# Patient Record
Sex: MALE | Race: WHITE | NOT HISPANIC OR LATINO | Employment: OTHER | RURAL
[De-identification: names, ages, dates, MRNs, and addresses within clinical notes are randomized per-mention and may not be internally consistent; named-entity substitution may affect disease eponyms.]

---

## 2023-09-18 ENCOUNTER — TELEPHONE (OUTPATIENT)
Dept: FAMILY MEDICINE | Facility: CLINIC | Age: 68
End: 2023-09-18
Payer: MEDICARE

## 2023-09-18 RX ORDER — AMLODIPINE BESYLATE 5 MG/1
5 TABLET ORAL DAILY
Qty: 90 TABLET | Refills: 3 | Status: ON HOLD | OUTPATIENT
Start: 2023-09-18 | End: 2023-12-14 | Stop reason: HOSPADM

## 2023-09-18 NOTE — TELEPHONE ENCOUNTER
----- Message from Isabel Acosta sent at 9/18/2023  9:47 AM CDT -----  Amlodlipine 5mg to [facility]

## 2023-10-10 NOTE — PROGRESS NOTES
Subjective     Patient ID: Mitul Torres is a 68 y.o. male.    Chief Complaint: Medication Refill and Hypertension    Patient is a 68 year old white male that presents today for medication refills. He has a history of hypertension. Blood pressure is 160/84 today in clinic. Patient reports he took his medication last night. He reports compliance with medication. He denies monitoring his blood pressure at home but admits to having a blood pressure monitor. He currently denies headache or dizziness. Admits to headaches and dizziness at times. Denies chest pain, admits to palpitations and SOB with exertion or carrying heavy objects. He was found to have a cardiac murmur last year, but did not have insurance at the time and declined a cardiology referral. He admits that he has never been told he had a murmur prior to that finding a year ago. He is due for routine lab work.     Review of Systems   Constitutional:  Negative for appetite change, chills, fatigue and unexpected weight change.   HENT:  Negative for dental problem, facial swelling, hearing loss, trouble swallowing and voice change.    Eyes:  Negative for visual disturbance.   Respiratory:  Positive for shortness of breath (with exertion and heavy lifting). Negative for apnea and chest tightness.    Cardiovascular:  Positive for palpitations. Negative for chest pain and leg swelling.   Gastrointestinal:  Negative for abdominal pain, blood in stool, change in bowel habit and reflux.   Endocrine: Negative for cold intolerance and heat intolerance.   Genitourinary:  Negative for decreased urine volume, difficulty urinating, hematuria, scrotal swelling and testicular pain.   Musculoskeletal:  Negative for gait problem, joint swelling and myalgias.   Integumentary:  Negative for color change and pallor.   Neurological:  Negative for weakness, light-headedness and headaches.   Psychiatric/Behavioral:  Negative for sleep disturbance. The patient is not  nervous/anxious.           Objective     Physical Exam  Vitals and nursing note reviewed.   Constitutional:       Appearance: Normal appearance.   HENT:      Head: Normocephalic and atraumatic.      Nose: Nose normal.      Mouth/Throat:      Mouth: Mucous membranes are moist.      Pharynx: Oropharynx is clear.   Eyes:      Extraocular Movements: Extraocular movements intact.      Conjunctiva/sclera: Conjunctivae normal.      Pupils: Pupils are equal, round, and reactive to light.   Cardiovascular:      Rate and Rhythm: Normal rate and regular rhythm.      Pulses: Normal pulses.      Heart sounds: Murmur heard.   Pulmonary:      Effort: Pulmonary effort is normal.      Breath sounds: Normal breath sounds.   Musculoskeletal:      Cervical back: Normal range of motion and neck supple.   Skin:     General: Skin is warm and dry.   Neurological:      General: No focal deficit present.      Mental Status: He is alert and oriented to person, place, and time.   Psychiatric:         Behavior: Behavior normal.         Thought Content: Thought content normal.          Assessment and Plan     1. Essential hypertension, benign  -     TSH  -     Hemoglobin A1C  -     Lipid Panel  -     Comprehensive Metabolic Panel  -     CBC Auto Differential  -     Hepatitis C antibody    2. Palpitation  -     IN OFFICE EKG 12-LEAD (to [place])  -     Ambulatory referral/consult to Cardiology; Future; Expected date: 10/18/2023    3. Cardiac murmur  -     IN OFFICE EKG 12-LEAD (to [place])  -     Ambulatory referral/consult to Cardiology; Future; Expected date: 10/18/2023    4. Dyspnea on exertion  -     Ambulatory referral/consult to Cardiology; Future; Expected date: 10/18/2023    5. Encounter for screening for lipoid disorders  -     TSH  -     Hemoglobin A1C  -     Lipid Panel  -     Comprehensive Metabolic Panel  -     CBC Auto Differential  -     Hepatitis C antibody    6. Encounter for screening for HIV  -     TSH  -     Lipid Panel  -      Comprehensive Metabolic Panel  -     CBC Auto Differential  -     Hepatitis C antibody    7. Encounter for hepatitis C screening test for low risk patient  -     TSH  -     Hemoglobin A1C  -     Lipid Panel  -     Comprehensive Metabolic Panel  -     CBC Auto Differential  -     Hepatitis C antibody    8. Impaired fasting glucose  -     Hemoglobin A1C    9. Prostate cancer screening  -     PSA, Screening        Referral to cardiology for evaluation of cardiac murmur/dyspnea on exertion/palpitations  Take medication as prescribed  Blood pressure log given to patient in clinic with instruction to check blood pressure twice daily and record on log. Bring log back to clinic in two weeks for review. Will anticipate medication changes at that time.  Patient would like to forego colonoscopy screening at this time, will address at subsequent visit  Labs pending, will review and notify patient of results once available.           Follow up in about 2 weeks (around 10/25/2023).

## 2023-10-11 ENCOUNTER — OFFICE VISIT (OUTPATIENT)
Dept: FAMILY MEDICINE | Facility: CLINIC | Age: 68
End: 2023-10-11
Payer: MEDICARE

## 2023-10-11 VITALS
OXYGEN SATURATION: 98 % | RESPIRATION RATE: 20 BRPM | HEIGHT: 70 IN | BODY MASS INDEX: 24.62 KG/M2 | HEART RATE: 73 BPM | WEIGHT: 172 LBS | TEMPERATURE: 98 F | SYSTOLIC BLOOD PRESSURE: 160 MMHG | DIASTOLIC BLOOD PRESSURE: 84 MMHG

## 2023-10-11 DIAGNOSIS — Z11.59 ENCOUNTER FOR HEPATITIS C SCREENING TEST FOR LOW RISK PATIENT: ICD-10-CM

## 2023-10-11 DIAGNOSIS — R00.2 PALPITATION: ICD-10-CM

## 2023-10-11 DIAGNOSIS — R01.1 CARDIAC MURMUR: ICD-10-CM

## 2023-10-11 DIAGNOSIS — I10 ESSENTIAL HYPERTENSION, BENIGN: Primary | ICD-10-CM

## 2023-10-11 DIAGNOSIS — R06.09 DYSPNEA ON EXERTION: ICD-10-CM

## 2023-10-11 DIAGNOSIS — Z12.5 PROSTATE CANCER SCREENING: ICD-10-CM

## 2023-10-11 DIAGNOSIS — Z13.220 ENCOUNTER FOR SCREENING FOR LIPOID DISORDERS: ICD-10-CM

## 2023-10-11 DIAGNOSIS — R73.01 IMPAIRED FASTING GLUCOSE: ICD-10-CM

## 2023-10-11 DIAGNOSIS — Z11.4 ENCOUNTER FOR SCREENING FOR HIV: ICD-10-CM

## 2023-10-11 LAB
ALBUMIN SERPL BCP-MCNC: 3.9 G/DL (ref 3.5–5)
ALBUMIN/GLOB SERPL: 1.1 {RATIO}
ALP SERPL-CCNC: 64 U/L (ref 45–115)
ALT SERPL W P-5'-P-CCNC: 21 U/L (ref 16–61)
ANION GAP SERPL CALCULATED.3IONS-SCNC: 7 MMOL/L (ref 7–16)
AST SERPL W P-5'-P-CCNC: 15 U/L (ref 15–37)
BASOPHILS # BLD AUTO: 0.05 K/UL (ref 0–0.2)
BASOPHILS NFR BLD AUTO: 0.7 % (ref 0–1)
BILIRUB SERPL-MCNC: 0.6 MG/DL (ref ?–1.2)
BUN SERPL-MCNC: 15 MG/DL (ref 7–18)
BUN/CREAT SERPL: 14 (ref 6–20)
CALCIUM SERPL-MCNC: 9 MG/DL (ref 8.5–10.1)
CHLORIDE SERPL-SCNC: 107 MMOL/L (ref 98–107)
CHOLEST SERPL-MCNC: 177 MG/DL (ref 0–200)
CHOLEST/HDLC SERPL: 2.7 {RATIO}
CO2 SERPL-SCNC: 30 MMOL/L (ref 21–32)
CREAT SERPL-MCNC: 1.06 MG/DL (ref 0.7–1.3)
DIFFERENTIAL METHOD BLD: ABNORMAL
EGFR (NO RACE VARIABLE) (RUSH/TITUS): 77 ML/MIN/1.73M2
EOSINOPHIL # BLD AUTO: 0.18 K/UL (ref 0–0.5)
EOSINOPHIL NFR BLD AUTO: 2.7 % (ref 1–4)
ERYTHROCYTE [DISTWIDTH] IN BLOOD BY AUTOMATED COUNT: 13.8 % (ref 11.5–14.5)
EST. AVERAGE GLUCOSE BLD GHB EST-MCNC: 90 MG/DL
GLOBULIN SER-MCNC: 3.7 G/DL (ref 2–4)
GLUCOSE SERPL-MCNC: 109 MG/DL (ref 74–106)
HBA1C MFR BLD HPLC: 5.3 % (ref 4.5–6.6)
HCT VFR BLD AUTO: 39.2 % (ref 40–54)
HCV AB SER QL: NORMAL
HDLC SERPL-MCNC: 66 MG/DL (ref 40–60)
HGB BLD-MCNC: 13.3 G/DL (ref 13.5–18)
IMM GRANULOCYTES # BLD AUTO: 0.01 K/UL (ref 0–0.04)
IMM GRANULOCYTES NFR BLD: 0.1 % (ref 0–0.4)
LDLC SERPL CALC-MCNC: 92 MG/DL
LDLC/HDLC SERPL: 1.4 {RATIO}
LYMPHOCYTES # BLD AUTO: 2.27 K/UL (ref 1–4.8)
LYMPHOCYTES NFR BLD AUTO: 33.9 % (ref 27–41)
MCH RBC QN AUTO: 29.6 PG (ref 27–31)
MCHC RBC AUTO-ENTMCNC: 33.9 G/DL (ref 32–36)
MCV RBC AUTO: 87.3 FL (ref 80–96)
MONOCYTES # BLD AUTO: 0.53 K/UL (ref 0–0.8)
MONOCYTES NFR BLD AUTO: 7.9 % (ref 2–6)
MPC BLD CALC-MCNC: 11.5 FL (ref 9.4–12.4)
NEUTROPHILS # BLD AUTO: 3.65 K/UL (ref 1.8–7.7)
NEUTROPHILS NFR BLD AUTO: 54.7 % (ref 53–65)
NONHDLC SERPL-MCNC: 111 MG/DL
NRBC # BLD AUTO: 0 X10E3/UL
NRBC, AUTO (.00): 0 %
PLATELET # BLD AUTO: 179 K/UL (ref 150–400)
POTASSIUM SERPL-SCNC: 4 MMOL/L (ref 3.5–5.1)
PROT SERPL-MCNC: 7.6 G/DL (ref 6.4–8.2)
PSA SERPL-MCNC: 6.98 NG/ML
RBC # BLD AUTO: 4.49 M/UL (ref 4.6–6.2)
SODIUM SERPL-SCNC: 140 MMOL/L (ref 136–145)
TRIGL SERPL-MCNC: 97 MG/DL (ref 35–150)
TSH SERPL DL<=0.005 MIU/L-ACNC: 2.35 UIU/ML (ref 0.36–3.74)
VLDLC SERPL-MCNC: 19 MG/DL
WBC # BLD AUTO: 6.69 K/UL (ref 4.5–11)

## 2023-10-11 PROCEDURE — G0103 PSA, SCREENING: ICD-10-PCS | Mod: ,,, | Performed by: CLINICAL MEDICAL LABORATORY

## 2023-10-11 PROCEDURE — G0103 PSA SCREENING: HCPCS | Mod: ,,, | Performed by: CLINICAL MEDICAL LABORATORY

## 2023-10-11 PROCEDURE — 36415 PR COLLECTION VENOUS BLOOD,VENIPUNCTURE: ICD-10-PCS | Mod: ,,,

## 2023-10-11 PROCEDURE — 80061 LIPID PANEL: ICD-10-PCS | Mod: ,,, | Performed by: CLINICAL MEDICAL LABORATORY

## 2023-10-11 PROCEDURE — 83036 HEMOGLOBIN A1C: ICD-10-PCS | Mod: ,,, | Performed by: CLINICAL MEDICAL LABORATORY

## 2023-10-11 PROCEDURE — 85025 CBC WITH DIFFERENTIAL: ICD-10-PCS | Mod: ,,, | Performed by: CLINICAL MEDICAL LABORATORY

## 2023-10-11 PROCEDURE — 99203 OFFICE O/P NEW LOW 30 MIN: CPT | Mod: ,,,

## 2023-10-11 PROCEDURE — 36415 COLL VENOUS BLD VENIPUNCTURE: CPT | Mod: ,,,

## 2023-10-11 PROCEDURE — 83036 HEMOGLOBIN GLYCOSYLATED A1C: CPT | Mod: ,,, | Performed by: CLINICAL MEDICAL LABORATORY

## 2023-10-11 PROCEDURE — 86803 HEPATITIS C ANTIBODY: ICD-10-PCS | Mod: ,,, | Performed by: CLINICAL MEDICAL LABORATORY

## 2023-10-11 PROCEDURE — 84443 ASSAY THYROID STIM HORMONE: CPT | Mod: ,,, | Performed by: CLINICAL MEDICAL LABORATORY

## 2023-10-11 PROCEDURE — 80053 COMPREHENSIVE METABOLIC PANEL: ICD-10-PCS | Mod: ,,, | Performed by: CLINICAL MEDICAL LABORATORY

## 2023-10-11 PROCEDURE — 80061 LIPID PANEL: CPT | Mod: ,,, | Performed by: CLINICAL MEDICAL LABORATORY

## 2023-10-11 PROCEDURE — 80053 COMPREHEN METABOLIC PANEL: CPT | Mod: ,,, | Performed by: CLINICAL MEDICAL LABORATORY

## 2023-10-11 PROCEDURE — 99203 PR OFFICE/OUTPT VISIT, NEW, LEVL III, 30-44 MIN: ICD-10-PCS | Mod: ,,,

## 2023-10-11 PROCEDURE — 85025 COMPLETE CBC W/AUTO DIFF WBC: CPT | Mod: ,,, | Performed by: CLINICAL MEDICAL LABORATORY

## 2023-10-11 PROCEDURE — 84443 TSH: ICD-10-PCS | Mod: ,,, | Performed by: CLINICAL MEDICAL LABORATORY

## 2023-10-11 PROCEDURE — 86803 HEPATITIS C AB TEST: CPT | Mod: ,,, | Performed by: CLINICAL MEDICAL LABORATORY

## 2023-10-12 DIAGNOSIS — R97.20 ELEVATED PSA: Primary | ICD-10-CM

## 2023-11-20 ENCOUNTER — OFFICE VISIT (OUTPATIENT)
Dept: CARDIOLOGY | Facility: CLINIC | Age: 68
End: 2023-11-20
Payer: MEDICARE

## 2023-11-20 VITALS
OXYGEN SATURATION: 96 % | WEIGHT: 172 LBS | SYSTOLIC BLOOD PRESSURE: 128 MMHG | BODY MASS INDEX: 24.62 KG/M2 | HEART RATE: 75 BPM | DIASTOLIC BLOOD PRESSURE: 80 MMHG | HEIGHT: 70 IN

## 2023-11-20 DIAGNOSIS — R06.09 DOE (DYSPNEA ON EXERTION): Primary | ICD-10-CM

## 2023-11-20 DIAGNOSIS — R00.2 PALPITATION: ICD-10-CM

## 2023-11-20 DIAGNOSIS — R06.09 DYSPNEA ON EXERTION: ICD-10-CM

## 2023-11-20 DIAGNOSIS — R01.1 CARDIAC MURMUR: ICD-10-CM

## 2023-11-20 PROCEDURE — 93000 EKG 12-LEAD: ICD-10-PCS | Mod: ,,, | Performed by: INTERNAL MEDICINE

## 2023-11-20 PROCEDURE — 99214 OFFICE O/P EST MOD 30 MIN: CPT | Mod: PBBFAC | Performed by: INTERNAL MEDICINE

## 2023-11-20 PROCEDURE — 93000 ELECTROCARDIOGRAM COMPLETE: CPT | Mod: ,,, | Performed by: INTERNAL MEDICINE

## 2023-11-20 PROCEDURE — 99205 PR OFFICE/OUTPT VISIT, NEW, LEVL V, 60-74 MIN: ICD-10-PCS | Mod: S$PBB,,, | Performed by: INTERNAL MEDICINE

## 2023-11-20 PROCEDURE — 93246 EXT ECG>7D<15D RECORDING: CPT | Performed by: INTERNAL MEDICINE

## 2023-11-20 PROCEDURE — 99205 OFFICE O/P NEW HI 60 MIN: CPT | Mod: S$PBB,,, | Performed by: INTERNAL MEDICINE
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
## 2023-12-12 ENCOUNTER — HOSPITAL ENCOUNTER (OUTPATIENT)
Dept: RADIOLOGY | Facility: HOSPITAL | Age: 68
Discharge: HOME | End: 2023-12-12
Attending: INTERNAL MEDICINE
Payer: MEDICARE

## 2023-12-12 ENCOUNTER — TELEPHONE (OUTPATIENT)
Dept: CARDIOLOGY | Facility: CLINIC | Age: 68
End: 2023-12-12
Payer: MEDICARE

## 2023-12-12 ENCOUNTER — HOSPITAL ENCOUNTER (OUTPATIENT)
Dept: CARDIOLOGY | Facility: HOSPITAL | Age: 68
Discharge: HOME | End: 2023-12-12
Attending: INTERNAL MEDICINE
Payer: MEDICARE

## 2023-12-12 VITALS
BODY MASS INDEX: 24.68 KG/M2 | HEART RATE: 68 BPM | SYSTOLIC BLOOD PRESSURE: 165 MMHG | DIASTOLIC BLOOD PRESSURE: 90 MMHG | HEIGHT: 70 IN

## 2023-12-12 VITALS — WEIGHT: 172 LBS | HEIGHT: 70 IN | BODY MASS INDEX: 24.62 KG/M2

## 2023-12-12 DIAGNOSIS — R06.09 DOE (DYSPNEA ON EXERTION): ICD-10-CM

## 2023-12-12 DIAGNOSIS — R93.1 ABNORMAL ECHOCARDIOGRAM: Primary | ICD-10-CM

## 2023-12-12 DIAGNOSIS — Z01.818 PRE-OPERATIVE CLEARANCE: Primary | ICD-10-CM

## 2023-12-12 LAB
AORTIC ROOT ANNULUS: 3.43 CM
BSA FOR ECHO PROCEDURE: 1.96 M2
CV ECHO LV RWT: 0.69 CM
DOP CALC LVOT AREA: 3.6 CM2
DOP CALC LVOT DIAMETER: 2.14 CM
DOP CALC LVOT PEAK VEL: 0.67 M/S
DOP CALC LVOT STROKE VOLUME: 52.49 CM3
DOP CALCLVOT PEAK VEL VTI: 14.6 CM
E WAVE DECELERATION TIME: 258.82 MSEC
E/A RATIO: 2.06
E/E' RATIO: 11.2 M/S
ECHO LV POSTERIOR WALL: 1.4 CM (ref 0.6–1.1)
FRACTIONAL SHORTENING: 42 % (ref 28–44)
INFERIOR VENA CAVA SIZE SNIFF: 0.5 CM
INTERVENTRICULAR SEPTUM: 1.58 CM (ref 0.6–1.1)
IVC DIAMETER: 1.8 CM
LEFT ATRIUM VOLUME INDEX MOD: 69.8 ML/M2
LEFT ATRIUM VOLUME MOD: 136.85 CM3
LEFT INTERNAL DIMENSION IN SYSTOLE: 2.34 CM (ref 2.1–4)
LEFT VENTRICLE DIASTOLIC VOLUME INDEX: 36.57 ML/M2
LEFT VENTRICLE DIASTOLIC VOLUME: 71.68 ML
LEFT VENTRICLE MASS INDEX: 119 G/M2
LEFT VENTRICLE SYSTOLIC VOLUME INDEX: 9.7 ML/M2
LEFT VENTRICLE SYSTOLIC VOLUME: 19.01 ML
LEFT VENTRICULAR INTERNAL DIMENSION IN DIASTOLE: 4.04 CM (ref 3.5–6)
LEFT VENTRICULAR MASS: 233.57 G
LV LATERAL E/E' RATIO: 10.77 M/S
LV SEPTAL E/E' RATIO: 11.67 M/S
LVOT MG: 0.92 MMHG
LVOT MV: 0.45 CM/S
MV PEAK A VEL: 0.68 M/S
MV PEAK E VEL: 1.4 M/S
OHS LV EJECTION FRACTION SIMPSONS BIPLANE MOD: 60 %
PISA TR MAX VEL: 2.58 M/S
PV PEAK GRADIENT: 5 MMHG
PV PEAK VELOCITY: 1.13 M/S
RA PRESSURE ESTIMATED: 3 MMHG
RIGHT ATRIAL AREA: 18 CM2
RIGHT VENTRICULAR END-DIASTOLIC DIMENSION: 4.3 CM
RIGHT VENTRICULAR LENGTH IN DIASTOLE (APICAL 4-CHAMBER VIEW): 7.77 CM
RV MID DIAMA: 2.69 CM
RV TB RVSP: 6 MMHG
TDI LATERAL: 0.13 M/S
TDI SEPTAL: 0.12 M/S
TDI: 0.13 M/S
TR MAX PG: 27 MMHG
TRICUSPID ANNULAR PLANE SYSTOLIC EXCURSION: 3.03 CM
TV REST PULMONARY ARTERY PRESSURE: 30 MMHG
Z-SCORE OF LEFT VENTRICULAR DIMENSION IN END DIASTOLE: -3.28
Z-SCORE OF LEFT VENTRICULAR DIMENSION IN END SYSTOLE: -3.07

## 2023-12-12 PROCEDURE — 93306 TTE W/DOPPLER COMPLETE: CPT | Mod: 26,,, | Performed by: STUDENT IN AN ORGANIZED HEALTH CARE EDUCATION/TRAINING PROGRAM

## 2023-12-12 PROCEDURE — 78452 HT MUSCLE IMAGE SPECT MULT: CPT | Mod: 26,,, | Performed by: STUDENT IN AN ORGANIZED HEALTH CARE EDUCATION/TRAINING PROGRAM

## 2023-12-12 PROCEDURE — 63600175 PHARM REV CODE 636 W HCPCS: Performed by: INTERNAL MEDICINE

## 2023-12-12 PROCEDURE — 93018 CV STRESS TEST I&R ONLY: CPT | Mod: ,,, | Performed by: INTERNAL MEDICINE

## 2023-12-12 PROCEDURE — 93306 ECHO (CUPID ONLY): ICD-10-PCS | Mod: 26,,, | Performed by: STUDENT IN AN ORGANIZED HEALTH CARE EDUCATION/TRAINING PROGRAM

## 2023-12-12 PROCEDURE — 93018 NUCLEAR STRESS TEST (CUPID ONLY): ICD-10-PCS | Mod: ,,, | Performed by: INTERNAL MEDICINE

## 2023-12-12 PROCEDURE — 93016 CV STRESS TEST SUPVJ ONLY: CPT | Mod: ,,, | Performed by: NURSE PRACTITIONER

## 2023-12-12 PROCEDURE — 93306 TTE W/DOPPLER COMPLETE: CPT

## 2023-12-12 PROCEDURE — 93017 CV STRESS TEST TRACING ONLY: CPT

## 2023-12-12 PROCEDURE — 78452 NM MYOCARDIAL PERFUSION SPECT MULTI STUDY: ICD-10-PCS | Mod: 26,,, | Performed by: STUDENT IN AN ORGANIZED HEALTH CARE EDUCATION/TRAINING PROGRAM

## 2023-12-12 PROCEDURE — 93016 NUCLEAR STRESS TEST (CUPID ONLY): ICD-10-PCS | Mod: ,,, | Performed by: NURSE PRACTITIONER

## 2023-12-12 RX ORDER — REGADENOSON 0.08 MG/ML
0.4 INJECTION, SOLUTION INTRAVENOUS ONCE
Status: COMPLETED | OUTPATIENT
Start: 2023-12-12 | End: 2023-12-12

## 2023-12-12 RX ORDER — SODIUM CHLORIDE 0.9 % (FLUSH) 0.9 %
3 SYRINGE (ML) INJECTION EVERY 6 HOURS PRN
Status: CANCELLED | OUTPATIENT
Start: 2023-12-14

## 2023-12-12 RX ORDER — METOPROLOL SUCCINATE 25 MG/1
25 TABLET, EXTENDED RELEASE ORAL DAILY
Qty: 30 TABLET | Refills: 0 | Status: SHIPPED | OUTPATIENT
Start: 2023-12-12 | End: 2024-01-19 | Stop reason: SDUPTHER

## 2023-12-12 RX ADMIN — REGADENOSON 0.4 MG: 0.08 INJECTION, SOLUTION INTRAVENOUS at 09:12

## 2023-12-13 DIAGNOSIS — I34.0 SEVERE MITRAL REGURGITATION: Primary | ICD-10-CM

## 2023-12-14 ENCOUNTER — HOSPITAL ENCOUNTER (OUTPATIENT)
Dept: CARDIOLOGY | Facility: HOSPITAL | Age: 68
Discharge: HOME | End: 2023-12-14
Attending: STUDENT IN AN ORGANIZED HEALTH CARE EDUCATION/TRAINING PROGRAM
Payer: MEDICARE

## 2023-12-14 ENCOUNTER — HOSPITAL ENCOUNTER (OUTPATIENT)
Facility: HOSPITAL | Age: 68
Discharge: HOME | End: 2023-12-14
Attending: INTERNAL MEDICINE | Admitting: INTERNAL MEDICINE
Payer: MEDICARE

## 2023-12-14 VITALS
OXYGEN SATURATION: 94 % | DIASTOLIC BLOOD PRESSURE: 83 MMHG | RESPIRATION RATE: 14 BRPM | HEART RATE: 57 BPM | SYSTOLIC BLOOD PRESSURE: 137 MMHG

## 2023-12-14 DIAGNOSIS — I34.0 SEVERE MITRAL REGURGITATION: ICD-10-CM

## 2023-12-14 DIAGNOSIS — R06.09 DYSPNEA ON EXERTION: ICD-10-CM

## 2023-12-14 DIAGNOSIS — R01.1 CARDIAC MURMUR: ICD-10-CM

## 2023-12-14 DIAGNOSIS — I34.0 SEVERE MITRAL REGURGITATION: Primary | ICD-10-CM

## 2023-12-14 DIAGNOSIS — R93.1 ABNORMAL ECHOCARDIOGRAM: ICD-10-CM

## 2023-12-14 LAB
CATH EF QUANTITATIVE: 55 %
CV STRESS BASE HR: 68 BPM
DIASTOLIC BLOOD PRESSURE: 90 MMHG
OHS CV CPX 1 MINUTE RECOVERY HEART RATE: 113 BPM
OHS CV CPX 85 PERCENT MAX PREDICTED HEART RATE MALE: 130
OHS CV CPX MAX PREDICTED HEART RATE: 153
OHS CV CPX PATIENT IS FEMALE: 0
OHS CV CPX PATIENT IS MALE: 1
OHS CV CPX PEAK DIASTOLIC BLOOD PRESSURE: 107 MMHG
OHS CV CPX PEAK HEAR RATE: 126 BPM
OHS CV CPX PEAK RATE PRESSURE PRODUCT: NORMAL
OHS CV CPX PEAK SYSTOLIC BLOOD PRESSURE: 205 MMHG
OHS CV CPX PERCENT MAX PREDICTED HEART RATE ACHIEVED: 82
OHS CV CPX RATE PRESSURE PRODUCT PRESENTING: NORMAL
SYSTOLIC BLOOD PRESSURE: 165 MMHG

## 2023-12-14 PROCEDURE — 93460 R&L HRT ART/VENTRICLE ANGIO: CPT | Mod: 26,,, | Performed by: INTERNAL MEDICINE

## 2023-12-14 PROCEDURE — 27201423 OPTIME MED/SURG SUP & DEVICES STERILE SUPPLY: Performed by: INTERNAL MEDICINE

## 2023-12-14 PROCEDURE — 63600175 PHARM REV CODE 636 W HCPCS: Performed by: INTERNAL MEDICINE

## 2023-12-14 PROCEDURE — 93320 DOPPLER ECHO COMPLETE: CPT | Mod: 26,,, | Performed by: STUDENT IN AN ORGANIZED HEALTH CARE EDUCATION/TRAINING PROGRAM

## 2023-12-14 PROCEDURE — 93312 TRANSESOPHAGEAL ECHO (TEE) (CUPID ONLY): ICD-10-PCS | Mod: 26,,, | Performed by: STUDENT IN AN ORGANIZED HEALTH CARE EDUCATION/TRAINING PROGRAM

## 2023-12-14 PROCEDURE — 93460 R&L HRT ART/VENTRICLE ANGIO: CPT | Performed by: INTERNAL MEDICINE

## 2023-12-14 PROCEDURE — 25000003 PHARM REV CODE 250: Performed by: INTERNAL MEDICINE

## 2023-12-14 PROCEDURE — 99152 MOD SED SAME PHYS/QHP 5/>YRS: CPT | Performed by: INTERNAL MEDICINE

## 2023-12-14 PROCEDURE — 99152 PR MOD CONSCIOUS SEDATION, SAME PHYS, 5+ YRS, FIRST 15 MIN: ICD-10-PCS | Mod: ,,, | Performed by: INTERNAL MEDICINE

## 2023-12-14 PROCEDURE — 99153 MOD SED SAME PHYS/QHP EA: CPT | Performed by: INTERNAL MEDICINE

## 2023-12-14 PROCEDURE — 93325 TRANSESOPHAGEAL ECHO (TEE) (CUPID ONLY): ICD-10-PCS | Mod: 26,,, | Performed by: STUDENT IN AN ORGANIZED HEALTH CARE EDUCATION/TRAINING PROGRAM

## 2023-12-14 PROCEDURE — 93320 DOPPLER ECHO COMPLETE: CPT

## 2023-12-14 PROCEDURE — 93325 DOPPLER ECHO COLOR FLOW MAPG: CPT

## 2023-12-14 PROCEDURE — C1760 CLOSURE DEV, VASC: HCPCS | Performed by: INTERNAL MEDICINE

## 2023-12-14 PROCEDURE — 93320 TRANSESOPHAGEAL ECHO (TEE) (CUPID ONLY): ICD-10-PCS | Mod: 26,,, | Performed by: STUDENT IN AN ORGANIZED HEALTH CARE EDUCATION/TRAINING PROGRAM

## 2023-12-14 PROCEDURE — 25500020 PHARM REV CODE 255: Performed by: INTERNAL MEDICINE

## 2023-12-14 PROCEDURE — 93325 DOPPLER ECHO COLOR FLOW MAPG: CPT | Mod: 26,,, | Performed by: STUDENT IN AN ORGANIZED HEALTH CARE EDUCATION/TRAINING PROGRAM

## 2023-12-14 PROCEDURE — 93460 PR CATH PLACE/CORON ANGIO, IMG SUPER/INTERP,R&L HRT CATH, L HRT VENTRIC: ICD-10-PCS | Mod: 26,,, | Performed by: INTERNAL MEDICINE

## 2023-12-14 PROCEDURE — C1894 INTRO/SHEATH, NON-LASER: HCPCS | Performed by: INTERNAL MEDICINE

## 2023-12-14 PROCEDURE — C1751 CATH, INF, PER/CENT/MIDLINE: HCPCS | Performed by: INTERNAL MEDICINE

## 2023-12-14 PROCEDURE — 99152 MOD SED SAME PHYS/QHP 5/>YRS: CPT | Mod: ,,, | Performed by: INTERNAL MEDICINE

## 2023-12-14 PROCEDURE — 93312 ECHO TRANSESOPHAGEAL: CPT | Mod: 26,,, | Performed by: STUDENT IN AN ORGANIZED HEALTH CARE EDUCATION/TRAINING PROGRAM

## 2023-12-14 DEVICE — ANGIO-SEAL VIP VASCULAR CLOSURE DEVICE
Type: IMPLANTABLE DEVICE | Site: GROIN | Status: FUNCTIONAL
Brand: ANGIO-SEAL

## 2023-12-14 RX ORDER — ACETAMINOPHEN 325 MG/1
650 TABLET ORAL EVERY 4 HOURS PRN
Status: DISCONTINUED | OUTPATIENT
Start: 2023-12-14 | End: 2023-12-14 | Stop reason: HOSPADM

## 2023-12-14 RX ORDER — MIDAZOLAM HYDROCHLORIDE 1 MG/ML
INJECTION INTRAMUSCULAR; INTRAVENOUS
Status: DISCONTINUED | OUTPATIENT
Start: 2023-12-14 | End: 2023-12-14 | Stop reason: HOSPADM

## 2023-12-14 RX ORDER — LIDOCAINE HYDROCHLORIDE 20 MG/ML
SOLUTION OROPHARYNGEAL
Status: DISCONTINUED | OUTPATIENT
Start: 2023-12-14 | End: 2023-12-14 | Stop reason: HOSPADM

## 2023-12-14 RX ORDER — SODIUM CHLORIDE 9 MG/ML
INJECTION, SOLUTION INTRAVENOUS
Status: DISCONTINUED | OUTPATIENT
Start: 2023-12-14 | End: 2023-12-14 | Stop reason: HOSPADM

## 2023-12-14 RX ORDER — ONDANSETRON 4 MG/1
8 TABLET, ORALLY DISINTEGRATING ORAL EVERY 8 HOURS PRN
Status: DISCONTINUED | OUTPATIENT
Start: 2023-12-14 | End: 2023-12-14 | Stop reason: HOSPADM

## 2023-12-14 RX ORDER — FENTANYL CITRATE 50 UG/ML
INJECTION, SOLUTION INTRAMUSCULAR; INTRAVENOUS
Status: DISCONTINUED | OUTPATIENT
Start: 2023-12-14 | End: 2023-12-14 | Stop reason: HOSPADM

## 2023-12-14 RX ORDER — SODIUM CHLORIDE 0.9 % (FLUSH) 0.9 %
3 SYRINGE (ML) INJECTION EVERY 6 HOURS PRN
Status: DISCONTINUED | OUTPATIENT
Start: 2023-12-14 | End: 2023-12-14 | Stop reason: HOSPADM

## 2023-12-14 RX ORDER — LIDOCAINE HYDROCHLORIDE 10 MG/ML
INJECTION INFILTRATION; PERINEURAL
Status: DISCONTINUED | OUTPATIENT
Start: 2023-12-14 | End: 2023-12-14 | Stop reason: HOSPADM

## 2023-12-14 RX ORDER — LOSARTAN POTASSIUM 25 MG/1
25 TABLET ORAL DAILY
Qty: 30 TABLET | Refills: 2 | Status: SHIPPED | OUTPATIENT
Start: 2023-12-14 | End: 2024-02-22

## 2023-12-14 RX ORDER — SODIUM CHLORIDE 450 MG/100ML
INJECTION, SOLUTION INTRAVENOUS CONTINUOUS
Status: DISCONTINUED | OUTPATIENT
Start: 2023-12-14 | End: 2023-12-14 | Stop reason: HOSPADM

## 2023-12-14 NOTE — DISCHARGE SUMMARY
[facility] - Cath Lab  Discharge Note  Short Stay    Procedure(s) (LRB):  Left heart cath (Left)  INSERTION, CATHETER, RIGHT HEART (Right)  Transesophageal echo (GAEL) intra-procedure log documentation      OUTCOME: Patient tolerated treatment/procedure well without complication and is now ready for discharge.    DISPOSITION: Home or Self Care    FINAL DIAGNOSIS:  <principal problem not specified>    FOLLOWUP:  With Dr. Be in 6 weeks and keep scheduled follow up at [facility] in [place], [place] next week    DISCHARGE INSTRUCTIONS:    Discharge Procedure Orders   Diet Cardiac     Lifting restrictions   Order Comments: No heavy lifting or squatting for 2 days     No driving until:   Order Comments: 24 hours after procedure     Activity as tolerated         Clinical Reference Documents Added to Patient Instructions         Document    CARDIAC CATHETERIZATION (ENGLISH)    CARDIAC CATHETERIZATION DISCHARGE INSTRUCTIONS (ENGLISH)    TRANSESOPHAGEAL ECHOCARDIOGRAM (ENGLISH)            TIME SPENT ON DISCHARGE: < 30 minutes

## 2023-12-14 NOTE — Clinical Note
95 ml of contrast were injected throughout the case. 105 mL of contrast was the total wasted during the case. 200 mL was the total amount used during the case.

## 2023-12-14 NOTE — Clinical Note
Attempted to call son. No answer. Out to waiting room to notify him to answer call when he sees [phone] Agreed.

## 2023-12-14 NOTE — DISCHARGE INSTRUCTIONS
Surgeon from [facility] office will contact you regarding an appointment.  Schedule a follow up with Dr Be after your cardiac surgery.

## 2023-12-15 PROCEDURE — 93248 PR EXT ECG, CONT, > 7 DAYS <= 15 DAYS, REVIEW W/INT: ICD-10-PCS | Mod: ,,, | Performed by: INTERNAL MEDICINE

## 2023-12-15 PROCEDURE — 93248 EXT ECG>7D<15D REV&INTERPJ: CPT | Mod: ,,, | Performed by: INTERNAL MEDICINE

## 2024-01-19 RX ORDER — METOPROLOL SUCCINATE 25 MG/1
25 TABLET, EXTENDED RELEASE ORAL DAILY
Qty: 30 TABLET | Refills: 0 | Status: SHIPPED | OUTPATIENT
Start: 2024-01-19 | End: 2024-02-22

## 2024-02-10 ENCOUNTER — HOSPITAL ENCOUNTER (EMERGENCY)
Facility: HOSPITAL | Age: 69
Discharge: ANOTHER HOSPITAL | End: 2024-02-10
Attending: EMERGENCY MEDICINE
Payer: MEDICARE

## 2024-02-10 VITALS
SYSTOLIC BLOOD PRESSURE: 90 MMHG | BODY MASS INDEX: 23.91 KG/M2 | DIASTOLIC BLOOD PRESSURE: 60 MMHG | TEMPERATURE: 98 F | OXYGEN SATURATION: 97 % | RESPIRATION RATE: 21 BRPM | WEIGHT: 167 LBS | HEIGHT: 70 IN | HEART RATE: 123 BPM

## 2024-02-10 DIAGNOSIS — I48.91 ATRIAL FIBRILLATION WITH RVR: Primary | ICD-10-CM

## 2024-02-10 DIAGNOSIS — R07.9 CHEST PAIN: ICD-10-CM

## 2024-02-10 DIAGNOSIS — K92.2 GASTROINTESTINAL HEMORRHAGE, UNSPECIFIED GASTROINTESTINAL HEMORRHAGE TYPE: ICD-10-CM

## 2024-02-10 DIAGNOSIS — D64.9 ANEMIA, UNSPECIFIED TYPE: ICD-10-CM

## 2024-02-10 LAB
ABO + RH BLD: NORMAL
ABORH RETYPE: NORMAL
ALBUMIN SERPL BCP-MCNC: 2.1 G/DL (ref 3.5–5)
ALBUMIN/GLOB SERPL: 0.6 {RATIO}
ALP SERPL-CCNC: 55 U/L (ref 45–115)
ALT SERPL W P-5'-P-CCNC: 44 U/L (ref 16–61)
ANION GAP SERPL CALCULATED.3IONS-SCNC: 7 MMOL/L (ref 7–16)
AST SERPL W P-5'-P-CCNC: 21 U/L (ref 15–37)
BASOPHILS # BLD AUTO: 0.07 K/UL (ref 0–0.2)
BASOPHILS NFR BLD AUTO: 0.5 % (ref 0–1)
BILIRUB SERPL-MCNC: 0.4 MG/DL (ref ?–1.2)
BLD PROD TYP BPU: NORMAL
BLOOD UNIT EXPIRATION DATE: NORMAL
BLOOD UNIT TYPE CODE: 6200
BUN SERPL-MCNC: 41 MG/DL (ref 7–18)
BUN/CREAT SERPL: 42 (ref 6–20)
CALCIUM SERPL-MCNC: 7.8 MG/DL (ref 8.5–10.1)
CHLORIDE SERPL-SCNC: 103 MMOL/L (ref 98–107)
CO2 SERPL-SCNC: 29 MMOL/L (ref 21–32)
CREAT SERPL-MCNC: 0.98 MG/DL (ref 0.7–1.3)
CROSSMATCH INTERPRETATION: NORMAL
DIFFERENTIAL METHOD BLD: ABNORMAL
DISPENSE STATUS: NORMAL
EGFR (NO RACE VARIABLE) (RUSH/TITUS): 85 ML/MIN/1.73M2
EOSINOPHIL # BLD AUTO: 0.07 K/UL (ref 0–0.5)
EOSINOPHIL NFR BLD AUTO: 0.5 % (ref 1–4)
ERYTHROCYTE [DISTWIDTH] IN BLOOD BY AUTOMATED COUNT: 13.5 % (ref 11.5–14.5)
GLOBULIN SER-MCNC: 3.3 G/DL (ref 2–4)
GLUCOSE SERPL-MCNC: 136 MG/DL (ref 74–106)
HCT VFR BLD AUTO: 24.7 % (ref 40–54)
HGB BLD-MCNC: 8.1 G/DL (ref 13.5–18)
IMM GRANULOCYTES # BLD AUTO: 0.06 K/UL (ref 0–0.04)
IMM GRANULOCYTES NFR BLD: 0.4 % (ref 0–0.4)
INDIRECT COOMBS: NORMAL
LYMPHOCYTES # BLD AUTO: 1.82 K/UL (ref 1–4.8)
LYMPHOCYTES NFR BLD AUTO: 12.8 % (ref 27–41)
MAGNESIUM SERPL-MCNC: 2.2 MG/DL (ref 1.7–2.3)
MCH RBC QN AUTO: 29.7 PG (ref 27–31)
MCHC RBC AUTO-ENTMCNC: 32.8 G/DL (ref 32–36)
MCV RBC AUTO: 90.5 FL (ref 80–96)
MONOCYTES # BLD AUTO: 0.96 K/UL (ref 0–0.8)
MONOCYTES NFR BLD AUTO: 6.7 % (ref 2–6)
MPC BLD CALC-MCNC: 10.3 FL (ref 9.4–12.4)
NEUTROPHILS # BLD AUTO: 11.29 K/UL (ref 1.8–7.7)
NEUTROPHILS NFR BLD AUTO: 79.1 % (ref 53–65)
NRBC # BLD AUTO: 0 X10E3/UL
NRBC, AUTO (.00): 0 %
NT-PROBNP SERPL-MCNC: 2614 PG/ML (ref 1–125)
PLATELET # BLD AUTO: 525 K/UL (ref 150–400)
POC OCCULT BLOOD STOOL: POSITIVE
POTASSIUM SERPL-SCNC: 4.4 MMOL/L (ref 3.5–5.1)
PROT SERPL-MCNC: 5.4 G/DL (ref 6.4–8.2)
RBC # BLD AUTO: 2.73 M/UL (ref 4.6–6.2)
RH BLD: NORMAL
SODIUM SERPL-SCNC: 135 MMOL/L (ref 136–145)
SPECIMEN OUTDATE: NORMAL
UNIT NUMBER: NORMAL
WBC # BLD AUTO: 14.27 K/UL (ref 4.5–11)

## 2024-02-10 PROCEDURE — 86850 RBC ANTIBODY SCREEN: CPT | Performed by: EMERGENCY MEDICINE

## 2024-02-10 PROCEDURE — 93005 ELECTROCARDIOGRAM TRACING: CPT

## 2024-02-10 PROCEDURE — 36430 TRANSFUSION BLD/BLD COMPNT: CPT

## 2024-02-10 PROCEDURE — C9113 INJ PANTOPRAZOLE SODIUM, VIA: HCPCS | Performed by: EMERGENCY MEDICINE

## 2024-02-10 PROCEDURE — 83735 ASSAY OF MAGNESIUM: CPT | Performed by: EMERGENCY MEDICINE

## 2024-02-10 PROCEDURE — 83880 ASSAY OF NATRIURETIC PEPTIDE: CPT | Performed by: EMERGENCY MEDICINE

## 2024-02-10 PROCEDURE — 63600175 PHARM REV CODE 636 W HCPCS

## 2024-02-10 PROCEDURE — 96365 THER/PROPH/DIAG IV INF INIT: CPT

## 2024-02-10 PROCEDURE — 82272 OCCULT BLD FECES 1-3 TESTS: CPT

## 2024-02-10 PROCEDURE — P9016 RBC LEUKOCYTES REDUCED: HCPCS | Performed by: EMERGENCY MEDICINE

## 2024-02-10 PROCEDURE — 63600175 PHARM REV CODE 636 W HCPCS: Performed by: EMERGENCY MEDICINE

## 2024-02-10 PROCEDURE — 96376 TX/PRO/DX INJ SAME DRUG ADON: CPT

## 2024-02-10 PROCEDURE — 99291 CRITICAL CARE FIRST HOUR: CPT

## 2024-02-10 PROCEDURE — 25000003 PHARM REV CODE 250: Performed by: EMERGENCY MEDICINE

## 2024-02-10 PROCEDURE — 85025 COMPLETE CBC W/AUTO DIFF WBC: CPT | Performed by: EMERGENCY MEDICINE

## 2024-02-10 PROCEDURE — 93010 ELECTROCARDIOGRAM REPORT: CPT | Mod: ,,, | Performed by: STUDENT IN AN ORGANIZED HEALTH CARE EDUCATION/TRAINING PROGRAM

## 2024-02-10 PROCEDURE — 96374 THER/PROPH/DIAG INJ IV PUSH: CPT | Mod: 59

## 2024-02-10 PROCEDURE — 80053 COMPREHEN METABOLIC PANEL: CPT | Performed by: EMERGENCY MEDICINE

## 2024-02-10 PROCEDURE — 86923 COMPATIBILITY TEST ELECTRIC: CPT | Performed by: EMERGENCY MEDICINE

## 2024-02-10 PROCEDURE — 96375 TX/PRO/DX INJ NEW DRUG ADDON: CPT

## 2024-02-10 PROCEDURE — 99285 EMERGENCY DEPT VISIT HI MDM: CPT | Mod: ,,, | Performed by: EMERGENCY MEDICINE

## 2024-02-10 RX ORDER — CLOPIDOGREL BISULFATE 75 MG/1
1 TABLET ORAL EVERY MORNING
COMMUNITY
Start: 2024-02-08 | End: 2024-03-01

## 2024-02-10 RX ORDER — PANTOPRAZOLE SODIUM 40 MG/10ML
80 INJECTION, POWDER, LYOPHILIZED, FOR SOLUTION INTRAVENOUS
Status: COMPLETED | OUTPATIENT
Start: 2024-02-10 | End: 2024-02-10

## 2024-02-10 RX ORDER — AMIODARONE HYDROCHLORIDE 200 MG/1
200 TABLET ORAL DAILY
COMMUNITY
Start: 2024-02-08 | End: 2024-03-14 | Stop reason: SDUPTHER

## 2024-02-10 RX ORDER — ATORVASTATIN CALCIUM 20 MG/1
20 TABLET, FILM COATED ORAL DAILY
COMMUNITY
Start: 2024-02-08 | End: 2024-03-14 | Stop reason: SDUPTHER

## 2024-02-10 RX ORDER — DILTIAZEM HYDROCHLORIDE 5 MG/ML
20 INJECTION INTRAVENOUS
Status: COMPLETED | OUTPATIENT
Start: 2024-02-10 | End: 2024-02-10

## 2024-02-10 RX ORDER — HYDROCODONE BITARTRATE AND ACETAMINOPHEN 500; 5 MG/1; MG/1
TABLET ORAL
Status: DISCONTINUED | OUTPATIENT
Start: 2024-02-10 | End: 2024-02-10 | Stop reason: HOSPADM

## 2024-02-10 RX ORDER — SODIUM CHLORIDE, SODIUM LACTATE, POTASSIUM CHLORIDE, CALCIUM CHLORIDE 600; 310; 30; 20 MG/100ML; MG/100ML; MG/100ML; MG/100ML
INJECTION, SOLUTION INTRAVENOUS
Status: COMPLETED
Start: 2024-02-10 | End: 2024-02-10

## 2024-02-10 RX ORDER — DIGOXIN 0.25 MG/ML
500 INJECTION INTRAMUSCULAR; INTRAVENOUS
Status: COMPLETED | OUTPATIENT
Start: 2024-02-10 | End: 2024-02-10

## 2024-02-10 RX ORDER — AMIODARONE HYDROCHLORIDE 150 MG/3ML
150 INJECTION, SOLUTION INTRAVENOUS
Status: DISCONTINUED | OUTPATIENT
Start: 2024-02-10 | End: 2024-02-10

## 2024-02-10 RX ORDER — DILTIAZEM HYDROCHLORIDE 5 MG/ML
25 INJECTION INTRAVENOUS
Status: COMPLETED | OUTPATIENT
Start: 2024-02-10 | End: 2024-02-10

## 2024-02-10 RX ORDER — TRANEXAMIC ACID 10 MG/ML
1000 INJECTION, SOLUTION INTRAVENOUS ONCE
Status: COMPLETED | OUTPATIENT
Start: 2024-02-10 | End: 2024-02-10

## 2024-02-10 RX ORDER — ASPIRIN 81 MG/1
1 TABLET ORAL EVERY MORNING
COMMUNITY

## 2024-02-10 RX ADMIN — SODIUM CHLORIDE: 9 INJECTION, SOLUTION INTRAVENOUS at 05:02

## 2024-02-10 RX ADMIN — DILTIAZEM HYDROCHLORIDE 20 MG: 5 INJECTION INTRAVENOUS at 02:02

## 2024-02-10 RX ADMIN — SODIUM CHLORIDE, POTASSIUM CHLORIDE, SODIUM LACTATE AND CALCIUM CHLORIDE 1000 ML: 600; 310; 30; 20 INJECTION, SOLUTION INTRAVENOUS at 05:02

## 2024-02-10 RX ADMIN — DILTIAZEM HYDROCHLORIDE 5 MG/HR: 5 INJECTION INTRAVENOUS at 03:02

## 2024-02-10 RX ADMIN — DIGOXIN 500 MCG: 0.25 INJECTION INTRAMUSCULAR; INTRAVENOUS at 04:02

## 2024-02-10 RX ADMIN — PANTOPRAZOLE SODIUM 80 MG: 40 INJECTION, POWDER, FOR SOLUTION INTRAVENOUS at 04:02

## 2024-02-10 RX ADMIN — AMIODARONE HYDROCHLORIDE 150 MG: 1.5 INJECTION, SOLUTION INTRAVENOUS at 04:02

## 2024-02-10 RX ADMIN — TRANEXAMIC ACID 1000 MG: 10 INJECTION, SOLUTION INTRAVENOUS at 04:02

## 2024-02-10 RX ADMIN — SODIUM CHLORIDE 1000 ML: 9 INJECTION, SOLUTION INTRAVENOUS at 04:02

## 2024-02-10 RX ADMIN — DILTIAZEM HYDROCHLORIDE 25 MG: 5 INJECTION INTRAVENOUS at 02:02

## 2024-02-10 NOTE — ED NOTES
[facility] called to fly patient. [facility] in [place] is available. Patient demographics given and flight accepted. ETA 10-15 minutes.

## 2024-02-10 NOTE — ED NOTES
1 L Nacl Bolus from EMS restarted by Dr. Sauer. Dr. Sauer stated to give the rest of the 1 L from EMS.

## 2024-02-10 NOTE — ED PROVIDER NOTES
Encounter Date: 2/10/2024       History     Chief Complaint   Patient presents with    Palpitations     Mitul Torres is a 69 yo with a PMHx of Afib and mitral valve repair who presents with a complaint of lightheadedness. Patient was D/Cd from [facility] where he had a mitral valve repair on 2/8/24. Patient states he was sitting down after taking his amiodarone and developed lightheadedness, nausea, and palpations.      Review of patient's allergies indicates:   Allergen Reactions    Bactrim [sulfamethoxazole-trimethoprim]      Past Medical History:   Diagnosis Date    Hyperlipidemia     Hypertension      Past Surgical History:   Procedure Laterality Date    ECHOCARDIOGRAM,TRANSESOPHAGEAL  12/14/2023    Procedure: Transesophageal echo (GAEL) intra-procedure log documentation;  Surgeon: Jerry Be DO;  Location: [facility] CATH LAB;  Service: Cardiology;;    LEFT HEART CATHETERIZATION Left 12/14/2023    Procedure: Left heart cath;  Surgeon: Jerry Be DO;  Location: [facility] CATH LAB;  Service: Cardiology;  Laterality: Left;    RIGHT HEART CATHETERIZATION Right 12/14/2023    Procedure: INSERTION, CATHETER, RIGHT HEART;  Surgeon: Jerry Be DO;  Location: [facility] CATH LAB;  Service: Cardiology;  Laterality: Right;     Family History   Problem Relation Age of Onset    Cataracts Mother     Glaucoma Mother     No Known Problems Father     Heart disease Brother         stent placement    No Known Problems Brother     Diabetes Maternal Grandfather      Social History     Tobacco Use    Smoking status: Former     Types: Cigarettes    Smokeless tobacco: Never   Substance Use Topics    Alcohol use: Never    Drug use: Yes     Types: Marijuana     Review of Systems   Constitutional:  Negative for chills and fever.   HENT:  Negative for congestion.    Respiratory:  Positive for shortness of breath.    Cardiovascular:  Positive for palpitations and leg swelling. Negative for chest pain.    Gastrointestinal:  Positive for nausea. Negative for abdominal pain and vomiting.   Skin:  Positive for color change.   All other systems reviewed and are negative.      Physical Exam     Initial Vitals [02/10/24 1350]   BP Pulse Resp Temp SpO2   106/71 (!) 140 20 97.6 °F (36.4 °C) 100 %      MAP       --         Physical Exam    Constitutional: He appears well-developed and well-nourished.   HENT:   Head: Normocephalic and atraumatic.   Cardiovascular:  An irregular rhythm present.   Tachycardia present.         Pulmonary/Chest: Effort normal. No respiratory distress.   Abdominal: Abdomen is soft. Bowel sounds are normal. There is no abdominal tenderness.   Bruising on lower abdomen     Neurological: He is alert and oriented to person, place, and time.   Psychiatric: He has a normal mood and affect. Thought content normal.         Medical Screening Exam   See Full Note    ED Course   Procedures  Labs Reviewed   COMPREHENSIVE METABOLIC PANEL - Abnormal; Notable for the following components:       Result Value    Sodium 135 (*)     Glucose 136 (*)     BUN 41 (*)     BUN/Creatinine Ratio 42 (*)     Calcium 7.8 (*)     Total Protein 5.4 (*)     Albumin 2.1 (*)     All other components within normal limits   NT-PRO NATRIURETIC PEPTIDE - Abnormal; Notable for the following components:    ProBNP 2,614 (*)     All other components within normal limits   CBC WITH DIFFERENTIAL - Abnormal; Notable for the following components:    WBC 14.27 (*)     RBC 2.73 (*)     Hemoglobin 8.1 (*)     Hematocrit 24.7 (*)     Platelet Count 525 (*)     Neutrophils % 79.1 (*)     Lymphocytes % 12.8 (*)     Monocytes % 6.7 (*)     Eosinophils % 0.5 (*)     Neutrophils, Abs 11.29 (*)     Monocytes, Absolute 0.96 (*)     Immature Granulocytes, Absolute 0.06 (*)     All other components within normal limits   POCT OCCULT BLOOD (STOOL) - Abnormal; Notable for the following components:    Fecal Occult Blood Positive (*)     All other components  within normal limits   MAGNESIUM - Normal   CBC W/ AUTO DIFFERENTIAL    Narrative:     The following orders were created for panel order CBC auto differential.  Procedure                               Abnormality         Status                     ---------                               -----------         ------                     CBC with Differential[[id number]]       Abnormal            Final result                 Please view results for these tests on the individual orders.   TYPE & SCREEN   ABORH RETYPE   PREPARE RBC SOFT          Imaging Results              X-Ray Chest AP Portable (Final result)  Result time 02/10/24 14:19:53      Final result by Arpan Garcia II, MD (02/10/24 14:19:53)                   Impression:      Increased left lower lung density could indicate atelectasis or infiltrate.  No other significant findings.      Electronically signed by: Arpan Garcia  Date:    02/10/2024  Time:    14:19               Narrative:    EXAMINATION:  XR CHEST AP PORTABLE    CLINICAL HISTORY:  Chest Pain;    COMPARISON:  None available    TECHNIQUE:  XR CHEST AP PORTABLE    FINDINGS:  The heart and mediastinum are within normal limits in size and configuration previous cardiac surgery.  The pulmonary vascularity is normal in caliber.  There is elevation left hemidiaphragm and increased left lower lung density.  No other lung infiltrates, effusions, pneumothorax or other abnormality is demonstrated.                                       Medications   diltiaZEM injection 20 mg (20 mg Intravenous Given 2/10/24 1415)   diltiaZEM injection 25 mg (25 mg Intravenous Given 2/10/24 1428)   diltiaZEM (CARDIZEM) 125 mg in dextrose 5 % (D5W) 125 mL infusion (0 mg/hr Intravenous Stopped 2/10/24 1558)   digoxin injection 500 mcg (500 mcg Intravenous Given 2/10/24 1609)   amiodarone in dextrose 150 mg/100 mL (1.5 mg/mL) loading dose 150 mg (0 mg Intravenous Stopped 2/10/24 1616)   sodium chloride 0.9% bolus 1,000 mL  1,000 mL (0 mLs Intravenous Stopped 2/10/24 1700)   tranexamic acid in NaCl,iso-os IVPB 1,000 mg (0 mg Intravenous Stopped 2/10/24 1649)   pantoprazole injection 80 mg (80 mg Intravenous Given 2/10/24 1635)   lactated ringers bolus 1,000 mL (1,000 mLs Intravenous New Bag 2/10/24 1700)     Medical Decision Making  Mitul Torres is a 69 yo M with Afib  DD  1. Afib with RVR  2. Electrolyte abnormality  3. Medication reaction    Amount and/or Complexity of Data Reviewed  Labs: ordered.  Radiology: ordered.    Risk  Prescription drug management.              Attending Attestation:         Attending Critical Care:   Critical Care Times:   Direct Patient Care (initial evaluation, reassessments, and time considering the case)................................................................30 minutes.   Additional History from reviewing old medical records or taking additional history from the family, EMS, PCP, etc.......................5 minutes.   Ordering, Reviewing, and Interpreting Diagnostic Studies...............................................................................................................5 minutes.   Documentation..................................................................................................................................................................................5 minutes.   Consultation with other Physicians. .................................................................................................................................................5 minutes.   ==============================================================  Total Critical Care Time - exclusive of procedural time: 50 minutes.  ==============================================================  Critical care was necessary to treat or prevent imminent or life-threatening deterioration of the following conditions: cardiac arrhythmia and GI bleeding.   Critical care was time spent personally by me on the  following activities: obtaining history from patient or relative, review of old charts, examination of patient, ordering lab, x-rays, and/or EKG, development of treatment plan with patient or relative, ordering and performing treatments and interventions, evaluation of patient's response to treatment, discussion with consultants and re-evaluation of patient's conition.   Critical Care Condition: potentially life-threatening           ED Course as of 02/12/24 0603   Sat Feb 10, 2024   1513 Medical decision-making:  Differential diagnosis includes AFib with RVR, CHF, anemia, GI bleed.  All labs and imaging ordered and interpreted by me. [BB]   1513 CMP shows elevated BUN of 41, otherwise unremarkable.  CBC shows anemia with hematocrit of 24.7 which is 3 points lower than 10 days ago. [BB]   1514 Magnesium is normal.  Pro BNP is elevated at 2600. [BB]   1514   [place] [address]. [BB]   1518 I discussed case with internal medicine hospitalist on-call who feels that since patient has so fresh from surgery he should probably be transferred to [facility].  I am going to place a [facility] order for this. [BB]   1548 Discussed case with transfer person at [facility] who accepted patient in transfer to the emergency department under care of Dr. Malave. [BB]      ED Course User Index  [BB] Frank Sauer MD                           Clinical Impression:   Final diagnoses:  [R07.9] Chest pain  [I48.91] Atrial fibrillation with RVR (Primary)  [K92.2] Gastrointestinal hemorrhage, unspecified gastrointestinal hemorrhage type  [D64.9] Anemia, unspecified type        ED Disposition Condition    Transfer to Another Facility Stable                Frank Sauer MD  02/12/24 0603

## 2024-02-10 NOTE — ED TRIAGE NOTES
Pt c/o heart racing, shortness of breath and nausea that started after taking his home meds this AM. States he just started amiodorone. Mitral valve replacement done last week. Dc'd from [facility] On 2/8/24.

## 2024-02-10 NOTE — ED NOTES
Discussed patient condition with  (hypotension and tachycardia) and PRBC that were ordered not ready yet. States to fly patient to [facility] and if they need to give him O-NEG blood they can.

## 2024-02-13 LAB
OHS QRS DURATION: 94 MS
OHS QTC CALCULATION: 462 MS

## 2024-02-22 ENCOUNTER — OFFICE VISIT (OUTPATIENT)
Dept: FAMILY MEDICINE | Facility: CLINIC | Age: 69
End: 2024-02-22
Payer: MEDICARE

## 2024-02-22 VITALS
TEMPERATURE: 98 F | DIASTOLIC BLOOD PRESSURE: 76 MMHG | OXYGEN SATURATION: 97 % | RESPIRATION RATE: 20 BRPM | SYSTOLIC BLOOD PRESSURE: 124 MMHG | HEART RATE: 70 BPM | HEIGHT: 70 IN | WEIGHT: 151 LBS | BODY MASS INDEX: 21.62 KG/M2

## 2024-02-22 DIAGNOSIS — R11.0 NAUSEA: ICD-10-CM

## 2024-02-22 DIAGNOSIS — Z09 HOSPITAL DISCHARGE FOLLOW-UP: ICD-10-CM

## 2024-02-22 DIAGNOSIS — I10 ESSENTIAL HYPERTENSION, BENIGN: ICD-10-CM

## 2024-02-22 DIAGNOSIS — Z98.890 STATUS POST CARDIAC SURGERY: ICD-10-CM

## 2024-02-22 DIAGNOSIS — Z87.19 HISTORY OF GI BLEED: Primary | ICD-10-CM

## 2024-02-22 PROCEDURE — 99212 OFFICE O/P EST SF 10 MIN: CPT | Mod: ,,,

## 2024-02-22 RX ORDER — METOPROLOL TARTRATE 25 MG/1
12.5 TABLET, FILM COATED ORAL 2 TIMES DAILY
COMMUNITY
Start: 2024-02-08 | End: 2024-03-14 | Stop reason: SDUPTHER

## 2024-02-22 RX ORDER — MUPIROCIN 20 MG/G
OINTMENT TOPICAL DAILY
COMMUNITY
Start: 2024-01-12 | End: 2024-02-29

## 2024-02-22 RX ORDER — PANTOPRAZOLE SODIUM 40 MG/1
40 TABLET, DELAYED RELEASE ORAL 2 TIMES DAILY
COMMUNITY
Start: 2024-02-14 | End: 2025-02-13

## 2024-02-22 NOTE — PROGRESS NOTES
Subjective     Patient ID: Mitul Torres is a 68 y.o. male.    Chief Complaint: Follow-up (Hospital follow up, went in for a heart valve repair done that but ended up having a G.I. bleed. Had surgery on the 30th came home on th 8th went back on he 10th found the bleed) and Abdominal Pain (Abdominal pain)    GAMAL is a 68 year old male that presents today for hospital follow up. He had a MVR on 1/30/24 in [place], [place]. He was discharged on 2/8/24, but was readmitted on 2/10 due to a severe upper GI bleed. He received several blood transfusions at that time. Plavix was stopped at that time and was started on ASA 81mg. He was discharged home with home health and home health PT. He was also scheduled to begin Cardiopulmonary rehab today. He states that he was dropped from home health once he accepted cardiopulmonary rehab. He was followed by [facility]. He wishes to continue cardiopulmonary rehab, as this was suggested by his CV surgeon. He reports feeling some better. He is currently staying with family to help with ADLs as needed. He is scheduled to follow up with CV surgeon on 3/4. He was not scheduled to follow up with GI. He has never had a colonoscopy. Additionally, he has complaints of abdominal soreness around his chest tube sites. He states that he was found to have a hematoma near the chest tube sites prior to discharge.    Review of Systems   Constitutional:  Negative for appetite change, chills, fatigue and unexpected weight change.   HENT:  Negative for dental problem, facial swelling, hearing loss, trouble swallowing and voice change.    Eyes:  Negative for visual disturbance.   Respiratory:  Negative for apnea, chest tightness and shortness of breath.    Cardiovascular:  Negative for chest pain, palpitations and leg swelling.   Gastrointestinal:  Positive for abdominal pain (soreness around chest tube incision sites). Negative for blood in stool, change in bowel habit and reflux.   Endocrine: Negative for  cold intolerance and heat intolerance.   Genitourinary:  Negative for decreased urine volume, difficulty urinating, hematuria, scrotal swelling and testicular pain.   Musculoskeletal:  Negative for gait problem, joint swelling and myalgias.   Integumentary:  Negative for color change and pallor.   Neurological:  Negative for weakness, light-headedness and headaches.   Psychiatric/Behavioral:  Negative for sleep disturbance. The patient is not nervous/anxious.           Objective     Physical Exam  Vitals and nursing note reviewed.   Constitutional:       Appearance: Normal appearance. He is normal weight.   HENT:      Head: Normocephalic and atraumatic.      Nose: Nose normal.      Mouth/Throat:      Mouth: Mucous membranes are moist.      Pharynx: Oropharynx is clear.   Eyes:      Extraocular Movements: Extraocular movements intact.      Conjunctiva/sclera: Conjunctivae normal.      Pupils: Pupils are equal, round, and reactive to light.   Cardiovascular:      Rate and Rhythm: Normal rate and regular rhythm.      Pulses: Normal pulses.      Heart sounds: Normal heart sounds.   Pulmonary:      Effort: Pulmonary effort is normal.      Breath sounds: Normal breath sounds.   Abdominal:      General: Abdomen is flat. Bowel sounds are normal.      Palpations: Abdomen is soft.   Musculoskeletal:         General: Normal range of motion.      Cervical back: Normal range of motion and neck supple.   Skin:     General: Skin is warm and dry.      Capillary Refill: Capillary refill takes less than 2 seconds.      Coloration: Skin is pale.      Findings: Bruising present.             Comments: Bruising noted to lower abdomen and near right side of umbilicus   Neurological:      General: No focal deficit present.      Mental Status: He is alert and oriented to person, place, and time.   Psychiatric:         Behavior: Behavior normal.         Thought Content: Thought content normal.          Assessment and Plan     1. History of  GI bleed  -     Ambulatory referral/consult to Gastroenterology; Future; Expected date: 02/29/2024    2. Status post cardiac surgery  -     Ambulatory referral/consult to Gastroenterology; Future; Expected date: 02/29/2024    3. Essential hypertension, benign    4. Hospital discharge follow-up        Transitional Care Note    Family and/or Caretaker present at visit?  Yes.  Diagnostic tests reviewed/disposition: I have reviewed all completed as well as pending diagnostic tests at the time of discharge.  Disease/illness education: Discussed the importance of follow up appointments and cardiopulmonary rehab. Patient has never had colonoscopy. No GI follow up scheduled.  Home health/community services discussion/referrals: Patient has home health established at Home health was established with [facility]. However, he was dropped due to beginning Cardiopulmonary rehab. .   Establishment or re-establishment of referral orders for community resources:  Referral to GI for follow up of GI bleed .   Discussion with other health care providers:  Referral to GI for follow up .     Plavix is D/C'd at this time. Continue cardiac rehab as ordered by CV surgeon. Keep follow up appointments with Dr. Daniels. Referral to GI in process given recent history of GI bleed and patient has never had colonoscopy.          No follow-ups on file.

## 2024-02-23 RX ORDER — ONDANSETRON 4 MG/1
4 TABLET, ORALLY DISINTEGRATING ORAL EVERY 8 HOURS PRN
Qty: 30 TABLET | Refills: 1 | Status: SHIPPED | OUTPATIENT
Start: 2024-02-23 | End: 2024-03-19

## 2024-02-29 ENCOUNTER — OFFICE VISIT (OUTPATIENT)
Dept: FAMILY MEDICINE | Facility: CLINIC | Age: 69
End: 2024-02-29
Payer: MEDICARE

## 2024-02-29 VITALS
DIASTOLIC BLOOD PRESSURE: 78 MMHG | RESPIRATION RATE: 18 BRPM | TEMPERATURE: 99 F | SYSTOLIC BLOOD PRESSURE: 136 MMHG | OXYGEN SATURATION: 97 % | HEIGHT: 70 IN | WEIGHT: 154 LBS | HEART RATE: 72 BPM | BODY MASS INDEX: 22.05 KG/M2

## 2024-02-29 DIAGNOSIS — J34.89 NASAL SORE: ICD-10-CM

## 2024-02-29 DIAGNOSIS — Z86.79 HISTORY OF ATRIAL FIBRILLATION: ICD-10-CM

## 2024-02-29 DIAGNOSIS — E78.5 HYPERLIPIDEMIA, UNSPECIFIED HYPERLIPIDEMIA TYPE: Chronic | ICD-10-CM

## 2024-02-29 DIAGNOSIS — I10 ESSENTIAL HYPERTENSION, BENIGN: Chronic | ICD-10-CM

## 2024-02-29 DIAGNOSIS — Z95.2 S/P MVR (MITRAL VALVE REPLACEMENT): Primary | ICD-10-CM

## 2024-02-29 DIAGNOSIS — Z87.19 H/O: UPPER GI BLEED: ICD-10-CM

## 2024-02-29 PROBLEM — D68.9 COAGULATION DEFECT: Status: ACTIVE | Noted: 2024-02-10

## 2024-02-29 PROBLEM — I48.91 ATRIAL FIBRILLATION WITH RAPID VENTRICULAR RESPONSE: Status: ACTIVE | Noted: 2024-02-10

## 2024-02-29 PROBLEM — K21.9 GERD (GASTROESOPHAGEAL REFLUX DISEASE): Status: ACTIVE | Noted: 2023-12-21

## 2024-02-29 PROBLEM — K92.2 ACUTE UPPER GI BLEED: Status: ACTIVE | Noted: 2024-02-10

## 2024-02-29 PROBLEM — D62 ACUTE BLOOD LOSS ANEMIA (ABLA): Status: ACTIVE | Noted: 2024-02-10

## 2024-02-29 PROCEDURE — 99214 OFFICE O/P EST MOD 30 MIN: CPT | Mod: ,,, | Performed by: NURSE PRACTITIONER

## 2024-02-29 RX ORDER — MUPIROCIN 20 MG/G
OINTMENT TOPICAL 3 TIMES DAILY
Qty: 15 G | Refills: 0 | Status: SHIPPED | OUTPATIENT
Start: 2024-02-29 | End: 2024-03-19

## 2024-02-29 NOTE — PROGRESS NOTES
ELA Pizarro        PATIENT NAME: Mitul Torres  : 1955  DATE: 24  MRN: [id number]      Patient PCP Information       Provider PCP Type    Primary Doctor No General            Reason for Visit / Chief Complaint: Nausea, GI Problem (GI bleed pt states.), and Epistaxis (Left side having a bit of bleeding, scar inside nose? RM 2)       Update PCP  Update Chief Complaint         History of Present Illness / Problem Focused Workflow     Mitul Torres presents to the clinic with Nausea, GI Problem (GI bleed pt states.), and Epistaxis (Left side having a bit of bleeding, scar inside nose? RM 2)     HPI  Mr Torres presents to clinic to [facility] care and reports nausea / sore to left nare with bleeding inside nose.   Patient recently underwent MVR. Patients post op period complicated by hematoma at prior chest tube site. Patient also had upper Gi bleed after starting plavix requiring transfusion.   Patient states two ulcers were noted on EGD. Patient has been taken off plavix and currently taking asa along with protonix BID.   Patient still has fist firm area to abdomen/epigastric region where hematoma occurred. Patient denies coffee ground emesis or blood in stools.   Patient has started CV rehab. Reports some right arm discomfort since starting. Patient has follow up with CV next week.   Patient was seen at outside clinic for nausea however was unaware zofran was sent to pharmacy.   Patient has sore to left nare. Patient uses generic afrin frequently. Instructed to dc afrin use. Bactroban swab to left nare recommended.   Patient states he did wear O2 per NC while in hospital.   Patient with slight exp wheeze in left lower lobe. Denies hx of copd. Nonsmoker.   Family states patient did receive lasix in hospital due to excessive IVF volume given.     Review of Systems     Review of Systems   Constitutional:  Positive for fatigue. Negative for activity change, appetite change, chills, diaphoresis,  fever and unexpected weight change.   HENT:  Positive for nosebleeds. Negative for congestion, ear pain, facial swelling, hearing loss and sore throat.    Eyes: Negative.    Respiratory:  Negative for apnea, cough and shortness of breath.    Cardiovascular:  Negative for chest pain, palpitations and leg swelling.   Gastrointestinal:  Positive for nausea. Negative for abdominal distention, abdominal pain, blood in stool, constipation and diarrhea.   Endocrine: Negative for cold intolerance, heat intolerance, polydipsia, polyphagia and polyuria.   Genitourinary:  Negative for decreased urine volume, difficulty urinating, dysuria, flank pain, frequency, hematuria and urgency.   Musculoskeletal:  Positive for myalgias. Negative for arthralgias and joint swelling.   Skin:  Negative for color change and rash.   Allergic/Immunologic: Negative.    Neurological:  Negative for dizziness, tremors, seizures, syncope, facial asymmetry, speech difficulty, weakness, light-headedness, numbness and headaches.   Hematological:  Negative for adenopathy. Does not bruise/bleed easily.   Psychiatric/Behavioral:  Negative for behavioral problems and confusion.        Medical / Social / Family History     Past Medical History:   Diagnosis Date    Hyperlipidemia     Hypertension        Past Surgical History:   Procedure Laterality Date    ECHOCARDIOGRAM,TRANSESOPHAGEAL  12/14/2023    Procedure: Transesophageal echo (GAEL) intra-procedure log documentation;  Surgeon: Jerry Be DO;  Location: [facility] CATH LAB;  Service: Cardiology;;    LEFT HEART CATHETERIZATION Left 12/14/2023    Procedure: Left heart cath;  Surgeon: Jerry Be DO;  Location: [facility] CATH LAB;  Service: Cardiology;  Laterality: Left;    RIGHT HEART CATHETERIZATION Right 12/14/2023    Procedure: INSERTION, CATHETER, RIGHT HEART;  Surgeon: Jerry Be DO;  Location: [facility] CATH LAB;  Service: Cardiology;  Laterality: Right;       Social History     "reports that he quit smoking about 32 years ago. His smoking use included cigarettes. He has never used smokeless tobacco. He reports current drug use. Drug: Marijuana. He reports that he does not drink alcohol.    Family History  's family history includes Cataracts in his mother; Diabetes in his maternal grandfather; Glaucoma in his mother; Heart disease in his brother; No Known Problems in his brother and father.    Medications and Allergies     Medications  Outpatient Medications Marked as Taking for the 2/29/24 encounter (Office Visit) with Shannan Gorman FNP   Medication Sig Dispense Refill    amiodarone (PACERONE) 200 MG Tab Take 200 mg by mouth once daily. Tale 1 tablet in the morning and 1 tablet at bedtime.      aspirin (ECOTRIN) 81 MG EC tablet Take 1 tablet by mouth every morning.      atorvastatin (LIPITOR) 20 MG tablet Take 20 mg by mouth once daily.      metoprolol tartrate (LOPRESSOR) 25 MG tablet Take 12.5 mg by mouth 2 (two) times daily.      pantoprazole (PROTONIX) 40 MG tablet Take 40 mg by mouth 2 (two) times daily.         Allergies  Review of patient's allergies indicates:   Allergen Reactions    Sulfamethoxazole-trimethoprim Rash     Note: Emil Johson Syndrome       Physical Examination     Vitals:    02/29/24 1409 02/29/24 1410   BP: (!) 148/88 136/78   BP Location: Left arm    Patient Position: Sitting    BP Method: Medium (Automatic)    Pulse: 72    Resp: 18    Temp: 98.7 °F (37.1 °C)    TempSrc: Oral    SpO2: 97%    Weight: 69.9 kg (154 lb)    Height: 5' 10" (1.778 m)        Physical Exam  Vitals and nursing note reviewed.   Constitutional:       Appearance: Normal appearance. He is normal weight.   HENT:      Head: Normocephalic.      Right Ear: External ear normal.      Left Ear: External ear normal.      Nose: Nose normal.      Comments: Small sore in left nare no active bleeding     Mouth/Throat:      Mouth: Mucous membranes are moist.   Eyes:      Extraocular Movements: " Extraocular movements intact.      Conjunctiva/sclera: Conjunctivae normal.      Pupils: Pupils are equal, round, and reactive to light.   Cardiovascular:      Rate and Rhythm: Normal rate and regular rhythm.      Pulses: Normal pulses.      Heart sounds: Normal heart sounds. No murmur heard.  Pulmonary:      Effort: Pulmonary effort is normal.      Breath sounds: Normal breath sounds. No stridor. No wheezing or rhonchi.   Abdominal:      General: Bowel sounds are normal. There is no distension.      Palpations: Abdomen is soft. There is no mass.      Tenderness: There is no abdominal tenderness.          Comments: Firm area secondary to post op hematoma. No redness or signs of infection. Scab over one of three prior chest tube sites.    Musculoskeletal:         General: No swelling or tenderness. Normal range of motion.      Cervical back: Normal range of motion and neck supple.      Right lower leg: No edema.      Left lower leg: No edema.   Skin:     General: Skin is warm and dry.      Capillary Refill: Capillary refill takes less than 2 seconds.   Neurological:      General: No focal deficit present.      Mental Status: He is alert and oriented to person, place, and time. Mental status is at baseline.      Cranial Nerves: No cranial nerve deficit.      Sensory: No sensory deficit.      Motor: No weakness.   Psychiatric:         Mood and Affect: Mood normal.         Behavior: Behavior normal.         Thought Content: Thought content normal.         Judgment: Judgment normal.           No visits with results within 14 Day(s) from this visit.   Latest known visit with results is:   Admission on 02/10/2024, Discharged on 02/10/2024   Component Date Value Ref Range Status    QRS Duration 02/10/2024 94  ms Final    [facility]HS QTC Calculation 02/10/2024 462  ms Final    Sodium 02/10/2024 135 (L)  136 - 145 mmol/L Final    Potassium 02/10/2024 4.4  3.5 - 5.1 mmol/L Final    Chloride 02/10/2024 103  98 - 107 mmol/L Final    CO2  02/10/2024 29  21 - 32 mmol/L Final    Anion Gap 02/10/2024 7  7 - 16 mmol/L Final    Glucose 02/10/2024 136 (H)  74 - 106 mg/dL Final    BUN 02/10/2024 41 (H)  7 - 18 mg/dL Final    Creatinine 02/10/2024 0.98  0.70 - 1.30 mg/dL Final    BUN/Creatinine Ratio 02/10/2024 42 (H)  6 - 20 Final    Calcium 02/10/2024 7.8 (L)  8.5 - 10.1 mg/dL Final    Total Protein 02/10/2024 5.4 (L)  6.4 - 8.2 g/dL Final    Albumin 02/10/2024 2.1 (L)  3.5 - 5.0 g/dL Final    Globulin 02/10/2024 3.3  2.0 - 4.0 g/dL Final    A/G Ratio 02/10/2024 0.6   Final    Bilirubin, Total 02/10/2024 0.4  >0.0 - 1.2 mg/dL Final    Alk Phos 02/10/2024 55  45 - 115 U/L Final    ALT 02/10/2024 44  16 - 61 U/L Final    AST 02/10/2024 21  15 - 37 U/L Final    eGFR 02/10/2024 85  >=60 mL/min/1.73m2 Final    ProBNP 02/10/2024 2,614 (H)  1 - 125 pg/mL Final    Magnesium 02/10/2024 2.2  1.7 - 2.3 mg/dL Final    WBC 02/10/2024 14.27 (H)  4.50 - 11.00 K/uL Final    RBC 02/10/2024 2.73 (L)  4.60 - 6.20 M/uL Final    Hemoglobin 02/10/2024 8.1 (L)  13.5 - 18.0 g/dL Final    Hematocrit 02/10/2024 24.7 (L)  40.0 - 54.0 % Final    MCV 02/10/2024 90.5  80.0 - 96.0 fL Final    MCH 02/10/2024 29.7  27.0 - 31.0 pg Final    MCHC 02/10/2024 32.8  32.0 - 36.0 g/dL Final    RDW 02/10/2024 13.5  11.5 - 14.5 % Final    Platelet Count 02/10/2024 525 (H)  150 - 400 K/uL Final    MPV 02/10/2024 10.3  9.4 - 12.4 fL Final    Neutrophils % 02/10/2024 79.1 (H)  53.0 - 65.0 % Final    Lymphocytes % 02/10/2024 12.8 (L)  27.0 - 41.0 % Final    Monocytes % 02/10/2024 6.7 (H)  2.0 - 6.0 % Final    Eosinophils % 02/10/2024 0.5 (L)  1.0 - 4.0 % Final    Basophils % 02/10/2024 0.5  0.0 - 1.0 % Final    Immature Granulocytes % 02/10/2024 0.4  0.0 - 0.4 % Final    nRBC, Auto 02/10/2024 0.0  <=0.0 % Final    Neutrophils, Abs 02/10/2024 11.29 (H)  1.80 - 7.70 K/uL Final    Lymphocytes, Absolute 02/10/2024 1.82  1.00 - 4.80 K/uL Final    Monocytes, Absolute 02/10/2024 0.96 (H)  0.00 - 0.80 K/uL  Final    Eosinophils, Absolute 02/10/2024 0.07  0.00 - 0.50 K/uL Final    Basophils, Absolute 02/10/2024 0.07  0.00 - 0.20 K/uL Final    Immature Granulocytes, Absolute 02/10/2024 0.06 (H)  0.00 - 0.04 K/uL Final    nRBC, Absolute 02/10/2024 0.00  <=0.00 x10e3/uL Final    Diff Type 02/10/2024 Auto   Final    Fecal Occult Blood 02/10/2024 Positive (A)   Final    Specimen Outdate 02/10/2024 02/13/2024 23:59   Final    Group & Rh 02/10/2024 A POS   Final    Indirect Nidhi 02/10/2024 NEG   Final    UNIT NUMBER 02/10/2024 [id number]   Final    UNIT ABO/RH 02/10/2024 A POS   Final    DISPENSE STATUS 02/10/2024 Transfused   Final    Unit Expiration 02/10/2024 [id number]   Final    Product Code 02/10/2024 [id number]   Final    Unit Blood Type Code 02/10/2024 6200   Final    CROSSMATCH INTERPRETATION 02/10/2024 Compatible   Final    ABORH Retype 02/10/2024 A POS   Final      PREOP ECHO 12/2024  Summary    Left Ventricle: The left ventricle is normal in size. Increased wall thickness. Normal wall motion. There is normal systolic function.    Right Ventricle: Right ventricular enlargement. Wall thickness is normal. Right ventricle wall motion  is normal. Systolic function is normal.    Left Atrium: Left atrium is severely dilated. The pulmonary veins have systolic flow reversal. There is no thrombus in the left atrial cavity.    Right Atrium: Right atrium is dilated.    Mitral Valve: Findings are consistent with myxomatous changes. There is severe prolapse of the posterior mitral leaflet. Flail posterior leaflet (P2 segment). There is no mass/vegetation present. There is no stenosis. There is severe regurgitation with an anteromedial eccentrically directed wall-impinging jet.    Tricuspid Valve: There is mild regurgitation.    Pulmonary Artery: Pulmonary artery pressure could not be accurately determined.    Conclusion: Severe mitral regurgitation secondary to severe prolpase of the posterior leaflet of the mitral valve  with flail P2 segment.      POST MVR ECHO 2/2024  Narrative      Conclusion: When compared to prior study, 2/6/2024. Patient in atrial  fibrillation during study. Cardiac evaluation made difficult by abnormal  rhythm.    Left Ventricle: Moderately to severely increased wall thickness. Normal  (55 - 60%) ejection fraction. Diastolic function could not be graded due  to A-Fib.    Left Atrium: Left atrium volume index is severely increased.    Right Atrium: Right atrium is mildly dilated.    Mitral Valve: Normal structure status post mitral valve ring repair. No  mitral regurgitation.    Tricuspid Valve: Trace tricuspid regurgitation. RVSP is 34.00.    Pericardium: No pericardial effusion. No cardiac tamponade present.     Assessment and Plan (including Health Maintenance)      Problem List  Smart Sets  Document Outside HM   :    Plan:   S/P MVR (mitral valve replacement)  Comments:  Follow up wtih CV  continue amiodarone and lopressor  continue asa, notify provider of any bleeding    H/O: upper GI bleed  Comments:  protonix BID    Essential hypertension, benign  Comments:  continue amiodarone and beta blocker    Hyperlipidemia, unspecified hyperlipidemia type  Comments:  continue statin    History of atrial fibrillation  Comments:  continue asa and rate control medication, rate stable    Nasal sore  Comments:  dc afrrin  Orders:  -     mupirocin (BACTROBAN) 2 % ointment; Apply topically 3 (three) times daily.  Dispense: 15 g; Refill: 0     RTC in 1 mo  There are no Patient Instructions on file for this visit.       Health Maintenance Due   Topic Date Due    COVID-19 Vaccine (1) Never done    TETANUS VACCINE  Never done    Colorectal Cancer Screening  Never done    Shingles Vaccine (1 of 2) Never done    RSV Vaccine (Age 60+ and Pregnant patients) (1 - 1-dose 60+ series) Never done    Pneumococcal Vaccines (Age 65+) (1 of 1 - PCV) Never done    Abdominal Aortic Aneurysm Screening  Never done    Influenza Vaccine (1)  Never done         There is no immunization history on file for this patient.     Problem List Items Addressed This Visit    None  Visit Diagnoses       S/P MVR (mitral valve replacement)    -  Primary    Follow up wtih CV  continue amiodarone and lopressor  continue asa, notify provider of any bleeding    H/O: upper GI bleed        protonix BID    Essential hypertension, benign  (Chronic)       continue amiodarone and beta blocker    Hyperlipidemia, unspecified hyperlipidemia type  (Chronic)       continue statin    History of atrial fibrillation        continue asa and rate control medication, rate stable    Nasal sore        dc afrrin    Relevant Medications    mupirocin (BACTROBAN) 2 % ointment            Health Maintenance Topics with due status: Not Due       Topic Last Completion Date    Lipid Panel 10/11/2023    Hemoglobin A1c (Prediabetes) 01/26/2024       Future Appointments   Date Time Provider Department Center   4/11/2024  3:00 PM AWHERB NURSE, [facility] FAMILY MEDICINE [facility] AYALA Albetro            Signature:  ELA Pizarro  [facility]     Date of encounter: 2/29/24

## 2024-03-14 ENCOUNTER — OFFICE VISIT (OUTPATIENT)
Dept: CARDIOLOGY | Facility: CLINIC | Age: 69
End: 2024-03-14
Payer: MEDICARE

## 2024-03-14 VITALS
HEIGHT: 70 IN | HEART RATE: 71 BPM | OXYGEN SATURATION: 97 % | WEIGHT: 152.81 LBS | DIASTOLIC BLOOD PRESSURE: 84 MMHG | SYSTOLIC BLOOD PRESSURE: 122 MMHG | BODY MASS INDEX: 21.88 KG/M2

## 2024-03-14 DIAGNOSIS — I34.0 SEVERE MITRAL REGURGITATION: ICD-10-CM

## 2024-03-14 DIAGNOSIS — I10 ESSENTIAL HYPERTENSION: ICD-10-CM

## 2024-03-14 DIAGNOSIS — I48.0 PAROXYSMAL ATRIAL FIBRILLATION: ICD-10-CM

## 2024-03-14 DIAGNOSIS — K26.4 GASTROINTESTINAL HEMORRHAGE ASSOCIATED WITH DUODENAL ULCER: ICD-10-CM

## 2024-03-14 DIAGNOSIS — Z98.890 S/P MITRAL VALVE REPAIR: ICD-10-CM

## 2024-03-14 DIAGNOSIS — I25.10 CORONARY ARTERY DISEASE INVOLVING NATIVE CORONARY ARTERY OF NATIVE HEART WITHOUT ANGINA PECTORIS: ICD-10-CM

## 2024-03-14 DIAGNOSIS — I48.91 ATRIAL FIBRILLATION, UNSPECIFIED TYPE: Primary | ICD-10-CM

## 2024-03-14 DIAGNOSIS — E78.5 HYPERLIPIDEMIA, UNSPECIFIED HYPERLIPIDEMIA TYPE: ICD-10-CM

## 2024-03-14 PROCEDURE — 99214 OFFICE O/P EST MOD 30 MIN: CPT | Mod: PBBFAC,25 | Performed by: NURSE PRACTITIONER

## 2024-03-14 PROCEDURE — 93005 ELECTROCARDIOGRAM TRACING: CPT | Mod: PBBFAC | Performed by: INTERNAL MEDICINE

## 2024-03-14 PROCEDURE — 99214 OFFICE O/P EST MOD 30 MIN: CPT | Mod: S$PBB,,, | Performed by: NURSE PRACTITIONER

## 2024-03-14 PROCEDURE — 93010 ELECTROCARDIOGRAM REPORT: CPT | Mod: S$PBB,,, | Performed by: INTERNAL MEDICINE

## 2024-03-14 RX ORDER — ATORVASTATIN CALCIUM 20 MG/1
20 TABLET, FILM COATED ORAL DAILY
Qty: 90 TABLET | Refills: 1 | Status: SHIPPED | OUTPATIENT
Start: 2024-03-14 | End: 2024-03-19 | Stop reason: SDUPTHER

## 2024-03-14 RX ORDER — AMIODARONE HYDROCHLORIDE 200 MG/1
200 TABLET ORAL DAILY
Qty: 30 TABLET | Refills: 11 | Status: SHIPPED | OUTPATIENT
Start: 2024-03-14 | End: 2024-05-14 | Stop reason: ALTCHOICE

## 2024-03-14 RX ORDER — METOPROLOL TARTRATE 25 MG/1
12.5 TABLET, FILM COATED ORAL 2 TIMES DAILY
Qty: 90 TABLET | Refills: 3 | Status: SHIPPED | OUTPATIENT
Start: 2024-03-14 | End: 2025-03-14

## 2024-03-15 PROBLEM — I48.0 PAROXYSMAL ATRIAL FIBRILLATION: Status: ACTIVE | Noted: 2024-02-10

## 2024-03-15 PROBLEM — K26.4 GASTROINTESTINAL HEMORRHAGE ASSOCIATED WITH DUODENAL ULCER: Status: ACTIVE | Noted: 2024-03-15

## 2024-03-15 PROBLEM — I34.0 SEVERE MITRAL REGURGITATION: Status: ACTIVE | Noted: 2024-03-15

## 2024-03-15 PROBLEM — I25.10 CORONARY ARTERY DISEASE INVOLVING NATIVE CORONARY ARTERY OF NATIVE HEART WITHOUT ANGINA PECTORIS: Status: ACTIVE | Noted: 2024-03-15

## 2024-03-15 PROBLEM — Z98.890 S/P MITRAL VALVE REPAIR: Status: ACTIVE | Noted: 2024-03-15

## 2024-03-15 PROBLEM — E78.5 HYPERLIPEMIA: Status: ACTIVE | Noted: 2024-03-15

## 2024-03-15 NOTE — PROGRESS NOTES
PCP: No, Primary Doctor    Referring Provider:     Chief Complaint   Patient presents with    Atrial Fibrillation    Hypertension    Valvular Heart Disease    Chest Pain     Tightness in the mornings around site.        Subjective:   Mitul Torres is a 68 y.o. male with hx of severe MR s/p complex MV repair and MOY occlusion (1/30/2024, Dr. Daniels), non-obstructive CAD with 30% lesion prox RCA (12/14/2023, Dr. Be), A. Fib with RVR in the presence of severe anemia secondary to an actively bleeding duodenal ulcer, HLD, and HTN,  who presents for HD follow up from admission to 2/10/2024-2/14/2024 at [facility].   The patient presented to [facility] 2/10/2024 with c/o sudden onset lightheadedness, nausea and palpitations. EKG demonstrated atrial fibrillation with RVR; rate 140 bpm in the presence of GIB. The patient was airlifted to [facility] with an urgent GI consult. The atrial fibrillation was converted chemically.  The patient had a complex mitral valve repair and MOY occlusion by Dr. Daniels on 1/30/2024. He was discharged on 2/8/2024 to return to [facility] ED on 2/10/2024 in A fib with RVR secondry to severe blood loss anemia from an actively bleeding duodenal ulcer. He was air lifted to [facility] with an emergent EGD done the following day demonstrating a large duodenal ulcer with a large overlying pancake like hematoma. The ulcer was treated with epinephrine injections and Bovie cautery.  He required approx 7 units of PRBCs, platelets, Fp and cryoprecipitate. A repeat EGD 48 hours later found no evidence of ongoing active bleeding . The patient's plavix was stopped but he was discharged home on ASA 81 mg po daily with a PPI BID. He denies any evidence of bleeding since hospital DC.    The patient presents today and states that he has some tightness in the mornings at his chest incision site but otherwise is having no issues.     11/20/23 (Dr. Drosey) presents for evaluation of palpitations,  come and go spontaneously, would not notice unless he put jhis hand on his chest.  He feels some pressure in mid epigastric area, 2/10, checks heart with hand on his chest, feels skip, then symptoms resolve.  He denies chest pain, pressure or shortness of breath.  He is very active, using chain saw and swing blade without provocation.  He is a  by trade, able to climb stairs, ladders without difficulty.  No previous cardiac history.  He is having more frequent and severe episodes of shortness of breath if tries to pick and carry five gallon pail of pain.  He has to stop activity, wait three to five minutes for symptoms to resolve.  He is recovering from puncture wound on bottom of left foot, notes is healing slowly,  He is smoking one or two joints a day.      Fhx:  Family History   Problem Relation Age of Onset    Cataracts Mother     Glaucoma Mother     No Known Problems Father     Heart disease Brother         stent placement    No Known Problems Brother     Diabetes Maternal Grandfather      Shx:   Social History     Socioeconomic History    Marital status: Single   Tobacco Use    Smoking status: Former     Current packs/day: 0.00     Types: Cigarettes     Quit date:      Years since quittin.2    Smokeless tobacco: Never   Substance and Sexual Activity    Alcohol use: Never    Drug use: Yes     Types: Marijuana       EKG 3/14/2024 RSR with 1st degree AVB; HR 72 bpm; LVH with repolarization abnormality (reviewed with Dr. Be)  2/10/2024 A fib with RVR;  bpm; ant/septal and lateral NS-TWA   RSR with HR 65 bpm; normal EKG        TTE at [facility] 2/10/2024     Conclusion: When compared to prior study, 2024. Patient in atrial   fibrillation during study. Cardiac evaluation made difficult by abnormal   rhythm.    Left Ventricle: Moderately to severely increased wall thickness. Normal   (55 - 60%) ejection fraction. Diastolic function could not be graded due   to A-Fib.     Left  Atrium: Left atrium volume index is severely increased.     Right Atrium: Right atrium is mildly dilated.     Mitral Valve: Normal structure status post mitral valve ring repair. No   mitral regurgitation.     Tricuspid Valve: Trace tricuspid regurgitation. RVSP is 34.00.     Pericardium: No pericardial effusion. No cardiac tamponade present.       GAEL 12/14/2023- Dr. Levine    Summary         Left Ventricle: The left ventricle is normal in size. Increased wall thickness. Normal wall motion. There is normal systolic function.    Right Ventricle: Right ventricular enlargement. Wall thickness is normal. Right ventricle wall motion  is normal. Systolic function is normal.    Left Atrium: Left atrium is severely dilated. The pulmonary veins have systolic flow reversal. There is no thrombus in the left atrial cavity.    Right Atrium: Right atrium is dilated.    Mitral Valve: Findings are consistent with myxomatous changes. There is severe prolapse of the posterior mitral leaflet. Flail posterior leaflet (P2 segment). There is no mass/vegetation present. There is no stenosis. There is severe regurgitation with an anteromedial eccentrically directed wall-impinging jet.    Tricuspid Valve: There is mild regurgitation.    Pulmonary Artery: Pulmonary artery pressure could not be accurately determined.    Conclusion: Severe mitral regurgitation secondary to severe prolpase of the posterior leaflet of the mitral valve with flail P2 segment.HO         Results for orders placed during the hospital encounter of 12/12/23    Echo    Interpretation Summary    Left Ventricle: The left ventricle is normal in size. Increased ventricular mass. Increased wall thickness. There is mild concentric hypertrophy. Normal wall motion. There is normal systolic function. Biplane (2D) method of discs ejection fraction is 60%. Global longitudinal strain is normal. There is normal diastolic function.    Right Ventricle: Mild right ventricular  enlargement. Systolic function is normal.    Left Atrium: Left atrium is severely dilated. LA volume index 69.8  ml/m2    Right Atrium: Right atrium is mildly dilated.    Aortic Valve: The aortic valve is a trileaflet valve.    Mitral Valve: There is prolapse of the posterior mitral leaflet. Flail posterior leaflet. There is severe regurgitation.    Tricuspid Valve: There is mild regurgitation.    Pulmonary Artery: The estimated pulmonary artery systolic pressure is 30 mmHg.    IVC/SVC: Normal venous pressure at 3 mmHg.    There is severe mitral regurgitation secondary to severe mitral valve prolpase with a flail posterior leaflet. Refer to CV surgery for mitral valve repair.    [facility] Results for orders placed during the hospital encounter of 12/14/23    Cardiac catheterization    Conclusion    The ejection fraction was calculated to be 55%.    The left ventricular systolic function was normal.    The pre-procedure left ventricular end diastolic pressure was 6.    The estimated blood loss was none.    There was non-obstructive coronary artery disease..    There was no mitral valve stenosis and severe (4+) mitral regurgitation.    The annulus was calcified.    The procedure log was documented by Documenter: Shilpi Basilio RN and verified by Jerry Be DO.    Date: 12/14/2023  Time: 11:21 AM    Trivial CAD  Normal LV systolic function, mild dilatation, 4+ MR  Await GAEL results to follow    1/30/2024- Dr. Daniels  Complex mitral valve repair and MOY closure    Recommend CTS eval for MVR        Lab Results   Component Value Date     02/14/2024    K 3.7 02/14/2024     02/14/2024    CO2 27 02/14/2024    BUN 16 02/14/2024    CREATININE 1.02 02/14/2024    CALCIUM 7.8 (L) 02/14/2024    ANIONGAP 6 (L) 02/14/2024    ESTGFRAFRICA 81 02/14/2024    EGFRNONAA 76 05/26/2022       Lab Results   Component Value Date    CHOL 177 10/11/2023     Lab Results   Component Value Date    HDL 66 (H) 10/11/2023     Lab  "Results   Component Value Date    LDLCALC 92 10/11/2023     Lab Results   Component Value Date    TRIG 97 10/11/2023     Lab Results   Component Value Date    CHOLHDL 2.7 10/11/2023       Lab Results   Component Value Date    WBC 14.27 (H) 02/10/2024    HGB 8.4 (L) 02/14/2024    HCT 25.4 (L) 02/14/2024    MCV 90.5 02/10/2024     (H) 02/10/2024           Current Outpatient Medications:     aspirin (ECOTRIN) 81 MG EC tablet, Take 1 tablet by mouth every morning., Disp: , Rfl:     pantoprazole (PROTONIX) 40 MG tablet, Take 40 mg by mouth 2 (two) times daily., Disp: , Rfl:     amiodarone (PACERONE) 200 MG Tab, Take 1 tablet (200 mg total) by mouth once daily. Tale 1 tablet in the morning and 1 tablet at bedtime., Disp: 30 tablet, Rfl: 11    atorvastatin (LIPITOR) 20 MG tablet, Take 1 tablet (20 mg total) by mouth once daily., Disp: 90 tablet, Rfl: 1    metoprolol tartrate (LOPRESSOR) 25 MG tablet, Take 0.5 tablets (12.5 mg total) by mouth 2 (two) times daily., Disp: 90 tablet, Rfl: 3    mupirocin (BACTROBAN) 2 % ointment, Apply topically 3 (three) times daily. (Patient not taking: Reported on 3/14/2024), Disp: 15 g, Rfl: 0    ondansetron (ZOFRAN-ODT) 4 MG TbDL, Take 1 tablet (4 mg total) by mouth every 8 (eight) hours as needed. (Patient not taking: Reported on 2/29/2024), Disp: 30 tablet, Rfl: 1  Meds reviewed and reconciled using the list provided by the patient.    Review of Systems   Cardiovascular:  Positive for chest pain.        Chest tightness at the chest incision site in the mornings           Objective:   /84 (BP Location: Left arm, Patient Position: Sitting)   Pulse 71   Ht 5' 10" (1.778 m)   Wt 69.3 kg (152 lb 12.8 oz)   SpO2 97%   BMI 21.92 kg/m²     Physical Exam  Vitals and nursing note reviewed.   Constitutional:       Appearance: Normal appearance. He is normal weight.   HENT:      Head: Normocephalic and atraumatic.   Neck:      Vascular: No carotid bruit.   Cardiovascular:      Rate " and Rhythm: Normal rate and regular rhythm.      Pulses: Normal pulses.      Heart sounds: Normal heart sounds.   Pulmonary:      Effort: Pulmonary effort is normal.      Breath sounds: Normal breath sounds.   Abdominal:      Palpations: Abdomen is soft.   Musculoskeletal:      Cervical back: Neck supple.      Right lower leg: No edema.      Left lower leg: No edema.   Skin:     General: Skin is warm and dry.      Capillary Refill: Capillary refill takes less than 2 seconds.   Neurological:      Mental Status: He is alert.           Assessment:     1. Atrial fibrillation, unspecified type  EKG 12-lead    metoprolol tartrate (LOPRESSOR) 25 MG tablet    amiodarone (PACERONE) 200 MG Tab    EKG 12-lead    DECLINE - This patient qualifies for an outpatient referral to Electrophysiology. Click here to decline this recommendation.      2. Coronary artery disease involving native coronary artery of native heart without angina pectoris  atorvastatin (LIPITOR) 20 MG tablet      3. Severe mitral regurgitation        4. S/P mitral valve repair        5. Paroxysmal atrial fibrillation        6. Gastrointestinal hemorrhage associated with duodenal ulcer        7. Essential hypertension        8. Hyperlipidemia, unspecified hyperlipidemia type              Plan:   Severe mitral regurgitation  TTE 2/10/2024     Conclusion: When compared to prior study, 2/6/2024. Patient in atrial   fibrillation during study. Cardiac evaluation made difficult by abnormal   rhythm.    Left Ventricle: Moderately to severely increased wall thickness. Normal   (55 - 60%) ejection fraction. Diastolic function could not be graded due   to A-Fib.     Left Atrium: Left atrium volume index is severely increased.     Right Atrium: Right atrium is mildly dilated.     Mitral Valve: Normal structure status post mitral valve ring repair. No   mitral regurgitation.     Tricuspid Valve: Trace tricuspid regurgitation. RVSP is 34.00.     Pericardium: No pericardial  effusion. No cardiac tamponade present.     1/30/2024 [facility]    [facility] 12/14/2023- Dr. Levine  Conclusion: Severe mitral regurgitation secondary to severe prolpase of the posterior leaflet of the mitral valve with flail P2 segment.    S/P mitral valve repair  1/30/2024- Dr. Rhett Kaminski  Complex mitral valve repair and MOY closure    TTE- [facility] 2/10/2024   Conclusion: When compared to prior study, 2/6/2024. Patient in atrial   fibrillation during study. Cardiac evaluation made difficult by abnormal   rhythm.    Left Ventricle: Moderately to severely increased wall thickness. Normal   (55 - 60%) ejection fraction. Diastolic function could not be graded due   to A-Fib.     Left Atrium: Left atrium volume index is severely increased.     Right Atrium: Right atrium is mildly dilated.     Mitral Valve: Normal structure status post mitral valve ring repair. No   mitral regurgitation.     Tricuspid Valve: Trace tricuspid regurgitation. RVSP is 34.00.     Pericardium: No pericardial effusion. No cardiac tamponade present.       Paroxysmal atrial fibrillation  Patient presented to [facility] 2/10/2024 with c/o sudden onset lightheadedness, nausea and palpitations. EKG demonstrated atrial fibrillation with RVR; rate 140 bpm in the presence of GIB. The patient was airlifted to [facility] with an urgent GI consult. The atrial fibrillation was converted chemically.    Gastrointestinal hemorrhage associated with duodenal ulcer  The patient was airlifted to [facility] with an urgent GI consult. He was admitted to CVR and continued to have evidence of GIB throughout the night. ON 12/11/2024 an emergent EGD demonstrated a large duodenal ulcer with a large overlying pancake like hematoma. This was treated epinephrine injections and Bovie cautery. The patient required a total of 7 units of PRCs, platelets, FFP and cryoprecipitate. EGD was repeat  48 hours later with no evidence of ongoing active bleeding found.     Contine PPI as Rx.    Essential hypertension  122/84 mmHg    Coronary artery disease involving native coronary artery of native heart without angina pectoris  [facility] 12/14/2023- Dr. Be    Non-obstructive CAD  Single vessel CAD with a 30% prox RCA lesion  LVEF 55%  LVEDP 6 mmHg  Severe MR    Continue ASA (also PPI)/LIpitor    Hyperlipemia  Lab Results   Component Value Date    CHOL 177 10/11/2023     Lab Results   Component Value Date    HDL 66 (H) 10/11/2023     Lab Results   Component Value Date    LDLCALC 92 10/11/2023     Lab Results   Component Value Date    TRIG 97 10/11/2023       Lab Results   Component Value Date    CHOLHDL 2.7 10/11/2023     Continue Lipitor 10 mg po daily      RTC: 4/3/2024 with Dr Levine 10:15 am  Patient has requested to follow up with Dr. Levine.

## 2024-03-15 NOTE — ASSESSMENT & PLAN NOTE
[facility] 12/14/2023- Dr. Be    Non-obstructive CAD  Single vessel CAD with a 30% prox RCA lesion  LVEF 55%  LVEDP 6 mmHg  Severe MR    Continue ASA (also PPI)/LIpitor

## 2024-03-15 NOTE — ASSESSMENT & PLAN NOTE
Lab Results   Component Value Date    CHOL 177 10/11/2023     Lab Results   Component Value Date    HDL 66 (H) 10/11/2023     Lab Results   Component Value Date    LDLCALC 92 10/11/2023     Lab Results   Component Value Date    TRIG 97 10/11/2023       Lab Results   Component Value Date    CHOLHDL 2.7 10/11/2023     Continue Lipitor 10 mg po daily

## 2024-03-15 NOTE — ASSESSMENT & PLAN NOTE
TTE 2/10/2024     Conclusion: When compared to prior study, 2/6/2024. Patient in atrial   fibrillation during study. Cardiac evaluation made difficult by abnormal   rhythm.    Left Ventricle: Moderately to severely increased wall thickness. Normal   (55 - 60%) ejection fraction. Diastolic function could not be graded due   to A-Fib.     Left Atrium: Left atrium volume index is severely increased.     Right Atrium: Right atrium is mildly dilated.     Mitral Valve: Normal structure status post mitral valve ring repair. No   mitral regurgitation.     Tricuspid Valve: Trace tricuspid regurgitation. RVSP is 34.00.     Pericardium: No pericardial effusion. No cardiac tamponade present.     1/30/2024 complex mitral valve repair and MOY occlusion - Dr. Daniels- [facility]    [facility] 12/14/2023- Dr. Levine  Conclusion: Severe mitral regurgitation secondary to severe prolpase of the posterior leaflet of the mitral valve with flail P2 segment.

## 2024-03-15 NOTE — ASSESSMENT & PLAN NOTE
Patient presented to [facility] 2/10/2024 with c/o sudden onset lightheadedness, nausea and palpitations. EKG demonstrated atrial fibrillation with RVR; rate 140 bpm in the presence of GIB. The patient was airlifted to [facility] with an urgent GI consult. The atrial fibrillation was converted chemically.

## 2024-03-15 NOTE — ASSESSMENT & PLAN NOTE
1/30/2024- Dr. Rhett Kaminski  Complex mitral valve repair and MOY closure    TTE- [facility] 2/10/2024   Conclusion: When compared to prior study, 2/6/2024. Patient in atrial   fibrillation during study. Cardiac evaluation made difficult by abnormal   rhythm.    Left Ventricle: Moderately to severely increased wall thickness. Normal   (55 - 60%) ejection fraction. Diastolic function could not be graded due   to A-Fib.     Left Atrium: Left atrium volume index is severely increased.     Right Atrium: Right atrium is mildly dilated.     Mitral Valve: Normal structure status post mitral valve ring repair. No   mitral regurgitation.     Tricuspid Valve: Trace tricuspid regurgitation. RVSP is 34.00.     Pericardium: No pericardial effusion. No cardiac tamponade present.

## 2024-03-15 NOTE — ASSESSMENT & PLAN NOTE
The patient was airlifted to [facility] with an urgent GI consult. He was admitted to R and continued to have evidence of GIB throughout the night. ON 12/11/2024 an emergent EGD demonstrated a large duodenal ulcer with a large overlying pancake like hematoma. This was treated epinephrine injections and Bovie cautery. The patient required a total of 7 units of PRCs, platelets, FFP and cryoprecipitate. EGD was repeat 48 hours later with no evidence of ongoing active bleeding found.     Contine PPI as Rx.

## 2024-03-18 LAB
OHS QRS DURATION: 92 MS
OHS QTC CALCULATION: 475 MS

## 2024-03-19 ENCOUNTER — APPOINTMENT (OUTPATIENT)
Dept: RADIOLOGY | Facility: CLINIC | Age: 69
End: 2024-03-19
Attending: NURSE PRACTITIONER
Payer: MEDICARE

## 2024-03-19 ENCOUNTER — OFFICE VISIT (OUTPATIENT)
Dept: FAMILY MEDICINE | Facility: CLINIC | Age: 69
End: 2024-03-19
Payer: MEDICARE

## 2024-03-19 VITALS
SYSTOLIC BLOOD PRESSURE: 135 MMHG | HEIGHT: 70 IN | TEMPERATURE: 99 F | OXYGEN SATURATION: 96 % | BODY MASS INDEX: 22.33 KG/M2 | RESPIRATION RATE: 20 BRPM | WEIGHT: 156 LBS | DIASTOLIC BLOOD PRESSURE: 78 MMHG | HEART RATE: 66 BPM

## 2024-03-19 DIAGNOSIS — Z95.2 S/P MVR (MITRAL VALVE REPLACEMENT): ICD-10-CM

## 2024-03-19 DIAGNOSIS — D64.9 ANEMIA, UNSPECIFIED TYPE: ICD-10-CM

## 2024-03-19 DIAGNOSIS — R06.02 SHORTNESS OF BREATH: ICD-10-CM

## 2024-03-19 DIAGNOSIS — Z87.11 HISTORY OF BLEEDING ULCERS: ICD-10-CM

## 2024-03-19 DIAGNOSIS — I25.10 CORONARY ARTERY DISEASE INVOLVING NATIVE CORONARY ARTERY OF NATIVE HEART WITHOUT ANGINA PECTORIS: ICD-10-CM

## 2024-03-19 DIAGNOSIS — R06.02 SHORTNESS OF BREATH: Primary | ICD-10-CM

## 2024-03-19 PROBLEM — K92.2 ACUTE UPPER GI BLEED: Status: RESOLVED | Noted: 2024-02-10 | Resolved: 2024-03-19

## 2024-03-19 PROBLEM — Z98.890 S/P LEFT ATRIAL APPENDAGE LIGATION: Status: ACTIVE | Noted: 2024-03-01

## 2024-03-19 PROBLEM — Z87.19 H/O: GI BLEED: Status: ACTIVE | Noted: 2024-03-01

## 2024-03-19 PROBLEM — K26.9 DUODENAL ULCER: Status: ACTIVE | Noted: 2024-03-01

## 2024-03-19 PROBLEM — D62 ACUTE BLOOD LOSS ANEMIA (ABLA): Status: RESOLVED | Noted: 2024-02-10 | Resolved: 2024-03-19

## 2024-03-19 LAB
ALBUMIN SERPL BCP-MCNC: 3.6 G/DL (ref 3.5–5)
ALBUMIN/GLOB SERPL: 1.2 {RATIO}
ALP SERPL-CCNC: 71 U/L (ref 45–115)
ALT SERPL W P-5'-P-CCNC: 18 U/L (ref 16–61)
ANION GAP SERPL CALCULATED.3IONS-SCNC: 9 MMOL/L (ref 7–16)
AST SERPL W P-5'-P-CCNC: 12 U/L (ref 15–37)
BASOPHILS # BLD AUTO: 0.05 K/UL (ref 0–0.2)
BASOPHILS NFR BLD AUTO: 0.9 % (ref 0–1)
BILIRUB SERPL-MCNC: 0.6 MG/DL (ref ?–1.2)
BUN SERPL-MCNC: 19 MG/DL (ref 7–18)
BUN/CREAT SERPL: 13 (ref 6–20)
CALCIUM SERPL-MCNC: 8.9 MG/DL (ref 8.5–10.1)
CHLORIDE SERPL-SCNC: 106 MMOL/L (ref 98–107)
CO2 SERPL-SCNC: 31 MMOL/L (ref 21–32)
CREAT SERPL-MCNC: 1.43 MG/DL (ref 0.7–1.3)
DIFFERENTIAL METHOD BLD: ABNORMAL
EGFR (NO RACE VARIABLE) (RUSH/TITUS): 54 ML/MIN/1.73M2
EOSINOPHIL # BLD AUTO: 0.3 K/UL (ref 0–0.5)
EOSINOPHIL NFR BLD AUTO: 5.3 % (ref 1–4)
ERYTHROCYTE [DISTWIDTH] IN BLOOD BY AUTOMATED COUNT: 14.7 % (ref 11.5–14.5)
GLOBULIN SER-MCNC: 3 G/DL (ref 2–4)
GLUCOSE SERPL-MCNC: 86 MG/DL (ref 74–106)
HCT VFR BLD AUTO: 35.5 % (ref 40–54)
HGB BLD-MCNC: 11.2 G/DL (ref 13.5–18)
IMM GRANULOCYTES # BLD AUTO: 0.01 K/UL (ref 0–0.04)
IMM GRANULOCYTES NFR BLD: 0.2 % (ref 0–0.4)
IRON SATN MFR SERPL: 17 % (ref 14–50)
IRON SERPL-MCNC: 49 ΜG/DL (ref 65–175)
LYMPHOCYTES # BLD AUTO: 0.98 K/UL (ref 1–4.8)
LYMPHOCYTES NFR BLD AUTO: 17.3 % (ref 27–41)
MCH RBC QN AUTO: 29.5 PG (ref 27–31)
MCHC RBC AUTO-ENTMCNC: 31.5 G/DL (ref 32–36)
MCV RBC AUTO: 93.4 FL (ref 80–96)
MONOCYTES # BLD AUTO: 0.52 K/UL (ref 0–0.8)
MONOCYTES NFR BLD AUTO: 9.2 % (ref 2–6)
MPC BLD CALC-MCNC: 11 FL (ref 9.4–12.4)
NEUTROPHILS # BLD AUTO: 3.79 K/UL (ref 1.8–7.7)
NEUTROPHILS NFR BLD AUTO: 67.1 % (ref 53–65)
NRBC # BLD AUTO: 0 X10E3/UL
NRBC, AUTO (.00): 0 %
NT-PROBNP SERPL-MCNC: 654 PG/ML (ref 1–125)
PLATELET # BLD AUTO: 224 K/UL (ref 150–400)
POTASSIUM SERPL-SCNC: 4.4 MMOL/L (ref 3.5–5.1)
PROT SERPL-MCNC: 6.6 G/DL (ref 6.4–8.2)
RBC # BLD AUTO: 3.8 M/UL (ref 4.6–6.2)
SODIUM SERPL-SCNC: 142 MMOL/L (ref 136–145)
TIBC SERPL-MCNC: 287 ΜG/DL (ref 250–450)
WBC # BLD AUTO: 5.65 K/UL (ref 4.5–11)

## 2024-03-19 PROCEDURE — 83540 ASSAY OF IRON: CPT | Mod: ,,, | Performed by: CLINICAL MEDICAL LABORATORY

## 2024-03-19 PROCEDURE — 99214 OFFICE O/P EST MOD 30 MIN: CPT | Mod: ,,, | Performed by: NURSE PRACTITIONER

## 2024-03-19 PROCEDURE — 80053 COMPREHEN METABOLIC PANEL: CPT | Mod: ,,, | Performed by: CLINICAL MEDICAL LABORATORY

## 2024-03-19 PROCEDURE — 83550 IRON BINDING TEST: CPT | Mod: ,,, | Performed by: CLINICAL MEDICAL LABORATORY

## 2024-03-19 PROCEDURE — 71046 X-RAY EXAM CHEST 2 VIEWS: CPT | Mod: TC,RHCUB,FY | Performed by: NURSE PRACTITIONER

## 2024-03-19 PROCEDURE — 85025 COMPLETE CBC W/AUTO DIFF WBC: CPT | Mod: ,,, | Performed by: CLINICAL MEDICAL LABORATORY

## 2024-03-19 PROCEDURE — 83880 ASSAY OF NATRIURETIC PEPTIDE: CPT | Mod: ,,, | Performed by: CLINICAL MEDICAL LABORATORY

## 2024-03-19 RX ORDER — ATORVASTATIN CALCIUM 20 MG/1
20 TABLET, FILM COATED ORAL DAILY
Qty: 90 TABLET | Refills: 1 | Status: SHIPPED | OUTPATIENT
Start: 2024-03-19 | End: 2024-05-14 | Stop reason: DRUGHIGH

## 2024-03-19 RX ORDER — FUROSEMIDE 20 MG/1
20 TABLET ORAL ONCE AS NEEDED
Qty: 2 TABLET | Refills: 0 | Status: SHIPPED | OUTPATIENT
Start: 2024-03-19

## 2024-03-22 NOTE — PROGRESS NOTES
ELA Pziarro        PATIENT NAME: Mitul Torres  : 1955  DATE: 3/19/24  MRN: [id number]      Patient PCP Information       Provider PCP Type    Primary Doctor No General            Reason for Visit / Chief Complaint: Sinus Problem (Patient presents to the clinic for sinus issues drainage chest feel tight on the right side) and Shortness of Breath           History of Present Illness / Problem Focused Workflow     Mitul Torres presents to the clinic with Sinus Problem (Patient presents to the clinic for sinus issues drainage chest feel tight on the right side) and Shortness of Breath     Sinus Problem  Associated symptoms include shortness of breath. Pertinent negatives include no chills, congestion, coughing, diaphoresis, ear pain, headaches or sore throat.   Shortness of Breath  Pertinent negatives include no abdominal pain, chest pain, ear pain, fever, headaches, leg swelling, rash, sore throat or wheezing.     Mr Torres presents to clinic with sinus congestion. Feels tightness to right side of chest, cough sob.   Patient O2 sats stable. Patient recently underwent MVR. Patients post op period complicated by hematoma at prior chest tube site. Patient also had upper Gi bleed after starting plavix requiring transfusion.   Patient states two ulcers were noted on EGD. Patient has been taken off plavix and currently taking asa along with protonix BID.   Patient still has fist firm area to abdomen/epigastric region where hematoma occurred. Patient denies coffee ground emesis or blood in stools.   Patient has started CV rehab. Patient had post op follow up since last visit. No changes to POC.   Patient reports easily sob on exertion.     Review of Systems     Review of Systems   Constitutional:  Negative for activity change, appetite change, chills, diaphoresis, fatigue, fever and unexpected weight change.   HENT:  Negative for congestion, ear pain, facial swelling, hearing loss, nosebleeds and  sore throat.    Eyes: Negative.    Respiratory:  Positive for shortness of breath. Negative for apnea, cough and wheezing.    Cardiovascular:  Negative for chest pain, palpitations and leg swelling.   Gastrointestinal:  Negative for abdominal distention, abdominal pain, blood in stool, constipation, diarrhea and nausea.   Endocrine: Negative for cold intolerance, heat intolerance, polydipsia, polyphagia and polyuria.   Genitourinary:  Negative for decreased urine volume, difficulty urinating, dysuria, flank pain, frequency, hematuria and urgency.   Musculoskeletal:  Negative for arthralgias, joint swelling and myalgias.   Skin:  Negative for color change and rash.   Allergic/Immunologic: Negative.    Neurological:  Negative for dizziness, tremors, seizures, syncope, facial asymmetry, speech difficulty, weakness, light-headedness, numbness and headaches.   Hematological:  Negative for adenopathy. Does not bruise/bleed easily.   Psychiatric/Behavioral:  Negative for behavioral problems and confusion.        Medical / Social / Family History     Past Medical History:   Diagnosis Date    Hyperlipidemia     Hypertension        Past Surgical History:   Procedure Laterality Date    ECHOCARDIOGRAM,TRANSESOPHAGEAL  12/14/2023    Procedure: Transesophageal echo (GAEL) intra-procedure log documentation;  Surgeon: Jerry Be DO;  Location: [facility] CATH LAB;  Service: Cardiology;;    LEFT HEART CATHETERIZATION Left 12/14/2023    Procedure: Left heart cath;  Surgeon: Jerry Be DO;  Location: [facility] CATH LAB;  Service: Cardiology;  Laterality: Left;    RIGHT HEART CATHETERIZATION Right 12/14/2023    Procedure: INSERTION, CATHETER, RIGHT HEART;  Surgeon: Jerry Be DO;  Location: [facility] CATH LAB;  Service: Cardiology;  Laterality: Right;       Social History    reports that he quit smoking about 32 years ago. His smoking use included cigarettes. He has never used smokeless tobacco. He reports current  "drug use. Drug: Marijuana. He reports that he does not drink alcohol.    Family History  's family history includes Cataracts in his mother; Diabetes in his maternal grandfather; Glaucoma in his mother; Heart disease in his brother; No Known Problems in his brother and father.    Medications and Allergies     Medications  Outpatient Medications Marked as Taking for the 3/19/24 encounter (Office Visit) with Shannan Gorman FNP   Medication Sig Dispense Refill    amiodarone (PACERONE) 200 MG Tab Take 1 tablet (200 mg total) by mouth once daily. Tale 1 tablet in the morning and 1 tablet at bedtime. 30 tablet 11    aspirin (ECOTRIN) 81 MG EC tablet Take 1 tablet by mouth every morning.      metoprolol tartrate (LOPRESSOR) 25 MG tablet Take 0.5 tablets (12.5 mg total) by mouth 2 (two) times daily. 90 tablet 3    [DISCONTINUED] mupirocin (BACTROBAN) 2 % ointment Apply topically 3 (three) times daily. (Patient taking differently: Apply 1 g topically 3 (three) times daily as needed (nare).) 15 g 0       Allergies  Review of patient's allergies indicates:   Allergen Reactions    Sulfamethoxazole-trimethoprim Rash     Note: Emil Johson Syndrome       Physical Examination     Vitals:    03/19/24 1350   BP: 135/78   BP Location: Right arm   Patient Position: Sitting   BP Method: Medium (Automatic)   Pulse: 66   Resp: 20   Temp: 98.5 °F (36.9 °C)   TempSrc: Oral   SpO2: 96%   Weight: 70.8 kg (156 lb)   Height: 5' 10" (1.778 m)       Physical Exam  Vitals and nursing note reviewed.   Constitutional:       Appearance: Normal appearance. He is normal weight.   HENT:      Head: Normocephalic.      Right Ear: External ear normal.      Left Ear: External ear normal.      Nose: Nose normal.      Mouth/Throat:      Mouth: Mucous membranes are moist.   Eyes:      Extraocular Movements: Extraocular movements intact.      Conjunctiva/sclera: Conjunctivae normal.      Pupils: Pupils are equal, round, and reactive to light. "   Cardiovascular:      Rate and Rhythm: Normal rate and regular rhythm.      Pulses: Normal pulses.      Heart sounds: Normal heart sounds. No murmur heard.  Pulmonary:      Effort: Pulmonary effort is normal.      Breath sounds: Normal breath sounds. No stridor. No wheezing or rhonchi.      Comments: Crackles auscultated concern for pulm edema  Abdominal:      General: Bowel sounds are normal. There is no distension.      Palpations: Abdomen is soft. There is no mass.      Tenderness: There is no abdominal tenderness.   Musculoskeletal:         General: No swelling or tenderness. Normal range of motion.      Cervical back: Normal range of motion and neck supple.      Right lower leg: No edema.      Left lower leg: No edema.   Skin:     General: Skin is warm and dry.      Capillary Refill: Capillary refill takes less than 2 seconds.   Neurological:      General: No focal deficit present.      Mental Status: He is alert and oriented to person, place, and time. Mental status is at baseline.      Cranial Nerves: No cranial nerve deficit.      Sensory: No sensory deficit.      Motor: No weakness.   Psychiatric:         Mood and Affect: Mood normal.         Behavior: Behavior normal.         Thought Content: Thought content normal.         Judgment: Judgment normal.           Office Visit on 03/19/2024   Component Date Value Ref Range Status    Sodium 03/19/2024 142  136 - 145 mmol/L Final    Potassium 03/19/2024 4.4  3.5 - 5.1 mmol/L Final    Chloride 03/19/2024 106  98 - 107 mmol/L Final    CO2 03/19/2024 31  21 - 32 mmol/L Final    Anion Gap 03/19/2024 9  7 - 16 mmol/L Final    Glucose 03/19/2024 86  74 - 106 mg/dL Final    BUN 03/19/2024 19 (H)  7 - 18 mg/dL Final    Creatinine 03/19/2024 1.43 (H)  0.70 - 1.30 mg/dL Final    BUN/Creatinine Ratio 03/19/2024 13  6 - 20 Final    Calcium 03/19/2024 8.9  8.5 - 10.1 mg/dL Final    Total Protein 03/19/2024 6.6  6.4 - 8.2 g/dL Final    Albumin 03/19/2024 3.6  3.5 - 5.0 g/dL  Final    Globulin 03/19/2024 3.0  2.0 - 4.0 g/dL Final    A/G Ratio 03/19/2024 1.2   Final    Bilirubin, Total 03/19/2024 0.6  >0.0 - 1.2 mg/dL Final    Alk Phos 03/19/2024 71  45 - 115 U/L Final    ALT 03/19/2024 18  16 - 61 U/L Final    AST 03/19/2024 12 (L)  15 - 37 U/L Final    eGFR 03/19/2024 54 (L)  >=60 mL/min/1.73m2 Final    Iron 03/19/2024 49 (L)  65 - 175 µg/dL Final    Iron Saturation 03/19/2024 17  14 - 50 % Final    TIBC 03/19/2024 287  250 - 450 µg/dL Final    ProBNP 03/19/2024 654 (H)  1 - 125 pg/mL Final    WBC 03/19/2024 5.65  4.50 - 11.00 K/uL Final    RBC 03/19/2024 3.80 (L)  4.60 - 6.20 M/uL Final    Hemoglobin 03/19/2024 11.2 (L)  13.5 - 18.0 g/dL Final    Hematocrit 03/19/2024 35.5 (L)  40.0 - 54.0 % Final    MCV 03/19/2024 93.4  80.0 - 96.0 fL Final    MCH 03/19/2024 29.5  27.0 - 31.0 pg Final    MCHC 03/19/2024 31.5 (L)  32.0 - 36.0 g/dL Final    RDW 03/19/2024 14.7 (H)  11.5 - 14.5 % Final    Platelet Count 03/19/2024 224  150 - 400 K/uL Final    MPV 03/19/2024 11.0  9.4 - 12.4 fL Final    Neutrophils % 03/19/2024 67.1 (H)  53.0 - 65.0 % Final    Lymphocytes % 03/19/2024 17.3 (L)  27.0 - 41.0 % Final    Monocytes % 03/19/2024 9.2 (H)  2.0 - 6.0 % Final    Eosinophils % 03/19/2024 5.3 (H)  1.0 - 4.0 % Final    Basophils % 03/19/2024 0.9  0.0 - 1.0 % Final    Immature Granulocytes % 03/19/2024 0.2  0.0 - 0.4 % Final    nRBC, Auto 03/19/2024 0.0  <=0.0 % Final    Neutrophils, Abs 03/19/2024 3.79  1.80 - 7.70 K/uL Final    Lymphocytes, Absolute 03/19/2024 0.98 (L)  1.00 - 4.80 K/uL Final    Monocytes, Absolute 03/19/2024 0.52  0.00 - 0.80 K/uL Final    Eosinophils, Absolute 03/19/2024 0.30  0.00 - 0.50 K/uL Final    Basophils, Absolute 03/19/2024 0.05  0.00 - 0.20 K/uL Final    Immature Granulocytes, Absolute 03/19/2024 0.01  0.00 - 0.04 K/uL Final    nRBC, Absolute 03/19/2024 0.00  <=0.00 x10e3/uL Final    Diff Type 03/19/2024 Auto   Final   Office Visit on 03/14/2024   Component Date Value Ref  Range Status    QRS Duration 03/14/2024 92  ms Final    [facility] QTC Calculation 03/14/2024 475  ms Final             Assessment and Plan (including Health Maintenance)      Problem List  Smart Sets  Document Outside HM   :    Plan:   Shortness of breath  -     X-Ray Chest PA And Lateral; Future; Expected date: 03/19/2024  -     NT-Pro Natriuretic Peptide; Future; Expected date: 03/19/2024  -     furosemide (LASIX) 20 MG tablet; Take 1 tablet (20 mg total) by mouth 1 (one) time if needed (pulmonary edema).  Dispense: 2 tablet; Refill: 0    S/P MVR (mitral valve replacement)  -     Comprehensive Metabolic Panel; Future; Expected date: 03/19/2024  -     NT-Pro Natriuretic Peptide; Future; Expected date: 03/19/2024    History of bleeding ulcers  Comments:  continue protonix    Anemia, unspecified type  -     CBC Auto Differential; Future; Expected date: 03/19/2024  -     Iron and TIBC; Future; Expected date: 03/19/2024    Coronary artery disease involving native coronary artery of native heart without angina pectoris  -     atorvastatin (LIPITOR) 20 MG tablet; Take 1 tablet (20 mg total) by mouth once daily.  Dispense: 90 tablet; Refill: 1     RTC for wellness as as scheduled   EKG obtained recently on cardiology follow up.   There are no Patient Instructions on file for this visit.       Health Maintenance Due   Topic Date Due    COVID-19 Vaccine (1) Never done    TETANUS VACCINE  Never done    Colorectal Cancer Screening  Never done    Shingles Vaccine (1 of 2) Never done    RSV Vaccine (Age 60+ and Pregnant patients) (1 - 1-dose 60+ series) Never done    Pneumococcal Vaccines (Age 65+) (1 of 1 - PCV) Never done    Abdominal Aortic Aneurysm Screening  Never done    Influenza Vaccine (1) Never done         There is no immunization history on file for this patient.     Problem List Items Addressed This Visit          Cardiac/Vascular    Coronary artery disease involving native coronary artery of native heart without angina  pectoris    Overview     [facility] 12/14/2023- Dr. Be    Non-obstructive CAD  Single vessel CAD with a 30% prox RCA lesion  LVEF 55%  LVEDP 6 mmHg  Severe MR         Relevant Medications    atorvastatin (LIPITOR) 20 MG tablet     Other Visit Diagnoses       Shortness of breath    -  Primary    Relevant Medications    furosemide (LASIX) 20 MG tablet    Other Relevant Orders    X-Ray Chest PA And Lateral (Completed)    NT-Pro Natriuretic Peptide (Completed)    S/P MVR (mitral valve replacement)        Relevant Orders    Comprehensive Metabolic Panel (Completed)    NT-Pro Natriuretic Peptide (Completed)    History of bleeding ulcers        continue protonix    Anemia, unspecified type        Relevant Orders    CBC Auto Differential (Completed)    Iron and TIBC (Completed)            Health Maintenance Topics with due status: Not Due       Topic Last Completion Date    Lipid Panel 10/11/2023    Hemoglobin A1c (Prediabetes) 01/26/2024    High Dose Statin 03/19/2024    Aspirin/Antiplatelet Therapy 03/19/2024       Future Appointments   Date Time Provider Department Center   4/3/2024 10:15 AM Trish Levine MD [facility] CARD [facility]   4/11/2024  3:00 PM AWV NURSE, [facility] FAMILY MEDICINE [facility] AYALA Alberto            Signature:  ELA Pizarro  [facility]     Date of encounter: 3/19/24

## 2024-04-02 NOTE — PROGRESS NOTES
PCP: Shannan Gorman FNP    Referring Provider:        Subjective:   Mitul Torres is a 68 y.o. male with hx of severe MR s/p complex MV repair and MOY occlusion (1/30/2024, Dr. Daniels), non-obstructive CAD with 30% lesion prox RCA (12/14/2023), A. Fib with RVR in the presence of severe anemia secondary to an actively bleeding duodenal ulcer, HLD, and HTN,  who presents for a follow up visit.    4/3/24:  Presents for follow-up.  Doing very well from cardiac standpoint.  Undergoing cardiac rehab.  Denies any chest pain/tightness, dyspnea, palpitations, lightheadedness or syncope.  Of incisional pain at sternotomy site.    3/15/24: Seen by ZAIN Pitt NP  for HD follow up from admission to 2/10/2024-2/14/2024 at [facility]. The patient presented to [facility] 2/10/2024 with c/o sudden onset lightheadedness, nausea and palpitations. EKG demonstrated atrial fibrillation with RVR; rate 140 bpm in the presence of GIB. The patient was airlifted to [facility] with an urgent GI consult. The atrial fibrillation was converted chemically.    The patient had a complex mitral valve repair and MOY occlusion by Dr. Daniels on 1/30/2024. He was discharged on 2/8/2024. Returned to [facility] ED on 2/10/2024 in A fib with RVR secondry to severe blood loss anemia from an actively bleeding duodenal ulcer. He was air lifted to [facility] with an emergent EGD done the following day demonstrating a large duodenal ulcer with a large overlying pancake like hematoma. The ulcer was treated with epinephrine injections and Bovie cautery.  He required approx 7 units of PRBCs, platelets, Fp and cryoprecipitate. A repeat EGD 48 hours later found no evidence of ongoing active bleeding . The patient's plavix was stopped but he was discharged home on ASA 81 mg po daily with a PPI BID. He denies any evidence of bleeding since hospital DC.The patient presents today and states that he has some tightness in the mornings at his chest  incision site but otherwise is having no issues.     11/20/23 (Dr. Dorsey): Presents for evaluation of palpitations, come and go spontaneously, would not notice unless he put jhis hand on his chest.  He feels some pressure in mid epigastric area, 2/10, checks heart with hand on his chest, feels skip, then symptoms resolve.  He denies chest pain, pressure or shortness of breath.  He is very active, using chain saw and swing blade without provocation.  He is a  by trade, able to climb stairs, ladders without difficulty.  No previous cardiac history.  He is having more frequent and severe episodes of shortness of breath if tries to pick and carry five gallon pail of pain.  He has to stop activity, wait three to five minutes for symptoms to resolve.  He is recovering from puncture wound on bottom of left foot, notes is healing slowly,  He is smoking one or two joints a day.      Fhx:  Brother (CAD)   SH: Former smoker    EKG 3/14/2024 NSR with 1st degree AVB; HR 72 bpm; LVH with repolarization abnormality   2/10/2024 A fib with RVR;  bpm; ant/septal and lateral NS-TWA  11/202023 RSR with HR 65 bpm; normal EKG    TTE at [facility] 2/10/2024     Conclusion: When compared to prior study, 2/6/2024. Patient in atrial   fibrillation during study. Cardiac evaluation made difficult by abnormal   rhythm.    Left Ventricle: Moderately to severely increased wall thickness. Normal   (55 - 60%) ejection fraction. Diastolic function could not be graded due   to A-Fib.     Left Atrium: Left atrium volume index is severely increased.     Right Atrium: Right atrium is mildly dilated.     Mitral Valve: Normal structure status post mitral valve ring repair. No   mitral regurgitation.     Tricuspid Valve: Trace tricuspid regurgitation. RVSP is 34.00.     Pericardium: No pericardial effusion. No cardiac tamponade present.       GAEL 12/14/2023    Summary      Left Ventricle: The left ventricle is normal in size. Increased  wall thickness. Normal wall motion. There is normal systolic function.    Right Ventricle: Right ventricular enlargement. Wall thickness is normal. Right ventricle wall motion  is normal. Systolic function is normal.    Left Atrium: Left atrium is severely dilated. The pulmonary veins have systolic flow reversal. There is no thrombus in the left atrial cavity.    Right Atrium: Right atrium is dilated.    Mitral Valve: Findings are consistent with myxomatous changes. There is severe prolapse of the posterior mitral leaflet. Flail posterior leaflet (P2 segment). There is no mass/vegetation present. There is no stenosis. There is severe regurgitation with an anteromedial eccentrically directed wall-impinging jet.    Tricuspid Valve: There is mild regurgitation.    Pulmonary Artery: Pulmonary artery pressure could not be accurately determined.    Conclusion: Severe mitral regurgitation secondary to severe prolpase of the posterior leaflet of the mitral valve with flail P2 segment.HO         Results for orders placed during the hospital encounter of 12/12/23    Echo    Interpretation Summary    Left Ventricle: The left ventricle is normal in size. Increased ventricular mass. Increased wall thickness. There is mild concentric hypertrophy. Normal wall motion. There is normal systolic function. Biplane (2D) method of discs ejection fraction is 60%. Global longitudinal strain is normal. There is normal diastolic function.    Right Ventricle: Mild right ventricular enlargement. Systolic function is normal.    Left Atrium: Left atrium is severely dilated. LA volume index 69.8  ml/m2    Right Atrium: Right atrium is mildly dilated.    Aortic Valve: The aortic valve is a trileaflet valve.    Mitral Valve: There is prolapse of the posterior mitral leaflet. Flail posterior leaflet. There is severe regurgitation.    Tricuspid Valve: There is mild regurgitation.    Pulmonary Artery: The estimated pulmonary artery systolic pressure  is 30 mmHg.    IVC/SVC: Normal venous pressure at 3 mmHg.    There is severe mitral regurgitation secondary to severe mitral valve prolpase with a flail posterior leaflet. Refer to CV surgery for mitral valve repair.    [facility] Results for orders placed during the hospital encounter of 12/14/23    Cardiac catheterization    Conclusion    The ejection fraction was calculated to be 55%.    The left ventricular systolic function was normal.    The pre-procedure left ventricular end diastolic pressure was 6.    The estimated blood loss was none.    There was non-obstructive coronary artery disease..    There was no mitral valve stenosis and severe (4+) mitral regurgitation.    The annulus was calcified.    The procedure log was documented by Documenter: Shilpi Basilio RN and verified by Jerry Be DO.    Date: 12/14/2023  Time: 11:21 AM    Trivial CAD  Normal LV systolic function, mild dilatation, 4+ MR  Await GAEL results to follow    1/30/2024- Dr. Daniels  Complex mitral valve repair and MOY closure      Lab Results   Component Value Date     03/19/2024    K 4.4 03/19/2024     03/19/2024    CO2 31 03/19/2024    BUN 19 (H) 03/19/2024    CREATININE 1.43 (H) 03/19/2024    CALCIUM 8.9 03/19/2024    ANIONGAP 9 03/19/2024    ESTGFRAFRICA 81 02/14/2024    EGFRNONAA 76 05/26/2022       Lab Results   Component Value Date    CHOL 177 10/11/2023     Lab Results   Component Value Date    HDL 66 (H) 10/11/2023     Lab Results   Component Value Date    LDLCALC 92 10/11/2023     Lab Results   Component Value Date    TRIG 97 10/11/2023     Lab Results   Component Value Date    CHOLHDL 2.7 10/11/2023       Lab Results   Component Value Date    WBC 5.65 03/19/2024    HGB 11.2 (L) 03/19/2024    HCT 35.5 (L) 03/19/2024    MCV 93.4 03/19/2024     03/19/2024           Current Outpatient Medications:     amiodarone (PACERONE) 200 MG Tab, Take 1 tablet (200 mg total) by mouth once daily. Tale 1 tablet in the morning  "and 1 tablet at bedtime., Disp: 30 tablet, Rfl: 11    aspirin (ECOTRIN) 81 MG EC tablet, Take 1 tablet by mouth every morning., Disp: , Rfl:     atorvastatin (LIPITOR) 20 MG tablet, Take 1 tablet (20 mg total) by mouth once daily., Disp: 90 tablet, Rfl: 1    furosemide (LASIX) 20 MG tablet, Take 1 tablet (20 mg total) by mouth 1 (one) time if needed (pulmonary edema)., Disp: 2 tablet, Rfl: 0    metoprolol tartrate (LOPRESSOR) 25 MG tablet, Take 0.5 tablets (12.5 mg total) by mouth 2 (two) times daily., Disp: 90 tablet, Rfl: 3    pantoprazole (PROTONIX) 40 MG tablet, Take 40 mg by mouth 2 (two) times daily., Disp: , Rfl:   Meds reviewed and reconciled using the list provided by the patient.    Review of Systems   Constitutional:  Negative for chills, diaphoresis, fever and malaise/fatigue.   Respiratory:  Negative for cough and shortness of breath.    Cardiovascular:  Negative for chest pain, palpitations, orthopnea, claudication, leg swelling and PND.   Gastrointestinal:  Negative for abdominal pain, heartburn, nausea and vomiting.   Neurological:  Negative for dizziness.           Objective:   /80 (BP Location: Right arm, Patient Position: Sitting)   Pulse 63   Ht 5' 10" (1.778 m)   Wt 71.3 kg (157 lb 3.2 oz)   SpO2 96%   BMI 22.56 kg/m²     Physical Exam  Constitutional:       General: He is not in acute distress.     Appearance: Normal appearance.   Cardiovascular:      Rate and Rhythm: Normal rate and regular rhythm.      Pulses: Normal pulses.      Heart sounds: Normal heart sounds. No murmur heard.     No friction rub. No gallop.   Pulmonary:      Effort: Pulmonary effort is normal.      Breath sounds: Normal breath sounds. No wheezing or rales.   Musculoskeletal:      Right lower leg: No edema.      Left lower leg: No edema.   Skin:     General: Skin is warm and dry.   Neurological:      Mental Status: He is alert.           Assessment:     1. S/P mitral valve repair  Lipid Panel    Basic Metabolic " Panel    CANCELED: Basic Metabolic Panel      2. Coronary artery disease involving native coronary artery of native heart without angina pectoris  Lipid Panel    Basic Metabolic Panel              Plan:   No problem-specific Assessment & Plan notes found for this encounter.    Severe mitral regurgitation  Secondary to severe prolapse/flail P2 segment  S/p complex mitral valve repair and MOY closure (Dr. Daniels 1/30/24:)  Doing well from cardiac standpoint  Undergoing cardiac rehab      Paroxysmal atrial fibrillation  Patient presented to [facility] 2/10/2024 with c/o sudden onset lightheadedness, nausea and palpitations. EKG demonstrated atrial fibrillation with RVR; rate 140 bpm in the presence of GIB. The patient was airlifted to [facility] with an urgent GI consult. The atrial fibrillation was converted chemically.  - Currently NSR  - Continue amiodarone 200 mg dialy     Gastrointestinal hemorrhage associated with duodenal ulcer  The patient was airlifted to [facility] with an urgent GI consult. He was admitted to CVR and continued to have evidence of GIB throughout the night. ON 12/11/2024 an emergent EGD demonstrated a large duodenal ulcer with a large overlying pancake like hematoma. This was treated epinephrine injections and Bovie cautery. The patient required a total of 7 units of PRCs, platelets, FFP and cryoprecipitate. EGD was repeat 48 hours later with no evidence of ongoing active bleeding found.     Contine PPI as Rx.    Essential hypertension  Controlled     Coronary artery disease involving native coronary artery of native heart without angina pectoris  [facility] 12/14/2023- Dr. Be  Non-obstructive CAD  Single vessel CAD with a 30% prox RCA lesion  - Fasting lipid panel next week    Recent labs reviewed 03/19/2024 with a creatinine of 1.43 compared to a normal baseline.  We will repeat BNP next week and patient is getting his fasting lipid panel    Follow up in 6 weeks

## 2024-04-03 ENCOUNTER — OFFICE VISIT (OUTPATIENT)
Dept: CARDIOLOGY | Facility: CLINIC | Age: 69
End: 2024-04-03
Payer: MEDICARE

## 2024-04-03 VITALS
HEIGHT: 70 IN | WEIGHT: 157.19 LBS | HEART RATE: 63 BPM | OXYGEN SATURATION: 96 % | BODY MASS INDEX: 22.5 KG/M2 | DIASTOLIC BLOOD PRESSURE: 80 MMHG | SYSTOLIC BLOOD PRESSURE: 138 MMHG

## 2024-04-03 DIAGNOSIS — Z98.890 S/P MITRAL VALVE REPAIR: Primary | ICD-10-CM

## 2024-04-03 DIAGNOSIS — I25.10 CORONARY ARTERY DISEASE INVOLVING NATIVE CORONARY ARTERY OF NATIVE HEART WITHOUT ANGINA PECTORIS: ICD-10-CM

## 2024-04-03 PROCEDURE — 99214 OFFICE O/P EST MOD 30 MIN: CPT | Mod: PBBFAC | Performed by: STUDENT IN AN ORGANIZED HEALTH CARE EDUCATION/TRAINING PROGRAM

## 2024-04-03 PROCEDURE — 99214 OFFICE O/P EST MOD 30 MIN: CPT | Mod: S$PBB,,, | Performed by: STUDENT IN AN ORGANIZED HEALTH CARE EDUCATION/TRAINING PROGRAM

## 2024-04-04 ENCOUNTER — OFFICE VISIT (OUTPATIENT)
Dept: FAMILY MEDICINE | Facility: CLINIC | Age: 69
End: 2024-04-04
Payer: MEDICARE

## 2024-04-04 VITALS
DIASTOLIC BLOOD PRESSURE: 72 MMHG | BODY MASS INDEX: 24.8 KG/M2 | RESPIRATION RATE: 16 BRPM | SYSTOLIC BLOOD PRESSURE: 132 MMHG | OXYGEN SATURATION: 97 % | HEART RATE: 62 BPM | HEIGHT: 67 IN | TEMPERATURE: 99 F | WEIGHT: 158 LBS

## 2024-04-04 DIAGNOSIS — I48.0 PAROXYSMAL ATRIAL FIBRILLATION: Chronic | ICD-10-CM

## 2024-04-04 DIAGNOSIS — I25.10 CORONARY ARTERY DISEASE INVOLVING NATIVE CORONARY ARTERY OF NATIVE HEART WITHOUT ANGINA PECTORIS: Chronic | ICD-10-CM

## 2024-04-04 DIAGNOSIS — S40.861A TICK BITE OF RIGHT UPPER ARM, INITIAL ENCOUNTER: ICD-10-CM

## 2024-04-04 DIAGNOSIS — Z12.11 SCREENING FOR COLON CANCER: ICD-10-CM

## 2024-04-04 DIAGNOSIS — E78.5 HYPERLIPIDEMIA, UNSPECIFIED HYPERLIPIDEMIA TYPE: Chronic | ICD-10-CM

## 2024-04-04 DIAGNOSIS — Z00.00 ENCOUNTER FOR SUBSEQUENT ANNUAL WELLNESS VISIT (AWV) IN MEDICARE PATIENT: Primary | ICD-10-CM

## 2024-04-04 DIAGNOSIS — I10 ESSENTIAL HYPERTENSION, BENIGN: Chronic | ICD-10-CM
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
## 2024-04-04 DIAGNOSIS — Z95.2 S/P MVR (MITRAL VALVE REPLACEMENT): Chronic | ICD-10-CM

## 2024-04-04 PROCEDURE — G0439 PPPS, SUBSEQ VISIT: HCPCS | Mod: ,,, | Performed by: NURSE PRACTITIONER

## 2024-04-04 RX ORDER — DOXYCYCLINE 100 MG/1
100 CAPSULE ORAL EVERY 12 HOURS
Qty: 28 CAPSULE | Refills: 0 | Status: SHIPPED | OUTPATIENT
Start: 2024-04-04 | End: 2024-04-18

## 2024-04-04 NOTE — LETTER
AUTHORIZATION FOR RELEASE OF   CONFIDENTIAL INFORMATION    Dear [facility],    We are seeing Mitul Torres, date of birth 1955, in the clinic at [facility]. Shannan Gorman FNP is the patient's PCP. Mitul Torres has an outstanding lab/procedure at the time we reviewed his chart. In order to help keep his health information updated, he has authorized us to request the following medical record(s):        (  )  MAMMOGRAM                                      (  )  COLONOSCOPY      (  )  PAP SMEAR                                          (  )  OUTSIDE LAB RESULTS     (  )  DEXA SCAN                                          (  )  EYE EXAM            (  )  FOOT EXAM                                          (  )  ENTIRE RECORD     ( x )  OUTSIDE IMMUNIZATIONS                 (  )  _______________         Please fax records to [facility], Rogers, Melissa P, FNP, [phone]     If you have any questions, please contact  at [phone].           Patient Name: Mitul Torres  : 1955  Patient Phone #: [phone]

## 2024-04-04 NOTE — PATIENT INSTRUCTIONS
Counseling and Referral of Other Preventative  (Italic type indicates deductible and co-insurance are waived)    Patient Name: Mitul Torres  Today's Date: 4/4/2024    Health Maintenance       Date Due Completion Date    COVID-19 Vaccine (1) Never done ---    TETANUS VACCINE Never done ---    Colorectal Cancer Screening Never done ---    Shingles Vaccine (1 of 2) Never done ---    RSV Vaccine (Age 60+ and Pregnant patients) (1 - 1-dose 60+ series) Never done ---    Pneumococcal Vaccines (Age 65+) (1 of 1 - PCV) Never done ---    Abdominal Aortic Aneurysm Screening Never done ---    Influenza Vaccine (1) Never done ---    Hemoglobin A1c (Prediabetes) 01/26/2025 1/26/2024    High Dose Statin 04/03/2025 4/3/2024    Aspirin/Antiplatelet Therapy 04/03/2025 4/3/2024    Lipid Panel 10/11/2028 10/11/2023        No orders of the defined types were placed in this encounter.      The following information is provided to all patients.  This information is to help you find resources for any of the problems found today that may be affecting your health:                  Living healthy guide: ms.gov    Understanding Diabetes: www.diabetes.org      Eating healthy: www.cdc.gov/healthyweight      CDC home safety checklist: www.cdc.gov/steadi/patient.html      Agency on Aging: ms.gov    Alcoholics anonymous (AA): www.aa.org      Physical Activity: www.maite.nih.gov/ow0qgxf      Tobacco use: ms.gov

## 2024-04-04 NOTE — PROGRESS NOTES
"  Mitul Torres presented for a  Medicare AWV and comprehensive Health Risk Assessment today. The following components were reviewed and updated:    Medical history  Family History  Social history  Allergies and Current Medications  Health Risk Assessment  Health Maintenance  Care Team         ** See Completed Assessments for Annual Wellness Visit within the encounter summary.**         The following assessments were completed:  Living Situation  CAGE  Depression Screening  Timed Get Up and Go  Whisper Test  Cognitive Function Screening  Nutrition Screening  ADL Screening  PAQ Screening      Opioid documentation:      Patient does not have a current opioid prescription.        Vitals:    04/04/24 1309 04/04/24 1319   BP: (!) 144/74 132/72   BP Location: Right arm    Patient Position: Sitting    Pulse: 62    Resp: 16    Temp: 98.6 °F (37 °C)    TempSrc: Oral    SpO2: 97%    Weight: 71.7 kg (158 lb)    Height: 5' 7" (1.702 m)      Body mass index is 24.75 kg/m².  Physical Exam  Vitals reviewed.   Constitutional:       Appearance: Normal appearance.   HENT:      Head: Normocephalic.      Right Ear: External ear normal.      Left Ear: External ear normal.      Nose: Nose normal.      Mouth/Throat:      Mouth: Mucous membranes are moist.   Eyes:      Extraocular Movements: Extraocular movements intact.   Cardiovascular:      Rate and Rhythm: Normal rate and regular rhythm.      Pulses: Normal pulses.      Heart sounds: Normal heart sounds.   Pulmonary:      Effort: Pulmonary effort is normal.      Breath sounds: Normal breath sounds.   Abdominal:      Palpations: Abdomen is soft.   Musculoskeletal:         General: Normal range of motion.      Cervical back: Normal range of motion.   Skin:     General: Skin is warm and dry.      Capillary Refill: Capillary refill takes less than 2 seconds.      Comments: Small quarter size area of redness right bicep secondary to tick bite   Neurological:      General: No focal deficit " present.      Mental Status: He is alert and oriented to person, place, and time.   Psychiatric:         Mood and Affect: Mood normal.         Behavior: Behavior normal.         Thought Content: Thought content normal.         Judgment: Judgment normal.             1. Encounter for subsequent annual wellness visit (AWV) in Medicare patient  Colonoscopy    doxycycline (VIBRAMYCIN) 100 MG Cap      2. Essential hypertension, benign Well controlled     continue metoprolol and amiodarone      3. Hyperlipidemia, unspecified hyperlipidemia type      continue statin and asa      4. S/P MVR (mitral valve replacement)      continue asa and statin  continue rate control and htn medications      5. Coronary artery disease involving native coronary artery of native heart without angina pectoris      continue asa and statin      6. Paroxysmal atrial fibrillation      continue amiodarone, asa and bblocker      7. BMI 24.0-24.9, adult        8. Screening for colon cancer  Colonoscopy      9. Tick bite of right upper arm, initial encounter  doxycycline (VIBRAMYCIN) 100 MG Cap            Provided Mitul with a 5-10 year written screening schedule and personal prevention plan. Recommendations were developed using the USPSTF age appropriate recommendations. Education, counseling, and referrals were provided as needed. After Visit Summary printed and given to patient which includes a list of additional screenings\tests needed.    Follow up in about 1 year (around 4/4/2025).    MINESH CHOI RN  Shannan Gorman CFNP  Covid vaccine - declined, Influenza vaccine - LULU, Pneumonia vaccine - declined, Tetanus vaccine -LULU faxed, Shingles vaccine - declined, AAA- declined, Colon screening - Colonoscopy ordered this visit, RSV vaccine - VIS (10/19/2023) given with instructions to take at pharmacy.    I offered to discuss advanced care planning, including how to pick a person who would make decisions for you if you were unable to make them  for yourself, called a health care power of , and what kind of decisions you might make such as use of life sustaining treatments such as ventilators and tube feeding when faced with a life limiting illness recorded on a living will that they will need to know. (How you want to be cared for as you near the end of your natural life)     X Patient is interested in learning more about how to make advanced directives.  I provided them paperwork and offered to discuss this with them.

## 2024-04-09 DIAGNOSIS — Z71.89 COMPLEX CARE COORDINATION: ICD-10-CM

## 2024-05-14 ENCOUNTER — OFFICE VISIT (OUTPATIENT)
Dept: CARDIOLOGY | Facility: CLINIC | Age: 69
End: 2024-05-14
Payer: MEDICARE

## 2024-05-14 VITALS
WEIGHT: 165.38 LBS | OXYGEN SATURATION: 99 % | SYSTOLIC BLOOD PRESSURE: 140 MMHG | HEART RATE: 57 BPM | HEIGHT: 67 IN | BODY MASS INDEX: 25.96 KG/M2 | DIASTOLIC BLOOD PRESSURE: 80 MMHG

## 2024-05-14 DIAGNOSIS — Z98.890 S/P MITRAL VALVE REPAIR: Primary | ICD-10-CM

## 2024-05-14 PROCEDURE — 99214 OFFICE O/P EST MOD 30 MIN: CPT | Mod: PBBFAC | Performed by: STUDENT IN AN ORGANIZED HEALTH CARE EDUCATION/TRAINING PROGRAM

## 2024-05-14 PROCEDURE — 99214 OFFICE O/P EST MOD 30 MIN: CPT | Mod: S$PBB,,, | Performed by: STUDENT IN AN ORGANIZED HEALTH CARE EDUCATION/TRAINING PROGRAM

## 2024-05-14 RX ORDER — ATORVASTATIN CALCIUM 40 MG/1
40 TABLET, FILM COATED ORAL DAILY
Qty: 90 TABLET | Refills: 3 | Status: SHIPPED | OUTPATIENT
Start: 2024-05-14 | End: 2025-05-14

## 2024-05-14 NOTE — PROGRESS NOTES
PCP: Shannan Gorman FNP    Referring Provider:        Subjective:   Mitul Torres is a 68 y.o. male with hx of severe MR s/p complex MV repair and MOY occlusion (1/30/2024, Dr. Daniels), non-obstructive CAD with 30% lesion prox RCA (12/14/2023), A. Fib with RVR in the presence of severe anemia secondary to an actively bleeding duodenal ulcer, HLD, and HTN,  who presents for a follow up visit.    5/14/24:  Doing well.  Occasional episodes of shortness of breath with heavy exertion but otherwise doing good.  He did undergo partial cardiac rehab but he notes that he had to get back to work and due to which he stopped cardiac rehab.    4/3/24:  Presents for follow-up.  Doing very well from cardiac standpoint.  Undergoing cardiac rehab.  Denies any chest pain/tightness, dyspnea, palpitations, lightheadedness or syncope.  Of incisional pain at sternotomy site.    3/15/24: Seen by ZAIN Pitt NP  for HD follow up from admission to 2/10/2024-2/14/2024 at [facility]. The patient presented to [facility] 2/10/2024 with c/o sudden onset lightheadedness, nausea and palpitations. EKG demonstrated atrial fibrillation with RVR; rate 140 bpm in the presence of GIB. The patient was airlifted to [facility] with an urgent GI consult. The atrial fibrillation was converted chemically.    The patient had a complex mitral valve repair and MOY occlusion by Dr. Daniels on 1/30/2024. He was discharged on 2/8/2024. Returned to [facility] ED on 2/10/2024 in A fib with RVR secondry to severe blood loss anemia from an actively bleeding duodenal ulcer. He was air lifted to [facility] with an emergent EGD done the following day demonstrating a large duodenal ulcer with a large overlying pancake like hematoma. The ulcer was treated with epinephrine injections and Bovie cautery.  He required approx 7 units of PRBCs, platelets, Fp and cryoprecipitate. A repeat EGD 48 hours later found no evidence of ongoing active bleeding . The  patient's plavix was stopped but he was discharged home on ASA 81 mg po daily with a PPI BID. He denies any evidence of bleeding since hospital DC.The patient presents today and states that he has some tightness in the mornings at his chest incision site but otherwise is having no issues.     11/20/23 (Dr. Dorsey): Presents for evaluation of palpitations, come and go spontaneously, would not notice unless he put jhis hand on his chest.  He feels some pressure in mid epigastric area, 2/10, checks heart with hand on his chest, feels skip, then symptoms resolve.  He denies chest pain, pressure or shortness of breath.  He is very active, using chain saw and swing blade without provocation.  He is a  by trade, able to climb stairs, ladders without difficulty.  No previous cardiac history.  He is having more frequent and severe episodes of shortness of breath if tries to pick and carry five gallon pail of pain.  He has to stop activity, wait three to five minutes for symptoms to resolve.  He is recovering from puncture wound on bottom of left foot, notes is healing slowly,  He is smoking one or two joints a day.      Fhx:  Brother (CAD)   SH: Former smoker    EKG 3/14/2024 NSR with 1st degree AVB; HR 72 bpm; LVH with repolarization abnormality   2/10/2024 A fib with RVR;  bpm; ant/septal and lateral NS-TWA  11/202023 RSR with HR 65 bpm; normal EKG    TTE at [facility] 2/10/2024     Conclusion: When compared to prior study, 2/6/2024. Patient in atrial   fibrillation during study. Cardiac evaluation made difficult by abnormal   rhythm.    Left Ventricle: Moderately to severely increased wall thickness. Normal   (55 - 60%) ejection fraction. Diastolic function could not be graded due   to A-Fib.     Left Atrium: Left atrium volume index is severely increased.     Right Atrium: Right atrium is mildly dilated.     Mitral Valve: Normal structure status post mitral valve ring repair. No   mitral regurgitation.      Tricuspid Valve: Trace tricuspid regurgitation. RVSP is 34.00.     Pericardium: No pericardial effusion. No cardiac tamponade present.       GAEL 12/14/2023    Summary      Left Ventricle: The left ventricle is normal in size. Increased wall thickness. Normal wall motion. There is normal systolic function.    Right Ventricle: Right ventricular enlargement. Wall thickness is normal. Right ventricle wall motion  is normal. Systolic function is normal.    Left Atrium: Left atrium is severely dilated. The pulmonary veins have systolic flow reversal. There is no thrombus in the left atrial cavity.    Right Atrium: Right atrium is dilated.    Mitral Valve: Findings are consistent with myxomatous changes. There is severe prolapse of the posterior mitral leaflet. Flail posterior leaflet (P2 segment). There is no mass/vegetation present. There is no stenosis. There is severe regurgitation with an anteromedial eccentrically directed wall-impinging jet.    Tricuspid Valve: There is mild regurgitation.    Pulmonary Artery: Pulmonary artery pressure could not be accurately determined.    Conclusion: Severe mitral regurgitation secondary to severe prolpase of the posterior leaflet of the mitral valve with flail P2 segment.HO         Results for orders placed during the hospital encounter of 12/12/23    Echo    Interpretation Summary    Left Ventricle: The left ventricle is normal in size. Increased ventricular mass. Increased wall thickness. There is mild concentric hypertrophy. Normal wall motion. There is normal systolic function. Biplane (2D) method of discs ejection fraction is 60%. Global longitudinal strain is normal. There is normal diastolic function.    Right Ventricle: Mild right ventricular enlargement. Systolic function is normal.    Left Atrium: Left atrium is severely dilated. LA volume index 69.8  ml/m2    Right Atrium: Right atrium is mildly dilated.    Aortic Valve: The aortic valve is a trileaflet valve.     Mitral Valve: There is prolapse of the posterior mitral leaflet. Flail posterior leaflet. There is severe regurgitation.    Tricuspid Valve: There is mild regurgitation.    Pulmonary Artery: The estimated pulmonary artery systolic pressure is 30 mmHg.    IVC/SVC: Normal venous pressure at 3 mmHg.    There is severe mitral regurgitation secondary to severe mitral valve prolpase with a flail posterior leaflet. Refer to CV surgery for mitral valve repair.    [facility] Results for orders placed during the hospital encounter of 12/14/23    Cardiac catheterization    Conclusion    The ejection fraction was calculated to be 55%.    The left ventricular systolic function was normal.    The pre-procedure left ventricular end diastolic pressure was 6.    The estimated blood loss was none.    There was non-obstructive coronary artery disease..    There was no mitral valve stenosis and severe (4+) mitral regurgitation.    The annulus was calcified.    The procedure log was documented by Documenter: Shilpi Basilio RN and verified by Jerry Be DO.    Date: 12/14/2023  Time: 11:21 AM    Trivial CAD  Normal LV systolic function, mild dilatation, 4+ MR  Await GAEL results to follow    1/30/2024- Dr. Daniels  Complex mitral valve repair and MOY closure      Lab Results   Component Value Date     05/13/2024    K 4.4 05/13/2024     05/13/2024    CO2 32 05/13/2024    BUN 27 (H) 05/13/2024    CREATININE 1.32 (H) 05/13/2024    CALCIUM 9.5 05/13/2024    ANIONGAP 6 (L) 05/13/2024    ESTGFRAFRICA 81 02/14/2024    EGFRNONAA 76 05/26/2022       Lab Results   Component Value Date    CHOL 184 05/13/2024    CHOL 177 10/11/2023     Lab Results   Component Value Date    HDL 61 (H) 05/13/2024    HDL 66 (H) 10/11/2023     Lab Results   Component Value Date    LDLCALC 108 05/13/2024    LDLCALC 92 10/11/2023     Lab Results   Component Value Date    TRIG 76 05/13/2024    TRIG 97 10/11/2023     Lab Results   Component Value Date     "CHOLHDL 3.0 05/13/2024    CHOLHDL 2.7 10/11/2023       Lab Results   Component Value Date    WBC 5.65 03/19/2024    HGB 11.2 (L) 03/19/2024    HCT 35.5 (L) 03/19/2024    MCV 93.4 03/19/2024     03/19/2024           Current Outpatient Medications:     aspirin (ECOTRIN) 81 MG EC tablet, Take 1 tablet by mouth every morning., Disp: , Rfl:     metoprolol tartrate (LOPRESSOR) 25 MG tablet, Take 0.5 tablets (12.5 mg total) by mouth 2 (two) times daily., Disp: 90 tablet, Rfl: 3    pantoprazole (PROTONIX) 40 MG tablet, Take 40 mg by mouth 2 (two) times daily., Disp: , Rfl:     atorvastatin (LIPITOR) 40 MG tablet, Take 1 tablet (40 mg total) by mouth once daily., Disp: 90 tablet, Rfl: 3    furosemide (LASIX) 20 MG tablet, Take 1 tablet (20 mg total) by mouth 1 (one) time if needed (pulmonary edema). (Patient not taking: Reported on 4/4/2024), Disp: 2 tablet, Rfl: 0  Meds reviewed and reconciled using the list provided by the patient.    Review of Systems   Constitutional:  Negative for chills, diaphoresis, fever and malaise/fatigue.   Respiratory:  Negative for cough and shortness of breath.    Cardiovascular:  Negative for chest pain, palpitations, orthopnea, claudication, leg swelling and PND.   Gastrointestinal:  Negative for abdominal pain, heartburn, nausea and vomiting.   Neurological:  Negative for dizziness.           Objective:   BP (!) 140/80 (BP Location: Left arm, Patient Position: Sitting)   Pulse (!) 57   Ht 5' 7" (1.702 m)   Wt 75 kg (165 lb 6.4 oz)   SpO2 99%   BMI 25.91 kg/m²     Physical Exam  Constitutional:       General: He is not in acute distress.     Appearance: Normal appearance.   Cardiovascular:      Rate and Rhythm: Normal rate and regular rhythm.      Pulses: Normal pulses.      Heart sounds: Normal heart sounds. No murmur heard.     No friction rub. No gallop.   Pulmonary:      Effort: Pulmonary effort is normal.      Breath sounds: Normal breath sounds. No wheezing or rales. "   Musculoskeletal:      Right lower leg: No edema.      Left lower leg: No edema.   Skin:     General: Skin is warm and dry.   Neurological:      Mental Status: He is alert.           Assessment:     1. S/P mitral valve repair                  Plan:   No problem-specific Assessment & Plan notes found for this encounter.    Severe mitral regurgitation  Secondary to severe prolapse/flail P2 segment  S/p complex mitral valve repair and MOY closure (Dr. Daniels 1/30/24:)  Doing well from cardiac standpoint    Paroxysmal atrial fibrillation  Patient presented to [facility] 2/10/2024 with c/o sudden onset lightheadedness, nausea and palpitations. EKG demonstrated atrial fibrillation with RVR; rate 140 bpm in the presence of GIB. The patient was airlifted to [facility] with an urgent GI consult. The atrial fibrillation was converted chemically.  - Currently NSR  - has completed 3 months of amiodarone.  Stop amiodarone  - no anticoagulation given brief episode of postop AFib in the setting of GI bleeding.  Of note, he has undergone left atrial appendage closure along with a mitral valve repair surgery.    Gastrointestinal hemorrhage associated with duodenal ulcer  The patient was airlifted to [facility] with an urgent GI consult. He was admitted to CVR and continued to have evidence of GIB throughout the night. ON 12/11/2024 an emergent EGD demonstrated a large duodenal ulcer with a large overlying pancake like hematoma. This was treated epinephrine injections and Bovie cautery. The patient required a total of 7 units of PRCs, platelets, FFP and cryoprecipitate. EGD was repeat 48 hours later with no evidence of ongoing active bleeding found.     Contine PPI as Rx.    Essential hypertension  Controlled     Coronary artery disease involving native coronary artery of native heart without angina pectoris  [facility] 12/14/2023- Dr. Be  Non-obstructive CAD  Single vessel CAD with a 30% prox RCA lesion  - Fasting LDL from  yesterday 108, not at goal.  Increase atorvastatin to 40 mg daily.      Follow up in 3 months

## 2024-06-17 PROBLEM — K26.4 GASTROINTESTINAL HEMORRHAGE ASSOCIATED WITH DUODENAL ULCER: Status: RESOLVED | Noted: 2024-03-15 | Resolved: 2024-06-17

## 2024-07-12 ENCOUNTER — OFFICE VISIT (OUTPATIENT)
Dept: FAMILY MEDICINE | Facility: CLINIC | Age: 69
End: 2024-07-12
Payer: MEDICARE

## 2024-07-12 VITALS
BODY MASS INDEX: 26.55 KG/M2 | TEMPERATURE: 98 F | DIASTOLIC BLOOD PRESSURE: 84 MMHG | HEIGHT: 67 IN | WEIGHT: 169.19 LBS | OXYGEN SATURATION: 97 % | HEART RATE: 64 BPM | SYSTOLIC BLOOD PRESSURE: 152 MMHG | RESPIRATION RATE: 18 BRPM

## 2024-07-12 DIAGNOSIS — K21.9 GASTROESOPHAGEAL REFLUX DISEASE, UNSPECIFIED WHETHER ESOPHAGITIS PRESENT: ICD-10-CM

## 2024-07-12 DIAGNOSIS — I10 ESSENTIAL HYPERTENSION: ICD-10-CM

## 2024-07-12 DIAGNOSIS — I25.10 CORONARY ARTERY DISEASE INVOLVING NATIVE CORONARY ARTERY OF NATIVE HEART WITHOUT ANGINA PECTORIS: Primary | ICD-10-CM

## 2024-07-12 DIAGNOSIS — Z98.890 S/P MITRAL VALVE REPAIR: ICD-10-CM

## 2024-07-12 DIAGNOSIS — E78.5 HYPERLIPIDEMIA, UNSPECIFIED HYPERLIPIDEMIA TYPE: ICD-10-CM

## 2024-07-12 DIAGNOSIS — I48.0 PAROXYSMAL ATRIAL FIBRILLATION: ICD-10-CM

## 2024-07-12 NOTE — PROGRESS NOTES
ELA Pizarro        PATIENT NAME: Mitul Torres  : 1955  DATE: 24  MRN: [id number]      Patient PCP Information       Provider PCP Type    Shannan CORY ELA Gorman General            Reason for Visit / Chief Complaint: Hypertension (Patient reports to the clinic for 3 month follow up.) and Health Maintenance (Care gaps addressed, patient states he had his Tdap at [facility],  declines all vaccines and screenings at this time.//Kirsty Troncoso CMA)       Update PCP  Update Chief Complaint         History of Present Illness / Problem Focused Workflow     Mitul Torres presents to the clinic with Hypertension (Patient reports to the clinic for 3 month follow up.) and Health Maintenance (Care gaps addressed, patient states he had his Tdap at [facility],  declines all vaccines and screenings at this time.//Kirsty Troncoso [facility])     HPI  Mr Torres presents to clinic for HTN follow up. BP elevated. Has been forgetting to take coreg at night. Also did not take this am. Stressed importance of compliance with medication. Amiodarone was stopped at last cardiology appt. Patient aware to take meds. FU in 2 weeks for BP check ON medication.       Hx of MVR complicated by GI bleed / hematoma post op. Patient with upper GI bleed after starting plavix. Patient had two ulcers on EGD. Patient also had hematoma at epigastric region following MVR. Received CV rehab post op. Required short course of lasix post op for fluid overload.     Medical / Social / Family History     Past Medical History:   Diagnosis Date    GERD (gastroesophageal reflux disease)     Hyperlipidemia     Hypertension        Past Surgical History:   Procedure Laterality Date    ECHOCARDIOGRAM,TRANSESOPHAGEAL  2023    Procedure: Transesophageal echo (GAEL) intra-procedure log documentation;  Surgeon: Jerry Be DO;  Location: [facility] CATH LAB;  Service: Cardiology;;    LEFT HEART CATHETERIZATION Left 2023    Procedure: Left heart cath;   "Surgeon: Jerry Be DO;  Location: [facility] CATH LAB;  Service: Cardiology;  Laterality: Left;    MITRAL VALVE REPAIR      RIGHT HEART CATHETERIZATION Right 12/14/2023    Procedure: INSERTION, CATHETER, RIGHT HEART;  Surgeon: Jerry Be DO;  Location: [facility] CATH LAB;  Service: Cardiology;  Laterality: Right;       Social History    reports that he quit smoking about 32 years ago. His smoking use included cigarettes. He has been exposed to tobacco smoke. He has never used smokeless tobacco. He reports that he does not currently use alcohol. He reports that he does not currently use drugs after having used the following drugs: Marijuana.    Family History  's family history includes Cataracts in his mother; Diabetes in his maternal grandfather; Glaucoma in his mother; Heart disease in his brother; No Known Problems in his brother and father.    Medications and Allergies     Medications  Outpatient Medications Marked as Taking for the 7/12/24 encounter (Office Visit) with Shannan Gorman FNP   Medication Sig Dispense Refill    aspirin (ECOTRIN) 81 MG EC tablet Take 1 tablet by mouth every morning.      atorvastatin (LIPITOR) 40 MG tablet Take 1 tablet (40 mg total) by mouth once daily. 90 tablet 3    metoprolol tartrate (LOPRESSOR) 25 MG tablet Take 0.5 tablets (12.5 mg total) by mouth 2 (two) times daily. 90 tablet 3    pantoprazole (PROTONIX) 40 MG tablet Take 40 mg by mouth 2 (two) times daily.         Allergies  Review of patient's allergies indicates:   Allergen Reactions    Sulfamethoxazole-trimethoprim Rash     Note: Emil Johson Syndrome       Physical Examination     Vitals:    07/12/24 0945 07/12/24 0957   BP: (!) 154/82 (!) 152/84   BP Location: Left arm Right arm   Patient Position: Sitting Sitting   BP Method: Large (Automatic) Large (Manual)   Pulse: 64    Resp: 18    Temp: 98.3 °F (36.8 °C)    TempSrc: Oral    SpO2: 97%    Weight: 76.7 kg (169 lb 3.2 oz)    Height: 5' 7" (1.702 " m)        Physical Exam  Vitals reviewed.   Constitutional:       Appearance: Normal appearance.   HENT:      Head: Normocephalic.      Right Ear: External ear normal.      Left Ear: External ear normal.      Nose: Nose normal.      Mouth/Throat:      Mouth: Mucous membranes are moist.   Eyes:      Extraocular Movements: Extraocular movements intact.   Cardiovascular:      Rate and Rhythm: Normal rate and regular rhythm.      Pulses: Normal pulses.      Heart sounds: Normal heart sounds.   Pulmonary:      Effort: Pulmonary effort is normal. No respiratory distress.      Breath sounds: Normal breath sounds. No stridor. No wheezing, rhonchi or rales.   Chest:      Chest wall: No tenderness.   Musculoskeletal:         General: Normal range of motion.      Cervical back: Normal range of motion.   Skin:     General: Skin is warm and dry.      Capillary Refill: Capillary refill takes less than 2 seconds.   Neurological:      General: No focal deficit present.      Mental Status: He is alert and oriented to person, place, and time.   Psychiatric:         Mood and Affect: Mood normal.         Behavior: Behavior normal.         Thought Content: Thought content normal.         Judgment: Judgment normal.           No visits with results within 14 Day(s) from this visit.   Latest known visit with results is:   Lab Visit on 05/13/2024   Component Date Value Ref Range Status    Triglycerides 05/13/2024 76  35 - 150 mg/dL Final      Normal:  <150 mg/dL  Borderline High: 150-199 mg/dL  High:   200-499 mg/dL  Very High:  >=500    Cholesterol 05/13/2024 184  0 - 200 mg/dL Final      <200 mg/dL:  Desirable  200-240 mg/dL: Borderline High  >240 mg/dL:  High    HDL Cholesterol 05/13/2024 61 (H)  40 - 60 mg/dL Final      <40 mg/dL: Low HDL  40-60 mg/dL: Normal  >60 mg/dL: Desirable    Cholesterol/HDL Ratio (Risk Factor) 05/13/2024 3.0   Final    Non-HDL 05/13/2024 123  mg/dL Final    LDL Calculated 05/13/2024 108  mg/dL Final    Unable  to calculate due to one of the following values:  Cholesterol <5  HDL Cholesterol <5  Triglycerides <10 or >400    LDL/HDL 05/13/2024 1.8   Final    Unable to calculate due to one of the following values:  Cholesterol <5  HDL Cholesterol <5  Triglycerides <10 or >400    VLDL 05/13/2024 15  mg/dL Final    Sodium 05/13/2024 140  136 - 145 mmol/L Final    Potassium 05/13/2024 4.4  3.5 - 5.1 mmol/L Final    Chloride 05/13/2024 106  98 - 107 mmol/L Final    CO2 05/13/2024 32  21 - 32 mmol/L Final    Anion Gap 05/13/2024 6 (L)  7 - 16 mmol/L Final    Glucose 05/13/2024 99  74 - 106 mg/dL Final    BUN 05/13/2024 27 (H)  7 - 18 mg/dL Final    Creatinine 05/13/2024 1.32 (H)  0.70 - 1.30 mg/dL Final    BUN/Creatinine Ratio 05/13/2024 20  6 - 20 Final    Calcium 05/13/2024 9.5  8.5 - 10.1 mg/dL Final    eGFR 05/13/2024 59 (L)  >=60 mL/min/1.73m2 Final             Assessment and Plan (including Health Maintenance)      Problem List  Smart Sets  Document Outside HM   :    Plan:     1. Coronary artery disease involving native coronary artery of native heart without angina pectoris  Overview:  [facility] 12/14/2023- Dr. Be    Non-obstructive CAD  Single vessel CAD with a 30% prox RCA lesion  LVEF 55%  LVEDP 6 mmHg  Severe MR    Assessment & Plan:  Continue asa and statin      2. Essential hypertension  Assessment & Plan:  152/84 did not take med, take metoprolol as prescribed RTC in 2 week for bp recheck      3. Hyperlipidemia, unspecified hyperlipidemia type  Assessment & Plan:  Continue statin and asa      4. Paroxysmal atrial fibrillation  Overview:  Patient presented to [facility] 2/10/2024 with c/o sudden onset lightheadedness, nausea and palpitations. EKG demonstrated atrial fibrillation with RVR; rate 140 bpm in the presence of GIB. The patient was airlifted to [facility] with an urgent GI consult. The atrial fibrillation was converted chemically.    Assessment & Plan:  Continue asa and beta blocker      5. S/P mitral  valve repair  Overview:  1/30/2024- Dr. Rhett Kaminski  Complex mitral valve repair and MOY closure    Assessment & Plan:  1/30/2024- Dr. Rhett Kaminski  Complex mitral valve repair and MOY closure     TTE- [facility] 2/10/2024   Conclusion: When compared to prior study, 2/6/2024. Patient in atrial   fibrillation during study. Cardiac evaluation made difficult by abnormal   rhythm.    Left Ventricle: Moderately to severely increased wall thickness. Normal   (55 - 60%) ejection fraction. Diastolic function could not be graded due   to A-Fib.     Left Atrium: Left atrium volume index is severely increased.     Right Atrium: Right atrium is mildly dilated.     Mitral Valve: Normal structure status post mitral valve ring repair. No   mitral regurgitation.     Tricuspid Valve: Trace tricuspid regurgitation. RVSP is 34.00.     Pericardium: No pericardial effusion. No cardiac tamponade present.       6. Gastroesophageal reflux disease, unspecified whether esophagitis present  Assessment & Plan:  Continue protonix      RTC in 2 weeks for BP check     There are no Patient Instructions on file for this visit.       Health Maintenance Due   Topic Date Due    TETANUS VACCINE  Never done    Colorectal Cancer Screening  Never done    Shingles Vaccine (1 of 2) Never done    RSV Vaccine (Age 60+ and Pregnant patients) (1 - 1-dose 60+ series) Never done    Pneumococcal Vaccines (Age 65+) (1 of 1 - PCV) Never done    Abdominal Aortic Aneurysm Screening  Never done    COVID-19 Vaccine (1 - 2023-24 season) Never done         There is no immunization history on file for this patient.         Health Maintenance Topics with due status: Not Due       Topic Last Completion Date    Hemoglobin A1c (Prediabetes) 01/26/2024    Lipid Panel 05/13/2024    High Dose Statin 07/12/2024    Aspirin/Antiplatelet Therapy 07/12/2024    Influenza Vaccine Not Due       Future Appointments   Date Time Provider Department Center    7/24/2024  1:00 PM Jorge Borja MD [facility]   7/30/2024  2:15 PM Shannan Gorman FNP [facility]   8/14/2024  9:45 AM Trish Levine MD [facility]   4/10/2025  8:00 AM AWV NURSE, [facility]            Signature:  ELA Pizarro  [facility]     Date of encounter: 7/12/24

## 2024-07-30 ENCOUNTER — OFFICE VISIT (OUTPATIENT)
Dept: FAMILY MEDICINE | Facility: CLINIC | Age: 69
End: 2024-07-30
Payer: MEDICARE

## 2024-07-30 VITALS
BODY MASS INDEX: 26.21 KG/M2 | HEART RATE: 57 BPM | WEIGHT: 167 LBS | TEMPERATURE: 98 F | SYSTOLIC BLOOD PRESSURE: 134 MMHG | OXYGEN SATURATION: 99 % | HEIGHT: 67 IN | RESPIRATION RATE: 20 BRPM | DIASTOLIC BLOOD PRESSURE: 69 MMHG

## 2024-07-30 DIAGNOSIS — I48.0 PAROXYSMAL ATRIAL FIBRILLATION: ICD-10-CM

## 2024-07-30 DIAGNOSIS — I25.10 CORONARY ARTERY DISEASE INVOLVING NATIVE CORONARY ARTERY OF NATIVE HEART WITHOUT ANGINA PECTORIS: ICD-10-CM

## 2024-07-30 DIAGNOSIS — K21.9 GASTROESOPHAGEAL REFLUX DISEASE, UNSPECIFIED WHETHER ESOPHAGITIS PRESENT: ICD-10-CM

## 2024-07-30 DIAGNOSIS — I10 ESSENTIAL HYPERTENSION: ICD-10-CM

## 2024-07-30 DIAGNOSIS — E78.5 HYPERLIPIDEMIA, UNSPECIFIED HYPERLIPIDEMIA TYPE: ICD-10-CM

## 2024-07-30 DIAGNOSIS — K26.9 DUODENAL ULCER: ICD-10-CM

## 2024-07-30 DIAGNOSIS — R26.89 BALANCE PROBLEM: ICD-10-CM

## 2024-07-30 DIAGNOSIS — H54.7 IMPAIRED VISION: Primary | ICD-10-CM

## 2024-07-30 PROCEDURE — 99212 OFFICE O/P EST SF 10 MIN: CPT | Mod: ,,, | Performed by: NURSE PRACTITIONER

## 2024-07-30 NOTE — PROGRESS NOTES
ELA Pizarro        PATIENT NAME: Mitul Torres  : 1955  DATE: 24  MRN: [id number]      Patient PCP Information       Provider PCP Type    Shannan P ELA Gorman [facility]            Reason for Visit / Chief Complaint: Follow-up and Hypertension (Pt presents to the clinic for 2wk f/u)       Update PCP  Update Chief Complaint         History of Present Illness / Problem Focused Workflow     Mitul Torres presents to the clinic with Follow-up and Hypertension (Pt presents to the clinic for 2wk f/u)     HPI  Mr Torres presents to clinic for follow up. Patient reports continued hazziness/ring in visual field left eye. Patient did follow up with ophthalmologist Dr Carla Sue. Patient states eye exam was normal however he was instructed to follow up early next week for repeat evaluation. Patient states eye doctor mentioned getting with PCP for imaging. Patient mentioned he had one episode of balance issue when going outside to feed animal. Patient has prior hx of MV repair complicated by post op GI bleed. Plavix had to be stopped due to bleeding. Prev received PRBC transfusions. Labs had improved last check. No further bleeding reported. Patient on ASA therapy only. Patient denies chest pain. Denies prior CVA. Patient aware further head imaging may be needed if symptoms persist and follow up eye exam remains negative.       Medical / Social / Family History     Past Medical History:   Diagnosis Date    GERD (gastroesophageal reflux disease)     Hyperlipidemia     Hypertension        Past Surgical History:   Procedure Laterality Date    ECHOCARDIOGRAM,TRANSESOPHAGEAL  2023    Procedure: Transesophageal echo (GAEL) intra-procedure log documentation;  Surgeon: Jerry Be DO;  Location: [facility] CATH LAB;  Service: Cardiology;;    LEFT HEART CATHETERIZATION Left 2023    Procedure: Left heart cath;  Surgeon: Jerry Be DO;  Location: [facility] CATH LAB;  Service:  "Cardiology;  Laterality: Left;    MITRAL VALVE REPAIR      RIGHT HEART CATHETERIZATION Right 12/14/2023    Procedure: INSERTION, CATHETER, RIGHT HEART;  Surgeon: Jerry Be DO;  Location: [facility] CATH LAB;  Service: Cardiology;  Laterality: Right;       Social History    reports that he quit smoking about 32 years ago. His smoking use included cigarettes. He has been exposed to tobacco smoke. He has never used smokeless tobacco. He reports that he does not currently use alcohol. He reports that he does not currently use drugs after having used the following drugs: Marijuana.    Family History  's family history includes Cataracts in his mother; Diabetes in his maternal grandfather; Glaucoma in his mother; Heart disease in his brother; No Known Problems in his brother and father.    Medications and Allergies     Medications  Outpatient Medications Marked as Taking for the 7/30/24 encounter (Office Visit) with Shannan Gorman FNP   Medication Sig Dispense Refill    aspirin (ECOTRIN) 81 MG EC tablet Take 1 tablet by mouth every morning.      atorvastatin (LIPITOR) 40 MG tablet Take 1 tablet (40 mg total) by mouth once daily. 90 tablet 3    metoprolol tartrate (LOPRESSOR) 25 MG tablet Take 0.5 tablets (12.5 mg total) by mouth 2 (two) times daily. 90 tablet 3    pantoprazole (PROTONIX) 40 MG tablet Take 40 mg by mouth 2 (two) times daily.         Allergies  Review of patient's allergies indicates:   Allergen Reactions    Sulfamethoxazole-trimethoprim Rash     Note: Emil Johson Syndrome       Physical Examination     Vitals:    07/30/24 1425   BP: 134/69   BP Location: Right arm   Patient Position: Sitting   BP Method: Medium (Automatic)   Pulse: (!) 57   Resp: 20   Temp: 98 °F (36.7 °C)   TempSrc: Oral   SpO2: 99%   Weight: 75.8 kg (167 lb)   Height: 5' 7" (1.702 m)       Physical Exam  Vitals reviewed.   Constitutional:       Appearance: Normal appearance.   HENT:      Head: Normocephalic.      Right " Ear: External ear normal.      Left Ear: External ear normal.      Nose: Nose normal.      Mouth/Throat:      Mouth: Mucous membranes are moist.   Eyes:      Extraocular Movements: Extraocular movements intact.      Pupils: Pupils are equal, round, and reactive to light.   Cardiovascular:      Rate and Rhythm: Normal rate and regular rhythm.      Pulses: Normal pulses.      Heart sounds: Normal heart sounds.   Pulmonary:      Effort: Pulmonary effort is normal. No respiratory distress.      Breath sounds: Normal breath sounds. No stridor. No wheezing, rhonchi or rales.   Chest:      Chest wall: No tenderness.   Musculoskeletal:         General: Normal range of motion.      Cervical back: Normal range of motion.   Skin:     General: Skin is warm and dry.      Capillary Refill: Capillary refill takes less than 2 seconds.   Neurological:      General: No focal deficit present.      Mental Status: He is alert and oriented to person, place, and time.   Psychiatric:         Mood and Affect: Mood normal.         Behavior: Behavior normal.         Thought Content: Thought content normal.         Judgment: Judgment normal.           No visits with results within 14 Day(s) from this visit.   Latest known visit with results is:   Lab Visit on 05/13/2024   Component Date Value Ref Range Status    Triglycerides 05/13/2024 76  35 - 150 mg/dL Final      Normal:  <150 mg/dL  Borderline High: 150-199 mg/dL  High:   200-499 mg/dL  Very High:  >=500    Cholesterol 05/13/2024 184  0 - 200 mg/dL Final      <200 mg/dL:  Desirable  200-240 mg/dL: Borderline High  >240 mg/dL:  High    HDL Cholesterol 05/13/2024 61 (H)  40 - 60 mg/dL Final      <40 mg/dL: Low HDL  40-60 mg/dL: Normal  >60 mg/dL: Desirable    Cholesterol/HDL Ratio (Risk Factor) 05/13/2024 3.0   Final    Non-HDL 05/13/2024 123  mg/dL Final    LDL Calculated 05/13/2024 108  mg/dL Final    Unable to calculate due to one of the following values:  Cholesterol <5  HDL Cholesterol  <5  Triglycerides <10 or >400    LDL/HDL 05/13/2024 1.8   Final    Unable to calculate due to one of the following values:  Cholesterol <5  HDL Cholesterol <5  Triglycerides <10 or >400    VLDL 05/13/2024 15  mg/dL Final    Sodium 05/13/2024 140  136 - 145 mmol/L Final    Potassium 05/13/2024 4.4  3.5 - 5.1 mmol/L Final    Chloride 05/13/2024 106  98 - 107 mmol/L Final    CO2 05/13/2024 32  21 - 32 mmol/L Final    Anion Gap 05/13/2024 6 (L)  7 - 16 mmol/L Final    Glucose 05/13/2024 99  74 - 106 mg/dL Final    BUN 05/13/2024 27 (H)  7 - 18 mg/dL Final    Creatinine 05/13/2024 1.32 (H)  0.70 - 1.30 mg/dL Final    BUN/Creatinine Ratio 05/13/2024 20  6 - 20 Final    Calcium 05/13/2024 9.5  8.5 - 10.1 mg/dL Final    eGFR 05/13/2024 59 (L)  >=60 mL/min/1.73m2 Final             Assessment and Plan (including Health Maintenance)      Problem List  Smart Sets  Document Outside HM   :    Plan:     1. Impaired vision  Comments:  follow up wt Dr Sue as scheduled    2. Balance problem  Comments:  consider imaing of brain if symptoms persist, ED for any worsening. Eye exam in coming days    3. Coronary artery disease involving native coronary artery of native heart without angina pectoris  Overview:  [facility] 12/14/2023- Dr. Be    Non-obstructive CAD  Single vessel CAD with a 30% prox RCA lesion  LVEF 55%  LVEDP 6 mmHg  Severe MR    Assessment & Plan:  Continue asa and statin      4. Essential hypertension  Assessment & Plan:  BP stable taking metoprolol as ordered BID      5. Hyperlipidemia, unspecified hyperlipidemia type  Assessment & Plan:  Continue statin and asa      6. Paroxysmal atrial fibrillation  Overview:  Patient presented to [facility] 2/10/2024 with c/o sudden onset lightheadedness, nausea and palpitations. EKG demonstrated atrial fibrillation with RVR; rate 140 bpm in the presence of GIB. The patient was airlifted to [facility] with an urgent GI consult. The atrial fibrillation was converted  chemically.    Assessment & Plan:  Continue asa and beta blocker      7. Duodenal ulcer  Assessment & Plan:  Continue protonix      8. Gastroesophageal reflux disease, unspecified whether esophagitis present  Assessment & Plan:  Continue protonix      RTC in 2 weeks     There are no Patient Instructions on file for this visit.       Health Maintenance Due   Topic Date Due    TETANUS VACCINE  Never done    Colorectal Cancer Screening  Never done    Shingles Vaccine (1 of 2) Never done    RSV Vaccine (Age 60+ and Pregnant patients) (1 - 1-dose 60+ series) Never done    Pneumococcal Vaccines (Age 65+) (1 of 1 - PCV) Never done    Abdominal Aortic Aneurysm Screening  Never done    COVID-19 Vaccine (1 - 2023-24 season) Never done         There is no immunization history on file for this patient.         Health Maintenance Topics with due status: Not Due       Topic Last Completion Date    Hemoglobin A1c (Prediabetes) 01/26/2024    Lipid Panel 05/13/2024    High Dose Statin 07/30/2024    Aspirin/Antiplatelet Therapy 07/30/2024    Influenza Vaccine Not Due       Future Appointments   Date Time Provider Department Center   8/13/2024  2:45 PM Shannan Gorman FNP [facility] AYALA Alberto   8/14/2024  9:45 AM Trish Levine MD [facility]   4/10/2025  8:00 AM AWHERB NURSEDARRELL [facility] FAMILY MEDICINE [facility] AYALA Alberto            Signature:  ELA Pziarro [facility]     Date of encounter: 7/30/24

## 2024-08-13 ENCOUNTER — OFFICE VISIT (OUTPATIENT)
Dept: FAMILY MEDICINE | Facility: CLINIC | Age: 69
End: 2024-08-13
Payer: MEDICARE

## 2024-08-13 VITALS
SYSTOLIC BLOOD PRESSURE: 134 MMHG | HEIGHT: 67 IN | TEMPERATURE: 99 F | WEIGHT: 169.88 LBS | BODY MASS INDEX: 26.66 KG/M2 | OXYGEN SATURATION: 95 % | DIASTOLIC BLOOD PRESSURE: 71 MMHG | RESPIRATION RATE: 20 BRPM | HEART RATE: 58 BPM

## 2024-08-13 DIAGNOSIS — N28.9 RENAL IMPAIRMENT: ICD-10-CM

## 2024-08-13 DIAGNOSIS — K21.9 GASTROESOPHAGEAL REFLUX DISEASE, UNSPECIFIED WHETHER ESOPHAGITIS PRESENT: ICD-10-CM

## 2024-08-13 DIAGNOSIS — I25.10 CORONARY ARTERY DISEASE INVOLVING NATIVE CORONARY ARTERY OF NATIVE HEART WITHOUT ANGINA PECTORIS: ICD-10-CM

## 2024-08-13 DIAGNOSIS — Z98.890 S/P MITRAL VALVE REPAIR: ICD-10-CM

## 2024-08-13 DIAGNOSIS — E61.1 IRON DEFICIENCY: ICD-10-CM

## 2024-08-13 DIAGNOSIS — I48.0 PAROXYSMAL ATRIAL FIBRILLATION: ICD-10-CM

## 2024-08-13 DIAGNOSIS — R41.3 MEMORY LOSS, SHORT TERM: Primary | ICD-10-CM

## 2024-08-13 DIAGNOSIS — I10 ESSENTIAL HYPERTENSION: ICD-10-CM

## 2024-08-13 DIAGNOSIS — R42 DIZZINESS: ICD-10-CM

## 2024-08-13 DIAGNOSIS — E78.5 HYPERLIPIDEMIA, UNSPECIFIED HYPERLIPIDEMIA TYPE: ICD-10-CM

## 2024-08-13 DIAGNOSIS — H54.7 IMPAIRED VISION: ICD-10-CM

## 2024-08-13 PROBLEM — R00.2 PALPITATION: Status: RESOLVED | Noted: 2023-11-20 | Resolved: 2024-08-13

## 2024-08-13 PROCEDURE — 99213 OFFICE O/P EST LOW 20 MIN: CPT | Mod: ,,, | Performed by: NURSE PRACTITIONER

## 2024-08-13 NOTE — PROGRESS NOTES
ELA Pizarro        PATIENT NAME: Mitul Torres  : 1955  DATE: 24  MRN: [id number]      Patient PCP Information       Provider PCP Type    Shannan CORY ELA Gorman General            Reason for Visit / Chief Complaint: Blurred Vision (Pt presents to the clinic for blurred vision in right eye)       Update PCP  Update Chief Complaint         History of Present Illness / Problem Focused Workflow     Mitul Torres presents to the clinic with Blurred Vision (Pt presents to the clinic for blurred vision in right eye)     HPI  Mr Torres presents to clinic today for follow up. Patient states he has yet to follow up with eye doctor. States appt is not for a few more weeks. Cloudiness in line of peripheral vision remains present. Patient states dizziness has improved however patient is having more difficulty with short term memory. Patient remains on asa and beta blocker sp MVR. No chest pain or SOB. BP stable. Iron has been low in past. Will order oral supplement.       Hx of MVR 2/10/2024 complicated by GI bleed / hematoma post op. Patient with upper GI bleed after starting plavix. Patient had two ulcers on EGD. Patient also had hematoma at epigastric region following MVR. Received CV rehab post op. Required short course of lasix post op for fluid overload. Plavix DC. On ASA. No further bleeding. PPI continued.        Medical / Social / Family History     Past Medical History:   Diagnosis Date    GERD (gastroesophageal reflux disease)     Hyperlipidemia     Hypertension        Past Surgical History:   Procedure Laterality Date    ECHOCARDIOGRAM,TRANSESOPHAGEAL  2023    Procedure: Transesophageal echo (GAEL) intra-procedure log documentation;  Surgeon: Jerry Be DO;  Location: [facility] CATH LAB;  Service: Cardiology;;    LEFT HEART CATHETERIZATION Left 2023    Procedure: Left heart cath;  Surgeon: Jerry Be DO;  Location: [facility] CATH LAB;  Service: Cardiology;   "Laterality: Left;    MITRAL VALVE REPAIR      RIGHT HEART CATHETERIZATION Right 12/14/2023    Procedure: INSERTION, CATHETER, RIGHT HEART;  Surgeon: Jerry Be DO;  Location: [facility] CATH LAB;  Service: Cardiology;  Laterality: Right;       Social History    reports that he quit smoking about 32 years ago. His smoking use included cigarettes. He has been exposed to tobacco smoke. He has never used smokeless tobacco. He reports that he does not currently use alcohol. He reports that he does not currently use drugs after having used the following drugs: Marijuana.    Family History  's family history includes Cataracts in his mother; Diabetes in his maternal grandfather; Glaucoma in his mother; Heart disease in his brother; No Known Problems in his brother and father.    Medications and Allergies     Medications  Outpatient Medications Marked as Taking for the 8/13/24 encounter (Office Visit) with Shannan Gorman FNP   Medication Sig Dispense Refill    aspirin (ECOTRIN) 81 MG EC tablet Take 1 tablet by mouth every morning.      atorvastatin (LIPITOR) 40 MG tablet Take 1 tablet (40 mg total) by mouth once daily. 90 tablet 3    metoprolol tartrate (LOPRESSOR) 25 MG tablet Take 0.5 tablets (12.5 mg total) by mouth 2 (two) times daily. 90 tablet 3    pantoprazole (PROTONIX) 40 MG tablet Take 40 mg by mouth 2 (two) times daily.         Allergies  Review of patient's allergies indicates:   Allergen Reactions    Sulfamethoxazole-trimethoprim Rash     Note: Emil Johson Syndrome       Physical Examination     Vitals:    08/13/24 1444   BP: 134/71   BP Location: Left arm   Patient Position: Sitting   BP Method: Medium (Automatic)   Pulse: (!) 58   Resp: 20   Temp: 98.6 °F (37 °C)   TempSrc: Oral   SpO2: 95%   Weight: 77.1 kg (169 lb 14.4 oz)   Height: 5' 7" (1.702 m)       Physical Exam  Vitals reviewed.   Constitutional:       Appearance: Normal appearance.   HENT:      Head: Normocephalic.      Right Ear: " External ear normal.      Left Ear: External ear normal.      Nose: Nose normal.      Mouth/Throat:      Mouth: Mucous membranes are moist.   Eyes:      Extraocular Movements: Extraocular movements intact.      Pupils: Pupils are equal, round, and reactive to light.   Cardiovascular:      Rate and Rhythm: Normal rate and regular rhythm.      Pulses: Normal pulses.      Heart sounds: Normal heart sounds.   Pulmonary:      Effort: Pulmonary effort is normal. No respiratory distress.      Breath sounds: Normal breath sounds. No stridor. No wheezing, rhonchi or rales.   Chest:      Chest wall: No tenderness.   Musculoskeletal:         General: Normal range of motion.      Cervical back: Normal range of motion.   Skin:     General: Skin is warm and dry.      Capillary Refill: Capillary refill takes less than 2 seconds.   Neurological:      General: No focal deficit present.      Mental Status: He is alert and oriented to person, place, and time.   Psychiatric:         Mood and Affect: Mood normal.         Behavior: Behavior normal.         Thought Content: Thought content normal.         Judgment: Judgment normal.           No visits with results within 14 Day(s) from this visit.   Latest known visit with results is:   Lab Visit on 05/13/2024   Component Date Value Ref Range Status    Triglycerides 05/13/2024 76  35 - 150 mg/dL Final      Normal:  <150 mg/dL  Borderline High: 150-199 mg/dL  High:   200-499 mg/dL  Very High:  >=500    Cholesterol 05/13/2024 184  0 - 200 mg/dL Final      <200 mg/dL:  Desirable  200-240 mg/dL: Borderline High  >240 mg/dL:  High    HDL Cholesterol 05/13/2024 61 (H)  40 - 60 mg/dL Final      <40 mg/dL: Low HDL  40-60 mg/dL: Normal  >60 mg/dL: Desirable    Cholesterol/HDL Ratio (Risk Factor) 05/13/2024 3.0   Final    Non-HDL 05/13/2024 123  mg/dL Final    LDL Calculated 05/13/2024 108  mg/dL Final    Unable to calculate due to one of the following values:  Cholesterol <5  HDL Cholesterol  <5  Triglycerides <10 or >400    LDL/HDL 05/13/2024 1.8   Final    Unable to calculate due to one of the following values:  Cholesterol <5  HDL Cholesterol <5  Triglycerides <10 or >400    VLDL 05/13/2024 15  mg/dL Final    Sodium 05/13/2024 140  136 - 145 mmol/L Final    Potassium 05/13/2024 4.4  3.5 - 5.1 mmol/L Final    Chloride 05/13/2024 106  98 - 107 mmol/L Final    CO2 05/13/2024 32  21 - 32 mmol/L Final    Anion Gap 05/13/2024 6 (L)  7 - 16 mmol/L Final    Glucose 05/13/2024 99  74 - 106 mg/dL Final    BUN 05/13/2024 27 (H)  7 - 18 mg/dL Final    Creatinine 05/13/2024 1.32 (H)  0.70 - 1.30 mg/dL Final    BUN/Creatinine Ratio 05/13/2024 20  6 - 20 Final    Calcium 05/13/2024 9.5  8.5 - 10.1 mg/dL Final    eGFR 05/13/2024 59 (L)  >=60 mL/min/1.73m2 Final      EKG     3/14/2024 NSR with 1st degree AVB; HR 72 bpm; LVH with repolarization abnormality   2/10/2024 A fib with RVR;  bpm; ant/septal and lateral NS-TWA  11/202023 RSR with HR 65 bpm; normal EKG     TTE at [facility] 2/10/2024      Conclusion: When compared to prior study, 2/6/2024. Patient in atrial   fibrillation during study. Cardiac evaluation made difficult by abnormal   rhythm.    Left Ventricle: Moderately to severely increased wall thickness. Normal   (55 - 60%) ejection fraction. Diastolic function could not be graded due   to A-Fib.     Left Atrium: Left atrium volume index is severely increased.     Right Atrium: Right atrium is mildly dilated.     Mitral Valve: Normal structure status post mitral valve ring repair. No   mitral regurgitation.     Tricuspid Valve: Trace tricuspid regurgitation. RVSP is 34.00.     Pericardium: No pericardial effusion. No cardiac tamponade present.         GAEL 12/14/2023     Summary       Left Ventricle: The left ventricle is normal in size. Increased wall thickness. Normal wall motion. There is normal systolic function.    Right Ventricle: Right ventricular enlargement. Wall thickness is normal. Right  CVM ventricle wall motion  is normal. Systolic function is normal.    Left Atrium: Left atrium is severely dilated. The pulmonary veins have systolic flow reversal. There is no thrombus in the left atrial cavity.    Right Atrium: Right atrium is dilated.    Mitral Valve: Findings are consistent with myxomatous changes. There is severe prolapse of the posterior mitral leaflet. Flail posterior leaflet (P2 segment). There is no mass/vegetation present. There is no stenosis. There is severe regurgitation with an anteromedial eccentrically directed wall-impinging jet.    Tricuspid Valve: There is mild regurgitation.    Pulmonary Artery: Pulmonary artery pressure could not be accurately determined.    Conclusion: Severe mitral regurgitation secondary to severe prolpase of the posterior leaflet of the mitral valve with flail P2 segment.HO            Results for orders placed during the hospital encounter of 12/12/23     Echo     Interpretation Summary    Left Ventricle: The left ventricle is normal in size. Increased ventricular mass. Increased wall thickness. There is mild concentric hypertrophy. Normal wall motion. There is normal systolic function. Biplane (2D) method of discs ejection fraction is 60%. Global longitudinal strain is normal. There is normal diastolic function.    Right Ventricle: Mild right ventricular enlargement. Systolic function is normal.    Left Atrium: Left atrium is severely dilated. LA volume index 69.8  ml/m2    Right Atrium: Right atrium is mildly dilated.    Aortic Valve: The aortic valve is a trileaflet valve.    Mitral Valve: There is prolapse of the posterior mitral leaflet. Flail posterior leaflet. There is severe regurgitation.    Tricuspid Valve: There is mild regurgitation.    Pulmonary Artery: The estimated pulmonary artery systolic pressure is 30 mmHg.    IVC/SVC: Normal venous pressure at 3 mmHg.    There is severe mitral regurgitation secondary to severe mitral valve prolpase with a  flail posterior leaflet. Refer to CV surgery for mitral valve repair.     [facility] Results for orders placed during the hospital encounter of 12/14/23     Cardiac catheterization     Conclusion    The ejection fraction was calculated to be 55%.    The left ventricular systolic function was normal.    The pre-procedure left ventricular end diastolic pressure was 6.    The estimated blood loss was none.    There was non-obstructive coronary artery disease..    There was no mitral valve stenosis and severe (4+) mitral regurgitation.    The annulus was calcified.     The procedure log was documented by Documenter: Shilpi Basilio RN and verified by Jerry Be DO.     Date: 12/14/2023  Time: 11:21 AM     Trivial CAD  Normal LV systolic function, mild dilatation, 4+ MR  Await GAEL results to follow     1/30/2024- Dr. Daniels  Complex mitral valve repair and MOY closure       Assessment and Plan (including Health Maintenance)      Problem List  Smart Sets  Document Outside HM   :    Plan:     1. Memory loss, short term  -     MRI Brain Without Contrast; Future; Expected date: 08/13/2024    2. Impaired vision  Comments:  Follow up with Dr Sue as scheduled   no worsening unchanged  Orders:  -     MRI Brain Without Contrast; Future; Expected date: 08/13/2024    3. Dizziness  -     MRI Brain Without Contrast; Future; Expected date: 08/13/2024    4. S/P mitral valve repair  Overview:  1/30/2024- Dr. Daniels- [facility]  Complex mitral valve repair and MOY closure    Orders:  -     MRI Brain Without Contrast; Future; Expected date: 08/13/2024    5. Iron deficiency  -     Iron and TIBC; Future; Expected date: 08/13/2024    6. Renal impairment  Comments:  increase oral hydration. admits to low water intake. 6-8 cups per day recommended    7. Paroxysmal atrial fibrillation  Overview:  Patient presented to [facility] 2/10/2024 with c/o sudden onset lightheadedness, nausea and palpitations. EKG demonstrated atrial  fibrillation with RVR; rate 140 bpm in the presence of GIB. The patient was airlifted to [facility] with an urgent GI consult. The atrial fibrillation was converted chemically.    Assessment & Plan:  Continue asa and beta blocker      8. Hyperlipidemia, unspecified hyperlipidemia type  Assessment & Plan:  Continue statin and asa      9. Essential hypertension  Assessment & Plan:  BP stable taking metoprolol as ordered BID      10. Coronary artery disease involving native coronary artery of native heart without angina pectoris  Overview:  [facility] 12/14/2023- Dr. Be    Non-obstructive CAD  Single vessel CAD with a 30% prox RCA lesion  LVEF 55%  LVEDP 6 mmHg  Severe MR    Assessment & Plan:  Continue bb, asa and statin      11. Gastroesophageal reflux disease, unspecified whether esophagitis present  Assessment & Plan:  Continue protonix        RTC in 1 mo  There are no Patient Instructions on file for this visit.       Health Maintenance Due   Topic Date Due    TETANUS VACCINE  Never done    Colorectal Cancer Screening  Never done    Shingles Vaccine (1 of 2) Never done    RSV Vaccine (Age 60+ and Pregnant patients) (1 - 1-dose 60+ series) Never done    Pneumococcal Vaccines (Age 65+) (1 of 1 - PCV) Never done    Abdominal Aortic Aneurysm Screening  Never done    COVID-19 Vaccine (1 - 2023-24 season) Never done         There is no immunization history on file for this patient.         Health Maintenance Topics with due status: Not Due       Topic Last Completion Date    Hemoglobin A1c (Prediabetes) 01/26/2024    Lipid Panel 05/13/2024    High Dose Statin 08/13/2024    Aspirin/Antiplatelet Therapy 08/13/2024    Influenza Vaccine Not Due       Future Appointments   Date Time Provider Department Center   8/14/2024  9:45 AM Trish Levine MD [facility] CARD [facility]   8/15/2024 10:45 AM [facility] MRI2 [facility] MRI [facility]   9/13/2024  9:30 AM Shannan Gorman FNP [facility] AYALA Alberto   4/10/2025  8:00 AM AWV NURSE, [place]  [facility] FAMILY MEDICINE [facility] AYALA Alberto            Signature:  ELA Pizarro  [facility]     Date of encounter: 8/13/24     patient sitting in bed, unable to fully assess neutral

## 2024-08-14 ENCOUNTER — OFFICE VISIT (OUTPATIENT)
Dept: CARDIOLOGY | Facility: CLINIC | Age: 69
End: 2024-08-14
Payer: MEDICARE

## 2024-08-14 VITALS
HEIGHT: 67 IN | OXYGEN SATURATION: 96 % | BODY MASS INDEX: 26.31 KG/M2 | HEART RATE: 56 BPM | WEIGHT: 167.63 LBS | DIASTOLIC BLOOD PRESSURE: 88 MMHG | SYSTOLIC BLOOD PRESSURE: 162 MMHG

## 2024-08-14 DIAGNOSIS — I25.10 CORONARY ARTERY DISEASE INVOLVING NATIVE CORONARY ARTERY OF NATIVE HEART WITHOUT ANGINA PECTORIS: ICD-10-CM

## 2024-08-14 DIAGNOSIS — Z98.890 S/P MITRAL VALVE REPAIR: Primary | ICD-10-CM

## 2024-08-14 PROCEDURE — 99999 PR PBB SHADOW E&M-EST. PATIENT-LVL IV: CPT | Mod: PBBFAC,,, | Performed by: STUDENT IN AN ORGANIZED HEALTH CARE EDUCATION/TRAINING PROGRAM

## 2024-08-14 PROCEDURE — 99214 OFFICE O/P EST MOD 30 MIN: CPT | Mod: S$PBB,,, | Performed by: STUDENT IN AN ORGANIZED HEALTH CARE EDUCATION/TRAINING PROGRAM

## 2024-08-14 PROCEDURE — 99214 OFFICE O/P EST MOD 30 MIN: CPT | Mod: PBBFAC | Performed by: STUDENT IN AN ORGANIZED HEALTH CARE EDUCATION/TRAINING PROGRAM

## 2024-08-14 RX ORDER — AMLODIPINE BESYLATE 5 MG/1
5 TABLET ORAL DAILY
Qty: 90 TABLET | Refills: 3 | Status: SHIPPED | OUTPATIENT
Start: 2024-08-14 | End: 2025-08-14

## 2024-08-14 NOTE — PROGRESS NOTES
PCP: Shannan Gorman FNP    Referring Provider:        Subjective:   Mitul Torres is a 69 y.o. male with hx of severe MR s/p complex MV repair and MOY occlusion (1/30/2024, Dr. Daniels), non-obstructive CAD with 30% lesion prox RCA (12/14/2023), A. Fib with RVR in the presence of severe anemia secondary to an actively bleeding duodenal ulcer, HLD, and HTN,  who presents for a follow up visit.    08/14/24:  Doing well.  No cardiac complaints today.  He had an episode of dizziness/imbalance about 3 weeks ago without any recurrence.  Since about 6 weeks, he is seeing a halo from his right eye.  Eye exam was normal.  He has a MRI brain scheduled tomorrow.    5/14/24:  Doing well.  Occasional episodes of shortness of breath with heavy exertion but otherwise doing good.  He did undergo partial cardiac rehab but he notes that he had to get back to work and due to which he stopped cardiac rehab.    4/3/24:  Presents for follow-up.  Doing very well from cardiac standpoint.  Undergoing cardiac rehab.  Denies any chest pain/tightness, dyspnea, palpitations, lightheadedness or syncope.  Of incisional pain at sternotomy site.    3/15/24: Seen by ZAIN Pitt NP  for HD follow up from admission to 2/10/2024-2/14/2024 at [facility]. The patient presented to [facility] 2/10/2024 with c/o sudden onset lightheadedness, nausea and palpitations. EKG demonstrated atrial fibrillation with RVR; rate 140 bpm in the presence of GIB. The patient was airlifted to [facility] with an urgent GI consult. The atrial fibrillation was converted chemically.    The patient had a complex mitral valve repair and MOY occlusion by Dr. Daniels on 1/30/2024. He was discharged on 2/8/2024. Returned to [facility] ED on 2/10/2024 in A fib with RVR secondry to severe blood loss anemia from an actively bleeding duodenal ulcer. He was air lifted to [facility] with an emergent EGD done the following day demonstrating a large duodenal ulcer with a  large overlying pancake like hematoma. The ulcer was treated with epinephrine injections and Bovie cautery.  He required approx 7 units of PRBCs, platelets, Fp and cryoprecipitate. A repeat EGD 48 hours later found no evidence of ongoing active bleeding . The patient's plavix was stopped but he was discharged home on ASA 81 mg po daily with a PPI BID. He denies any evidence of bleeding since hospital DC.The patient presents today and states that he has some tightness in the mornings at his chest incision site but otherwise is having no issues.     11/20/23 (Dr. Dorsey): Presents for evaluation of palpitations, come and go spontaneously, would not notice unless he put jhis hand on his chest.  He feels some pressure in mid epigastric area, 2/10, checks heart with hand on his chest, feels skip, then symptoms resolve.  He denies chest pain, pressure or shortness of breath.  He is very active, using chain saw and swing blade without provocation.  He is a  by trade, able to climb stairs, ladders without difficulty.  No previous cardiac history.  He is having more frequent and severe episodes of shortness of breath if tries to pick and carry five gallon pail of pain.  He has to stop activity, wait three to five minutes for symptoms to resolve.  He is recovering from puncture wound on bottom of left foot, notes is healing slowly,  He is smoking one or two joints a day.      Fhx:  Brother (CAD)   SH: Former smoker    EKG 3/14/2024 NSR with 1st degree AVB; HR 72 bpm; LVH with repolarization abnormality   2/10/2024 A fib with RVR;  bpm; ant/septal and lateral NS-TWA  11/202023 RSR with HR 65 bpm; normal EKG    TTE at [facility] 2/10/2024     Conclusion: When compared to prior study, 2/6/2024. Patient in atrial   fibrillation during study. Cardiac evaluation made difficult by abnormal   rhythm.    Left Ventricle: Moderately to severely increased wall thickness. Normal   (55 - 60%) ejection fraction. Diastolic  function could not be graded due   to A-Fib.     Left Atrium: Left atrium volume index is severely increased.     Right Atrium: Right atrium is mildly dilated.     Mitral Valve: Normal structure status post mitral valve ring repair. No   mitral regurgitation.     Tricuspid Valve: Trace tricuspid regurgitation. RVSP is 34.00.     Pericardium: No pericardial effusion. No cardiac tamponade present.       GAEL 12/14/2023    Summary      Left Ventricle: The left ventricle is normal in size. Increased wall thickness. Normal wall motion. There is normal systolic function.    Right Ventricle: Right ventricular enlargement. Wall thickness is normal. Right ventricle wall motion  is normal. Systolic function is normal.    Left Atrium: Left atrium is severely dilated. The pulmonary veins have systolic flow reversal. There is no thrombus in the left atrial cavity.    Right Atrium: Right atrium is dilated.    Mitral Valve: Findings are consistent with myxomatous changes. There is severe prolapse of the posterior mitral leaflet. Flail posterior leaflet (P2 segment). There is no mass/vegetation present. There is no stenosis. There is severe regurgitation with an anteromedial eccentrically directed wall-impinging jet.    Tricuspid Valve: There is mild regurgitation.    Pulmonary Artery: Pulmonary artery pressure could not be accurately determined.    Conclusion: Severe mitral regurgitation secondary to severe prolpase of the posterior leaflet of the mitral valve with flail P2 segment.HO         Results for orders placed during the hospital encounter of 12/12/23    Echo    Interpretation Summary    Left Ventricle: The left ventricle is normal in size. Increased ventricular mass. Increased wall thickness. There is mild concentric hypertrophy. Normal wall motion. There is normal systolic function. Biplane (2D) method of discs ejection fraction is 60%. Global longitudinal strain is normal. There is normal diastolic function.    Right  Ventricle: Mild right ventricular enlargement. Systolic function is normal.    Left Atrium: Left atrium is severely dilated. LA volume index 69.8  ml/m2    Right Atrium: Right atrium is mildly dilated.    Aortic Valve: The aortic valve is a trileaflet valve.    Mitral Valve: There is prolapse of the posterior mitral leaflet. Flail posterior leaflet. There is severe regurgitation.    Tricuspid Valve: There is mild regurgitation.    Pulmonary Artery: The estimated pulmonary artery systolic pressure is 30 mmHg.    IVC/SVC: Normal venous pressure at 3 mmHg.    There is severe mitral regurgitation secondary to severe mitral valve prolpase with a flail posterior leaflet. Refer to CV surgery for mitral valve repair.    [facility] Results for orders placed during the hospital encounter of 12/14/23    Cardiac catheterization    Conclusion    The ejection fraction was calculated to be 55%.    The left ventricular systolic function was normal.    The pre-procedure left ventricular end diastolic pressure was 6.    The estimated blood loss was none.    There was non-obstructive coronary artery disease..    There was no mitral valve stenosis and severe (4+) mitral regurgitation.    The annulus was calcified.    The procedure log was documented by Documenter: Shilpi Basilio RN and verified by Jerry Be DO.    Date: 12/14/2023  Time: 11:21 AM    Trivial CAD  Normal LV systolic function, mild dilatation, 4+ MR  Await GAEL results to follow    1/30/2024- Dr. Daniels  Complex mitral valve repair and MOY closure      Lab Results   Component Value Date     05/13/2024    K 4.4 05/13/2024     05/13/2024    CO2 32 05/13/2024    BUN 27 (H) 05/13/2024    CREATININE 1.32 (H) 05/13/2024    CALCIUM 9.5 05/13/2024    ANIONGAP 6 (L) 05/13/2024    ESTGFRAFRICA 81 02/14/2024    EGFRNONAA 76 05/26/2022       Lab Results   Component Value Date    CHOL 184 05/13/2024    CHOL 177 10/11/2023     Lab Results   Component Value Date    HDL  "61 (H) 05/13/2024    HDL 66 (H) 10/11/2023     Lab Results   Component Value Date    LDLCALC 108 05/13/2024    LDLCALC 92 10/11/2023     Lab Results   Component Value Date    TRIG 76 05/13/2024    TRIG 97 10/11/2023     Lab Results   Component Value Date    CHOLHDL 3.0 05/13/2024    CHOLHDL 2.7 10/11/2023       Lab Results   Component Value Date    WBC 5.65 03/19/2024    HGB 11.2 (L) 03/19/2024    HCT 35.5 (L) 03/19/2024    MCV 93.4 03/19/2024     03/19/2024           Current Outpatient Medications:     aspirin (ECOTRIN) 81 MG EC tablet, Take 1 tablet by mouth every morning., Disp: , Rfl:     atorvastatin (LIPITOR) 40 MG tablet, Take 1 tablet (40 mg total) by mouth once daily., Disp: 90 tablet, Rfl: 3    metoprolol tartrate (LOPRESSOR) 25 MG tablet, Take 0.5 tablets (12.5 mg total) by mouth 2 (two) times daily., Disp: 90 tablet, Rfl: 3    pantoprazole (PROTONIX) 40 MG tablet, Take 40 mg by mouth 2 (two) times daily., Disp: , Rfl:     amLODIPine (NORVASC) 5 MG tablet, Take 1 tablet (5 mg total) by mouth once daily., Disp: 90 tablet, Rfl: 3  Meds reviewed and reconciled using the list provided by the patient.    Review of Systems   Constitutional:  Negative for chills, diaphoresis, fever and malaise/fatigue.   Respiratory:  Negative for cough and shortness of breath.    Cardiovascular:  Negative for chest pain, palpitations, orthopnea, claudication, leg swelling and PND.   Gastrointestinal:  Negative for abdominal pain, heartburn, nausea and vomiting.   Neurological:  Negative for dizziness.           Objective:   BP (!) 162/88 (BP Location: Left arm, Patient Position: Sitting)   Pulse (!) 56   Ht 5' 7" (1.702 m)   Wt 76 kg (167 lb 9.6 oz)   SpO2 96%   BMI 26.25 kg/m²     Physical Exam  Constitutional:       General: He is not in acute distress.     Appearance: Normal appearance.   Cardiovascular:      Rate and Rhythm: Normal rate and regular rhythm.      Pulses: Normal pulses.      Heart sounds: Normal " heart sounds. No murmur heard.     No friction rub. No gallop.   Pulmonary:      Effort: Pulmonary effort is normal.      Breath sounds: Normal breath sounds. No wheezing or rales.   Musculoskeletal:      Right lower leg: No edema.      Left lower leg: No edema.   Skin:     General: Skin is warm and dry.   Neurological:      Mental Status: He is alert.           Assessment:     1. S/P mitral valve repair  Lipid Panel    Basic Metabolic Panel    NT-Pro Natriuretic Peptide    CBC Auto Differential      2. Coronary artery disease involving native coronary artery of native heart without angina pectoris  Lipid Panel    Basic Metabolic Panel    NT-Pro Natriuretic Peptide    CBC Auto Differential                  Plan:   No problem-specific Assessment & Plan notes found for this encounter.    Severe mitral regurgitation  Secondary to severe prolapse/flail P2 segment  S/p complex mitral valve repair and MOY closure (Dr. Daniels 1/30/24:)  Doing well from cardiac standpoint  [facility]  - check BMP and proBNP today    Paroxysmal atrial fibrillation  Patient presented to [facility] 2/10/2024 with c/o sudden onset lightheadedness, nausea and palpitations. EKG demonstrated atrial fibrillation with RVR; rate 140 bpm in the presence of GIB. The patient was airlifted to [facility] with an urgent GI consult. The atrial fibrillation was converted chemically.  - Currently NSR  - status post 3 months of amiodarone.  Stopped at last visit  - no anticoagulation given brief episode of postop AFib in the setting of GI bleeding.  Of note, he has undergone left atrial appendage closure along with a mitral valve repair surgery.    Gastrointestinal hemorrhage associated with duodenal ulcer  The patient was airlifted to [facility] with an urgent GI consult. He was admitted to [facility] and continued to have evidence of GIB throughout the night. ON 12/11/2024 an emergent EGD demonstrated a large duodenal ulcer with a large  overlying pancake like hematoma. This was treated epinephrine injections and Bovie cautery. The patient required a total of 7 units of PRCs, platelets, FFP and cryoprecipitate. EGD was repeat 48 hours later with no evidence of ongoing active bleeding found.     Contine PPI as Rx.    Essential hypertension  Blood pressure above goal  Blood pressure log provided, maintain blood pressure log and bring to next visit  Start amlodipine 5 mg daily  Continue metoprolol 12.5 mg b.i.d.    Coronary artery disease involving native coronary artery of native heart without angina pectoris  [facility] 12/14/2023- Dr. Be  Non-obstructive CAD  Single vessel CAD with a 30% prox RCA lesion  - atorvastatin increased to 40 mg daily at previous visit.  Check fasting lipid panel today      Follow up in 3 months

## 2024-08-15 ENCOUNTER — HOSPITAL ENCOUNTER (OUTPATIENT)
Dept: RADIOLOGY | Facility: HOSPITAL | Age: 69
Discharge: HOME | End: 2024-08-15
Attending: NURSE PRACTITIONER
Payer: MEDICARE

## 2024-08-15 DIAGNOSIS — R42 DIZZINESS: ICD-10-CM

## 2024-08-15 DIAGNOSIS — Z98.890 S/P MITRAL VALVE REPAIR: ICD-10-CM

## 2024-08-15 DIAGNOSIS — R41.3 MEMORY LOSS, SHORT TERM: ICD-10-CM

## 2024-08-15 DIAGNOSIS — I63.9 CEREBROVASCULAR ACCIDENT (CVA), UNSPECIFIED MECHANISM: Primary | ICD-10-CM

## 2024-08-15 DIAGNOSIS — H54.7 IMPAIRED VISION: ICD-10-CM

## 2024-08-15 PROCEDURE — 70551 MRI BRAIN STEM W/O DYE: CPT | Mod: 26,,, | Performed by: RADIOLOGY

## 2024-08-15 PROCEDURE — 70551 MRI BRAIN STEM W/O DYE: CPT | Mod: TC

## 2024-08-15 RX ORDER — FERROUS SULFATE 325(65) MG
325 TABLET ORAL
Qty: 30 TABLET | Refills: 3 | Status: SHIPPED | OUTPATIENT
Start: 2024-08-15

## 2024-08-16 PROBLEM — I63.9 CEREBROVASCULAR ACCIDENT (CVA): Status: ACTIVE | Noted: 2024-08-16

## 2024-09-11 ENCOUNTER — APPOINTMENT (OUTPATIENT)
Dept: RADIOLOGY | Facility: CLINIC | Age: 69
End: 2024-09-11
Attending: NURSE PRACTITIONER
Payer: MEDICARE

## 2024-09-11 ENCOUNTER — OFFICE VISIT (OUTPATIENT)
Dept: FAMILY MEDICINE | Facility: CLINIC | Age: 69
End: 2024-09-11
Payer: MEDICARE

## 2024-09-11 ENCOUNTER — TELEPHONE (OUTPATIENT)
Dept: NEUROLOGY | Facility: CLINIC | Age: 69
End: 2024-09-11
Payer: MEDICARE

## 2024-09-11 VITALS
WEIGHT: 163 LBS | HEIGHT: 67 IN | RESPIRATION RATE: 20 BRPM | BODY MASS INDEX: 25.58 KG/M2 | OXYGEN SATURATION: 98 % | DIASTOLIC BLOOD PRESSURE: 76 MMHG | HEART RATE: 75 BPM | SYSTOLIC BLOOD PRESSURE: 137 MMHG | TEMPERATURE: 99 F

## 2024-09-11 DIAGNOSIS — K26.9 DUODENAL ULCER: ICD-10-CM

## 2024-09-11 DIAGNOSIS — E78.5 HYPERLIPIDEMIA, UNSPECIFIED HYPERLIPIDEMIA TYPE: ICD-10-CM

## 2024-09-11 DIAGNOSIS — I25.10 CORONARY ARTERY DISEASE INVOLVING NATIVE CORONARY ARTERY OF NATIVE HEART WITHOUT ANGINA PECTORIS: ICD-10-CM

## 2024-09-11 DIAGNOSIS — I10 ESSENTIAL HYPERTENSION: Primary | ICD-10-CM

## 2024-09-11 DIAGNOSIS — I63.9 CEREBROVASCULAR ACCIDENT (CVA), UNSPECIFIED MECHANISM: ICD-10-CM

## 2024-09-11 DIAGNOSIS — Z98.890 S/P MITRAL VALVE REPAIR: ICD-10-CM

## 2024-09-11 DIAGNOSIS — R07.81 RIB PAIN: ICD-10-CM

## 2024-09-11 DIAGNOSIS — I48.0 PAROXYSMAL ATRIAL FIBRILLATION: ICD-10-CM

## 2024-09-11 DIAGNOSIS — R01.1 CARDIAC MURMUR: ICD-10-CM

## 2024-09-11 DIAGNOSIS — H54.7 IMPAIRED VISION: ICD-10-CM
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
## 2024-09-11 DIAGNOSIS — N18.31 CHRONIC KIDNEY DISEASE, STAGE 3A: ICD-10-CM

## 2024-09-11 PROCEDURE — 71046 X-RAY EXAM CHEST 2 VIEWS: CPT | Mod: TC,RHCUB,FY | Performed by: NURSE PRACTITIONER

## 2024-09-11 PROCEDURE — 71046 X-RAY EXAM CHEST 2 VIEWS: CPT | Mod: 26,,, | Performed by: RADIOLOGY

## 2024-09-11 NOTE — PROGRESS NOTES
ELA Pizarro        PATIENT NAME: Mitul Torres  : 1955  DATE: 24  MRN: [id number]      Patient PCP Information       Provider PCP Type    Shannan P ELA Gorman General            Reason for Visit / Chief Complaint: Memory Loss (Pt presents to the clinic for 1m f/u he had another episode of feeling like he did the last time and founded out he had a stroke )       Update PCP  Update Chief Complaint         History of Present Illness / Problem Focused Workflow     Mitul Torres presents to the clinic with Memory Loss (Pt presents to the clinic for 1m f/u he had another episode of feeling like he did the last time and founded out he had a stroke )     HPI  Patient presents to clinic for follow up. Patient with prior dizziness and vision loss. MRI revealed chronic left occipital infarct. Patient on statin and asa. Noncompliant with statin. Stressed importance of taking. Patient was prev seen by opt at [facility]. Referral to be placed to ophthalmologist Confait for vision loss, patient reports worsening vision right eye. Patient also reports recent fall into bird bath at home. Patient unsure if tripped or fell due to worsening CVA. States he slept for several hours after incident. Denies loc denies hitting head. Requested earlier appt for neurology current appt 10/14/24. Patient aware to go to ED for any further episodes.   Patient was seen by cardiology last month. BP high. Amlodipine added to beta blocker. Patient was not aware to take both. Instructed to take both for BP control    Medical / Social / Family History     Past Medical History:   Diagnosis Date    GERD (gastroesophageal reflux disease)     Hyperlipidemia     Hypertension        Past Surgical History:   Procedure Laterality Date    ECHOCARDIOGRAM,TRANSESOPHAGEAL  2023    Procedure: Transesophageal echo (GAEL) intra-procedure log documentation;  Surgeon: Jerry Be DO;  Location: [facility] CATH LAB;  Service:  Cardiology;;    LEFT HEART CATHETERIZATION Left 12/14/2023    Procedure: Left heart cath;  Surgeon: Jerry Be DO;  Location: [facility] CATH LAB;  Service: Cardiology;  Laterality: Left;    MITRAL VALVE REPAIR      REPAIR OF HEART VALVE      RIGHT HEART CATHETERIZATION Right 12/14/2023    Procedure: INSERTION, CATHETER, RIGHT HEART;  Surgeon: Jerry Be DO;  Location: [facility] CATH LAB;  Service: Cardiology;  Laterality: Right;       Social History    reports that he quit smoking about 32 years ago. His smoking use included cigarettes. He has been exposed to tobacco smoke. He has never used smokeless tobacco. He reports that he does not currently use alcohol. He reports that he does not currently use drugs after having used the following drugs: Marijuana.    Family History  's family history includes Cataracts in his mother; Diabetes in his maternal grandfather; Glaucoma in his mother; Heart disease in his brother; No Known Problems in his brother and father.    Medications and Allergies     Medications  Outpatient Medications Marked as Taking for the 9/11/24 encounter (Office Visit) with Shannan Gorman FNP   Medication Sig Dispense Refill    amLODIPine (NORVASC) 5 MG tablet Take 1 tablet (5 mg total) by mouth once daily. 90 tablet 3    aspirin (ECOTRIN) 81 MG EC tablet Take 1 tablet by mouth every morning.      atorvastatin (LIPITOR) 40 MG tablet Take 1 tablet (40 mg total) by mouth once daily. 90 tablet 3    pantoprazole (PROTONIX) 40 MG tablet Take 40 mg by mouth 2 (two) times daily.         Allergies  Review of patient's allergies indicates:   Allergen Reactions    Sulfamethoxazole-trimethoprim Rash     Note: Emil Johson Syndrome       Physical Examination     Vitals:    09/11/24 1039   BP: 137/76   BP Location: Right arm   Patient Position: Sitting   BP Method: Medium (Automatic)   Pulse: 75   Resp: 20   Temp: 98.7 °F (37.1 °C)   TempSrc: Oral   SpO2: 98%   Weight: 73.9 kg (163  "lb)   Height: 5' 7" (1.702 m)       Physical Exam  Vitals reviewed.   Constitutional:       Appearance: Normal appearance.   HENT:      Head: Normocephalic.      Right Ear: External ear normal.      Left Ear: External ear normal.      Nose: Nose normal.      Mouth/Throat:      Mouth: Mucous membranes are moist.   Eyes:      Extraocular Movements: Extraocular movements intact.   Cardiovascular:      Rate and Rhythm: Normal rate.      Pulses: Normal pulses.   Pulmonary:      Effort: Pulmonary effort is normal.   Musculoskeletal:         General: Normal range of motion.      Cervical back: Normal range of motion.   Skin:     General: Skin is warm and dry.      Capillary Refill: Capillary refill takes less than 2 seconds.   Neurological:      General: No focal deficit present.      Mental Status: He is alert and oriented to person, place, and time.   Psychiatric:         Mood and Affect: Mood normal.         Behavior: Behavior normal.         Thought Content: Thought content normal.         Judgment: Judgment normal.         No visits with results within 14 Day(s) from this visit.   Latest known visit with results is:   Lab Visit on 08/14/2024   Component Date Value Ref Range Status    Triglycerides 08/14/2024 88  35 - 150 mg/dL Final      Normal:  <150 mg/dL  Borderline High: 150-199 mg/dL  High:   200-499 mg/dL  Very High:  >=500    Cholesterol 08/14/2024 166  0 - 200 mg/dL Final      <200 mg/dL:  Desirable  200-240 mg/dL: Borderline High  >240 mg/dL:  High    HDL Cholesterol 08/14/2024 57  40 - 60 mg/dL Final      <40 mg/dL: Low HDL  40-60 mg/dL: Normal  >60 mg/dL: Desirable    Cholesterol/HDL Ratio (Risk Factor) 08/14/2024 2.9   Final    Non-HDL 08/14/2024 109  mg/dL Final    LDL Calculated 08/14/2024 91  mg/dL Final    Unable to calculate due to one of the following values:  Cholesterol <5  HDL Cholesterol <5  Triglycerides <10 or >400    LDL/HDL 08/14/2024 1.6   Final    Unable to calculate due to one of " the following values:  Cholesterol <5  HDL Cholesterol <5  Triglycerides <10 or >400    VLDL 08/14/2024 18  mg/dL Final    Sodium 08/14/2024 142  136 - 145 mmol/L Final    Potassium 08/14/2024 4.8  3.5 - 5.1 mmol/L Final    Chloride 08/14/2024 108 (H)  98 - 107 mmol/L Final    CO2 08/14/2024 31  21 - 32 mmol/L Final    Anion Gap 08/14/2024 8  7 - 16 mmol/L Final    Glucose 08/14/2024 116 (H)  74 - 106 mg/dL Final    BUN 08/14/2024 14  7 - 18 mg/dL Final    Creatinine 08/14/2024 1.29  0.70 - 1.30 mg/dL Final    BUN/Creatinine Ratio 08/14/2024 11  6 - 20 Final    Calcium 08/14/2024 9.4  8.5 - 10.1 mg/dL Final    eGFR 08/14/2024 60  >=60 mL/min/1.73m2 Final    ProBNP 08/14/2024 228 (H)  1 - 125 pg/mL Final    WBC 08/14/2024 8.16  4.50 - 11.00 K/uL Final    RBC 08/14/2024 4.41 (L)  4.60 - 6.20 M/uL Final    Hemoglobin 08/14/2024 12.8 (L)  13.5 - 18.0 g/dL Final    Hematocrit 08/14/2024 39.8 (L)  40.0 - 54.0 % Final    MCV 08/14/2024 90.2  80.0 - 96.0 fL Final    MCH 08/14/2024 29.0  27.0 - 31.0 pg Final    MCHC 08/14/2024 32.2  32.0 - 36.0 g/dL Final    RDW 08/14/2024 13.7  11.5 - 14.5 % Final    Platelet Count 08/14/2024 250  150 - 400 K/uL Final    MPV 08/14/2024 10.2  9.4 - 12.4 fL Final    Neutrophils % 08/14/2024 64.4  53.0 - 65.0 % Final    Lymphocytes % 08/14/2024 23.5 (L)  27.0 - 41.0 % Final    Monocytes % 08/14/2024 7.7 (H)  2.0 - 6.0 % Final    Eosinophils % 08/14/2024 3.4  1.0 - 4.0 % Final    Basophils % 08/14/2024 0.6  0.0 - 1.0 % Final    Immature Granulocytes % 08/14/2024 0.4  0.0 - 0.4 % Final    nRBC, Auto 08/14/2024 0.0  <=0.0 % Final    Neutrophils, Abs 08/14/2024 5.25  1.80 - 7.70 K/uL Final    Lymphocytes, Absolute 08/14/2024 1.92  1.00 - 4.80 K/uL Final    Monocytes, Absolute 08/14/2024 0.63  0.00 - 0.80 K/uL Final    Eosinophils, Absolute 08/14/2024 0.28  0.00 - 0.50 K/uL Final    Basophils, Absolute 08/14/2024 0.05  0.00 - 0.20 K/uL Final    Immature  Granulocytes, Absolute 08/14/2024 0.03  0.00 - 0.04 K/uL Final    nRBC, Absolute 08/14/2024 0.00  <=0.00 x10e3/uL Final    Diff Type 08/14/2024 Auto   Final             Assessment and Plan (including Health Maintenance)      Problem List  Smart Sets  Document Outside HM   :    Plan:     1. Essential hypertension  Assessment & Plan:  Patient was unaware he was suppose to take metoprolol and amlodipine  Advised to take both  Follow up cardiology as scheduled  Logging bp at home  Patient fu with PCP in 4 weeks         2. Chronic kidney disease, stage 3a  Overview:  eGFR 60      3. Rib pain  -     X-Ray Chest PA And Lateral; Future; Expected date: 09/11/2024    4. Fall, initial encounter  -     X-Ray Chest PA And Lateral; Future; Expected date: 09/11/2024    5. Cerebrovascular accident (CVA), unspecified mechanism  Overview:  left occipital  righ vision impairment    Impression:     Chronic area of infarct of the left superior occipital lobe.  Mild chronic microvascular ischemic change.  No acute abnormality.        Electronically signed by:Germán Claros  Date:                                            08/15/2024  Time:                                           16:06    Assessment & Plan:  Continue asa and statin  Requested appt with neurology soon, currently scheduled for 10/2024   Call center sent message to staff      6. Coronary artery disease involving native coronary artery of native heart without angina pectoris  Overview:  [facility] 12/14/2023- Dr. Be    Non-obstructive CAD  Single vessel CAD with a 30% prox RCA lesion  LVEF 55%  LVEDP 6 mmHg  Severe MR    Assessment & Plan:  Continue bb, ccb, asa and statin    Orders:  -     Ambulatory referral/consult to Ophthalmology; Future; Expected date: 09/18/2024    7. Cardiac murmur    8. Hyperlipidemia, unspecified hyperlipidemia type  Assessment & Plan:  Continue statin and asa  Noncompliant with statin stressed importance of taking      9. Paroxysmal atrial  fibrillation  Overview:  Patient presented to [facility] 2/10/2024 with c/o sudden onset lightheadedness, nausea and palpitations. EKG demonstrated atrial fibrillation with RVR; rate 140 bpm in the presence of GIB. The patient was airlifted to [facility] with an urgent GI consult. The atrial fibrillation was converted chemically.    Assessment & Plan:  Continue asa and beta blocker      10. S/P mitral valve repair  Overview:  1/30/2024- Dr. Daniels- [facility]  Complex mitral valve repair and MOY closure      11. Duodenal ulcer  Assessment & Plan:  Continue protonix      12. Impaired vision  -     Ambulatory referral/consult to Ophthalmology; Future; Expected date: 09/18/2024    RTC in 1mo    There are no Patient Instructions on file for this visit.       Health Maintenance Due   Topic Date Due    Annual UACr  Never done    TETANUS VACCINE  Never done    Colorectal Cancer Screening  Never done    Shingles Vaccine (1 of 2) Never done    RSV Vaccine (Age 60+ and Pregnant patients) (1 - 1-dose 60+ series) Never done    Pneumococcal Vaccines (Age 65+) (1 of 1 - PCV) Never done    Abdominal Aortic Aneurysm Screening  Never done    Influenza Vaccine (1) Never done    COVID-19 Vaccine (1 - 2023-24 season) Never done         There is no immunization history on file for this patient.         Health Maintenance Topics with due status: Not Due       Topic Last Completion Date    Hemoglobin A1c (Prediabetes) 01/26/2024    Lipid Panel 08/14/2024    High Dose Statin 09/11/2024    Aspirin/Antiplatelet Therapy 09/11/2024       Future Appointments   Date Time Provider Department Center   10/11/2024  9:45 AM Shannan Gorman FNP [facility]   10/14/2024 10:00 AM Yamil Day FNP [facility]UR[facility]   11/14/2024 10:00 AM Trish Levine MD [facility] CARD [facility]   4/10/2025  8:00 AM AWV NURSE, [facility] FAMILY MEDICINE [facility]            Signature:  ELA Pizarro  [facility]  Clinic     Date of encounter: 9/11/24

## 2024-09-11 NOTE — ASSESSMENT & PLAN NOTE
Continue asa and statin  Requested appt with neurology soon, currently scheduled for 10/2024   Call center sent message to staff

## 2024-09-11 NOTE — ASSESSMENT & PLAN NOTE
Patient was unaware he was suppose to take metoprolol and amlodipine  Advised to take both  Follow up cardiology as scheduled  Logging bp at home  Patient fu with PCP in 4 weeks

## 2024-09-20 ENCOUNTER — OFFICE VISIT (OUTPATIENT)
Dept: NEUROLOGY | Facility: CLINIC | Age: 69
End: 2024-09-20
Payer: MEDICARE

## 2024-09-20 VITALS
DIASTOLIC BLOOD PRESSURE: 95 MMHG | SYSTOLIC BLOOD PRESSURE: 175 MMHG | HEART RATE: 96 BPM | WEIGHT: 167.63 LBS | BODY MASS INDEX: 26.31 KG/M2 | RESPIRATION RATE: 18 BRPM | OXYGEN SATURATION: 98 % | HEIGHT: 67 IN

## 2024-09-20 DIAGNOSIS — I63.89 OTHER CEREBRAL INFARCTION: Primary | ICD-10-CM

## 2024-09-20 DIAGNOSIS — I10 HYPERTENSION, UNSPECIFIED TYPE: ICD-10-CM

## 2024-09-20 DIAGNOSIS — I63.9 CEREBROVASCULAR ACCIDENT (CVA), UNSPECIFIED MECHANISM: ICD-10-CM

## 2024-09-20 DIAGNOSIS — E78.5 HYPERLIPIDEMIA, UNSPECIFIED HYPERLIPIDEMIA TYPE: ICD-10-CM

## 2024-09-20 PROCEDURE — 99215 OFFICE O/P EST HI 40 MIN: CPT | Mod: PBBFAC | Performed by: NURSE PRACTITIONER

## 2024-09-20 PROCEDURE — 99999 PR PBB SHADOW E&M-EST. PATIENT-LVL V: CPT | Mod: PBBFAC,,, | Performed by: NURSE PRACTITIONER

## 2024-09-20 NOTE — PROGRESS NOTES
Subjective:       Patient ID: Mitul Torres is a 69 y.o. male     Chief Complaint:  No chief complaint on file.       Allergies:  Sulfamethoxazole-trimethoprim    Current Medications:    Outpatient Encounter Medications as of 9/20/2024   Medication Sig Dispense Refill    amLODIPine (NORVASC) 5 MG tablet Take 1 tablet (5 mg total) by mouth once daily. 90 tablet 3    aspirin (ECOTRIN) 81 MG EC tablet Take 1 tablet by mouth every morning.      atorvastatin (LIPITOR) 40 MG tablet Take 1 tablet (40 mg total) by mouth once daily. 90 tablet 3    ferrous sulfate (FEOSOL) 325 mg (65 mg iron) Tab tablet Take 1 tablet (325 mg total) by mouth daily with breakfast. 30 tablet 3    pantoprazole (PROTONIX) 40 MG tablet Take 40 mg by mouth 2 (two) times daily.      metoprolol tartrate (LOPRESSOR) 25 MG tablet Take 0.5 tablets (12.5 mg total) by mouth 2 (two) times daily. (Patient not taking: Reported on 9/11/2024) 90 tablet 3     No facility-administered encounter medications on file as of 9/20/2024.       History of Present Illness  68 y/o male new referral to neurology for prior history of CVA    He just left Dr. Trevino clinic for eye evaluation, had  a dilation done, told had cataract    He reports he was feeling fine, but had a sudden onset of off balance, leaning to the left which lasted that day, and he had a headache and slept and was better that next morning.    Last MRI brain done in August of 2024, noted chronic left occipital lobe infarct, but also area of punctate focus of microhemorrhage of the right frontal subcortical white matter.  The imaging was done about 4-6 weeks after the incident of fall.  There was no prior indication or history of stroke that he was aware of.  Per the EMR his BP has not been well regulated lately and this could certainly be a consideration for recent reported dizziness and vision loss.  He is prescribed ASA, statin, and HTN but records indicates not compliant with statin treatment.   His BP is still elevated today in clinic, I did encourage him to keep follow-up with primary care and cardiology regarding continued management of this especially given the microhemorrhage finding on the imaging.    There is a report of carotid doppler study done at [facility] in January of 2024 which was reported negative.  No CTA or MRA brain to review, given the micro-hemorrhage need to obtain this.           Review of Systems  Review of Systems   Constitutional:  Negative for diaphoresis and fever.   HENT:  Negative for congestion, hearing loss and tinnitus.    Eyes:  Negative for blurred vision, double vision, photophobia, discharge and redness.   Respiratory:  Negative for cough and shortness of breath.    Cardiovascular:  Negative for chest pain.   Gastrointestinal:  Negative for abdominal pain, nausea and vomiting.   Musculoskeletal:  Negative for back pain, joint pain, myalgias and neck pain.   Skin:  Negative for itching and rash.   Neurological:  Positive for headaches. Negative for dizziness, tremors, sensory change, speech change, focal weakness, seizures, loss of consciousness and weakness.   Psychiatric/Behavioral:  Negative for depression, hallucinations and memory loss. The patient does not have insomnia.    All other systems reviewed and are negative.     Objective:     NEUROLOGICAL EXAMINATION:     MENTAL STATUS   Oriented to person, place, and time.   Attention: normal. Concentration: normal.   Speech: speech is normal   Level of consciousness: alert  Knowledge: good and consistent with education.   Normal comprehension.     CRANIAL NERVES     CN II   Visual fields full to confrontation.   Visual acuity: normal  Right visual field deficit: none  Left visual field deficit: none     CN III, IV, VI   Pupils are equal, round, and reactive to light.  Extraocular motions are normal.   Right pupil: Size: 3 mm. Shape: regular. Reactivity: brisk. Consensual response: intact. Accommodation: intact.    Left pupil: Size: 3 mm. Shape: regular. Reactivity: brisk. Consensual response: intact. Accommodation: intact.   CN III: no CN III palsy  CN VI: no CN VI palsy  Nystagmus: none   Diplopia: none  Upgaze: normal  Downgaze: normal  Conjugate gaze: present  Vestibulo-ocular reflex: present    CN V   Facial sensation intact.   Right facial sensation deficit: none  Left facial sensation deficit: none  Right corneal reflex: normal  Left corneal reflex: normal    CN VII   Facial expression full, symmetric.   Right facial weakness: none  Left facial weakness: none  Right taste: normal  Left taste: normal    CN VIII   CN VIII normal.   Hearing: intact    CN IX, X   CN IX normal.   CN X normal.   Palate: symmetric    CN XI   CN XI normal.   Right sternocleidomastoid strength: normal  Left sternocleidomastoid strength: normal  Right trapezius strength: normal  Left trapezius strength: normal    CN XII   CN XII normal.   Tongue: not atrophic  Fasciculations: absent  Tongue deviation: none    MOTOR EXAM   Muscle bulk: normal  Overall muscle tone: normal  Right arm tone: normal  Left arm tone: normal  Right arm pronator drift: absent  Left arm pronator drift: absent  Right leg tone: normal  Left leg tone: normal    Strength   Right neck flexion: 5/5  Left neck flexion: 5/5  Right neck extension: 5/5  Left neck extension: 5/5  Right deltoid: 5/5  Left deltoid: 5/5  Right biceps: 5/5  Left biceps: 5/5  Right triceps: 5/5  Left triceps: 5/5  Right wrist flexion: 5/5  Left wrist flexion: 5/5  Right wrist extension: 5/5  Left wrist extension: 5/5  Right interossei: 5/5  Left interossei: 5/5  Right iliopsoas: 5/5  Left iliopsoas: 5/5  Right quadriceps: 5/5  Left quadriceps: 5/5  Right hamstrin/5  Left hamstrin/5  Right anterior tibial: 5/5  Left anterior tibial: 5/5  Right posterior tibial: 5/5  Left posterior tibial: 5/5  Right gastroc: 5/5  Left gastroc: 5/5    REFLEXES     Reflexes   Right brachioradialis: 2+  Left  brachioradialis: 2+  Right biceps: 2+  Left biceps: 2+  Right triceps: 2+  Left triceps: 2+  Right patellar: 2+  Left patellar: 2+  Right achilles: 2+  Left achilles: 2+  Right plantar: normal  Left plantar: normal  Right Altman: absent  Left Altman: absent  Right ankle clonus: absent  Left ankle clonus: absent  Right pendular knee jerk: absent  Left pendular knee jerk: absent    SENSORY EXAM   Light touch normal.   Right arm light touch: normal  Left arm light touch: normal  Right leg light touch: normal  Left leg light touch: normal  Vibration normal.   Right arm vibration: normal  Left arm vibration: normal  Right leg vibration: normal  Left leg vibration: normal  Proprioception normal.   Right arm proprioception: normal  Left arm proprioception: normal  Right leg proprioception: normal  Left leg proprioception: normal  Pinprick normal.   Right arm pinprick: normal  Left arm pinprick: normal  Right leg pinprick: normal  Left leg pinprick: normal  Graphesthesia: normal  Romberg: negative  Stereognosis: normal    GAIT AND COORDINATION     Gait  Gait: normal     Coordination   Finger to nose coordination: normal  Heel to shin coordination: normal  Tandem walking coordination: normal    Tremor   Resting tremor: absent  Intention tremor: absent  Action tremor: absent       Physical Exam  Vitals and nursing note reviewed.   Constitutional:       Appearance: Normal appearance.   HENT:      Head: Normocephalic.   Eyes:      Extraocular Movements: EOM normal.      Pupils: Pupils are equal, round, and reactive to light.   Cardiovascular:      Rate and Rhythm: Normal rate and regular rhythm.      Pulses: Normal pulses.      Heart sounds: Normal heart sounds.   Pulmonary:      Effort: Pulmonary effort is normal.      Breath sounds: Normal breath sounds.   Musculoskeletal:         General: Normal range of motion.      Cervical back: Normal range of motion and neck supple.   Skin:     General: Skin is warm and dry.    Neurological:      General: No focal deficit present.      Mental Status: He is alert and oriented to person, place, and time.      Cranial Nerves: No cranial nerve deficit.      Sensory: No sensory deficit.      Motor: No weakness.      Coordination: Coordination normal. Finger-Nose-Finger Test, Heel to Shin Test and Romberg Test normal.      Gait: Gait is intact. Gait and tandem walk normal.      Deep Tendon Reflexes: Reflexes normal.      Reflex Scores:       Tricep reflexes are 2+ on the right side and 2+ on the left side.       Bicep reflexes are 2+ on the right side and 2+ on the left side.       Brachioradialis reflexes are 2+ on the right side and 2+ on the left side.       Patellar reflexes are 2+ on the right side and 2+ on the left side.       Achilles reflexes are 2+ on the right side and 2+ on the left side.  Psychiatric:         Mood and Affect: Mood normal.         Speech: Speech normal.         Behavior: Behavior normal.          Assessment:     Problem List Items Addressed This Visit          Neuro    Cerebrovascular accident (CVA)    Overview     left occipital  righ vision impairment    Impression:     Chronic area of infarct of the left superior occipital lobe.  Mild chronic microvascular ischemic change.  No acute abnormality.        Electronically signed by:Germán Claros  Date:                                            08/15/2024  Time:                                           16:06            Cardiac/Vascular    Hypertension    Hyperlipemia     Other Visit Diagnoses       Other cerebral infarction    -  Primary    Relevant Orders    MRA Brain without contrast             Primary Diagnosis and ICD10  Other cerebral infarction [I63.89]    Plan:     Patient Instructions   Keep follow-up with cardiology  MRA brain  Reviewed recent MRI and prior carotid doppler study  Continue current aspirin, hypertension, and hyperlipidemia medications    There are no discontinued medications.    Requested  Prescriptions      No prescriptions requested or ordered in this encounter       Orders Placed This Encounter   Procedures    MRA Brain without contrast

## 2024-09-20 NOTE — PATIENT INSTRUCTIONS
Keep follow-up with cardiology  MRA brain  Reviewed recent MRI and prior carotid doppler study  Continue current aspirin, hypertension, and hyperlipidemia medications

## 2024-10-14 ENCOUNTER — TELEPHONE (OUTPATIENT)
Dept: NEUROLOGY | Facility: CLINIC | Age: 69
End: 2024-10-14
Payer: MEDICARE

## 2024-10-14 NOTE — TELEPHONE ENCOUNTER
Pt V/U.    ----- Message from Maeve sent at 10/14/2024 10:18 AM CDT -----  Regarding: Pt. returned call  Who Called: Mitul Torres    Patient is returning phone call    Who Left Message for Patient: Shoshana  Does the patient know what this is regarding?: Yes      Preferred Method of Contact: Phone Call  Patient's Preferred Phone Number on File: [phone]     Additional Information: Pt. Missed call and is waiting on call back for MRA brain test results.

## 2024-10-14 NOTE — TELEPHONE ENCOUNTER
Left VM.    ----- Message from ELA Hathaway sent at 10/14/2024  8:09 AM CDT -----  No significant findings on the MRA brain

## 2024-10-18 ENCOUNTER — OFFICE VISIT (OUTPATIENT)
Dept: FAMILY MEDICINE | Facility: CLINIC | Age: 69
End: 2024-10-18
Payer: MEDICARE

## 2024-10-18 VITALS
BODY MASS INDEX: 26.84 KG/M2 | RESPIRATION RATE: 20 BRPM | HEIGHT: 67 IN | TEMPERATURE: 99 F | HEART RATE: 60 BPM | OXYGEN SATURATION: 95 % | SYSTOLIC BLOOD PRESSURE: 130 MMHG | WEIGHT: 171 LBS | DIASTOLIC BLOOD PRESSURE: 73 MMHG

## 2024-10-18 DIAGNOSIS — K26.9 DUODENAL ULCER: ICD-10-CM

## 2024-10-18 DIAGNOSIS — I34.0 SEVERE MITRAL REGURGITATION: ICD-10-CM

## 2024-10-18 DIAGNOSIS — I48.0 PAROXYSMAL ATRIAL FIBRILLATION: ICD-10-CM

## 2024-10-18 DIAGNOSIS — Z13.6 ENCOUNTER FOR ABDOMINAL AORTIC ANEURYSM (AAA) SCREENING: ICD-10-CM

## 2024-10-18 DIAGNOSIS — N18.31 CHRONIC KIDNEY DISEASE, STAGE 3A: ICD-10-CM

## 2024-10-18 DIAGNOSIS — I25.10 CORONARY ARTERY DISEASE INVOLVING NATIVE CORONARY ARTERY OF NATIVE HEART WITHOUT ANGINA PECTORIS: ICD-10-CM

## 2024-10-18 DIAGNOSIS — I10 HYPERTENSION, UNSPECIFIED TYPE: Primary | ICD-10-CM

## 2024-10-18 DIAGNOSIS — Z87.19 H/O: GI BLEED: ICD-10-CM

## 2024-10-18 DIAGNOSIS — I63.9 CEREBROVASCULAR ACCIDENT (CVA), UNSPECIFIED MECHANISM: ICD-10-CM

## 2024-10-18 PROCEDURE — 99212 OFFICE O/P EST SF 10 MIN: CPT | Mod: ,,, | Performed by: NURSE PRACTITIONER

## 2024-10-18 NOTE — PROGRESS NOTES
ELA Pizarro        PATIENT NAME: Mitul Torres  : 1955  DATE: 10/18/24  MRN: [id number]      Patient PCP Information       Provider PCP Type    Shannan RAY ELA [facility]            Reason for Visit / Chief Complaint: Follow-up and Hypertension (Pt presents to the clinic for a 1m f/u pt stated he still sore from his fall)       Update PCP  Update Chief Complaint         History of Present Illness / Problem Focused Workflow     Mitul Torres presents to the clinic with Follow-up and Hypertension (Pt presents to the clinic for a 1m f/u pt stated he still sore from his fall)     HPI  Mr Torres presents to clinic for follow up. Patient states he did go see Dr Trevino, reports he did have cataract to left eye which he was told was contributing to blurriness. Patient denies any further episodes of dizziness.       Hx of MVR 2/10/2024 complicated by GI bleed / hematoma post op. Patient with upper GI bleed after starting plavix. Patient had two ulcers on EGD. Patient also had hematoma at epigastric region following MVR. Received CV rehab post op. Required short course of lasix post op for fluid overload. Plavix DC. On ASA. No further bleeding. PPI continued.   2024 Patient experienced dizziness and visual disturbance.       MRI brain   Impression:     Chronic area of infarct of the left superior occipital lobe.  Mild chronic microvascular ischemic change.  No acute abnormality.        Electronically signed by:Germán Claros  Date:                                            08/15/2024  Time:                                           16:06        Exam Ended: 08/15/24 11:27 CDT Last Resulted: 08/15/24 16:06 CDT         MRA brain  FINDINGS:  MRA (Intracranial Arteries):No focal high-grade stenosis, occlusion, or aneurysm.  Note is made of anatomic variant with 3rd A2 segment off of the anterior communicating artery     Impression:     No significant arterial abnormalities.        Electronically signed  by:Germán Rothman  Date:                                            10/11/2024  Time:                                           11:18        Exam Ended: 10/10/24 14:54 CDT Last Resulted: 10/11/24 11:18 CDT          01/2024 Carotid us    No hemodynamically significant carotid artery stenosis.     Antegrade vertebral artery flow noted bilaterally.   Medical / Social / Family History     Past Medical History:   Diagnosis Date    GERD (gastroesophageal reflux disease)     Hyperlipidemia     Hypertension        Past Surgical History:   Procedure Laterality Date    ECHOCARDIOGRAM,TRANSESOPHAGEAL  12/14/2023    Procedure: Transesophageal echo (GAEL) intra-procedure log documentation;  Surgeon: Jerry Be DO;  Location: [facility] CATH LAB;  Service: Cardiology;;    LEFT HEART CATHETERIZATION Left 12/14/2023    Procedure: Left heart cath;  Surgeon: Jerry Be DO;  Location: [facility] CATH LAB;  Service: Cardiology;  Laterality: Left;    MITRAL VALVE REPAIR      REPAIR OF HEART VALVE      RIGHT HEART CATHETERIZATION Right 12/14/2023    Procedure: INSERTION, CATHETER, RIGHT HEART;  Surgeon: Jerry Be DO;  Location: [facility] CATH LAB;  Service: Cardiology;  Laterality: Right;       Social History    reports that he quit smoking about 32 years ago. His smoking use included cigarettes. He has been exposed to tobacco smoke. He has never used smokeless tobacco. He reports that he does not currently use alcohol. He reports that he does not currently use drugs after having used the following drugs: Marijuana.    Family History  's family history includes Cataracts in his mother; Diabetes in his maternal grandfather; Glaucoma in his mother; Heart disease in his brother; No Known Problems in his brother and father.    Medications and Allergies     Medications  Outpatient Medications Marked as Taking for the 10/18/24 encounter (Office Visit) with Shannan Gorman FNP   Medication Sig Dispense Refill     "amLODIPine (NORVASC) 5 MG tablet Take 1 tablet (5 mg total) by mouth once daily. 90 tablet 3    aspirin (ECOTRIN) 81 MG EC tablet Take 1 tablet by mouth every morning.      atorvastatin (LIPITOR) 40 MG tablet Take 1 tablet (40 mg total) by mouth once daily. 90 tablet 3    ferrous sulfate (FEOSOL) 325 mg (65 mg iron) Tab tablet Take 1 tablet (325 mg total) by mouth daily with breakfast. 30 tablet 3    metoprolol tartrate (LOPRESSOR) 25 MG tablet Take 0.5 tablets (12.5 mg total) by mouth 2 (two) times daily. 90 tablet 3    pantoprazole (PROTONIX) 40 MG tablet Take 40 mg by mouth 2 (two) times daily.         Allergies  Review of patient's allergies indicates:   Allergen Reactions    Sulfamethoxazole-trimethoprim Rash     Note: Emil Johson Syndrome       Physical Examination     Vitals:    10/18/24 1042   BP: 130/73   BP Location: Right arm   Patient Position: Sitting   Pulse: 60   Resp: 20   Temp: 98.5 °F (36.9 °C)   TempSrc: Oral   SpO2: 95%   Weight: 77.6 kg (171 lb)   Height: 5' 7" (1.702 m)       Physical Exam  Vitals and nursing note reviewed.   Constitutional:       Appearance: Normal appearance. He is normal weight.   HENT:      Head: Normocephalic.      Right Ear: Tympanic membrane, ear canal and external ear normal.      Left Ear: Tympanic membrane, ear canal and external ear normal.      Nose: Nose normal.      Mouth/Throat:      Mouth: Mucous membranes are moist.   Eyes:      Extraocular Movements: Extraocular movements intact.      Conjunctiva/sclera: Conjunctivae normal.      Pupils: Pupils are equal, round, and reactive to light.   Cardiovascular:      Rate and Rhythm: Normal rate and regular rhythm.      Pulses: Normal pulses.      Heart sounds: Normal heart sounds. No murmur heard.  Pulmonary:      Effort: Pulmonary effort is normal.      Breath sounds: Normal breath sounds. No stridor. No wheezing or rhonchi.   Abdominal:      General: Bowel sounds are normal. There is no distension.      Palpations: " Abdomen is soft. There is no mass.      Tenderness: There is no abdominal tenderness.   Musculoskeletal:         General: No swelling or tenderness. Normal range of motion.      Cervical back: Normal range of motion and neck supple.      Right lower leg: No edema.      Left lower leg: No edema.   Skin:     General: Skin is warm and dry.      Capillary Refill: Capillary refill takes less than 2 seconds.   Neurological:      General: No focal deficit present.      Mental Status: He is alert and oriented to person, place, and time. Mental status is at baseline.      Cranial Nerves: No cranial nerve deficit.      Sensory: No sensory deficit.      Motor: No weakness.   Psychiatric:         Mood and Affect: Mood normal.         Behavior: Behavior normal.         Thought Content: Thought content normal.         Judgment: Judgment normal.           No visits with results within 14 Day(s) from this visit.   Latest known visit with results is:   Lab Visit on 08/14/2024   Component Date Value Ref Range Status    Triglycerides 08/14/2024 88  35 - 150 mg/dL Final      Normal:  <150 mg/dL  Borderline High: 150-199 mg/dL  High:   200-499 mg/dL  Very High:  >=500    Cholesterol 08/14/2024 166  0 - 200 mg/dL Final      <200 mg/dL:  Desirable  200-240 mg/dL: Borderline High  >240 mg/dL:  High    HDL Cholesterol 08/14/2024 57  40 - 60 mg/dL Final      <40 mg/dL: Low HDL  40-60 mg/dL: Normal  >60 mg/dL: Desirable    Cholesterol/HDL Ratio (Risk Factor) 08/14/2024 2.9   Final    Non-HDL 08/14/2024 109  mg/dL Final    LDL Calculated 08/14/2024 91  mg/dL Final    Unable to calculate due to one of the following values:  Cholesterol <5  HDL Cholesterol <5  Triglycerides <10 or >400    LDL/HDL 08/14/2024 1.6   Final    Unable to calculate due to one of the following values:  Cholesterol <5  HDL Cholesterol <5  Triglycerides <10 or >400    VLDL 08/14/2024 18  mg/dL Final    Sodium 08/14/2024 142  136 - 145 mmol/L Final    Potassium 08/14/2024  4.8  3.5 - 5.1 mmol/L Final    Chloride 08/14/2024 108 (H)  98 - 107 mmol/L Final    CO2 08/14/2024 31  21 - 32 mmol/L Final    Anion Gap 08/14/2024 8  7 - 16 mmol/L Final    Glucose 08/14/2024 116 (H)  74 - 106 mg/dL Final    BUN 08/14/2024 14  7 - 18 mg/dL Final    Creatinine 08/14/2024 1.29  0.70 - 1.30 mg/dL Final    BUN/Creatinine Ratio 08/14/2024 11  6 - 20 Final    Calcium 08/14/2024 9.4  8.5 - 10.1 mg/dL Final    eGFR 08/14/2024 60  >=60 mL/min/1.73m2 Final    ProBNP 08/14/2024 228 (H)  1 - 125 pg/mL Final    WBC 08/14/2024 8.16  4.50 - 11.00 K/uL Final    RBC 08/14/2024 4.41 (L)  4.60 - 6.20 M/uL Final    Hemoglobin 08/14/2024 12.8 (L)  13.5 - 18.0 g/dL Final    Hematocrit 08/14/2024 39.8 (L)  40.0 - 54.0 % Final    MCV 08/14/2024 90.2  80.0 - 96.0 fL Final    MCH 08/14/2024 29.0  27.0 - 31.0 pg Final    MCHC 08/14/2024 32.2  32.0 - 36.0 g/dL Final    RDW 08/14/2024 13.7  11.5 - 14.5 % Final    Platelet Count 08/14/2024 250  150 - 400 K/uL Final    MPV 08/14/2024 10.2  9.4 - 12.4 fL Final    Neutrophils % 08/14/2024 64.4  53.0 - 65.0 % Final    Lymphocytes % 08/14/2024 23.5 (L)  27.0 - 41.0 % Final    Monocytes % 08/14/2024 7.7 (H)  2.0 - 6.0 % Final    Eosinophils % 08/14/2024 3.4  1.0 - 4.0 % Final    Basophils % 08/14/2024 0.6  0.0 - 1.0 % Final    Immature Granulocytes % 08/14/2024 0.4  0.0 - 0.4 % Final    nRBC, Auto 08/14/2024 0.0  <=0.0 % Final    Neutrophils, Abs 08/14/2024 5.25  1.80 - 7.70 K/uL Final    Lymphocytes, Absolute 08/14/2024 1.92  1.00 - 4.80 K/uL Final    Monocytes, Absolute 08/14/2024 0.63  0.00 - 0.80 K/uL Final    Eosinophils, Absolute 08/14/2024 0.28  0.00 - 0.50 K/uL Final    Basophils, Absolute 08/14/2024 0.05  0.00 - 0.20 K/uL Final    Immature Granulocytes, Absolute 08/14/2024 0.03  0.00 - 0.04 K/uL Final    nRBC, Absolute 08/14/2024 0.00  <=0.00 x10e3/uL Final    Diff Type 08/14/2024 Auto   Final             Assessment and Plan (including Health Maintenance)      Problem List   Smart Sets  Document Outside HM   :    Plan:     1. Hypertension, unspecified type  Assessment & Plan:  Metoprolol and amlodipine  Follow up cardiology as scheduled          2. Encounter for abdominal aortic aneurysm (AAA) screening  -      AAA Screening; Future; Expected date: 10/18/2024    3. Cerebrovascular accident (CVA), unspecified mechanism  Overview:  left occipital  righ vision impairment    Impression:     Chronic area of infarct of the left superior occipital lobe.  Mild chronic microvascular ischemic change.  No acute abnormality.        Electronically signed by:Germán Claros  Date:                                            08/15/2024  Time:                                           16:06    Assessment & Plan:  Continue asa and statin  Requested appt with neurology soon, currently scheduled for 10/2024   Call center sent message to staff      4. Coronary artery disease involving native coronary artery of native heart without angina pectoris  Overview:  [facility] 12/14/2023- Dr. Be    Non-obstructive CAD  Single vessel CAD with a 30% prox RCA lesion  LVEF 55%  LVEDP 6 mmHg  Severe MR    Assessment & Plan:  Continue bb, ccb, asa and statin      5. Paroxysmal atrial fibrillation  Overview:  Patient presented to [facility] 2/10/2024 with c/o sudden onset lightheadedness, nausea and palpitations. EKG demonstrated atrial fibrillation with RVR; rate 140 bpm in the presence of GIB. The patient was airlifted to [facility] with an urgent GI consult. The atrial fibrillation was converted chemically.    Assessment & Plan:  Continue asa and beta blocker      6. Severe mitral regurgitation  Overview:  TTE 2/10/2024      Conclusion: When compared to prior study, 2/6/2024. Patient in atrial   fibrillation during study. Cardiac evaluation made difficult by abnormal   rhythm.    Left Ventricle: Moderately to severely increased wall thickness. Normal   (55 - 60%) ejection fraction. Diastolic function could not be graded  due   to A-Fib.     Left Atrium: Left atrium volume index is severely increased.     Right Atrium: Right atrium is mildly dilated.     Mitral Valve: Normal structure status post mitral valve ring repair. No   mitral regurgitation.     Tricuspid Valve: Trace tricuspid regurgitation. RVSP is 34.00.     Pericardium: No pericardial effusion. No cardiac tamponade present.      1/30/2024 complex mitral valve repair and MOY occlusion - Dr. Daniels- [facility]     GAEL 12/14/2023- Dr. Levine  Conclusion: Severe mitral regurgitation secondary to severe prolpase of the posterior leaflet of the mitral valve with flail P2 segment.         7. Chronic kidney disease, stage 3a  Overview:  eGFR 60      8. Duodenal ulcer  Assessment & Plan:  Continue protonix      9. H/O: GI bleed      RTC in 3 mo  There are no Patient Instructions on file for this visit.       Health Maintenance Due   Topic Date Due    TETANUS VACCINE  Never done    Colorectal Cancer Screening  Never done    Shingles Vaccine (1 of 2) Never done    RSV Vaccine (Age 60+ and Pregnant patients) (1 - Risk 60-74 years 1-dose series) Never done    Pneumococcal Vaccines (Age 65+) (1 of 1 - PCV) Never done    Abdominal Aortic Aneurysm Screening  Never done    Influenza Vaccine (1) Never done    COVID-19 Vaccine (1 - 2024-25 season) Never done         There is no immunization history on file for this patient.         Health Maintenance Topics with due status: Not Due       Topic Last Completion Date    Hemoglobin A1c (Prediabetes) 01/26/2024    Lipid Panel 08/14/2024    High Dose Statin 10/18/2024    Aspirin/Antiplatelet Therapy 10/18/2024       Future Appointments   Date Time Provider Department Center   10/29/2024  9:30 AM [facility]IC [facility]   11/14/2024 10:00 AM Trish Levine MD OB CARD [facility]   1/7/2025  1:30 PM Yamil Day FNP OB NEURO [facility]   1/17/2025  9:45 AM Shannan Gorman FNP [facility]   4/10/2025  8:00 AM AW[facility]  NURSE, [facility] FAMILY [facility] AYALA Alberto            Signature:  ELA Pizarro  [facility]     Date of encounter: 10/18/24

## 2024-10-29 ENCOUNTER — HOSPITAL ENCOUNTER (OUTPATIENT)
Dept: RADIOLOGY | Facility: HOSPITAL | Age: 69
Discharge: HOME | End: 2024-10-29
Attending: NURSE PRACTITIONER
Payer: MEDICARE

## 2024-10-29 DIAGNOSIS — Z13.6 ENCOUNTER FOR ABDOMINAL AORTIC ANEURYSM (AAA) SCREENING: ICD-10-CM

## 2024-10-29 PROCEDURE — 76706 US ABDL AORTA SCREEN AAA: CPT | Mod: 26,,, | Performed by: RADIOLOGY

## 2024-10-29 PROCEDURE — 76706 US ABDL AORTA SCREEN AAA: CPT | Mod: TC

## 2024-11-14 ENCOUNTER — OFFICE VISIT (OUTPATIENT)
Dept: CARDIOLOGY | Facility: CLINIC | Age: 69
End: 2024-11-14
Payer: MEDICARE

## 2024-11-14 ENCOUNTER — HOSPITAL ENCOUNTER (OUTPATIENT)
Dept: CARDIOLOGY | Facility: HOSPITAL | Age: 69
Discharge: HOME | End: 2024-11-14
Attending: STUDENT IN AN ORGANIZED HEALTH CARE EDUCATION/TRAINING PROGRAM
Payer: MEDICARE

## 2024-11-14 VITALS
BODY MASS INDEX: 26.27 KG/M2 | WEIGHT: 167.38 LBS | SYSTOLIC BLOOD PRESSURE: 120 MMHG | OXYGEN SATURATION: 97 % | DIASTOLIC BLOOD PRESSURE: 80 MMHG | HEART RATE: 53 BPM | HEIGHT: 67 IN

## 2024-11-14 DIAGNOSIS — R06.09 DYSPNEA ON EXERTION: ICD-10-CM

## 2024-11-14 DIAGNOSIS — I49.3 PVC'S (PREMATURE VENTRICULAR CONTRACTIONS): ICD-10-CM

## 2024-11-14 DIAGNOSIS — I34.0 SEVERE MITRAL REGURGITATION: ICD-10-CM

## 2024-11-14 DIAGNOSIS — K26.4 GASTROINTESTINAL HEMORRHAGE ASSOCIATED WITH DUODENAL ULCER: ICD-10-CM

## 2024-11-14 DIAGNOSIS — I48.0 PAROXYSMAL ATRIAL FIBRILLATION: ICD-10-CM

## 2024-11-14 DIAGNOSIS — Z98.890 S/P MITRAL VALVE REPAIR: ICD-10-CM

## 2024-11-14 DIAGNOSIS — I48.0 PAROXYSMAL ATRIAL FIBRILLATION: Primary | ICD-10-CM

## 2024-11-14 PROCEDURE — 99214 OFFICE O/P EST MOD 30 MIN: CPT | Mod: S$PBB,,, | Performed by: STUDENT IN AN ORGANIZED HEALTH CARE EDUCATION/TRAINING PROGRAM

## 2024-11-14 PROCEDURE — 93246 EXT ECG>7D<15D RECORDING: CPT

## 2024-11-14 PROCEDURE — 99214 OFFICE O/P EST MOD 30 MIN: CPT | Mod: PBBFAC,25 | Performed by: STUDENT IN AN ORGANIZED HEALTH CARE EDUCATION/TRAINING PROGRAM

## 2024-11-14 PROCEDURE — 99999 PR PBB SHADOW E&M-EST. PATIENT-LVL IV: CPT | Mod: PBBFAC,,, | Performed by: STUDENT IN AN ORGANIZED HEALTH CARE EDUCATION/TRAINING PROGRAM

## 2024-11-14 RX ORDER — METOPROLOL SUCCINATE 25 MG/1
12.5 TABLET, EXTENDED RELEASE ORAL DAILY
Qty: 45 TABLET | Refills: 3 | Status: SHIPPED | OUTPATIENT
Start: 2024-11-14 | End: 2025-11-14

## 2024-11-14 NOTE — PROGRESS NOTES
PCP: Shannan Gorman FNP    Referring Provider:        Subjective:   Mitul Torres is a 69 y.o. male with hx of severe MR s/p complex MV repair and MOY occlusion (1/30/2024, Dr. Daniels), non-obstructive CAD with 30% lesion prox RCA (12/14/2023), A. Fib with RVR in the presence of severe anemia secondary to an actively bleeding duodenal ulcer, HLD, and HTN,  who presents for a follow up visit.    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  No recurrence of GI bleeding.  Endorses NYHA class II dyspnea on exertion.    08/14/24:  Doing well.  No cardiac complaints today.  He had an episode of dizziness/imbalance about 3 weeks ago without any recurrence.  Since about 6 weeks, he is seeing a halo from his right eye.  Eye exam was normal.  He has a MRI brain scheduled tomorrow.    5/14/24:  Doing well.  Occasional episodes of shortness of breath with heavy exertion but otherwise doing good.  He did undergo partial cardiac rehab but he notes that he had to get back to work and due to which he stopped cardiac rehab.    4/3/24:  Presents for follow-up.  Doing very well from cardiac standpoint.  Undergoing cardiac rehab.  Denies any chest pain/tightness, dyspnea, palpitations, lightheadedness or syncope.  Of incisional pain at sternotomy site.    3/15/24: Seen by ZAIN Pitt NP  for HD follow up from admission to 2/10/2024-2/14/2024 at [facility]. The patient presented to [facility] 2/10/2024 with c/o sudden onset lightheadedness, nausea and palpitations. EKG demonstrated atrial fibrillation with RVR; rate 140 bpm in the presence of GIB. The patient was airlifted to [facility] with an urgent GI consult. The atrial fibrillation was converted chemically.    The patient had a complex mitral valve repair and MOY occlusion by Dr. Daniels on 1/30/2024. He was discharged on 2/8/2024. Returned to [facility] ED on 2/10/2024 in A fib with RVR secondry to severe  blood loss anemia from an actively bleeding duodenal ulcer. He was air lifted to [facility] with an emergent EGD done the following day demonstrating a large duodenal ulcer with a large overlying pancake like hematoma. The ulcer was treated with epinephrine injections and Bovie cautery.  He required approx 7 units of PRBCs, platelets, Fp and cryoprecipitate. A repeat EGD 48 hours later found no evidence of ongoing active bleeding . The patient's plavix was stopped but he was discharged home on ASA 81 mg po daily with a PPI BID. He denies any evidence of bleeding since hospital DC.The patient presents today and states that he has some tightness in the mornings at his chest incision site but otherwise is having no issues.     11/20/23 (Dr. Dorsey): Presents for evaluation of palpitations, come and go spontaneously, would not notice unless he put jhis hand on his chest.  He feels some pressure in mid epigastric area, 2/10, checks heart with hand on his chest, feels skip, then symptoms resolve.  He denies chest pain, pressure or shortness of breath.  He is very active, using chain saw and swing blade without provocation.  He is a  by trade, able to climb stairs, ladders without difficulty.  No previous cardiac history.  He is having more frequent and severe episodes of shortness of breath if tries to pick and carry five gallon pail of pain.  He has to stop activity, wait three to five minutes for symptoms to resolve.  He is recovering from puncture wound on bottom of left foot, notes is healing slowly,  He is smoking one or two joints a day.      Fhx:  Brother (CAD)   SH: Former smoker    EKG 11/14/2024: Sinus bradycardia with frequent PVCs, LVH with repolarization abnormality  3/14/2024 NSR with 1st degree AVB; HR 72 bpm; LVH with repolarization abnormality   2/10/2024 A fib with RVR;  bpm; ant/septal and lateral NS-TWA  11/202023 RSR with HR 65 bpm; normal EKG    TTE at [facility] 2/10/2024      Conclusion: When compared to prior study, 2/6/2024. Patient in atrial   fibrillation during study. Cardiac evaluation made difficult by abnormal   rhythm.    Left Ventricle: Moderately to severely increased wall thickness. Normal   (55 - 60%) ejection fraction. Diastolic function could not be graded due   to A-Fib.     Left Atrium: Left atrium volume index is severely increased.     Right Atrium: Right atrium is mildly dilated.     Mitral Valve: Normal structure status post mitral valve ring repair. No   mitral regurgitation.     Tricuspid Valve: Trace tricuspid regurgitation. RVSP is 34.00.     Pericardium: No pericardial effusion. No cardiac tamponade present.       GAEL 12/14/2023    Summary      Left Ventricle: The left ventricle is normal in size. Increased wall thickness. Normal wall motion. There is normal systolic function.    Right Ventricle: Right ventricular enlargement. Wall thickness is normal. Right ventricle wall motion  is normal. Systolic function is normal.    Left Atrium: Left atrium is severely dilated. The pulmonary veins have systolic flow reversal. There is no thrombus in the left atrial cavity.    Right Atrium: Right atrium is dilated.    Mitral Valve: Findings are consistent with myxomatous changes. There is severe prolapse of the posterior mitral leaflet. Flail posterior leaflet (P2 segment). There is no mass/vegetation present. There is no stenosis. There is severe regurgitation with an anteromedial eccentrically directed wall-impinging jet.    Tricuspid Valve: There is mild regurgitation.    Pulmonary Artery: Pulmonary artery pressure could not be accurately determined.    Conclusion: Severe mitral regurgitation secondary to severe prolpase of the posterior leaflet of the mitral valve with flail P2 segment.HO         Results for orders placed during the hospital encounter of 12/12/23    Echo    Interpretation Summary    Left Ventricle: The left ventricle is normal in size. Increased  ventricular mass. Increased wall thickness. There is mild concentric hypertrophy. Normal wall motion. There is normal systolic function. Biplane (2D) method of discs ejection fraction is 60%. Global longitudinal strain is normal. There is normal diastolic function.    Right Ventricle: Mild right ventricular enlargement. Systolic function is normal.    Left Atrium: Left atrium is severely dilated. LA volume index 69.8  ml/m2    Right Atrium: Right atrium is mildly dilated.    Aortic Valve: The aortic valve is a trileaflet valve.    Mitral Valve: There is prolapse of the posterior mitral leaflet. Flail posterior leaflet. There is severe regurgitation.    Tricuspid Valve: There is mild regurgitation.    Pulmonary Artery: The estimated pulmonary artery systolic pressure is 30 mmHg.    IVC/SVC: Normal venous pressure at 3 mmHg.    There is severe mitral regurgitation secondary to severe mitral valve prolpase with a flail posterior leaflet. Refer to CV surgery for mitral valve repair.    [facility] Results for orders placed during the hospital encounter of 12/14/23    Cardiac catheterization    Conclusion    The ejection fraction was calculated to be 55%.    The left ventricular systolic function was normal.    The pre-procedure left ventricular end diastolic pressure was 6.    The estimated blood loss was none.    There was non-obstructive coronary artery disease..    There was no mitral valve stenosis and severe (4+) mitral regurgitation.    The annulus was calcified.    The procedure log was documented by Documenter: Shilpi Basilio RN and verified by Jerry Be DO.    Date: 12/14/2023  Time: 11:21 AM    Trivial CAD  Normal LV systolic function, mild dilatation, 4+ MR  Await GAEL results to follow    1/30/2024- Dr. Daniels  Complex mitral valve repair and MOY closure      Lab Results   Component Value Date     11/22/2024    K 3.8 11/22/2024     11/22/2024    CO2 15 (L) 11/22/2024    BUN 18 11/22/2024     CREATININE 1.36 (H) 11/22/2024    CALCIUM 9.2 11/22/2024    ANIONGAP 26 (H) 11/22/2024    ESTGFRAFRICA 81 02/14/2024    EGFRNONAA 76 05/26/2022       Lab Results   Component Value Date    CHOL 166 08/14/2024    CHOL 184 05/13/2024    CHOL 177 10/11/2023     Lab Results   Component Value Date    HDL 57 08/14/2024    HDL 61 (H) 05/13/2024    HDL 66 (H) 10/11/2023     Lab Results   Component Value Date    LDLCALC 91 08/14/2024    LDLCALC 108 05/13/2024    LDLCALC 92 10/11/2023     Lab Results   Component Value Date    TRIG 88 08/14/2024    TRIG 76 05/13/2024    TRIG 97 10/11/2023     Lab Results   Component Value Date    CHOLHDL 2.9 08/14/2024    CHOLHDL 3.0 05/13/2024    CHOLHDL 2.7 10/11/2023       Lab Results   Component Value Date    WBC 12.41 (H) 11/22/2024    HGB 13.5 11/22/2024    HCT 41 11/22/2024    MCV 93.3 11/22/2024     11/22/2024         No current facility-administered medications for this visit.    Current Outpatient Medications:     amLODIPine (NORVASC) 5 MG tablet, Take 1 tablet (5 mg total) by mouth once daily., Disp: 90 tablet, Rfl: 3    aspirin (ECOTRIN) 81 MG EC tablet, Take 1 tablet by mouth every morning., Disp: , Rfl:     atorvastatin (LIPITOR) 40 MG tablet, Take 1 tablet (40 mg total) by mouth once daily., Disp: 90 tablet, Rfl: 3    ferrous sulfate (FEOSOL) 325 mg (65 mg iron) Tab tablet, Take 1 tablet (325 mg total) by mouth daily with breakfast., Disp: 30 tablet, Rfl: 3    pantoprazole (PROTONIX) 40 MG tablet, Take 40 mg by mouth 2 (two) times daily., Disp: , Rfl:     metoprolol succinate (TOPROL-XL) 25 MG 24 hr tablet, Take 0.5 tablets (12.5 mg total) by mouth once daily., Disp: 45 tablet, Rfl: 3  Meds reviewed and reconciled using the list provided by the patient.    Review of Systems   Constitutional:  Negative for chills, diaphoresis, fever and malaise/fatigue.   Respiratory:  Positive for shortness of breath. Negative for cough.    Cardiovascular:  Negative for chest pain,  "palpitations, orthopnea, claudication, leg swelling and PND.   Gastrointestinal:  Negative for abdominal pain, heartburn, nausea and vomiting.   Neurological:  Negative for dizziness.           Objective:   /80 (BP Location: Left arm, Patient Position: Sitting)   Pulse (!) 53   Ht 5' 7" (1.702 m)   Wt 75.9 kg (167 lb 6.4 oz)   SpO2 97%   BMI 26.22 kg/m²     Physical Exam  Constitutional:       General: He is not in acute distress.     Appearance: Normal appearance.   Cardiovascular:      Rate and Rhythm: Normal rate and regular rhythm.      Pulses: Normal pulses.      Heart sounds: Normal heart sounds. No murmur heard.     No friction rub. No gallop.   Pulmonary:      Effort: Pulmonary effort is normal.      Breath sounds: Normal breath sounds. No wheezing or rales.   Musculoskeletal:      Right lower leg: No edema.      Left lower leg: No edema.   Skin:     General: Skin is warm and dry.   Neurological:      Mental Status: He is alert.           Assessment:     1. Paroxysmal atrial fibrillation  EKG 12-lead    Cardiac Monitor - 3-15 Day Adult (Cupid Only)    Echo    EKG 12-lead      2. Gastrointestinal hemorrhage associated with duodenal ulcer  Ambulatory referral/consult to Gastroenterology    CBC Auto Differential      3. Dyspnea on exertion  Echo      4. PVC's (premature ventricular contractions)  Basic Metabolic Panel      5. Severe mitral regurgitation        6. S/P mitral valve repair                    Plan:   No problem-specific Assessment & Plan notes found for this encounter.    Severe mitral regurgitation  Secondary to severe prolapse/flail P2 segment  S/p complex mitral valve repair and MOY closure (Dr. Daniels 1/30/24:)  Doing well from cardiac standpoint  Completed cardaic rehab  - check [facility] today  - Schedule echo    Paroxysmal atrial fibrillation  Patient presented to [facility] 2/10/2024 with c/o sudden onset lightheadedness, nausea and palpitations. EKG demonstrated atrial " fibrillation with RVR; rate 140 bpm in the presence of GIB. The patient was airlifted to [facility] with an urgent GI consult. The atrial fibrillation was converted chemically.  - Currently NSR  - status post 3 months of amiodarone.  - Was not anticoagulation given brief episode of postop AFib in the setting of major GI bleeding.  Of note, he has undergone left atrial appendage closure along with a mitral valve repair surgery.  - MRI in the interim has shown a chronic occipital infarct.  Consider resuming anticoagulation.  Concerns due to history of major GI bleeding.   - place Zio to assess for Afib burden (sinus swapna on EKG today)   - repeat CBC today    Gastrointestinal hemorrhage associated with duodenal ulcer  The patient was airlifted to [facility] with an urgent GI consult. He was admitted to CVR and continued to have evidence of GIB throughout the night. ON 12/11/2024 an emergent EGD demonstrated a large duodenal ulcer with a large overlying pancake like hematoma. This was treated epinephrine injections and Bovie cautery. The patient required a total of 7 units of PRCs, platelets, FFP and cryoprecipitate. EGD was repeat 48 hours later with no evidence of ongoing active bleeding found.     Contine PPI as Rx.  Refer to GI     Essential hypertension  /80 today  Continue amlodipine 5 mg daily and Toprol 12.5 mg daily    Coronary artery disease involving native coronary artery of native heart without angina pectoris  [facility] 12/14/2023- Dr. Be  Non-obstructive CAD  Single vessel CAD with a 30% prox RCA lesion  - atorvastatin 40 mg daily      Follow up in 6-8 weeks

## 2024-11-14 NOTE — PATIENT INSTRUCTIONS
Heart monitor today   Echo on -> November 22, 2024 at 2:00 pm  Labs today  Refer to GI   Take metoprolol extended release 12.5 mg once daily   Follow-up in 6 -8 weeks with Dr. Levine January 15, 2025 at 10:45am

## 2024-11-22 ENCOUNTER — HOSPITAL ENCOUNTER (EMERGENCY)
Facility: HOSPITAL | Age: 69
Discharge: HOME | End: 2024-11-22
Attending: EMERGENCY MEDICINE
Payer: MEDICARE

## 2024-11-22 VITALS
OXYGEN SATURATION: 96 % | DIASTOLIC BLOOD PRESSURE: 78 MMHG | HEIGHT: 67 IN | SYSTOLIC BLOOD PRESSURE: 142 MMHG | TEMPERATURE: 97 F | WEIGHT: 167.38 LBS | RESPIRATION RATE: 13 BRPM | BODY MASS INDEX: 26.27 KG/M2 | HEART RATE: 81 BPM

## 2024-11-22 DIAGNOSIS — R41.82 AMS (ALTERED MENTAL STATUS): ICD-10-CM

## 2024-11-22 DIAGNOSIS — R56.9 SEIZURE-LIKE ACTIVITY: Primary | ICD-10-CM

## 2024-11-22 LAB
ACETONE SERPL QL SCN: NEGATIVE
ALBUMIN SERPL BCP-MCNC: 4 G/DL (ref 3.4–4.8)
ALBUMIN/GLOB SERPL: 1.1 {RATIO}
ALP SERPL-CCNC: 63 U/L (ref 40–150)
ALT SERPL W P-5'-P-CCNC: 11 U/L
AMPHET UR QL SCN: NEGATIVE
ANION GAP SERPL CALCULATED.3IONS-SCNC: 26 MMOL/L (ref 7–16)
AST SERPL W P-5'-P-CCNC: 22 U/L (ref 5–34)
BARBITURATES UR QL SCN: NEGATIVE
BASOPHILS # BLD AUTO: 0.11 K/UL (ref 0–0.2)
BASOPHILS NFR BLD AUTO: 0.9 % (ref 0–1)
BENZODIAZ METAB UR QL SCN: POSITIVE
BILIRUB SERPL-MCNC: 0.8 MG/DL
BILIRUB UR QL STRIP: NEGATIVE
BUN SERPL-MCNC: 18 MG/DL (ref 8–26)
BUN/CREAT SERPL: 13 (ref 6–20)
CALCIUM SERPL-MCNC: 9.2 MG/DL (ref 8.8–10)
CANNABINOIDS UR QL SCN: POSITIVE
CHLORIDE SERPL-SCNC: 105 MMOL/L (ref 98–107)
CK SERPL-CCNC: 208 U/L (ref 30–200)
CLARITY UR: CLEAR
CO2 SERPL-SCNC: 15 MMOL/L (ref 23–31)
COCAINE UR QL SCN: NEGATIVE
COLOR UR: COLORLESS
CREAT SERPL-MCNC: 1.36 MG/DL (ref 0.72–1.25)
CRENATED CELLS: ABNORMAL
DIFFERENTIAL METHOD BLD: ABNORMAL
EGFR (NO RACE VARIABLE) (RUSH/TITUS): 57 ML/MIN/1.73M2
EOSINOPHIL # BLD AUTO: 0.47 K/UL (ref 0–0.5)
EOSINOPHIL NFR BLD AUTO: 3.8 % (ref 1–4)
EOSINOPHIL NFR BLD MANUAL: 3 % (ref 1–4)
ERYTHROCYTE [DISTWIDTH] IN BLOOD BY AUTOMATED COUNT: 13.4 % (ref 11.5–14.5)
ETHANOL SERPL-MCNC: <10 MG/DL
GLOBULIN SER-MCNC: 3.5 G/DL (ref 2–4)
GLUCOSE SERPL-MCNC: 184 MG/DL (ref 82–115)
GLUCOSE SERPL-MCNC: 200 MG/DL (ref 70–105)
GLUCOSE UR STRIP-MCNC: NORMAL MG/DL
HCO3 UR-SCNC: 20.2 MMOL/L (ref 24–28)
HCT VFR BLD AUTO: 43.1 % (ref 40–54)
HCT VFR BLD CALC: 41 % (ref 35–51)
HGB BLD-MCNC: 13.5 G/DL (ref 13.5–18)
HYALINE CASTS #/AREA URNS LPF: ABNORMAL /LPF
IMM GRANULOCYTES # BLD AUTO: 0.03 K/UL (ref 0–0.04)
IMM GRANULOCYTES NFR BLD: 0.2 % (ref 0–0.4)
KETONES UR STRIP-SCNC: 10 MG/DL
LACTATE SERPL-SCNC: 1.5 MMOL/L (ref 0.5–2.2)
LDH SERPL L TO P-CCNC: 10.9 MMOL/L (ref 0.3–1.2)
LEUKOCYTE ESTERASE UR QL STRIP: NEGATIVE
LYMPHOCYTES # BLD AUTO: 5.94 K/UL (ref 1–4.8)
LYMPHOCYTES NFR BLD AUTO: 47.9 % (ref 27–41)
LYMPHOCYTES NFR BLD MANUAL: 53 % (ref 27–41)
MCH RBC QN AUTO: 29.2 PG (ref 27–31)
MCHC RBC AUTO-ENTMCNC: 31.3 G/DL (ref 32–36)
MCV RBC AUTO: 93.3 FL (ref 80–96)
MONOCYTES # BLD AUTO: 1.29 K/UL (ref 0–0.8)
MONOCYTES NFR BLD AUTO: 10.4 % (ref 2–6)
MONOCYTES NFR BLD MANUAL: 7 % (ref 2–6)
MPC BLD CALC-MCNC: 11.4 FL (ref 9.4–12.4)
MUCOUS, UA: ABNORMAL /LPF
MYOGLOBIN SERPL-MCNC: 641 NG/ML (ref 16–116)
NEUTROPHILS # BLD AUTO: 4.57 K/UL (ref 1.8–7.7)
NEUTROPHILS NFR BLD AUTO: 36.8 % (ref 53–65)
NEUTS BAND NFR BLD MANUAL: 3 % (ref 1–5)
NEUTS SEG NFR BLD MANUAL: 34 % (ref 50–62)
NITRITE UR QL STRIP: NEGATIVE
NRBC # BLD AUTO: 0 X10E3/UL
NRBC, AUTO (.00): 0 %
OHS QRS DURATION: 96 MS
OHS QTC CALCULATION: 436 MS
OPIATES UR QL SCN: NEGATIVE
OVALOCYTES BLD QL SMEAR: ABNORMAL
PCO2 BLDA: 45 MMHG (ref 41–51)
PCP UR QL SCN: NEGATIVE
PH SMN: 7.26 [PH] (ref 7.32–7.42)
PH UR STRIP: 5 PH UNITS
PLATELET # BLD AUTO: 270 K/UL (ref 150–400)
PLATELET MORPHOLOGY: ABNORMAL
PO2 BLDA: 41 MMHG (ref 25–40)
POC BASE EXCESS: -6.8 MMOL/L (ref -2–3)
POC CO2: 21.6 MMOL/L
POC IONIZED CALCIUM: 1.14 MMOL/L (ref 1.15–1.35)
POC SATURATED O2: 67 % (ref 40–70)
POCT GLUCOSE: 175 MG/DL (ref 60–95)
POTASSIUM BLD-SCNC: 3.6 MMOL/L (ref 3.4–4.5)
POTASSIUM SERPL-SCNC: 3.8 MMOL/L (ref 3.5–5.1)
PROT SERPL-MCNC: 7.5 G/DL (ref 5.8–7.6)
PROT UR QL STRIP: 50
RBC # BLD AUTO: 4.62 M/UL (ref 4.6–6.2)
RBC # UR STRIP: ABNORMAL /UL
RBC #/AREA URNS HPF: 1 /HPF
SARS-COV-2 RDRP RESP QL NAA+PROBE: NEGATIVE
SODIUM BLD-SCNC: 139 MMOL/L (ref 136–145)
SODIUM SERPL-SCNC: 142 MMOL/L (ref 136–145)
SP GR UR STRIP: 1.01
SQUAMOUS #/AREA URNS LPF: ABNORMAL /HPF
UROBILINOGEN UR STRIP-ACNC: NORMAL MG/DL
WBC # BLD AUTO: 12.41 K/UL (ref 4.5–11)
WBC #/AREA URNS HPF: 3 /HPF

## 2024-11-22 PROCEDURE — 84295 ASSAY OF SERUM SODIUM: CPT

## 2024-11-22 PROCEDURE — 36415 COLL VENOUS BLD VENIPUNCTURE: CPT | Performed by: EMERGENCY MEDICINE

## 2024-11-22 PROCEDURE — 82962 GLUCOSE BLOOD TEST: CPT

## 2024-11-22 PROCEDURE — 99285 EMERGENCY DEPT VISIT HI MDM: CPT | Mod: 25

## 2024-11-22 PROCEDURE — 82947 ASSAY GLUCOSE BLOOD QUANT: CPT

## 2024-11-22 PROCEDURE — 93005 ELECTROCARDIOGRAM TRACING: CPT

## 2024-11-22 PROCEDURE — 63600175 PHARM REV CODE 636 W HCPCS

## 2024-11-22 PROCEDURE — 82077 ASSAY SPEC XCP UR&BREATH IA: CPT | Performed by: EMERGENCY MEDICINE

## 2024-11-22 PROCEDURE — 63600175 PHARM REV CODE 636 W HCPCS: Performed by: EMERGENCY MEDICINE

## 2024-11-22 PROCEDURE — 51702 INSERT TEMP BLADDER CATH: CPT

## 2024-11-22 PROCEDURE — 85025 COMPLETE CBC W/AUTO DIFF WBC: CPT | Performed by: EMERGENCY MEDICINE

## 2024-11-22 PROCEDURE — 80053 COMPREHEN METABOLIC PANEL: CPT | Performed by: EMERGENCY MEDICINE

## 2024-11-22 PROCEDURE — 83605 ASSAY OF LACTIC ACID: CPT | Performed by: EMERGENCY MEDICINE

## 2024-11-22 PROCEDURE — 80307 DRUG TEST PRSMV CHEM ANLYZR: CPT | Performed by: EMERGENCY MEDICINE

## 2024-11-22 PROCEDURE — 96375 TX/PRO/DX INJ NEW DRUG ADDON: CPT

## 2024-11-22 PROCEDURE — 96374 THER/PROPH/DIAG INJ IV PUSH: CPT

## 2024-11-22 PROCEDURE — 25000242 PHARM REV CODE 250 ALT 637 W/ HCPCS: Performed by: EMERGENCY MEDICINE

## 2024-11-22 PROCEDURE — 85014 HEMATOCRIT: CPT

## 2024-11-22 PROCEDURE — 81003 URINALYSIS AUTO W/O SCOPE: CPT | Performed by: EMERGENCY MEDICINE

## 2024-11-22 PROCEDURE — 83605 ASSAY OF LACTIC ACID: CPT

## 2024-11-22 PROCEDURE — 82803 BLOOD GASES ANY COMBINATION: CPT

## 2024-11-22 PROCEDURE — 83874 ASSAY OF MYOGLOBIN: CPT | Performed by: EMERGENCY MEDICINE

## 2024-11-22 PROCEDURE — 82550 ASSAY OF CK (CPK): CPT | Performed by: EMERGENCY MEDICINE

## 2024-11-22 PROCEDURE — 82009 KETONE BODYS QUAL: CPT | Performed by: EMERGENCY MEDICINE

## 2024-11-22 PROCEDURE — 82330 ASSAY OF CALCIUM: CPT

## 2024-11-22 PROCEDURE — 87635 SARS-COV-2 COVID-19 AMP PRB: CPT | Performed by: EMERGENCY MEDICINE

## 2024-11-22 PROCEDURE — 84132 ASSAY OF SERUM POTASSIUM: CPT

## 2024-11-22 PROCEDURE — 96372 THER/PROPH/DIAG INJ SC/IM: CPT | Performed by: EMERGENCY MEDICINE

## 2024-11-22 RX ORDER — MIDAZOLAM HYDROCHLORIDE 2 MG/2ML
6 INJECTION, SOLUTION INTRAMUSCULAR; INTRAVENOUS
Status: COMPLETED | OUTPATIENT
Start: 2024-11-22 | End: 2024-11-22

## 2024-11-22 RX ORDER — MIDAZOLAM HYDROCHLORIDE 2 MG/2ML
INJECTION, SOLUTION INTRAMUSCULAR; INTRAVENOUS
Status: DISPENSED
Start: 2024-11-22 | End: 2024-11-22

## 2024-11-22 RX ORDER — MIDAZOLAM HYDROCHLORIDE 2 MG/2ML
4 INJECTION, SOLUTION INTRAMUSCULAR; INTRAVENOUS
Status: COMPLETED | OUTPATIENT
Start: 2024-11-22 | End: 2024-11-22

## 2024-11-22 RX ORDER — DROPERIDOL 2.5 MG/ML
2.5 INJECTION, SOLUTION INTRAMUSCULAR; INTRAVENOUS ONCE
Status: COMPLETED | OUTPATIENT
Start: 2024-11-22 | End: 2024-11-22

## 2024-11-22 RX ORDER — DOXYCYCLINE HYCLATE 100 MG
100 TABLET ORAL 2 TIMES DAILY
COMMUNITY
Start: 2024-11-15 | End: 2024-11-29

## 2024-11-22 RX ORDER — MIDAZOLAM HYDROCHLORIDE 2 MG/2ML
2 INJECTION, SOLUTION INTRAMUSCULAR; INTRAVENOUS
Status: COMPLETED | OUTPATIENT
Start: 2024-11-22 | End: 2024-11-22

## 2024-11-22 RX ORDER — LEVETIRACETAM 500 MG/1
TABLET ORAL
Qty: 60 TABLET | Refills: 0 | Status: SHIPPED | OUTPATIENT
Start: 2024-11-22

## 2024-11-22 RX ORDER — FLUTICASONE PROPIONATE 50 MCG
2 SPRAY, SUSPENSION (ML) NASAL DAILY
Status: DISCONTINUED | OUTPATIENT
Start: 2024-11-22 | End: 2024-11-22 | Stop reason: HOSPADM

## 2024-11-22 RX ORDER — MIDAZOLAM HYDROCHLORIDE 2 MG/2ML
INJECTION, SOLUTION INTRAMUSCULAR; INTRAVENOUS
Status: COMPLETED
Start: 2024-11-22 | End: 2024-11-22

## 2024-11-22 RX ORDER — HALOPERIDOL 5 MG/ML
10 INJECTION INTRAMUSCULAR
Status: COMPLETED | OUTPATIENT
Start: 2024-11-22 | End: 2024-11-22

## 2024-11-22 RX ORDER — LEVETIRACETAM 500 MG/5ML
1000 INJECTION, SOLUTION, CONCENTRATE INTRAVENOUS EVERY 12 HOURS
Status: DISCONTINUED | OUTPATIENT
Start: 2024-11-22 | End: 2024-11-22 | Stop reason: HOSPADM

## 2024-11-22 RX ORDER — LEVETIRACETAM 500 MG/5ML
1000 INJECTION, SOLUTION, CONCENTRATE INTRAVENOUS
Status: COMPLETED | OUTPATIENT
Start: 2024-11-22 | End: 2024-11-22

## 2024-11-22 RX ORDER — MIDAZOLAM HYDROCHLORIDE 2 MG/2ML
INJECTION, SOLUTION INTRAMUSCULAR; INTRAVENOUS
Status: DISCONTINUED
Start: 2024-11-22 | End: 2024-11-22 | Stop reason: HOSPADM

## 2024-11-22 RX ORDER — HALOPERIDOL 5 MG/ML
INJECTION INTRAMUSCULAR
Status: COMPLETED
Start: 2024-11-22 | End: 2024-11-22

## 2024-11-22 RX ADMIN — HALOPERIDOL 10 MG: 5 INJECTION INTRAMUSCULAR at 09:11

## 2024-11-22 RX ADMIN — LEVETIRACETAM 1000 MG: 100 INJECTION, SOLUTION INTRAVENOUS at 03:11

## 2024-11-22 RX ADMIN — MIDAZOLAM HYDROCHLORIDE 6 MG: 1 INJECTION, SOLUTION INTRAMUSCULAR; INTRAVENOUS at 08:11

## 2024-11-22 RX ADMIN — MIDAZOLAM HYDROCHLORIDE 2 MG: 2 INJECTION, SOLUTION INTRAMUSCULAR; INTRAVENOUS at 08:11

## 2024-11-22 RX ADMIN — DROPERIDOL 2.5 MG: 2.5 INJECTION, SOLUTION INTRAMUSCULAR; INTRAVENOUS at 08:11

## 2024-11-22 RX ADMIN — HALOPERIDOL LACTATE 10 MG: 5 INJECTION, SOLUTION INTRAMUSCULAR at 09:11

## 2024-11-22 RX ADMIN — MIDAZOLAM HYDROCHLORIDE 2 MG: 1 INJECTION, SOLUTION INTRAMUSCULAR; INTRAVENOUS at 08:11

## 2024-11-22 RX ADMIN — FLUTICASONE PROPIONATE 100 MCG: 50 SPRAY, METERED NASAL at 02:11

## 2024-11-22 RX ADMIN — MIDAZOLAM HYDROCHLORIDE 4 MG: 1 INJECTION, SOLUTION INTRAMUSCULAR; INTRAVENOUS at 07:11

## 2024-11-22 NOTE — ED TRIAGE NOTES
HPI     Seizures     Additional comments: Presents to the emergency department with patient's   son who reports that patient has not been 'acting right' since this   morning - witnessed seizure upon arrival to ED.          Last edited by Kaitlyn Chavez RN on 11/22/2024  8:01 AM.

## 2024-11-22 NOTE — ED PROVIDER NOTES
Encounter Date: 11/22/2024       History     Chief Complaint   Patient presents with    Seizures     Presents to the emergency department with patient's son who reports that patient has not been 'acting right' since this morning - witnessed seizure upon arrival to ED. No previous history of seizure per EMR. Breath smells of possible ETOH.     70 y/o male went to work this morning.  He works with his son who says he was not acting right and was staring and seemed disoriented.  Son says he doesn't know how patient even made it to the job site.  Son got patient to get into his truck.  Son drove patient to ED here, at which point patient got out of truck and had a seizure.  Son says patient did not fall and hit the ground because son grabbed him.          Review of patient's allergies indicates:   Allergen Reactions    Sulfamethoxazole-trimethoprim Rash     Note: Emil Johson Syndrome     Past Medical History:   Diagnosis Date    GERD (gastroesophageal reflux disease)     Hyperlipidemia     Hypertension      Past Surgical History:   Procedure Laterality Date    ECHOCARDIOGRAM,TRANSESOPHAGEAL  12/14/2023    Procedure: Transesophageal echo (GAEL) intra-procedure log documentation;  Surgeon: Jerry Be DO;  Location: [facility] CATH LAB;  Service: Cardiology;;    LEFT HEART CATHETERIZATION Left 12/14/2023    Procedure: Left heart cath;  Surgeon: Jerry Be DO;  Location: [facility] CATH LAB;  Service: Cardiology;  Laterality: Left;    MITRAL VALVE REPAIR      REPAIR OF HEART VALVE      RIGHT HEART CATHETERIZATION Right 12/14/2023    Procedure: INSERTION, CATHETER, RIGHT HEART;  Surgeon: Jerry Be DO;  Location: [facility] CATH LAB;  Service: Cardiology;  Laterality: Right;     Family History   Problem Relation Name Age of Onset    Cataracts Mother      Glaucoma Mother      No Known Problems Father      Heart disease Brother          stent placement    No Known Problems Brother      Diabetes Maternal  Grandfather       Social History     Tobacco Use    Smoking status: Former     Current packs/day: 0.00     Types: Cigarettes     Quit date:      Years since quittin.0     Passive exposure: Past    Smokeless tobacco: Never   Substance Use Topics    Alcohol use: Not Currently    Drug use: Not Currently     Types: Marijuana     Review of Systems   Unable to perform ROS: Mental status change       Physical Exam     Initial Vitals   BP Pulse Resp Temp SpO2   24 0820 24 0758 24 0758 24 0818 24 0758   121/67 (!) 117 (!) 30 98.3 °F (36.8 °C) 97 %      MAP       --                Physical Exam    Medical Screening Exam   See Full Note    ED Course   Procedures  Labs Reviewed   DRUG SCREEN, URINE (BEAKER) - Abnormal       Result Value    Barbiturates, Urine Negative      Benzodiazepine, Urine Positive (*)     Opiates, Urine Negative      Phencyclidine, Urine Negative      Amphetamine, Urine Negative      Cannabinoid, Urine Positive (*)     Cocaine, Urine Negative      Narrative:     This screen includes the following classes of drugs at the listed cut-off:    Benzodiazepines 200 ng/ml  Cocaine metabolite 300 ng/ml  Opiates 2000 ng/ml  Barbiturates 200 ng/ml  Amphetamines 500 ng/ml  Marijuana metabs (THC) 50 ng/ml  Phencyclidine (PCP) 25 ng/ml    This is a screening test. If results do not correlate with clinical presentation, then a confirmatory send out test is advised.   COMPREHENSIVE METABOLIC PANEL - Abnormal    Sodium 142      Potassium 3.8      Chloride 105      CO2 15 (*)     Anion Gap 26 (*)     Glucose 184 (*)     BUN 18      Creatinine 1.36 (*)     BUN/Creatinine Ratio 13      Calcium 9.2      Total Protein 7.5      Albumin 4.0      Globulin 3.5      A/G Ratio 1.1      Bilirubin, Total 0.8      Alk Phos 63      ALT 11      AST 22      eGFR 57 (*)    CBC WITH DIFFERENTIAL - Abnormal    WBC 12.41 (*)     RBC 4.62      Hemoglobin 13.5      Hematocrit 43.1      MCV 93.3      MCH  29.2      MCHC 31.3 (*)     RDW 13.4      Platelet Count 270      MPV 11.4      Neutrophils % 36.8 (*)     Lymphocytes % 47.9 (*)     Monocytes % 10.4 (*)     Eosinophils % 3.8      Basophils % 0.9      Immature Granulocytes % 0.2      nRBC, Auto 0.0      Neutrophils, Abs 4.57      Lymphocytes, Absolute 5.94 (*)     Monocytes, Absolute 1.29 (*)     Eosinophils, Absolute 0.47      Basophils, Absolute 0.11      Immature Granulocytes, Absolute 0.03      nRBC, Absolute 0.00      Diff Type Manual     URINALYSIS, REFLEX TO URINE CULTURE - Abnormal    Color, UA Colorless      Clarity, UA Clear      pH, UA 5.0      Leukocytes, UA Negative      Nitrites, UA Negative      Protein, UA 50 (*)     Glucose, UA Normal      Ketones, UA 10 (*)     Urobilinogen, UA Normal      Bilirubin, UA Negative      Blood, UA Trace (*)     Specific [place], UA 1.015     CK - Abnormal     (*)    MYOGLOBIN, SERUM - Abnormal    Myoglobin 641 (*)    MANUAL DIFFERENTIAL - Abnormal    Segmented Neutrophils, Man % 34 (*)     Bands, Man % 3      Lymphocytes, Man % 53 (*)     Monocytes, Man % 7 (*)     Eosinophils, Man % 3      Platelet Morphology Large & Giant Platelets (*)     Crenated Cells Few      Ovalocytes Few     URINALYSIS, MICROSCOPIC - Abnormal    WBC, UA 3      RBC, UA 1      Squamous Epithelial Cells, UA Occasional (*)     Hyaline Casts, UA 0-2 (*)     Mucous Occasional (*)    POCT GLUCOSE MONITORING CONTINUOUS - Abnormal    POC Glucose 200 (*)    ALCOHOL,MEDICAL (ETHANOL) - Normal    Ethanol <10     SARS-COV-2 RNA AMPLIFICATION, QUAL - Normal    SARS COV-2 Molecular Negative      Narrative:     Negative SARS-CoV results should not be used as the sole basis for treatment or patient management decisions; negative results should be considered in the context of a patient's recent exposures, history and the presene of clinical signs and symptoms consistent with COVID-19.  Negative results should be treated as presumptive and confirmed by  molecular assay, if necessary for patient management.   LACTIC ACID, PLASMA - Normal    Lactic Acid 1.5     CBC W/ AUTO DIFFERENTIAL    Narrative:     The following orders were created for panel order CBC auto differential.  Procedure                               Abnormality         Status                     ---------                               -----------         ------                     CBC with Differential[[id number]]       Abnormal            Final result               Manual Differential[[id number]]         Abnormal            Final result                 Please view results for these tests on the individual orders.   ACETONE    Acetone Negative          ECG Results              EKG 12-lead (Final result)        Collection Time Result Time QRS Duration OHS QTC Calculation    11/22/24 08:19:15 11/22/24 11:22:29 96 436                     Final result by Interface, Lab In [facility] (11/22/24 11:22:34)                   Narrative:    Test Reason : R41.82,    Vent. Rate :  91 BPM     Atrial Rate :    BPM     P-R Int : 232 ms          QRS Dur :  96 ms      QT Int : 382 ms       P-R-T Axes : -71  61 112 degrees    QTcB Int : 436 ms    Possible ectopic atrial rhythm with PVC(s)  Left ventricular hypertrophy  Widespread ST-T abnormality may be due to the hypertrophy and/or ischemia  Abnormal ECG    Confirmed by David Sampson ([id number]) on 11/22/2024 11:22:23 AM    Referred By: AAAREFERRAL SELF           Confirmed By: Rehmat Adrián                                  Imaging Results              CT head (if fall & altered, LOC>2, on Warfarin or other anticoagulants) (Final result)  Result time 11/22/24 09:11:35      Final result by Sergey Guzman MD (11/22/24 09:11:35)                   Impression:      Artifact limited study.  No convincing CT evidence of acute intracranial abnormality. If the patient has an acute, focal neurological deficit, MRI of the brain may be indicated.    Remote infarct in the left occipital  "lobe.      Electronically signed by: Sergey Guzman MD  Date:    11/22/2024  Time:    09:11               Narrative:    EXAMINATION:  CT HEAD WITHOUT CONTRAST    CLINICAL HISTORY:  Seizure disorder, clinical change;    TECHNIQUE:  Low dose axial images were obtained through the head.  Coronal and sagittal reformations were also performed. Contrast was not administered.    COMPARISON:  MRI brain, 08/15/2024.    FINDINGS:  Exam quality is limited by extensive streak artifact related to external hands/extremities overlying the field of view.    No evidence of acute territorial infarct, hemorrhage, mass effect, or midline shift.  Remote infarct in the left occipital lobe as seen on prior MRI.  Minimal patchy periventricular white matter hypoattenuation suggestive of chronic microvascular ischemic change, though nonspecific.    Ventricles are normal in size and configuration.    No displaced calvarial fracture.    Visualized paranasal sinuses and mastoid air cells are essentially clear.                                       X-Ray Chest 1 View (Final result)  Result time 11/22/24 09:25:08      Final result by Sergey Guzman MD (11/22/24 09:25:08)                   Impression:      No focal consolidation identified on this single view.  No significant change when compared with 09/11/2024.      Electronically signed by: Sergey Guzman MD  Date:    11/22/2024  Time:    09:25               Narrative:    EXAMINATION:  XR CHEST 1 VIEW    CLINICAL HISTORY:  Provided history is "ams;  ".    TECHNIQUE:  One view of the chest.    COMPARISON:  09/11/2024.    FINDINGS:  Cardiac wires overlie the chest.  Postoperative changes of prior median sternotomy and atrial appendage closure device.  Cardiac silhouette is similar to prior study, and not significantly enlarged.  Coarse interstitial lung markings but no large focal area of consolidation.  No sizable pleural effusion.  No pneumothorax.                                     "   Medications   midazolam (PF) (VERSED) 1 mg/mL injection (has no administration in time range)   midazolam (PF) (VERSED) 1 mg/mL injection 4 mg (4 mg Intramuscular Given 11/22/24 0755)   midazolam (PF) (VERSED) 1 mg/mL injection 2 mg (2 mg Intravenous Back Association 11/22/24 0900)   droPERidol injection 2.5 mg (2.5 mg Intravenous Given 11/22/24 0834)   midazolam (PF) (VERSED) 1 mg/mL injection 6 mg (6 mg Intravenous Given 11/22/24 0855)   haloperidol lactate injection 10 mg (10 mg Intravenous Given 11/22/24 0930)   levETIRAcetam injection 1,000 mg (1,000 mg Intravenous Given 11/22/24 1542)     Medical Decision Making  AMS.  LIKELY POST-ICTAL.  IMPROVED IN ED.      DDX:  LIKELY SEIZURE VS CVA VS OTHER    OUTPATIENT FOLLOW UP.      Amount and/or Complexity of Data Reviewed  Labs: ordered. Decision-making details documented in ED Course.  Radiology: ordered. Decision-making details documented in ED Course.  ECG/medicine tests: ordered. Decision-making details documented in ED Course.    Risk  Prescription drug management.                                      Clinical Impression:   Final diagnoses:  [R41.82] AMS (altered mental status)  [R56.9] Seizure-like activity (Primary)        ED Disposition Condition    Discharge Stable          ED Prescriptions       Medication Sig Dispense Start Date End Date Auth. Provider    levETIRAcetam (KEPPRA) 500 MG Tab TAKE 1/2 TABLET TWICE DAILY FOR 2 OR 3 DAYS, AND THEN START WHOLE TABLET PO BID. 60 tablet 11/22/2024 -- Jermaine Hollingsworth MD          Follow-up Information       Follow up With Specialties Details Why Contact Info    Shannan Gorman, [facility]  As needed [address] [place] [address] [phone]               Jermaine Hollingsworth MD  12/28/24 2025

## 2024-11-22 NOTE — ED NOTES
Pt much more calm at this time. Pt no longer combative and is asking questions about his care. RN called son Nahun and updated him

## 2024-11-22 NOTE — DISCHARGE INSTRUCTIONS
TAKE KEPPRA AS DIRECTED.   AVOID DRIVING OR CLIMBING OR ANY OTHER ACTIVITY THAT WOULD INCREASE RISK TO YOURSELF OR OTHERS IF YOU WERE TO HAVE ANOTHER EPISODE.  FOLLOW UP WITH MAICOL CLARK.  RETURN TO THE EMERGENCY DEPARTMENT AS NEEDED.

## 2024-11-22 NOTE — ED NOTES
PT RECURRENTLY VERY COMBATIVE AND AGGRESSIVE TOWARDS STAFF, THIS IS BEING MANAGED PHARMACOLOGICALLY. FAMILY BEING BROUGHT FROM LOBBY TO PT ROOM

## 2024-11-25 ENCOUNTER — TELEPHONE (OUTPATIENT)
Dept: NEUROLOGY | Facility: CLINIC | Age: 69
End: 2024-11-25
Payer: MEDICARE

## 2024-11-25 DIAGNOSIS — R56.9 SEIZURE: Primary | ICD-10-CM

## 2024-11-25 NOTE — TELEPHONE ENCOUNTER
Called pt gave him appointment date and time for EEG. 12-4-24 AT 11:00. He v/u        ----- Message from ELA Hathaway sent at 11/25/2024  7:54 AM CST -----  Emergency department had called me on Friday evening, he was there with witnessed seizure x2.  They started him on keppra, I put in order for EEG we need to set this up.  See about putting him on list for cancellation as we need to bring him in earlier for recheck

## 2024-12-04 ENCOUNTER — PROCEDURE VISIT (OUTPATIENT)
Dept: NEUROLOGY | Facility: HOSPITAL | Age: 69
End: 2024-12-04
Attending: NURSE PRACTITIONER
Payer: MEDICARE

## 2024-12-04 DIAGNOSIS — R56.9 SEIZURE: ICD-10-CM

## 2024-12-04 PROCEDURE — 95812 EEG 41-60 MINUTES: CPT

## 2024-12-13 ENCOUNTER — HOSPITAL ENCOUNTER (OUTPATIENT)
Dept: CARDIOLOGY | Facility: HOSPITAL | Age: 69
Discharge: HOME | End: 2024-12-13
Attending: STUDENT IN AN ORGANIZED HEALTH CARE EDUCATION/TRAINING PROGRAM
Payer: MEDICARE

## 2024-12-13 DIAGNOSIS — R06.09 DYSPNEA ON EXERTION: ICD-10-CM

## 2024-12-13 DIAGNOSIS — I48.0 PAROXYSMAL ATRIAL FIBRILLATION: ICD-10-CM

## 2024-12-13 LAB
AORTIC ROOT ANNULUS: 2.47 CM
AV INDEX (PROSTH): 0.56
AV MEAN GRADIENT: 8.1 MMHG
AV PEAK GRADIENT: 14.4 MMHG
AV VALVE AREA BY VELOCITY RATIO: 2.6 CM²
AV VALVE AREA: 2.1 CM²
AV VELOCITY RATIO: 0.68
CV ECHO LV RWT: 0.39 CM
DOP CALC AO PEAK VEL: 1.9 M/S
DOP CALC AO VTI: 48.1 CM
DOP CALC LVOT AREA: 3.8 CM2
DOP CALC LVOT DIAMETER: 2.2 CM
DOP CALC LVOT PEAK VEL: 1.3 M/S
DOP CALC LVOT STROKE VOLUME: 103 CM3
DOP CALCLVOT PEAK VEL VTI: 27.1 CM
E WAVE DECELERATION TIME: 332.28 MSEC
E/A RATIO: 1.41
E/E' RATIO: 19.5 M/S
ECHO LV POSTERIOR WALL: 1 CM (ref 0.6–1.1)
FRACTIONAL SHORTENING: 23.5 % (ref 28–44)
INTERVENTRICULAR SEPTUM: 1.3 CM (ref 0.6–1.1)
LEFT ATRIUM AREA SYSTOLIC (APICAL 2 CHAMBER): 23.27 CM2
LEFT ATRIUM AREA SYSTOLIC (APICAL 4 CHAMBER): 22.03 CM2
LEFT ATRIUM VOLUME MOD: 72.04 ML
LEFT INTERNAL DIMENSION IN SYSTOLE: 3.9 CM (ref 2.1–4)
LEFT VENTRICLE DIASTOLIC VOLUME: 123.08 ML
LEFT VENTRICLE END SYSTOLIC VOLUME APICAL 2 CHAMBER: 71.06 ML
LEFT VENTRICLE END SYSTOLIC VOLUME APICAL 4 CHAMBER: 68.61 ML
LEFT VENTRICLE SYSTOLIC VOLUME: 67.13 ML
LEFT VENTRICULAR INTERNAL DIMENSION IN DIASTOLE: 5.1 CM (ref 3.5–6)
LEFT VENTRICULAR MASS: 227.4 G
LV LATERAL E/E' RATIO: 14.18 M/S
LV SEPTAL E/E' RATIO: 31.2 M/S
LVED V (TEICH): 123.08 ML
LVES V (TEICH): 67.13 ML
LVOT MG: 2.98 MMHG
LVOT MV: 0.81 CM/S
MV PEAK A VEL: 1.11 M/S
MV PEAK E VEL: 1.56 M/S
MV STENOSIS PRESSURE HALF TIME: 96.36 MS
MV VALVE AREA P 1/2 METHOD: 2.28 CM2
OHS CV RV/LV RATIO: 0.69 CM
PISA TR MAX VEL: 2.4 M/S
PV PEAK GRADIENT: 6 MMHG
PV PEAK VELOCITY: 1.18 M/S
RA VOL SYS: 41.74 ML
RIGHT ATRIAL AREA: 15.5 CM2
RIGHT ATRIUM VOLUME AREA LENGTH APICAL 4 CHAMBER: 40.49 ML
RIGHT VENTRICLE DIASTOLIC BASEL DIMENSION: 3.5 CM
RIGHT VENTRICLE DIASTOLIC LENGTH: 7.2 CM
RIGHT VENTRICLE DIASTOLIC MID DIMENSION: 2.6 CM
RIGHT VENTRICULAR LENGTH IN DIASTOLE (APICAL 4-CHAMBER VIEW): 7.22 CM
RV MID DIAMA: 2.57 CM
TDI LATERAL: 0.11 M/S
TDI SEPTAL: 0.05 M/S
TDI: 0.08 M/S
TR MAX PG: 23 MMHG

## 2024-12-13 PROCEDURE — 93306 TTE W/DOPPLER COMPLETE: CPT

## 2024-12-13 PROCEDURE — 93306 TTE W/DOPPLER COMPLETE: CPT | Mod: 26,,, | Performed by: STUDENT IN AN ORGANIZED HEALTH CARE EDUCATION/TRAINING PROGRAM

## 2024-12-16 ENCOUNTER — TELEPHONE (OUTPATIENT)
Dept: CARDIOLOGY | Facility: CLINIC | Age: 69
End: 2024-12-16
Payer: MEDICARE

## 2024-12-16 LAB
AORTIC ROOT ANNULUS: 2.47 CM
AV INDEX (PROSTH): 0.56
AV MEAN GRADIENT: 8.1 MMHG
AV PEAK GRADIENT: 14.4 MMHG
AV VALVE AREA BY VELOCITY RATIO: 2.6 CM²
AV VALVE AREA: 2.1 CM²
AV VELOCITY RATIO: 0.68
CV ECHO LV RWT: 0.42 CM
DOP CALC AO PEAK VEL: 1.9 M/S
DOP CALC AO VTI: 48.1 CM
DOP CALC LVOT AREA: 3.8 CM2
DOP CALC LVOT DIAMETER: 2.2 CM
DOP CALC LVOT PEAK VEL: 1.3 M/S
DOP CALC LVOT STROKE VOLUME: 103 CM3
DOP CALCLVOT PEAK VEL VTI: 27.1 CM
E WAVE DECELERATION TIME: 332.28 MSEC
E/A RATIO: 1.41
E/E' RATIO: 19.5 M/S
ECHO LV POSTERIOR WALL: 1 CM (ref 0.6–1.1)
FRACTIONAL SHORTENING: 18.8 % (ref 28–44)
INTERVENTRICULAR SEPTUM: 1.3 CM (ref 0.6–1.1)
LEFT ATRIUM AREA SYSTOLIC (APICAL 2 CHAMBER): 23.27 CM2
LEFT ATRIUM AREA SYSTOLIC (APICAL 4 CHAMBER): 22.03 CM2
LEFT ATRIUM VOLUME MOD: 72.04 ML
LEFT INTERNAL DIMENSION IN SYSTOLE: 3.9 CM (ref 2.1–4)
LEFT VENTRICLE DIASTOLIC VOLUME: 123.08 ML
LEFT VENTRICLE END SYSTOLIC VOLUME APICAL 2 CHAMBER: 71.06 ML
LEFT VENTRICLE END SYSTOLIC VOLUME APICAL 4 CHAMBER: 68.61 ML
LEFT VENTRICLE SYSTOLIC VOLUME: 67.13 ML
LEFT VENTRICULAR INTERNAL DIMENSION IN DIASTOLE: 4.8 CM (ref 3.5–6)
LEFT VENTRICULAR MASS: 206.4 G
LV LATERAL E/E' RATIO: 14.18 M/S
LV SEPTAL E/E' RATIO: 31.2 M/S
LVED V (TEICH): 123.08 ML
LVES V (TEICH): 67.13 ML
LVOT MG: 2.98 MMHG
LVOT MV: 0.81 CM/S
MV PEAK A VEL: 1.11 M/S
MV PEAK E VEL: 1.56 M/S
MV STENOSIS PRESSURE HALF TIME: 96.36 MS
MV VALVE AREA P 1/2 METHOD: 2.28 CM2
OHS CV RV/LV RATIO: 0.73 CM
OHS LV EJECTION FRACTION SIMPSONS BIPLANE MOD: 55 %
PISA TR MAX VEL: 2.4 M/S
PV PEAK GRADIENT: 6 MMHG
PV PEAK VELOCITY: 1.18 M/S
RA PRESSURE ESTIMATED: 3 MMHG
RA VOL SYS: 41.74 ML
RIGHT ATRIAL AREA: 15.5 CM2
RIGHT ATRIUM VOLUME AREA LENGTH APICAL 4 CHAMBER: 40.49 ML
RIGHT VENTRICLE DIASTOLIC BASEL DIMENSION: 3.5 CM
RIGHT VENTRICLE DIASTOLIC LENGTH: 7.2 CM
RIGHT VENTRICLE DIASTOLIC MID DIMENSION: 2.6 CM
RIGHT VENTRICULAR LENGTH IN DIASTOLE (APICAL 4-CHAMBER VIEW): 7.22 CM
RV MID DIAMA: 2.57 CM
RV TB RVSP: 5 MMHG
TDI LATERAL: 0.11 M/S
TDI SEPTAL: 0.05 M/S
TDI: 0.08 M/S
TR MAX PG: 23 MMHG
TV REST PULMONARY ARTERY PRESSURE: 26 MMHG

## 2024-12-16 NOTE — TELEPHONE ENCOUNTER
Spoke with resp at [facility].  Patient had a run of V tach of 120bpm over 30 seconds.        ----- Message from Nimisha sent at 12/16/2024 12:35 PM CST -----  Who Called: Carlos Alberto ([employer])    Caller is requesting assistance/information from provider's office.    Abnormal Viopatch results. Please call back as soon as can. Thanks      Preferred Method of Contact: Phone Call  Patient's Preferred Phone Number on File: [phone] Reference Number [id number]

## 2024-12-18 ENCOUNTER — TELEPHONE (OUTPATIENT)
Dept: NEUROLOGY | Facility: CLINIC | Age: 69
End: 2024-12-18
Payer: MEDICARE

## 2024-12-18 NOTE — TELEPHONE ENCOUNTER
Called pt again got v/m left message to please call office.        Called pt got v/m left message to please call office for test results.        ----- Message from ELA Hathaway sent at 12/18/2024  7:58 AM CST -----  His EEG is negative, no clear indication of seizure, slowing noted in area of brain with prior noted stroke.  I did see in the EMR where cardiology had note about his Zio monitor showed he had run of ventricular tachycardia for about 30 seconds, this is significant, and can certainly be the cause of his syncope and seizure.  Need to keep on the current medication

## 2024-12-19 ENCOUNTER — TELEPHONE (OUTPATIENT)
Dept: NEUROLOGY | Facility: CLINIC | Age: 69
End: 2024-12-19
Payer: MEDICARE

## 2024-12-19 NOTE — TELEPHONE ENCOUNTER
Pt returned call and I gave him the information below from BRADLEY Day NP. He v/u.        ----- Message from ELA Hathaway sent at 12/18/2024  7:58 AM CST -----  His EEG is negative, no clear indication of seizure, slowing noted in area of brain with prior noted stroke.  I did see in the EMR where cardiology had note about his Zio monitor showed he had run of ventricular tachycardia for about 30 seconds, this is significant, and can certainly be the cause of his syncope and seizure.  Need to keep on the current medication

## 2024-12-19 NOTE — TELEPHONE ENCOUNTER
See other phone encounter from today.    ----- Message from Kelly sent at 12/19/2024  9:36 AM CST -----  Regarding: RESULTS  Who Called: Mitul Torres    Caller is requesting information on test results.    Name of Test (Lab/Mammo/Etc): EEG  Date of Test:   Where the test was performed:   Ordering Provider: EDUARDO    Preferred Method of Contact: Phone Call  Patient's Preferred Phone Number on File: [phone]   Best Call Back Number, if different:  Additional Information: RETURNING CALL TO [facility] FOR TEST RESULTS

## 2024-12-19 NOTE — TELEPHONE ENCOUNTER
Call and spoke with patient regarding his Zio results.  Patient was given Dr. Levine's recommendations.  Patient stated that he was already taking Metoprolol 25 mg daily.  He stated that he must have gotten confused and started taking a whole tablet.  He was also notified of needing to start Eliquis due to A fib.  Explained to him the effects that a fib has and the need for the blood thinner.  He voiced understanding.

## 2024-12-20 RX ORDER — METOPROLOL SUCCINATE 50 MG/1
50 TABLET, EXTENDED RELEASE ORAL DAILY
Qty: 90 TABLET | Refills: 3 | Status: SHIPPED | OUTPATIENT
Start: 2024-12-20 | End: 2025-12-20

## 2024-12-20 NOTE — TELEPHONE ENCOUNTER
Thank you. Since he is already taking metoprolol 25 mg daily, I recommend increasing it to 50 mg daily. I have sent in a new metoprolol 50 mg script along w/ the eliquis 5 mg BID. Thanks.

## 2025-01-02 ENCOUNTER — OFFICE VISIT (OUTPATIENT)
Dept: FAMILY MEDICINE | Facility: CLINIC | Age: 70
End: 2025-01-02
Payer: MEDICARE

## 2025-01-02 VITALS
RESPIRATION RATE: 20 BRPM | SYSTOLIC BLOOD PRESSURE: 137 MMHG | BODY MASS INDEX: 24.64 KG/M2 | OXYGEN SATURATION: 97 % | DIASTOLIC BLOOD PRESSURE: 74 MMHG | TEMPERATURE: 97 F | HEART RATE: 73 BPM | WEIGHT: 157 LBS | HEIGHT: 67 IN

## 2025-01-02 DIAGNOSIS — I10 HYPERTENSION, UNSPECIFIED TYPE: ICD-10-CM

## 2025-01-02 DIAGNOSIS — K26.9 DUODENAL ULCER: ICD-10-CM

## 2025-01-02 DIAGNOSIS — E78.5 HYPERLIPIDEMIA, UNSPECIFIED HYPERLIPIDEMIA TYPE: ICD-10-CM

## 2025-01-02 DIAGNOSIS — N18.31 CHRONIC KIDNEY DISEASE, STAGE 3A: ICD-10-CM

## 2025-01-02 DIAGNOSIS — I63.9 CEREBROVASCULAR ACCIDENT (CVA), UNSPECIFIED MECHANISM: ICD-10-CM

## 2025-01-02 DIAGNOSIS — Z12.11 SCREENING FOR COLON CANCER: Primary | ICD-10-CM

## 2025-01-02 DIAGNOSIS — Z98.890 S/P MITRAL VALVE REPAIR: ICD-10-CM

## 2025-01-02 DIAGNOSIS — I48.0 PAROXYSMAL ATRIAL FIBRILLATION: ICD-10-CM

## 2025-01-02 DIAGNOSIS — R56.9 SEIZURE-LIKE ACTIVITY: ICD-10-CM

## 2025-01-02 PROCEDURE — 99212 OFFICE O/P EST SF 10 MIN: CPT | Mod: ,,, | Performed by: NURSE PRACTITIONER

## 2025-01-02 NOTE — PROGRESS NOTES
ELA Pizarro        PATIENT NAME: Mitul Torres  : 1955  DATE: 25  MRN: [id number]      Patient PCP Information       Provider PCP Type    Shannan RAY ELA Gorman General            Reason for Visit / Chief Complaint: Medication Problem and Anxiety       Update PCP  Update Chief Complaint         History of Present Illness / Problem Focused Workflow     Mitul Torres presents to the clinic with Medication Problem and Anxiety     HPI  Patient presents to clinic for follow up. Recent ED eval for sz activity. Patient declined referral to [facility]. UDS positive THC and BZ. CT head: prior CVA otherwise stable. Keppra prescribed.     Impression:     Artifact limited study.  No convincing CT evidence of acute intracranial abnormality. If the patient has an acute, focal neurological deficit, MRI of the brain may be indicated.     Remote infarct in the left occipital lobe.        Electronically signed by:Sergey Guzman MD  Date:                                            2024  Time:                                           09:11        Exam Ended: 24 09:04 CST Last Resulted: 24 09:11 CST           EEG with neurology 2024 IMPRESSION:  Intermittent reactive theta activity, left regional, temporal occipital  CLINICAL CORRELATION:  This is an abnormal study.  The intermittent focal slowing in the left temporal occipital is suggestive of structural or functional abnormality in the region.  The overriding rhythmic theta activity is thought to increase the risk for focal seizure.  Clinical correlation is recommended.  Denis Finnegan MD  Neurology     Zio monitor placed. Cardiology noted run of v tach of 120 bpm over 30 secs.   Per neurology  His EEG is negative, no clear indication of seizure, slowing noted in area of brain with prior noted stroke. I did see in the EMR where cardiology had note about his Zio monitor showed he had run of ventricular tachycardia for about 30  seconds, this is significant, and can certainly be the cause of his syncope and seizure. Patient was instructed to resume Keppra per neurology. Cardiology recommended increasing metoprolol and starting eliquis. Patient has been on blood thinners in past following valve replacement and GI bleed resulted due to two gastric ulcers. Patient apprehensive to restart anticoagulation. Has been tolerating ASA with PPI.   Will discuss further with cardiology on follow up 1/6/2024  Patient has not been taking Keppra (due to side effects), metoprolol at increased dose or eliquis as prescribed. Will discuss further with Cardiology and neurology at upcoming appts.       10/18/2024  Mr Torres presents to clinic for follow up. Patient states he did go see Dr Trevino, reports he did have cataract to left eye which he was told was contributing to blurriness. Patient denies any further episodes of dizziness.         Hx of MVR 2/10/2024 complicated by GI bleed / hematoma post op. Patient with upper GI bleed after starting plavix. Patient had two ulcers on EGD. Patient also had hematoma at epigastric region following MVR. Received CV rehab post op. Required short course of lasix post op for fluid overload. Plavix DC. On ASA. No further bleeding. PPI continued.   9/2024 Patient experienced dizziness and visual disturbance.         MRI brain   Impression:     Chronic area of infarct of the left superior occipital lobe.  Mild chronic microvascular ischemic change.  No acute abnormality.        Electronically signed by:Germán Claros  Date:                                            08/15/2024  Time:                                           16:06              Exam Ended: 08/15/24 11:27 CDT Last Resulted: 08/15/24 16:06 CDT          MRA brain  FINDINGS:  MRA (Intracranial Arteries):No focal high-grade stenosis, occlusion, or aneurysm.  Note is made of anatomic variant with 3rd A2 segment off of the anterior communicating artery      Impression:     No significant arterial abnormalities.        Electronically signed by:Germán Rothman  Date:                                            10/11/2024  Time:                                           11:18              Exam Ended: 10/10/24 14:54 CDT Last Resulted: 10/11/24 11:18 CDT           01/2024 Carotid us    No hemodynamically significant carotid artery stenosis.     Antegrade vertebral artery flow noted bilaterally.       Medical / Social / Family History     Past Medical History:   Diagnosis Date    GERD (gastroesophageal reflux disease)     Hyperlipidemia     Hypertension        Past Surgical History:   Procedure Laterality Date    ECHOCARDIOGRAM,TRANSESOPHAGEAL  12/14/2023    Procedure: Transesophageal echo (GAEL) intra-procedure log documentation;  Surgeon: Jerry Be DO;  Location: [facility] CATH LAB;  Service: Cardiology;;    LEFT HEART CATHETERIZATION Left 12/14/2023    Procedure: Left heart cath;  Surgeon: Jerry Be DO;  Location: [facility] CATH LAB;  Service: Cardiology;  Laterality: Left;    MITRAL VALVE REPAIR      REPAIR OF HEART VALVE      RIGHT HEART CATHETERIZATION Right 12/14/2023    Procedure: INSERTION, CATHETER, RIGHT HEART;  Surgeon: Jerry Be DO;  Location: [facility] CATH LAB;  Service: Cardiology;  Laterality: Right;       Social History    reports that he quit smoking about 33 years ago. His smoking use included cigarettes. He has been exposed to tobacco smoke. He has never used smokeless tobacco. He reports that he does not currently use alcohol. He reports that he does not currently use drugs after having used the following drugs: Marijuana.    Family History  's family history includes Cataracts in his mother; Diabetes in his maternal grandfather; Glaucoma in his mother; Heart disease in his brother; No Known Problems in his brother and father.    Medications and Allergies     Medications  Outpatient Medications Marked as Taking for the 1/2/25  "encounter (Office Visit) with Shannan Gorman FNP   Medication Sig Dispense Refill    aspirin (ECOTRIN) 81 MG EC tablet Take 1 tablet by mouth every morning.      atorvastatin (LIPITOR) 40 MG tablet Take 1 tablet (40 mg total) by mouth once daily. (Patient not taking: Reported on 1/6/2025) 90 tablet 3    ferrous sulfate (FEOSOL) 325 mg (65 mg iron) Tab tablet Take 1 tablet (325 mg total) by mouth daily with breakfast. (Patient not taking: Reported on 1/6/2025) 30 tablet 3    pantoprazole (PROTONIX) 40 MG tablet Take 40 mg by mouth 2 (two) times daily.         Allergies  Review of patient's allergies indicates:   Allergen Reactions    Sulfamethoxazole-trimethoprim Rash     Note: Emil Johson Syndrome       Physical Examination     Vitals:    01/02/25 0849   BP: 137/74   BP Location: Right arm   Patient Position: Sitting   Pulse: 73   Resp: 20   Temp: 97.4 °F (36.3 °C)   TempSrc: Oral   SpO2: 97%   Weight: 71.2 kg (157 lb)   Height: 5' 7" (1.702 m)       Physical Exam  Vitals reviewed.   Constitutional:       Appearance: Normal appearance.   HENT:      Head: Normocephalic.      Right Ear: External ear normal.      Left Ear: External ear normal.      Nose: Nose normal.      Mouth/Throat:      Mouth: Mucous membranes are moist.   Eyes:      Extraocular Movements: Extraocular movements intact.   Cardiovascular:      Rate and Rhythm: Normal rate.      Pulses: Normal pulses.      Heart sounds: Normal heart sounds.   Pulmonary:      Effort: Pulmonary effort is normal. No respiratory distress.      Breath sounds: Normal breath sounds. No stridor. No wheezing, rhonchi or rales.   Chest:      Chest wall: No tenderness.   Musculoskeletal:         General: Normal range of motion.      Cervical back: Normal range of motion.   Skin:     General: Skin is warm and dry.   Neurological:      General: No focal deficit present.      Mental Status: He is alert and oriented to person, place, and time.   Psychiatric:         Mood and " Affect: Mood normal.         Behavior: Behavior normal.         Thought Content: Thought content normal.         Judgment: Judgment normal.           No visits with results within 14 Day(s) from this visit.   Latest known visit with results is:   Hospital Outpatient Visit on 12/13/2024   Component Date Value Ref Range Status    LVOT stroke volume 12/13/2024 103.0  cm3 Final    LVIDd 12/13/2024 4.8  3.5 - 6.0 cm Final    LV Systolic Volume 12/13/2024 67.13  mL Final    LVIDs 12/13/2024 3.9  2.1 - 4.0 cm Final    LV ESV A2C 12/13/2024 71.06  mL Final    LV Diastolic Volume 12/13/2024 123.08  mL Final    LV ESV A4C 12/13/2024 68.61  mL Final    Left Ventricular End Systolic Volu* 12/13/2024 67.13  mL Final    Left Ventricular End Diastolic Vol* 12/13/2024 123.08  mL Final    IVS 12/13/2024 1.3 (A)  0.6 - 1.1 cm Final    LVOT diameter 12/13/2024 2.2  cm Final    LVOT area 12/13/2024 3.8  cm2 Final    FS 12/13/2024 18.8 (A)  28 - 44 % Final    Left Ventricle Relative Wall Thick* 12/13/2024 0.42  cm Final    PW 12/13/2024 1.0  0.6 - 1.1 cm Final    LV mass 12/13/2024 206.4  g Final    MV Peak E Javed 12/13/2024 1.56  m/s Final    TDI LATERAL 12/13/2024 0.11  m/s Final    TDI SEPTAL 12/13/2024 0.05  m/s Final    E/E' ratio 12/13/2024 19.50  m/s Final    MV Peak A Javed 12/13/2024 1.11  m/s Final    TR Max Javed 12/13/2024 2.40  m/s Final    E/A ratio 12/13/2024 1.41   Final    E wave deceleration time 12/13/2024 332.28  msec Final    LV SEPTAL E/E' RATIO 12/13/2024 31.20  m/s Final    LV LATERAL E/E' RATIO 12/13/2024 14.18  m/s Final    LVOT peak javed 12/13/2024 1.3  m/s Final    Left Ventricular Outflow Tract Lori* 12/13/2024 0.81  cm/s Final    Left Ventricular Outflow Tract Lori* 12/13/2024 2.98  mmHg Final    RV- rene basal diam 12/13/2024 3.5  cm Final    RV-rene mid d 12/13/2024 2.6  cm Final    RV Basal Diameter 12/13/2024 7.22  cm Final    RV-rene length 12/13/2024 7.2  cm Final    RV mid diameter 12/13/2024 2.57  cm Final     RV/LV Ratio 12/13/2024 0.73  cm Final    LA Vol (MOD) 12/13/2024 72.04  mL Final    RA area length vol 12/13/2024 40.49  mL Final    RA Area 12/13/2024 15.5  cm2 Final    RA Vol 12/13/2024 41.74  mL Final    AV mean gradient 12/13/2024 8.1  mmHg Final    AV peak gradient 12/13/2024 14.4  mmHg Final    Ao peak nancy 12/13/2024 1.9  m/s Final    Ao VTI 12/13/2024 48.1  cm Final    LVOT peak VTI 12/13/2024 27.1  cm Final    AV valve area 12/13/2024 2.1  cm² Final    AV Velocity Ratio 12/13/2024 0.68   Final    AV index (prosthetic) 12/13/2024 0.56   Final    STEVEN by Velocity Ratio 12/13/2024 2.6  cm² Final    MV stenosis pressure 1/2 time 12/13/2024 96.36  ms Final    MV valve area p 1/2 method 12/13/2024 2.28  cm2 Final    Triscuspid Valve Regurgitation Pea* 12/13/2024 23  mmHg Final    PV PEAK VELOCITY 12/13/2024 1.18  m/s Final    PV peak gradient 12/13/2024 6  mmHg Final    Ao root annulus 12/13/2024 2.47  cm Final    Mean e' 12/13/2024 0.08  m/s Final    LA area A4C 12/13/2024 22.03  cm2 Final    LA area A2C 12/13/2024 23.27  cm2 Final    Cuenca's Biplane MOD Ejection Fra* 12/13/2024 55  % Final    TV resting pulmonary artery pressu* 12/13/2024 26  mmHg Final    RV TB RVSP 12/13/2024 5  mmHg Final    Est. RA pres 12/13/2024 3  mmHg Final             Assessment and Plan (including Health Maintenance)      Problem List  Smart Sets  Document Outside HM   :    Plan:     1. Screening for colon cancer  -     Colonoscopy; Future; Expected date: 01/02/2025    2. Seizure-like activity  Assessment & Plan:  Keppra prescribed      3. Paroxysmal atrial fibrillation  Overview:  Patient presented to [facility] 2/10/2024 with c/o sudden onset lightheadedness, nausea and palpitations. EKG demonstrated atrial fibrillation with RVR; rate 140 bpm in the presence of GIB. The patient was airlifted to [facility] with an urgent GI consult. The atrial fibrillation was converted chemically.      4. Chronic kidney disease, stage  3a  Overview:  eGFR 60      5. Cerebrovascular accident (CVA), unspecified mechanism  Overview:  left occipital  righ vision impairment    Impression:     Chronic area of infarct of the left superior occipital lobe.  Mild chronic microvascular ischemic change.  No acute abnormality.        Electronically signed by:Germán Claros  Date:                                            08/15/2024  Time:                                           16:06    Assessment & Plan:  Continue asa and statin  Follow up with neurology as scheduled       6. Hyperlipidemia, unspecified hyperlipidemia type  Assessment & Plan:  Continue statin and asa  Noncompliant with statin stressed importance of taking      7. Hypertension, unspecified type  Assessment & Plan:  Metoprolol and amlodipine  Follow up cardiology as scheduled          8. S/P mitral valve repair  Overview:  1/30/2024- Dr. Rhett Kaminski  Complex mitral valve repair and MOY closure    Assessment & Plan:  1/30/2024- Dr. Rhett Kaminski  Complex mitral valve repair and MOY closure     TTE- [facility] 2/10/2024   Conclusion: When compared to prior study, 2/6/2024. Patient in atrial   fibrillation during study. Cardiac evaluation made difficult by abnormal   rhythm.    Left Ventricle: Moderately to severely increased wall thickness. Normal   (55 - 60%) ejection fraction. Diastolic function could not be graded due   to A-Fib.     Left Atrium: Left atrium volume index is severely increased.     Right Atrium: Right atrium is mildly dilated.     Mitral Valve: Normal structure status post mitral valve ring repair. No   mitral regurgitation.     Tricuspid Valve: Trace tricuspid regurgitation. RVSP is 34.00.     Pericardium: No pericardial effusion. No cardiac tamponade present.       9. Duodenal ulcer    Continue protonix    RTC in 3 mo    There are no Patient Instructions on file for this visit.       Health Maintenance Due   Topic Date Due    TETANUS VACCINE  Never done     Colorectal Cancer Screening  Never done    Shingles Vaccine (1 of 2) Never done    Pneumococcal Vaccines (Age 50+) (1 of 1 - PCV) Never done    RSV Vaccine (Age 60+ and Pregnant patients) (1 - Risk 60-74 years 1-dose series) Never done    Influenza Vaccine (1) Never done    COVID-19 Vaccine (1 - 2024-25 season) Never done         There is no immunization history on file for this patient.         Health Maintenance Topics with due status: Not Due       Topic Last Completion Date    Hemoglobin A1c (Diabetic Prevention Screening) 01/26/2024    Lipid Panel 08/14/2024    High Dose Statin 01/02/2025       Future Appointments   Date Time Provider Department Center   1/17/2025  9:45 AM Shannan Gorman FNP [facility]   1/21/2025 10:00 AM NURSE [facility] CARD OB CARD [facility]   2/19/2025  3:30 PM Yamil Day [facility] NEURO [facility]   4/2/2025  9:15 [facility]   4/7/2025 11:15 AM Trish Levine MD [facility] CARD [facility]   4/10/2025  8:00 AM AWV NURSE, [facility] FAMILY MEDICINE [facility]            Signature:  Shannan Gorman CORY  [facility]     Date of encounter: 1/2/25

## 2025-01-06 ENCOUNTER — OFFICE VISIT (OUTPATIENT)
Dept: CARDIOLOGY | Facility: CLINIC | Age: 70
End: 2025-01-06
Payer: MEDICARE

## 2025-01-06 VITALS
HEART RATE: 96 BPM | HEIGHT: 67 IN | BODY MASS INDEX: 25.08 KG/M2 | OXYGEN SATURATION: 98 % | WEIGHT: 159.81 LBS | SYSTOLIC BLOOD PRESSURE: 150 MMHG | DIASTOLIC BLOOD PRESSURE: 90 MMHG

## 2025-01-06 DIAGNOSIS — R25.2 MUSCLE CRAMPING: ICD-10-CM

## 2025-01-06 DIAGNOSIS — I48.0 PAROXYSMAL ATRIAL FIBRILLATION: Primary | ICD-10-CM

## 2025-01-06 PROCEDURE — 99214 OFFICE O/P EST MOD 30 MIN: CPT | Mod: PBBFAC | Performed by: STUDENT IN AN ORGANIZED HEALTH CARE EDUCATION/TRAINING PROGRAM

## 2025-01-06 PROCEDURE — 99999 PR PBB SHADOW E&M-EST. PATIENT-LVL IV: CPT | Mod: PBBFAC,,, | Performed by: STUDENT IN AN ORGANIZED HEALTH CARE EDUCATION/TRAINING PROGRAM

## 2025-01-06 PROCEDURE — 99214 OFFICE O/P EST MOD 30 MIN: CPT | Mod: S$PBB,,, | Performed by: STUDENT IN AN ORGANIZED HEALTH CARE EDUCATION/TRAINING PROGRAM

## 2025-01-06 NOTE — PATIENT INSTRUCTIONS
Labs today   Resume eliquis 5 mg two times a day and metoprolol XL 50 mg daily   Nurse visit in 2 weeks for vitals check and BP calibration. Please bring your home BP monitor   Follow-up in 3 months

## 2025-01-06 NOTE — PROGRESS NOTES
PCP: Shannan Gorman FNP    Referring Provider:        Subjective:   Mitul Torres is a 69 y.o. male with hx of severe MR s/p complex MV repair and MOY occlusion (1/30/2024, Dr. Daneils), non-obstructive CAD with 30% lesion prox RCA (12/14/2023), A. Fib with RVR in the presence of severe anemia secondary to an actively bleeding duodenal ulcer, HLD, and HTN,  who presents for a follow up visit.    1/6/25:  Patient has stopped taking most of her medications and wants to discuss monitor results since symptoms in person before proceeding.  It was discussed that he had multiple episodes of paroxysmal Afib (with RVR) on the Zio monitor.  Discussed Eliquis for stroke prevention and metoprolol 50 mg daily for rate control.  Patient is agreeable to proceed.  Notes he stopped taking his statin because he felt extremely fatigued while he was taking it.  He also notes muscle cramps usually at night.  He also stopped taking Keppra- advised to talk to Neurology regarding Keppra.    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  No recurrence of GI bleeding.  Endorses NYHA class II dyspnea on exertion.    08/14/24:  Doing well.  No cardiac complaints today.  He had an episode of dizziness/imbalance about 3 weeks ago without any recurrence.  Since about 6 weeks, he is seeing a halo from his right eye.  Eye exam was normal.  He has a MRI brain scheduled tomorrow.    5/14/24:  Doing well.  Occasional episodes of shortness of breath with heavy exertion but otherwise doing good.  He did undergo partial cardiac rehab but he notes that he had to get back to work and due to which he stopped cardiac rehab.    4/3/24:  Presents for follow-up.  Doing very well from cardiac standpoint.  Undergoing cardiac rehab.  Denies any chest pain/tightness, dyspnea, palpitations, lightheadedness or syncope.  Of incisional pain at sternotomy site.    3/15/24: Seen by ZAIN Pitt NP  for HD  follow up from admission to 2/10/2024-2/14/2024 at [facility]. The patient presented to [facility] 2/10/2024 with c/o sudden onset lightheadedness, nausea and palpitations. EKG demonstrated atrial fibrillation with RVR; rate 140 bpm in the presence of GIB. The patient was airlifted to [facility] with an urgent GI consult. The atrial fibrillation was converted chemically.    The patient had a complex mitral valve repair and MOY occlusion by Dr. Daniels on 1/30/2024. He was discharged on 2/8/2024. Returned to [facility] ED on 2/10/2024 in A fib with RVR secondry to severe blood loss anemia from an actively bleeding duodenal ulcer. He was air lifted to [facility] with an emergent EGD done the following day demonstrating a large duodenal ulcer with a large overlying pancake like hematoma. The ulcer was treated with epinephrine injections and Bovie cautery.  He required approx 7 units of PRBCs, platelets, Fp and cryoprecipitate. A repeat EGD 48 hours later found no evidence of ongoing active bleeding . The patient's plavix was stopped but he was discharged home on ASA 81 mg po daily with a PPI BID. He denies any evidence of bleeding since hospital DC.The patient presents today and states that he has some tightness in the mornings at his chest incision site but otherwise is having no issues.     11/20/23 (Dr. Dorsey): Presents for evaluation of palpitations, come and go spontaneously, would not notice unless he put jhis hand on his chest.  He feels some pressure in mid epigastric area, 2/10, checks heart with hand on his chest, feels skip, then symptoms resolve.  He denies chest pain, pressure or shortness of breath.  He is very active, using chain saw and swing blade without provocation.  He is a  by trade, able to climb stairs, ladders without difficulty.  No previous cardiac history.  He is having more frequent and severe episodes of shortness of breath if tries to pick and carry five gallon pail of  pain.  He has to stop activity, wait three to five minutes for symptoms to resolve.  He is recovering from puncture wound on bottom of left foot, notes is healing slowly,  He is smoking one or two joints a day.      Fhx:  Brother (CAD)   SH: Former smoker    EKG 11/14/2024: Sinus bradycardia with frequent PVCs, LVH with repolarization abnormality  3/14/2024 NSR with 1st degree AVB; HR 72 bpm; LVH with repolarization abnormality   2/10/2024 A fib with RVR;  bpm; ant/septal and lateral NS-TWA  11/202023 RSR with HR 65 bpm; normal EKG    Results for orders placed during the hospital encounter of 12/13/24    Echo    Interpretation Summary    Left Ventricle: The left ventricle is normal in size. Mildly increased wall thickness. There is concentric remodeling. There is normal systolic function. Quantitated ejection fraction is 55%.    Right Ventricle: Normal right ventricular cavity size. Systolic function could not be assesed.    Left Atrium: Left atrium is mildly dilated.    Tricuspid Valve: There is mild regurgitation.    Pulmonary Artery: The estimated pulmonary artery systolic pressure is 26 mmHg.    IVC/SVC: Normal venous pressure at 3 mmHg.    TTE at [facility] 2/10/2024     Conclusion: When compared to prior study, 2/6/2024. Patient in atrial   fibrillation during study. Cardiac evaluation made difficult by abnormal   rhythm.    Left Ventricle: Moderately to severely increased wall thickness. Normal   (55 - 60%) ejection fraction. Diastolic function could not be graded due   to A-Fib.     Left Atrium: Left atrium volume index is severely increased.     Right Atrium: Right atrium is mildly dilated.     Mitral Valve: Normal structure status post mitral valve ring repair. No   mitral regurgitation.     Tricuspid Valve: Trace tricuspid regurgitation. RVSP is 34.00.     Pericardium: No pericardial effusion. No cardiac tamponade present.       GAEL 12/14/2023    Summary      Left Ventricle: The left ventricle is  normal in size. Increased wall thickness. Normal wall motion. There is normal systolic function.    Right Ventricle: Right ventricular enlargement. Wall thickness is normal. Right ventricle wall motion  is normal. Systolic function is normal.    Left Atrium: Left atrium is severely dilated. The pulmonary veins have systolic flow reversal. There is no thrombus in the left atrial cavity.    Right Atrium: Right atrium is dilated.    Mitral Valve: Findings are consistent with myxomatous changes. There is severe prolapse of the posterior mitral leaflet. Flail posterior leaflet (P2 segment). There is no mass/vegetation present. There is no stenosis. There is severe regurgitation with an anteromedial eccentrically directed wall-impinging jet.    Tricuspid Valve: There is mild regurgitation.    Pulmonary Artery: Pulmonary artery pressure could not be accurately determined.    Conclusion: Severe mitral regurgitation secondary to severe prolpase of the posterior leaflet of the mitral valve with flail P2 segment.HO         Results for orders placed during the hospital encounter of 12/12/23    Echo    Interpretation Summary    Left Ventricle: The left ventricle is normal in size. Increased ventricular mass. Increased wall thickness. There is mild concentric hypertrophy. Normal wall motion. There is normal systolic function. Biplane (2D) method of discs ejection fraction is 60%. Global longitudinal strain is normal. There is normal diastolic function.    Right Ventricle: Mild right ventricular enlargement. Systolic function is normal.    Left Atrium: Left atrium is severely dilated. LA volume index 69.8  ml/m2    Right Atrium: Right atrium is mildly dilated.    Aortic Valve: The aortic valve is a trileaflet valve.    Mitral Valve: There is prolapse of the posterior mitral leaflet. Flail posterior leaflet. There is severe regurgitation.    Tricuspid Valve: There is mild regurgitation.    Pulmonary Artery: The estimated pulmonary  artery systolic pressure is 30 mmHg.    IVC/SVC: Normal venous pressure at 3 mmHg.    There is severe mitral regurgitation secondary to severe mitral valve prolpase with a flail posterior leaflet. Refer to CV surgery for mitral valve repair.    [facility] Results for orders placed during the hospital encounter of 12/14/23    Cardiac catheterization    Conclusion    The ejection fraction was calculated to be 55%.    The left ventricular systolic function was normal.    The pre-procedure left ventricular end diastolic pressure was 6.    The estimated blood loss was none.    There was non-obstructive coronary artery disease..    There was no mitral valve stenosis and severe (4+) mitral regurgitation.    The annulus was calcified.    The procedure log was documented by Documenter: Shilpi Basilio RN and verified by Jerry Be DO.    Date: 12/14/2023  Time: 11:21 AM    Trivial CAD  Normal LV systolic function, mild dilatation, 4+ MR  Await GAEL results to follow    1/30/2024- Dr. Daniels  Complex mitral valve repair and MOY closure       Day ambulatory cardiac monitor 11/2024    Predominant normal sinus rhyth. Paroxysmal atrial fibrillation (w/ RVR) noted. Episodes marked as VT were reviewed: they are Atrial fibrillation/SVT with aberrancy as opposed to true VT.      Lab Results   Component Value Date     11/22/2024    K 3.8 11/22/2024     11/22/2024    CO2 15 (L) 11/22/2024    BUN 18 11/22/2024    CREATININE 1.36 (H) 11/22/2024    CALCIUM 9.2 11/22/2024    ANIONGAP 26 (H) 11/22/2024    ESTGFRAFRICA 81 02/14/2024    EGFRNONAA 76 05/26/2022       Lab Results   Component Value Date    CHOL 166 08/14/2024    CHOL 184 05/13/2024    CHOL 177 10/11/2023     Lab Results   Component Value Date    HDL 57 08/14/2024    HDL 61 (H) 05/13/2024    HDL 66 (H) 10/11/2023     Lab Results   Component Value Date    LDLCALC 91 08/14/2024    LDLCALC 108 05/13/2024    LDLCALC 92 10/11/2023     Lab Results   Component Value Date     TRIG 88 08/14/2024    TRIG 76 05/13/2024    TRIG 97 10/11/2023     Lab Results   Component Value Date    CHOLHDL 2.9 08/14/2024    CHOLHDL 3.0 05/13/2024    CHOLHDL 2.7 10/11/2023       Lab Results   Component Value Date    WBC 12.41 (H) 11/22/2024    HGB 13.5 11/22/2024    HCT 41 11/22/2024    MCV 93.3 11/22/2024     11/22/2024           Current Outpatient Medications:     aspirin (ECOTRIN) 81 MG EC tablet, Take 1 tablet by mouth every morning., Disp: , Rfl:     pantoprazole (PROTONIX) 40 MG tablet, Take 40 mg by mouth 2 (two) times daily., Disp: , Rfl:     amLODIPine (NORVASC) 5 MG tablet, Take 1 tablet (5 mg total) by mouth once daily. (Patient not taking: Reported on 1/6/2025), Disp: 90 tablet, Rfl: 3    apixaban (ELIQUIS) 5 mg Tab, Take 1 tablet (5 mg total) by mouth 2 (two) times daily. (Patient not taking: Reported on 1/6/2025), Disp: 180 tablet, Rfl: 3    atorvastatin (LIPITOR) 40 MG tablet, Take 1 tablet (40 mg total) by mouth once daily. (Patient not taking: Reported on 1/6/2025), Disp: 90 tablet, Rfl: 3    ferrous sulfate (FEOSOL) 325 mg (65 mg iron) Tab tablet, Take 1 tablet (325 mg total) by mouth daily with breakfast. (Patient not taking: Reported on 1/6/2025), Disp: 30 tablet, Rfl: 3    levETIRAcetam (KEPPRA) 500 MG Tab, TAKE 1/2 TABLET TWICE DAILY FOR 2 OR 3 DAYS, AND THEN START WHOLE TABLET PO BID. (Patient not taking: Reported on 1/6/2025), Disp: 60 tablet, Rfl: 0    metoprolol succinate (TOPROL-XL) 50 MG 24 hr tablet, Take 1 tablet (50 mg total) by mouth once daily. (Patient not taking: Reported on 1/6/2025), Disp: 90 tablet, Rfl: 3  Meds reviewed and reconciled using the list provided by the patient.    Review of Systems   Constitutional:  Negative for chills, diaphoresis, fever and malaise/fatigue.   Respiratory:  Positive for shortness of breath. Negative for cough.    Cardiovascular:  Negative for chest pain, palpitations, orthopnea, claudication, leg swelling and PND.  "  Gastrointestinal:  Negative for abdominal pain, heartburn, nausea and vomiting.   Neurological:  Negative for dizziness.           Objective:   BP (!) 150/90 (BP Location: Left arm)   Pulse 96   Ht 5' 7" (1.702 m)   Wt 72.5 kg (159 lb 12.8 oz)   SpO2 98%   BMI 25.03 kg/m²     Physical Exam  Constitutional:       General: He is not in acute distress.     Appearance: Normal appearance.   Cardiovascular:      Rate and Rhythm: Normal rate and regular rhythm.      Pulses: Normal pulses.      Heart sounds: Normal heart sounds. No murmur heard.     No friction rub. No gallop.   Pulmonary:      Effort: Pulmonary effort is normal.      Breath sounds: Normal breath sounds. No wheezing or rales.   Musculoskeletal:      Right lower leg: No edema.      Left lower leg: No edema.   Skin:     General: Skin is warm and dry.   Neurological:      Mental Status: He is alert.           Assessment:     1. Paroxysmal atrial fibrillation  Basic Metabolic Panel    CBC Auto Differential    CK      2. Muscle cramping  Basic Metabolic Panel    CBC Auto Differential    CK            Plan:   No problem-specific Assessment & Plan notes found for this encounter.    Severe mitral regurgitation  Secondary to severe prolapse/flail P2 segment  S/p complex mitral valve repair and MOY closure (Dr. Daniels 1/30/24:)  Doing well from cardiac standpoint  [facility]  Recent repeat echo with normal LVEF and mitral valve status post repair    Paroxysmal atrial fibrillation  [facility] with c/o sudden onset lightheadedness, nausea and palpitations. EKG demonstrated atrial fibrillation with RVR; rate 140 bpm in the presence of GIB. The patient was airlifted to [facility] with an urgent GI consult. The atrial fibrillation was converted chemically.  - Currently NSR  - status post 3 months of amiodarone.  - Was not anticoagulation given brief episode of postop AFib in the setting of major GI bleeding.  Of " note, he has undergone left atrial appendage closure along with a mitral valve repair surgery.  11/14/24:- MRI in the interim has shown a chronic occipital infarct.  Consider resuming anticoagulation.  Concerns due to history of major GI bleeding-> place Zio to assess for Afib burden (sinus swapna on EKG today), repeat CBC  01/06/25:- Zio monitor in the interim showed paroxysmal Afib, CBC showed stable H&H and no anemia.  Patient has a was advised to start Eliquis 5 mg b.i.d. but had not started due to some concerns.  After discussion today, he is agreeable to start Eliquis 5 mg b.i.d. and metoprolol 50 mg daily    Gastrointestinal hemorrhage associated with duodenal ulcer  The patient was airlifted to [facility] with an urgent GI consult. He was admitted to CVR and continued to have evidence of GIB throughout the night. ON 12/11/2024 an emergent EGD demonstrated a large duodenal ulcer with a large overlying pancake like hematoma. This was treated epinephrine injections and Bovie cautery. The patient required a total of 7 units of PRCs, platelets, FFP and cryoprecipitate. EGD was repeat 48 hours later with no evidence of ongoing active bleeding found.     Contine PPI as Rx.  Patient has been referred to GI  Given no recurrence of GI bleeding, stable H&H, paroxysmal AFib on Zio monitor and history of CVA, patient was started on Eliquis 5 mg b.i.d.    Essential hypertension  Blood pressure 150/90 today  Patient had stop taking amlodipine 5 mg daily on his own.   Starting metoprolol 50 mg daily as above  Notes blood pressure 120 over 70s at home.  Nurse visit in 2 weeks for vitals check and blood pressure monitor calibration.  Patient will bring but blood pressure monitor at nurse visit.    Coronary artery disease involving native coronary artery of native heart without angina pectoris  [facility] 12/14/2023- Dr. Be  Non-obstructive CAD  Single vessel CAD with a 30% prox RCA lesion  - Stop aspirin 81 mg daily as pt will  be on eliquis.   - Patient has stopped taking atorvastatin on his own due to muscle cramping and fatigue.  Check BMP and CK levels today      Follow-up in 3 months

## 2025-01-09 ENCOUNTER — TELEPHONE (OUTPATIENT)
Dept: CARDIOLOGY | Facility: CLINIC | Age: 70
End: 2025-01-09
Payer: MEDICARE

## 2025-01-09 ENCOUNTER — HOSPITAL ENCOUNTER (INPATIENT)
Facility: HOSPITAL | Age: 70
LOS: 7 days | Discharge: HOME HEALTH | DRG: 988 | End: 2025-01-16
Attending: EMERGENCY MEDICINE | Admitting: STUDENT IN AN ORGANIZED HEALTH CARE EDUCATION/TRAINING PROGRAM
Payer: MEDICARE

## 2025-01-09 DIAGNOSIS — N20.1 LEFT URETERAL STONE: ICD-10-CM

## 2025-01-09 DIAGNOSIS — N18.31 CHRONIC KIDNEY DISEASE, STAGE 3A: ICD-10-CM

## 2025-01-09 DIAGNOSIS — N20.0 RENAL STONE: ICD-10-CM

## 2025-01-09 DIAGNOSIS — R07.9 CHEST PAIN: ICD-10-CM

## 2025-01-09 DIAGNOSIS — I63.412 EMBOLIC STROKE INVOLVING LEFT MIDDLE CEREBRAL ARTERY: ICD-10-CM

## 2025-01-09 DIAGNOSIS — R29.818 ACUTE FOCAL NEUROLOGICAL DEFICIT: ICD-10-CM

## 2025-01-09 DIAGNOSIS — R31.0 GROSS HEMATURIA: ICD-10-CM

## 2025-01-09 DIAGNOSIS — I63.9 ACUTE CVA (CEREBROVASCULAR ACCIDENT): Primary | ICD-10-CM

## 2025-01-09 DIAGNOSIS — I63.9 STROKE: ICD-10-CM

## 2025-01-09 LAB
ALBUMIN SERPL BCP-MCNC: 3.9 G/DL (ref 3.4–4.8)
ALBUMIN/GLOB SERPL: 1.3 {RATIO}
ALP SERPL-CCNC: 66 U/L (ref 40–150)
ALT SERPL W P-5'-P-CCNC: 13 U/L
ANION GAP SERPL CALCULATED.3IONS-SCNC: 13 MMOL/L (ref 7–16)
ANION GAP SERPL CALCULATED.3IONS-SCNC: 9 MMOL/L (ref 7–16)
APTT PPP: 29.3 SECONDS (ref 25.2–37.3)
AST SERPL W P-5'-P-CCNC: 20 U/L (ref 5–34)
BASOPHILS # BLD AUTO: 0.03 K/UL (ref 0–0.2)
BASOPHILS NFR BLD AUTO: 0.5 % (ref 0–1)
BILIRUB SERPL-MCNC: 0.5 MG/DL
BUN SERPL-MCNC: 12 MG/DL (ref 8–26)
BUN SERPL-MCNC: 16 MG/DL (ref 8–26)
BUN/CREAT SERPL: 13 (ref 6–20)
BUN/CREAT SERPL: 14 (ref 6–20)
CALCIUM SERPL-MCNC: 8.4 MG/DL (ref 8.8–10)
CALCIUM SERPL-MCNC: 8.6 MG/DL (ref 8.8–10)
CHLORIDE SERPL-SCNC: 108 MMOL/L (ref 98–107)
CHLORIDE SERPL-SCNC: 109 MMOL/L (ref 98–107)
CHOLEST SERPL-MCNC: 131 MG/DL
CHOLEST/HDLC SERPL: 2.5 {RATIO}
CO2 SERPL-SCNC: 23 MMOL/L (ref 23–31)
CO2 SERPL-SCNC: 26 MMOL/L (ref 23–31)
CREAT SERPL-MCNC: 0.92 MG/DL (ref 0.72–1.25)
CREAT SERPL-MCNC: 1.13 MG/DL (ref 0.72–1.25)
DIFFERENTIAL METHOD BLD: ABNORMAL
EGFR (NO RACE VARIABLE) (RUSH/TITUS): 71 ML/MIN/1.73M2
EGFR (NO RACE VARIABLE) (RUSH/TITUS): 91 ML/MIN/1.73M2
EOSINOPHIL # BLD AUTO: 0.05 K/UL (ref 0–0.5)
EOSINOPHIL NFR BLD AUTO: 0.8 % (ref 1–4)
ERYTHROCYTE [DISTWIDTH] IN BLOOD BY AUTOMATED COUNT: 13.9 % (ref 11.5–14.5)
GLOBULIN SER-MCNC: 2.9 G/DL (ref 2–4)
GLUCOSE SERPL-MCNC: 88 MG/DL (ref 82–115)
GLUCOSE SERPL-MCNC: 99 MG/DL (ref 82–115)
HCO3 UR-SCNC: 30.8 MMOL/L (ref 24–28)
HCT VFR BLD AUTO: 41.2 % (ref 40–54)
HCT VFR BLD CALC: 39 % (ref 35–51)
HDLC SERPL-MCNC: 52 MG/DL (ref 35–60)
HGB BLD-MCNC: 12.7 G/DL (ref 13.5–18)
IMM GRANULOCYTES # BLD AUTO: 0.02 K/UL (ref 0–0.04)
IMM GRANULOCYTES NFR BLD: 0.3 % (ref 0–0.4)
INR BLD: 1.06
LDH SERPL L TO P-CCNC: 0.8 MMOL/L (ref 0.3–1.2)
LDLC SERPL CALC-MCNC: 67 MG/DL
LDLC/HDLC SERPL: 1.3 {RATIO}
LYMPHOCYTES # BLD AUTO: 1.1 K/UL (ref 1–4.8)
LYMPHOCYTES NFR BLD AUTO: 17.7 % (ref 27–41)
MCH RBC QN AUTO: 28.8 PG (ref 27–31)
MCHC RBC AUTO-ENTMCNC: 30.8 G/DL (ref 32–36)
MCV RBC AUTO: 93.4 FL (ref 80–96)
MONOCYTES # BLD AUTO: 0.52 K/UL (ref 0–0.8)
MONOCYTES NFR BLD AUTO: 8.4 % (ref 2–6)
MPC BLD CALC-MCNC: 11.6 FL (ref 9.4–12.4)
NEUTROPHILS # BLD AUTO: 4.48 K/UL (ref 1.8–7.7)
NEUTROPHILS NFR BLD AUTO: 72.3 % (ref 53–65)
NONHDLC SERPL-MCNC: 79 MG/DL
NRBC # BLD AUTO: 0 X10E3/UL
NRBC, AUTO (.00): 0 %
NT-PROBNP SERPL-MCNC: 226 PG/ML (ref 1–125)
PCO2 BLDA: 57 MMHG (ref 41–51)
PH SMN: 7.34 [PH] (ref 7.32–7.42)
PLATELET # BLD AUTO: 219 K/UL (ref 150–400)
PO2 BLDA: 25 MMHG (ref 25–40)
POC BASE EXCESS: 3.7 MMOL/L (ref -2–3)
POC CO2: 32.5 MMOL/L
POC IONIZED CALCIUM: 1.14 MMOL/L (ref 1.15–1.35)
POC SATURATED O2: 41 % (ref 40–70)
POCT GLUCOSE: 104 MG/DL (ref 60–95)
POTASSIUM BLD-SCNC: 4.2 MMOL/L (ref 3.4–4.5)
POTASSIUM SERPL-SCNC: 3.6 MMOL/L (ref 3.5–5.1)
POTASSIUM SERPL-SCNC: 4.2 MMOL/L (ref 3.5–5.1)
PROT SERPL-MCNC: 6.8 G/DL (ref 5.8–7.6)
PROTHROMBIN TIME: 13.7 SECONDS (ref 11.7–14.7)
RBC # BLD AUTO: 4.41 M/UL (ref 4.6–6.2)
SODIUM BLD-SCNC: 139 MMOL/L (ref 136–145)
SODIUM SERPL-SCNC: 140 MMOL/L (ref 136–145)
SODIUM SERPL-SCNC: 140 MMOL/L (ref 136–145)
TRIGL SERPL-MCNC: 61 MG/DL (ref 34–140)
TROPONIN I SERPL HS-MCNC: 4.9 NG/L
TSH SERPL DL<=0.005 MIU/L-ACNC: 1.99 UIU/ML (ref 0.35–4.94)
VLDLC SERPL-MCNC: 12 MG/DL
WBC # BLD AUTO: 6.2 K/UL (ref 4.5–11)

## 2025-01-09 PROCEDURE — 25500020 PHARM REV CODE 255: Performed by: EMERGENCY MEDICINE

## 2025-01-09 PROCEDURE — 80061 LIPID PANEL: CPT | Performed by: EMERGENCY MEDICINE

## 2025-01-09 PROCEDURE — 99291 CRITICAL CARE FIRST HOUR: CPT | Mod: ,,, | Performed by: STUDENT IN AN ORGANIZED HEALTH CARE EDUCATION/TRAINING PROGRAM

## 2025-01-09 PROCEDURE — 85610 PROTHROMBIN TIME: CPT | Performed by: EMERGENCY MEDICINE

## 2025-01-09 PROCEDURE — 83880 ASSAY OF NATRIURETIC PEPTIDE: CPT | Performed by: EMERGENCY MEDICINE

## 2025-01-09 PROCEDURE — 96374 THER/PROPH/DIAG INJ IV PUSH: CPT

## 2025-01-09 PROCEDURE — 36415 COLL VENOUS BLD VENIPUNCTURE: CPT | Performed by: EMERGENCY MEDICINE

## 2025-01-09 PROCEDURE — 96375 TX/PRO/DX INJ NEW DRUG ADDON: CPT

## 2025-01-09 PROCEDURE — 63600175 PHARM REV CODE 636 W HCPCS: Mod: TB | Performed by: EMERGENCY MEDICINE

## 2025-01-09 PROCEDURE — 84484 ASSAY OF TROPONIN QUANT: CPT | Performed by: EMERGENCY MEDICINE

## 2025-01-09 PROCEDURE — 84132 ASSAY OF SERUM POTASSIUM: CPT

## 2025-01-09 PROCEDURE — 85025 COMPLETE CBC W/AUTO DIFF WBC: CPT | Performed by: EMERGENCY MEDICINE

## 2025-01-09 PROCEDURE — 99285 EMERGENCY DEPT VISIT HI MDM: CPT | Mod: 25

## 2025-01-09 PROCEDURE — 20000000 HC ICU ROOM

## 2025-01-09 PROCEDURE — 82803 BLOOD GASES ANY COMBINATION: CPT

## 2025-01-09 PROCEDURE — 82330 ASSAY OF CALCIUM: CPT

## 2025-01-09 PROCEDURE — 84295 ASSAY OF SERUM SODIUM: CPT

## 2025-01-09 PROCEDURE — 80053 COMPREHEN METABOLIC PANEL: CPT | Performed by: EMERGENCY MEDICINE

## 2025-01-09 PROCEDURE — 84443 ASSAY THYROID STIM HORMONE: CPT | Performed by: EMERGENCY MEDICINE

## 2025-01-09 PROCEDURE — 83605 ASSAY OF LACTIC ACID: CPT

## 2025-01-09 PROCEDURE — 36415 COLL VENOUS BLD VENIPUNCTURE: CPT | Performed by: STUDENT IN AN ORGANIZED HEALTH CARE EDUCATION/TRAINING PROGRAM

## 2025-01-09 PROCEDURE — G0425 INPT/ED TELECONSULT30: HCPCS | Mod: 95,G0,, | Performed by: STUDENT IN AN ORGANIZED HEALTH CARE EDUCATION/TRAINING PROGRAM

## 2025-01-09 PROCEDURE — 3E03317 INTRODUCTION OF OTHER THROMBOLYTIC INTO PERIPHERAL VEIN, PERCUTANEOUS APPROACH: ICD-10-PCS | Performed by: STUDENT IN AN ORGANIZED HEALTH CARE EDUCATION/TRAINING PROGRAM

## 2025-01-09 PROCEDURE — 93010 ELECTROCARDIOGRAM REPORT: CPT | Mod: ,,, | Performed by: HOSPITALIST

## 2025-01-09 PROCEDURE — 85730 THROMBOPLASTIN TIME PARTIAL: CPT | Performed by: EMERGENCY MEDICINE

## 2025-01-09 PROCEDURE — 93005 ELECTROCARDIOGRAM TRACING: CPT

## 2025-01-09 PROCEDURE — 51702 INSERT TEMP BLADDER CATH: CPT

## 2025-01-09 PROCEDURE — 82947 ASSAY GLUCOSE BLOOD QUANT: CPT

## 2025-01-09 PROCEDURE — 85014 HEMATOCRIT: CPT

## 2025-01-09 RX ORDER — IOPAMIDOL 755 MG/ML
100 INJECTION, SOLUTION INTRAVASCULAR
Status: COMPLETED | OUTPATIENT
Start: 2025-01-09 | End: 2025-01-09

## 2025-01-09 RX ORDER — SODIUM CHLORIDE 0.9 % (FLUSH) 0.9 %
10 SYRINGE (ML) INJECTION ONCE
Status: DISCONTINUED | OUTPATIENT
Start: 2025-01-09 | End: 2025-01-16 | Stop reason: HOSPADM

## 2025-01-09 RX ORDER — LEVETIRACETAM 500 MG/5ML
1500 INJECTION, SOLUTION, CONCENTRATE INTRAVENOUS
Status: COMPLETED | OUTPATIENT
Start: 2025-01-09 | End: 2025-01-09

## 2025-01-09 RX ADMIN — Medication 18 MG: at 12:01

## 2025-01-09 RX ADMIN — LEVETIRACETAM 1500 MG: 100 INJECTION, SOLUTION INTRAVENOUS at 01:01

## 2025-01-09 RX ADMIN — IOPAMIDOL 100 ML: 755 INJECTION, SOLUTION INTRAVENOUS at 12:01

## 2025-01-09 NOTE — SUBJECTIVE & OBJECTIVE
HPI:  69 y.o. male Afib not on AC, had a MVR/ LAAand subsequent GI bleed thus not on AC, Hx of Afib, CAD, HLD, L PCA infarct ( asymptomatic), Seizures , CKD III remote history of GI bleed s/p treatment at OSH and no further GI bleeding as per son who presents w/ aphasia and RSW.   LKW 08:30 while at his work-place, and then subsequently his co-workers noted that he had issues w/ his RUE and was less forthcoming with his speech.   Son confirms timeline and that he was perfectly normal this AM , able to drive himself to his workplace.      Images personally reviewed and interpreted:  Study: Head CT and CTA Head & Neck  Study Interpretation: No acute intracranial abnormalities, specifically no early signs of ischemia and no intracranial hemorrhage.   No LVO, no hemodynamically significant stenosis.           Assessment and plan:  NIHSS 10, w/ concern for L hemispheric ischemic event   NO LVO noted on CTA H/N, no significant stenosis.   Deficits appear disabling.  No contraindications to IV thrombolytics. Request attending physician to discuss risks and benefits with the patient and/or family members and proceed with TNK/alteplase once consent is obtained.   BP goal prior to IV thrombolytics - <185/110  BP goal post IV thrombolytics - <180/105 for at least 24 hrs.    Recommendations:  MRI brain to evaluate for presence and/or extent of ischemic injury  Lipid profile, A1c, TSH  ECHO w/o bubble study  PT/OT/SLP eval and Rx  IVF to allow for maximum cerebral perfusion  HOB flat   HOLD AC x 24 hours post TNK - daily aspirin 81 mg /  clopidogrel 75 mg x 30 days followed by monotherapy thereafter, if no contraindications.   High intensity statin for LDL goal <70 long term     Recommendations discussed w/ Dr. Rizo.         Lytics recommendation: Recommend IV Tenecteplase 0.25mg/kg IV push (max dose 25mg); If Tenecteplase is not available use Alteplase 0.9mg/kg IV bolus followed by infusion (max dose 90mg)     BP goal prior  to IV thrombolytics - <185/110 - Initiate BP management prior to treatment if not at goal    BP goal post IV thrombolytics - <180/105 for at least 24 hrs. - Continue BP management to maintain goal    Additional Recommendations:   Neurological assessment and vital signs (except temperature) every 15 minutes x 2 hours, then every 30 minutes x 6 hours, then every hour x 16 hours..  Frequency of BP assessments may need to be increased if systolic BP stays >= 180 mm Hg or diastolic BP stays >= 105 mm Hg. Administer antihypertensive meds as ordered  Temperature every 4 hours or as required.  Follow hospital protocol for further orders re: post thrombolytic therapy patient management.  No antithrombotics or anticoagulants (including but not limited to: heparin, warfarin, aspirin, clopidigrel, or dipyridamole) for 24 hours, then start antithrombotics as ordered by treating physician    Adapted from the American Heart Association/American Stroke Association (AHA/ASA) and American Association of Neuroscience Nurses (AANN) Guidelines.       Clinical Delays to Decision to Treat: None    Thrombectomy recommendation: No; No large vessel occlusion identified on imaging   Placement recommendation: pending further studies  admit to inpatient

## 2025-01-09 NOTE — ED TRIAGE NOTES
Patient arrives to ED with his son after working. Son states that he was told that approx 1100 he was called saying that patient was confused. On arrival patient appears to be aphasic. Patient understands conversation and follows commands but is unable to verbalize responses. Patient also having difficulty with right arm and right handed  strength weaker than left. LKWT was stated as 0830 after further questioning by Dr. Rizo with the son.

## 2025-01-09 NOTE — PHARMACY MED REC
"Admission Medication History     The home medication history was taken by Terra Lopez.    You may go to "Admission" then "Reconcile Home Medications" tabs to review and/or act upon these items.     The home medication list has been updated by the Pharmacy department.   Please read ALL comments highlighted in yellow.   Please address this information as you see fit.    Feel free to contact us if you have any questions or require assistance.  According to progress note from Dr. Levine (Cardiology), patient had reported stopped most of his medications. She did start him back on the Eliquis and Metoprolol, but his son reported to me that the Eliquis was very expensive so he was not able to get it. He was uncertain if he got the Metoprolol or not.  Dr. Levine also noted he had stopped his Atorvastatin due to side effects, so I opted to remove it, as well as the other medications noted he wasn't taking beyond the Eliquis and Metoprolol.  I also left the Levetiracetam because Dr. Levine advised the patient to follow up with Neurology regarding Levetiracetam.      Patient reports no longer taking the following medication(s). The medication(s) listed below were removed from the home medication list. Please reorder if appropriate:  Atorvastatin 40 mg  Ferrous Sulfate 325 mg  Pantoprazole 40 mg  Amlodipine 5 mg    Medications listed below were obtained from: Patient/family, Analytic software- [employer], and Medical records  (Not in a hospital admission)        Current Outpatient Medications on File Prior to Encounter   Medication Sig Dispense Refill Last Dose/Taking    aspirin (ECOTRIN) 81 MG EC tablet Take 1 tablet by mouth every morning.   Taking    apixaban (ELIQUIS) 5 mg Tab Take 1 tablet (5 mg total) by mouth 2 (two) times daily. (Patient not taking: Reported on 1/6/2025) 180 tablet 3     levETIRAcetam (KEPPRA) 500 MG Tab TAKE 1/2 TABLET TWICE DAILY FOR 2 OR 3 DAYS, AND THEN START WHOLE TABLET PO BID. (Patient not taking: " Reported on 1/6/2025) 60 tablet 0     metoprolol succinate (TOPROL-XL) 50 MG 24 hr tablet Take 1 tablet (50 mg total) by mouth once daily. (Patient not taking: Reported on 1/6/2025) 90 tablet 3     [DISCONTINUED] amLODIPine (NORVASC) 5 MG tablet Take 1 tablet (5 mg total) by mouth once daily. (Patient not taking: Reported on 1/6/2025) 90 tablet 3     [DISCONTINUED] atorvastatin (LIPITOR) 40 MG tablet Take 1 tablet (40 mg total) by mouth once daily. (Patient not taking: Reported on 1/6/2025) 90 tablet 3     [DISCONTINUED] ferrous sulfate (FEOSOL) 325 mg (65 mg iron) Tab tablet Take 1 tablet (325 mg total) by mouth daily with breakfast. (Patient not taking: Reported on 1/6/2025) 30 tablet 3     [DISCONTINUED] pantoprazole (PROTONIX) 40 MG tablet Take 40 mg by mouth 2 (two) times daily.            Potential issues to be addressed PRIOR TO DISCHARGE  Patient reported not taking the following medications: (Eliquis 5 mg, Levetiracetam 500 mg, Metoprolol Succinate 50 mg). These medications remain on the home medication list. Please address accordingly.   Drug cost interfering with therapy: (Eliquis 5 mg)    Terra Lopez  Medication Access Specialist  [id number]. [id number]    .

## 2025-01-09 NOTE — ED PROVIDER NOTES
Encounter Date: 1/9/2025    SCRIBE #1 NOTE: I, Giovany Francis, am scribing for, and in the presence of,  Adelfo Rizo MD. I have scribed the entire note.       History     Chief Complaint   Patient presents with    Aphasia    Altered Mental Status     69 y.o.male presented to the ED for Aphasia and AMS with an onset of 8:30-9:00am. Pt was at work when a coworker noticed he was having difficulty speaking clearly. Pt went to the restroom and came back out confused. Pt son stated that he is experiencing ride sided weakness and having difficulty getting in and out of the car. He also has a right facial droop. Pt takes baby Aspirin, but no blood thinners. Pt had this same occurrence last month and had a seizure and he is not on any seizure medication.  Pt has seen a neurologist and had no findings. Pt son stated that when a cat scan was done at one moment they were told he had a stroke in the past. Pt also had a valve repaired at [facility] last year. Pt has hx of smoking and has PMHX of GERD, Hyperlipidemia, and Hypertension.       The history is provided by the patient, a relative and a friend. No  was used.     Review of patient's allergies indicates:   Allergen Reactions    Sulfamethoxazole-trimethoprim Rash     Note: Emil Johson Syndrome     Past Medical History:   Diagnosis Date    GERD (gastroesophageal reflux disease)     Hyperlipidemia     Hypertension      Past Surgical History:   Procedure Laterality Date    ECHOCARDIOGRAM,TRANSESOPHAGEAL  12/14/2023    Procedure: Transesophageal echo (GAEL) intra-procedure log documentation;  Surgeon: Jerry Be DO;  Location: [facility] CATH LAB;  Service: Cardiology;;    LEFT HEART CATHETERIZATION Left 12/14/2023    Procedure: Left heart cath;  Surgeon: Jerry Be DO;  Location: [facility] CATH LAB;  Service: Cardiology;  Laterality: Left;    MITRAL VALVE REPAIR      REPAIR OF HEART VALVE      RIGHT HEART CATHETERIZATION Right  2023    Procedure: INSERTION, CATHETER, RIGHT HEART;  Surgeon: Jerry Be DO;  Location: [facility] CATH LAB;  Service: Cardiology;  Laterality: Right;     Family History   Problem Relation Name Age of Onset    Cataracts Mother      Glaucoma Mother      No Known Problems Father      Heart disease Brother          stent placement    No Known Problems Brother      Diabetes Maternal Grandfather       Social History     Tobacco Use    Smoking status: Former     Current packs/day: 0.00     Types: Cigarettes     Quit date:      Years since quittin.0     Passive exposure: Past    Smokeless tobacco: Never   Substance Use Topics    Alcohol use: Not Currently    Drug use: Not Currently     Types: Marijuana     Review of Systems   Constitutional:  Positive for activity change. Negative for chills, diaphoresis and fever.   HENT:  Negative for congestion, drooling, ear pain, rhinorrhea, sneezing, sore throat and trouble swallowing.    Eyes:  Negative for pain.   Respiratory:  Negative for cough, chest tightness, shortness of breath, wheezing and stridor.    Cardiovascular:  Negative for chest pain, palpitations and leg swelling.   Gastrointestinal:  Negative for abdominal distention, abdominal pain, constipation, diarrhea, nausea and vomiting.   Genitourinary:  Negative for difficulty urinating, dysuria, frequency and urgency.   Musculoskeletal:  Negative for arthralgias, back pain, myalgias, neck pain and neck stiffness.   Skin:  Negative for pallor, rash and wound.   Neurological:  Positive for facial asymmetry (right sided facial droop), speech difficulty (aphasia) and weakness (right sided weakness). Negative for dizziness, seizures, syncope, light-headedness, numbness and headaches.   Psychiatric/Behavioral:  Positive for confusion.    All other systems reviewed and are negative.      Physical Exam     Initial Vitals [25 1211]   BP Pulse Resp Temp SpO2   (!) 185/88 72 18 97.5 °F (36.4 °C) 96 %       MAP       --         Physical Exam    Nursing note and vitals reviewed.  Constitutional: He appears well-developed and well-nourished.   HENT:   Head: Normocephalic and atraumatic.   Eyes: EOM are normal. Pupils are equal, round, and reactive to light.   Neck: Neck supple. No thyromegaly present. No JVD present.   Normal range of motion.  Cardiovascular:  Normal rate, regular rhythm, normal heart sounds and intact distal pulses.           No murmur heard.  Pulmonary/Chest: Breath sounds normal. No stridor. No respiratory distress. He has no wheezes.   Abdominal: Abdomen is soft. Bowel sounds are normal. He exhibits no distension. There is no abdominal tenderness.   Musculoskeletal:         General: No tenderness or edema. Normal range of motion.      Cervical back: Normal range of motion and neck supple.      Comments: Right sided weakness      Lymphadenopathy:     He has no cervical adenopathy.   Neurological: He is alert. No cranial nerve deficit or sensory deficit. GCS score is 15. GCS eye subscore is 4. GCS verbal subscore is 5. GCS motor subscore is 6.   Right facial droop /speech difficulty   Skin: Skin is warm and dry. Capillary refill takes less than 2 seconds. No rash noted.   Psychiatric: He has a normal mood and affect.         ED Course   Procedures  Labs Reviewed   COMPREHENSIVE METABOLIC PANEL - Abnormal       Result Value    Sodium 140      Potassium 4.2      Chloride 108 (*)     CO2 23      Anion Gap 13      Glucose 99      BUN 16      Creatinine 1.13      BUN/Creatinine Ratio 14      Calcium 8.6 (*)     Total Protein 6.8      Albumin 3.9      Globulin 2.9      A/G Ratio 1.3      Bilirubin, Total 0.5      Alk Phos 66      ALT 13      AST 20      eGFR 71     NT-PRO NATRIURETIC PEPTIDE - Abnormal    ProBNP 226 (*)    CBC WITH DIFFERENTIAL - Abnormal    WBC 6.20      RBC 4.41 (*)     Hemoglobin 12.7 (*)     Hematocrit 41.2      MCV 93.4      MCH 28.8      MCHC 30.8 (*)     RDW 13.9      Platelet Count  219      MPV 11.6      Neutrophils % 72.3 (*)     Lymphocytes % 17.7 (*)     Monocytes % 8.4 (*)     Eosinophils % 0.8 (*)     Basophils % 0.5      Immature Granulocytes % 0.3      nRBC, Auto 0.0      Neutrophils, Abs 4.48      Lymphocytes, Absolute 1.10      Monocytes, Absolute 0.52      Eosinophils, Absolute 0.05      Basophils, Absolute 0.03      Immature Granulocytes, Absolute 0.02      nRBC, Absolute 0.00      Diff Type Auto     PROTIME-INR - Normal    PT 13.7      INR 1.06     TSH - Normal    TSH 1.992     APTT - Normal    PTT 29.3     TROPONIN I - Normal    Troponin I High Sensitivity 4.9     CBC W/ AUTO DIFFERENTIAL    Narrative:     The following orders were created for panel order CBC W/ AUTO DIFFERENTIAL.  Procedure                               Abnormality         Status                     ---------                               -----------         ------                     CBC with Differential[[id number]]       Abnormal            Final result                 Please view results for these tests on the individual orders.   LIPID PANEL    Triglycerides 61      Cholesterol 131      HDL Cholesterol 52      Cholesterol/HDL Ratio (Risk Factor) 2.5      Non-HDL 79      LDL Calculated 67      LDL/HDL 1.3      VLDL 12     POCT GLUCOSE MONITORING CONTINUOUS          Imaging Results              X-Ray Chest AP Portable (Final result)  Result time 01/09/25 14:15:15      Final result by Rikki Calderon MD (01/09/25 14:15:15)                   Impression:      Bilateral mild diffuse nonspecific interstitial coarsening increased from most recent prior, which may be accentuated by AP portable technique versus pulmonary edema, interstitial type pneumonia or progression of chronic interstitial lung changes in the proper clinical setting.  No consolidation.      Electronically signed by: Rikki Calderon MD  Date:    01/09/2025  Time:    14:15               Narrative:    EXAMINATION:  XR CHEST AP PORTABLE    CLINICAL  HISTORY:  Stroke;    TECHNIQUE:  Single frontal view of the chest was performed.    COMPARISON:  Chest radiograph 11/22/2024    FINDINGS:  Monitoring leads overlie the chest.  Inferior most aspect of the costophrenic angles are excluded limiting evaluation.    Postoperative changes of the chest as before.  Cardiomediastinal silhouette is midline and prominent with similar calcification and tortuosity of the aorta and mildly enlarged heart.  Pulmonary vasculature and hilar contours are within normal limits.    Bilateral mild diffuse nonspecific interstitial coarsening increased from most recent prior.  Lungs are otherwise well expanded without large consolidation, sizeable pleural effusion or pneumothorax identified allowing for partial exclusion of costophrenic angles.    No acute osseous process seen.  PA and lateral views can be obtained.                                       CTA Neck (Final result)  Result time 01/09/25 12:50:35      Final result by Ascencion Carney MD (01/09/25 12:50:35)                   Impression:      1. No evidence of acute large vessel occlusion or flow-limiting stenosis.  2. Additional details, as provided in the body of the report.      Electronically signed by: Ascencion Carney  Date:    01/09/2025  Time:    12:50               Narrative:    EXAMINATION:  CTA NECK; CTA HEAD    CLINICAL HISTORY:  AVM, cerebral;    TECHNIQUE:  CT angiogram was performed of the head and neck following the IV administration of 100mL of Isovue-370.  Sagittal and coronal reconstructions and maximum intensity projection reconstructions were performed. Arterial stenosis percentages are based on NASCET measurement criteria.    COMPARISON:  CT head without contrast performed 01/09/2025, 12:15 hours.    FINDINGS:  CTA:    NECK:    Imaged aortic arch: Nonaneurysmal.  Left-sided arch with conventional 3 vessel arch anatomy.  Brachiocephalic and subclavian arteries appear grossly patent.    Right common and cervical  internal carotid arteries: CCA and ECA grossly patent.  Less than 50% atheromatous narrowing at the proximal ICA.  No evidence of carotid dissection or web.  Nonspecific tortuosity of the distal cervical ICA.    Left common and cervical internal carotid arteries: CCA and ECA grossly patent.  Less than 50% atheromatous narrowing at the proximal ICA.  No evidence of carotid dissection or web.    Right cervical vertebral artery: There is no hemodynamically significant stenosis or dissection    Left cervical vertebral artery: There is no hemodynamically significant stenosis or dissection.    HEAD:    Right anterior circulation:Mild atheromatous irregularity of the ICA.  Right ophthalmic artery is patent.No evidence of acute large vessel occlusion or flow-limiting stenosis.    Left anterior circulation: Mild atheromatous irregularity of the ICA.Left ophthalmic artery is patent.No evidence of acute large vessel occlusion or flow-limiting stenosis.    Posterior circulation: There is no hemodynamically significant vertebrobasilar stenosis. There is no significant PCA stenosis. Posterior inferior cerebellar arteries patent at origin.    Anterior and posterior communicating arteries: Anterior communicating artery appears patent.  Question fenestration of the A-comm.  Posterior communicating arteries not reliably visualized.    NON-ANGIOGRAPHIC FINDINGS:    Visualized intracranial contents: No acute change relative to earlier same day unenhanced head CT.  No definite venous phase abnormality.    Soft tissues of the neck: Unremarkable.    Visualized sinuses: No acute change relative to earlier same day CT.    Dentition: Edentulous.    Spine: Degenerative change.    Visualized lungs: No definite acute abnormality.                                       CTA Head (Final result)  Result time 01/09/25 12:50:35      Final result by Ascencion Carney MD (01/09/25 12:50:35)                   Impression:      1. No evidence of acute  large vessel occlusion or flow-limiting stenosis.  2. Additional details, as provided in the body of the report.      Electronically signed by: Ascencion Carney  Date:    01/09/2025  Time:    12:50               Narrative:    EXAMINATION:  CTA NECK; CTA HEAD    CLINICAL HISTORY:  AVM, cerebral;    TECHNIQUE:  CT angiogram was performed of the head and neck following the IV administration of 100mL of Isovue-370.  Sagittal and coronal reconstructions and maximum intensity projection reconstructions were performed. Arterial stenosis percentages are based on NASCET measurement criteria.    COMPARISON:  CT head without contrast performed 01/09/2025, 12:15 hours.    FINDINGS:  CTA:    NECK:    Imaged aortic arch: Nonaneurysmal.  Left-sided arch with conventional 3 vessel arch anatomy.  Brachiocephalic and subclavian arteries appear grossly patent.    Right common and cervical internal carotid arteries: CCA and ECA grossly patent.  Less than 50% atheromatous narrowing at the proximal ICA.  No evidence of carotid dissection or web.  Nonspecific tortuosity of the distal cervical ICA.    Left common and cervical internal carotid arteries: CCA and ECA grossly patent.  Less than 50% atheromatous narrowing at the proximal ICA.  No evidence of carotid dissection or web.    Right cervical vertebral artery: There is no hemodynamically significant stenosis or dissection    Left cervical vertebral artery: There is no hemodynamically significant stenosis or dissection.    HEAD:    Right anterior circulation:Mild atheromatous irregularity of the ICA.  Right ophthalmic artery is patent.No evidence of acute large vessel occlusion or flow-limiting stenosis.    Left anterior circulation: Mild atheromatous irregularity of the ICA.Left ophthalmic artery is patent.No evidence of acute large vessel occlusion or flow-limiting stenosis.    Posterior circulation: There is no hemodynamically significant vertebrobasilar stenosis. There is no  significant PCA stenosis. Posterior inferior cerebellar arteries patent at origin.    Anterior and posterior communicating arteries: Anterior communicating artery appears patent.  Question fenestration of the A-comm.  Posterior communicating arteries not reliably visualized.    NON-ANGIOGRAPHIC FINDINGS:    Visualized intracranial contents: No acute change relative to earlier same day unenhanced head CT.  No definite venous phase abnormality.    Soft tissues of the neck: Unremarkable.    Visualized sinuses: No acute change relative to earlier same day CT.    Dentition: Edentulous.    Spine: Degenerative change.    Visualized lungs: No definite acute abnormality.                                       CT Head Without Contrast (Final result)  Result time 01/09/25 12:35:42      Final result by Ascencion Carney MD (01/09/25 12:35:42)                   Impression:      No definite acute intracranial by noncontrast CT.    Remote post ischemic sequelae, as discussed.      Electronically signed by: Ascencion Carney  Date:    01/09/2025  Time:    12:35               Narrative:    EXAMINATION:  CT HEAD WITHOUT CONTRAST    CLINICAL HISTORY:  AVM, cerebral;    TECHNIQUE:  Low dose axial images were obtained through the head.  Coronal and sagittal reformations were also performed. Contrast was not administered.    COMPARISON:  CT HEAD PERFORMED 11/22/2024    FINDINGS:  Blood: No acute intracranial hemorrhage.    Parenchyma: No definite loss of gray-white differentiation to suggest acute or subacute transcortical infarct. Remote infarcts in the left carotid septal region and left cerebellum, as before.  Generalized pattern of age-related parenchymal volume loss elsewhere.  Nonspecific areas of white matter hypoattenuation may reflect sequela of chronic small vessel ischemic disease.    Ventricles/Extra-axial spaces: No abnormal extra-axial fluid collection. Basal cisterns are patent.    Vessels: Mild atherosclerotic  calcification.    Orbits: Unremarkable.    Scalp: Unremarkable.    Skull: There are no depressed skull fractures or destructive bone lesions.    Sinuses and mastoids: Essentially clear.    Other findings: None                                       Medications   tenecteplase (TNKase) IV KIT 18 mg (18 mg Intravenous Given 1/9/25 1245)     Followed by   sodium chloride 0.9% flush 10 mL (has no administration in time range)   iopamidoL (ISOVUE-370) injection 100 mL (100 mLs Intravenous Given 1/9/25 1234)   levETIRAcetam injection 1,500 mg (1,500 mg Intravenous Given 1/9/25 1337)     Medical Decision Making  Amount and/or Complexity of Data Reviewed  Labs: ordered.  Radiology: ordered.    Risk  Prescription drug management.  Decision regarding hospitalization.    Critical Care  Total time providing critical care: 45 minutes              Attending Attestation:           Physician Attestation for Scribe:  Physician Attestation Statement for Scribe #1: I, Adelof Rizo MD, reviewed documentation, as scribed by Giovany Francis in my presence, and it is both accurate and complete.             ED Course as of 01/09/25 1456   Thu Jan 09, 2025   1245     Thrombolysis Candidate? Yes. The risks and benefits of Tenecteplase were discussed with patient/family. Also discussed that medication is off-FDA label but that it is within the standard of care and appropriate for their treatment. The patient and/or family verbalized understanding of risks, benefits, off-label nature, and has given verbal consent for Tenecteplase. If patient was not competent, or no family was available, treatment will be administered as an emergency procedure and in what we believe to be the patients best interest.    Delays to Thrombolysis?  Yes, Care-team unable to determine eligibility (Timeline of stroke onset evolved- wakeup stroke requiring MRI, etc)    Patient had to wait for the CT scanner a few minutes.  Patient also had to have his risk  versus benefits of tPA discussed he had a major episode of gastric bleeding 1 year ago.  Also the timeline had to be clarify this required some phone calls from his son to somnolent at the job site.  We clarified that the time of onset was 830.       [PK]   8146 Discussed with vascular neurologist and with intensivist.  Patient has not a candidate for thrombectomy he does not have a SBO based on CTA. [PK]      ED Course User Index  [PK] Adelfo Rizo MD                           Clinical Impression:  Final diagnoses:  [R29.818] Acute focal neurological deficit  [I63.9] Acute CVA (cerebrovascular accident) (Primary)          ED Disposition Condition    Admit Stable                Adelfo Rizo MD  01/09/25 1097

## 2025-01-09 NOTE — ED NOTES
Per sons report patient is acting the exact same in demeanor as he was when he was here last time with his seizure and was aggressive initially and then altered and weak. Advised provider earlier that patient presented similar prior to his last er visit but would like to proceed with TNK.

## 2025-01-09 NOTE — TELEMEDICINE CONSULT
[facility]  Vascular Neurology  Comprehensive Stroke Center  TeleVascular Neurology Acute Consultation Note        Consult Information  Consult to Telemedicine - Acute Stroke  Consult performed by: America Thomas MD  Consult ordered by: Adelfo Epps MD          Consulting Provider: ADELFO EPPS   Current Providers  No providers found    Patient Location: [facility] - TELEMEDICINE ED [facility] EMERGENCY DEPART* Emergency Department    Spoke hospital nurse at bedside with patient assisting consultant.  Patient information was obtained from relative(s), past medical records, and primary team.       Stroke Documentation  Acute Stroke Times   Last Known Normal Date: 01/09/25  Last Known Normal Time: 0830  Symptom Onset Date: 01/09/25  Unknown Symptom Onset Time: Unknown Time  Stroke Team Called Date: 01/09/25  Stroke Team Called Time: 1211  Stroke Team Arrival Date: 01/09/25  Stroke Team Arrival Time: 1215  CT Interpretation Time: 1219  Thrombolytic Therapy Recommended: Yes  CTA Interpretation Time: 1224  Thrombectomy Recommended: No  Decision to Treat Time for Tenecteplase: 1224    NIH Scale:  1a. Level of Consciousness: 0-->Alert, keenly responsive  1b. LOC Questions: 2-->Answers neither question correctly  1c. LOC Commands: 0-->Performs both tasks correctly  2. Best Gaze: 0-->Normal  3. Visual: 0-->No visual loss  4. Facial Palsy: 1-->Minor paralysis (flattened nasolabial fold, asymmetry on smiling)  5a. Motor Arm, Left: 0-->No drift, limb holds 90 (or 45) degrees for full 10 secs  5b. Motor Arm, Right: 2-->Some effort against gravity, limb cannot get to or maintain (if cued) 90 (or 45) degrees, drifts down to bed, but has some effort against gravity  6a. Motor Leg, Left: 0-->No drift, leg holds 30 degree position for full 5 secs  6b. Motor Leg, Right: 2-->Some effort against gravity, leg falls to bed by 5 secs, but has some effort against gravity  7. Limb Ataxia:  "0-->Absent  8. Sensory: 0-->Normal, no sensory loss  9. Best Language: 3-->Mute, global aphasia, no usable speech or auditory comprehension  10. Dysarthria: (UN) Intubated or other physical barrier  11. Extinction and Inattention (formerly Neglect): 0-->No abnormality  Total (NIH Stroke Scale): 10      Modified [place]: Score: 1  [place] Coma Scale:     ABCD2 Score:    [id number] Score:    HAS -BLED Score:    ICH Score:    Hunt & Pride Classification:      Blood pressure (!) 185/88, pulse 72, temperature 97.5 °F (36.4 °C), temperature source Oral, resp. rate 18, height 5' 7" (1.702 m), weight 72.6 kg (160 lb), SpO2 96%.      In my opinion, this was a: Tier 1; VAN Stroke Assessment: Positive     Medical Decision Making  HPI:  69 y.o. male Afib not on AC, had a MVR/ LAAand subsequent GI bleed thus not on AC, Hx of Afib, CAD, HLD, L PCA infarct ( asymptomatic), Seizures , CKD III remote history of GI bleed s/p treatment at OSH and no further GI bleeding as per son who presents w/ aphasia and RSW.   LKW 08:30 while at his work-place, and then subsequently his co-workers noted that he had issues w/ his RUE and was less forthcoming with his speech.   Son confirms timeline and that he was perfectly normal this AM , able to drive himself to his workplace.      Images personally reviewed and interpreted:  Study: Head CT and CTA Head & Neck  Study Interpretation: No acute intracranial abnormalities, specifically no early signs of ischemia and no intracranial hemorrhage.   No LVO, no hemodynamically significant stenosis.           Assessment and plan:  NIHSS 10, w/ concern for L hemispheric ischemic event   NO LVO noted on CTA H/N, no significant stenosis.   Deficits appear disabling.  No contraindications to IV thrombolytics. Request attending physician to discuss risks and benefits with the patient and/or family members and proceed with TNK/alteplase once consent is obtained.   BP goal prior to IV thrombolytics - " <185/110  BP goal post IV thrombolytics - <180/105 for at least 24 hrs.    Recommendations:  MRI brain to evaluate for presence and/or extent of ischemic injury  Lipid profile, A1c, TSH  ECHO w/o bubble study  PT/OT/SLP eval and Rx  IVF to allow for maximum cerebral perfusion  HOB flat   HOLD AC x 24 hours post TNK - daily aspirin 81 mg /  clopidogrel 75 mg x 30 days followed by monotherapy thereafter, if no contraindications.   High intensity statin for LDL goal <70 long term     Recommendations discussed w/ Dr. Rizo.         Lytics recommendation: Recommend IV Tenecteplase 0.25mg/kg IV push (max dose 25mg); If Tenecteplase is not available use Alteplase 0.9mg/kg IV bolus followed by infusion (max dose 90mg)     BP goal prior to IV thrombolytics - <185/110 - Initiate BP management prior to treatment if not at goal    BP goal post IV thrombolytics - <180/105 for at least 24 hrs. - Continue BP management to maintain goal    Additional Recommendations:   Neurological assessment and vital signs (except temperature) every 15 minutes x 2 hours, then every 30 minutes x 6 hours, then every hour x 16 hours..  Frequency of BP assessments may need to be increased if systolic BP stays >= 180 mm Hg or diastolic BP stays >= 105 mm Hg. Administer antihypertensive meds as ordered  Temperature every 4 hours or as required.  Follow hospital protocol for further orders re: post thrombolytic therapy patient management.  No antithrombotics or anticoagulants (including but not limited to: heparin, warfarin, aspirin, clopidigrel, or dipyridamole) for 24 hours, then start antithrombotics as ordered by treating physician    Adapted from the American Heart Association/American Stroke Association (AHA/ASA) and American Association of Neuroscience Nurses (AANN) Guidelines.       Clinical Delays to Decision to Treat: None    Thrombectomy recommendation: No; No large vessel occlusion identified on imaging   Placement recommendation: pending  further studies  admit to inpatient               ROS  Physical Exam  Past Medical History:   Diagnosis Date    GERD (gastroesophageal reflux disease)     Hyperlipidemia     Hypertension      Past Surgical History:   Procedure Laterality Date    ECHOCARDIOGRAM,TRANSESOPHAGEAL  12/14/2023    Procedure: Transesophageal echo (GAEL) intra-procedure log documentation;  Surgeon: Jerry Be DO;  Location: [facility] CATH LAB;  Service: Cardiology;;    LEFT HEART CATHETERIZATION Left 12/14/2023    Procedure: Left heart cath;  Surgeon: Jerry Be DO;  Location: [facility] CATH LAB;  Service: Cardiology;  Laterality: Left;    MITRAL VALVE REPAIR      REPAIR OF HEART VALVE      RIGHT HEART CATHETERIZATION Right 12/14/2023    Procedure: INSERTION, CATHETER, RIGHT HEART;  Surgeon: Jerry Be DO;  Location: [facility] CATH LAB;  Service: Cardiology;  Laterality: Right;     Family History   Problem Relation Name Age of Onset    Cataracts Mother      Glaucoma Mother      No Known Problems Father      Heart disease Brother          stent placement    No Known Problems Brother      Diabetes Maternal Grandfather         Diagnoses  No problems updated.    America Thomas MD      Emergent/Acute neurological consultation requested by spoke provider due to critical concerns for possible cerebrovascular event that could result in permanent loss of neurologic/bodily function, severe disability or death of this patient.  Immediate/timely evaluation by a highly prepared expert is paramount for optimal outcomes  High risk for neurological deterioration if not properly diagnosed  High risk for neurological deterioration if not treated promplty/as soon as possible  Complex diagnostic evaluation may be required (advanced imaging)  High risk treatment options (thrombolytics and/or thrombectomy)    Patient care was coordinated with spoke provider, including but not limted to    Discussing likely diagnosis/etiology of symptoms  Making  recommendations for further diagnostic studies  Making recommendations for intravenous thrombolytics or other advanced therapies  Making recommendations for disposition (admission/transfer for higher level of care)      Neurology consultation requested by spoke provider. Audiovisual encounter with the patient performed using a secure connection.  Results and impressions from the visit are documented on this note and were communicated to the consulting provider/team via direct communication. The note has been shared for addition to the patients electronic medical record.

## 2025-01-09 NOTE — TELEPHONE ENCOUNTER
Attempted to call son regarding the below message.  Left message to return call.       ----- Message from Med Assistant Castrejon sent at 1/9/2025 11:14 AM CST -----  Who Called: Nahun (son)    Caller is requesting assistance/information from provider's office.    Preferred Method of Contact: Phone Call  Patient's Preferred Phone Number on File: [phone]   Best Call Back Number, if different:[phone]  Additional Information: wants to talk about his memory. He's fine one minute and then mind goes blank next minute

## 2025-01-10 LAB
AORTIC ROOT ANNULUS: 3.28 CM
AORTIC VALVE CUSP SEPERATION: 2.4 CM
APICAL FOUR CHAMBER EJECTION FRACTION: 66 %
AV INDEX (PROSTH): 0.92
AV MEAN GRADIENT: 3.5 MMHG
AV PEAK GRADIENT: 6.8 MMHG
AV VALVE AREA BY VELOCITY RATIO: 2.4 CM²
AV VALVE AREA: 2.9 CM²
AV VELOCITY RATIO: 0.77
BASOPHILS # BLD AUTO: 0.04 K/UL (ref 0–0.2)
BASOPHILS NFR BLD AUTO: 0.8 % (ref 0–1)
BSA FOR ECHO PROCEDURE: 1.89 M2
CV ECHO LV RWT: 0.81 CM
DIFFERENTIAL METHOD BLD: ABNORMAL
DOP CALC AO PEAK VEL: 1.3 M/S
DOP CALC AO VTI: 25.3 CM
DOP CALC LVOT AREA: 3.1 CM2
DOP CALC LVOT DIAMETER: 2 CM
DOP CALC LVOT PEAK VEL: 1 M/S
DOP CALC LVOT STROKE VOLUME: 73.2 CM3
DOP CALC MV VTI: 45.8 CM
DOP CALCLVOT PEAK VEL VTI: 23.3 CM
E WAVE DECELERATION TIME: 368.27 MSEC
E/A RATIO: 0.8
E/E' RATIO: 10.75 M/S
ECHO LV POSTERIOR WALL: 1.7 CM (ref 0.6–1.1)
EJECTION FRACTION: 60 %
EOSINOPHIL # BLD AUTO: 0.1 K/UL (ref 0–0.5)
EOSINOPHIL NFR BLD AUTO: 2 % (ref 1–4)
ERYTHROCYTE [DISTWIDTH] IN BLOOD BY AUTOMATED COUNT: 13.8 % (ref 11.5–14.5)
FRACTIONAL SHORTENING: 21.4 % (ref 28–44)
HCT VFR BLD AUTO: 37.4 % (ref 40–54)
HGB BLD-MCNC: 11.8 G/DL (ref 13.5–18)
IMM GRANULOCYTES # BLD AUTO: 0.01 K/UL (ref 0–0.04)
IMM GRANULOCYTES NFR BLD: 0.2 % (ref 0–0.4)
INTERVENTRICULAR SEPTUM: 1.5 CM (ref 0.6–1.1)
IVC DIAMETER: 1.17 CM
LEFT ATRIUM AREA SYSTOLIC (APICAL 4 CHAMBER): 14.68 CM2
LEFT ATRIUM SIZE: 3.28 CM
LEFT INTERNAL DIMENSION IN SYSTOLE: 3.3 CM (ref 2.1–4)
LEFT VENTRICLE DIASTOLIC VOLUME INDEX: 42.04 ML/M2
LEFT VENTRICLE DIASTOLIC VOLUME: 78.61 ML
LEFT VENTRICLE END DIASTOLIC VOLUME APICAL 4 CHAMBER: 88.62 ML
LEFT VENTRICLE END SYSTOLIC VOLUME APICAL 4 CHAMBER: 33.58 ML
LEFT VENTRICLE MASS INDEX: 147.6 G/M2
LEFT VENTRICLE SYSTOLIC VOLUME INDEX: 22.7 ML/M2
LEFT VENTRICLE SYSTOLIC VOLUME: 42.42 ML
LEFT VENTRICULAR INTERNAL DIMENSION IN DIASTOLE: 4.2 CM (ref 3.5–6)
LEFT VENTRICULAR MASS: 276.1 G
LV LATERAL E/E' RATIO: 8.6 M/S
LV SEPTAL E/E' RATIO: 14.33 M/S
LVED V (TEICH): 78.61 ML
LVES V (TEICH): 42.42 ML
LVOT MG: 2.04 MMHG
LVOT MV: 0.67 CM/S
LYMPHOCYTES # BLD AUTO: 1.37 K/UL (ref 1–4.8)
LYMPHOCYTES NFR BLD AUTO: 27.5 % (ref 27–41)
MCH RBC QN AUTO: 28.4 PG (ref 27–31)
MCHC RBC AUTO-ENTMCNC: 31.6 G/DL (ref 32–36)
MCV RBC AUTO: 90.1 FL (ref 80–96)
MONOCYTES # BLD AUTO: 0.51 K/UL (ref 0–0.8)
MONOCYTES NFR BLD AUTO: 10.2 % (ref 2–6)
MPC BLD CALC-MCNC: 11.6 FL (ref 9.4–12.4)
MV MEAN GRADIENT: 3 MMHG
MV PEAK A VEL: 1.08 M/S
MV PEAK E VEL: 0.86 M/S
MV PEAK GRADIENT: 5 MMHG
MV STENOSIS PRESSURE HALF TIME: 96.6 MS
MV VALVE AREA BY CONTINUITY EQUATION: 1.6 CM2
MV VALVE AREA P 1/2 METHOD: 2.28 CM2
NEUTROPHILS # BLD AUTO: 2.96 K/UL (ref 1.8–7.7)
NEUTROPHILS NFR BLD AUTO: 59.3 % (ref 53–65)
NRBC # BLD AUTO: 0 X10E3/UL
NRBC, AUTO (.00): 0 %
OHS CV RV/LV RATIO: 1 CM
PISA TR MAX VEL: 1.82 M/S
PLATELET # BLD AUTO: 209 K/UL (ref 150–400)
PV PEAK GRADIENT: 4 MMHG
PV PEAK VELOCITY: 0.99 M/S
RA MAJOR: 3.87 CM
RA PRESSURE ESTIMATED: 3 MMHG
RBC # BLD AUTO: 4.15 M/UL (ref 4.6–6.2)
RIGHT VENTRICLE DIASTOLIC BASEL DIMENSION: 4.2 CM
RIGHT VENTRICLE DIASTOLIC LENGTH: 4.8 CM
RIGHT VENTRICLE DIASTOLIC MID DIMENSION: 3.1 CM
RIGHT VENTRICULAR LENGTH IN DIASTOLE (APICAL 4-CHAMBER VIEW): 4.78 CM
RV MID DIAMA: 3.12 CM
RV TB RVSP: 5 MMHG
TDI LATERAL: 0.1 M/S
TDI SEPTAL: 0.06 M/S
TDI: 0.08 M/S
TR MAX PG: 13 MMHG
TRICUSPID ANNULAR PLANE SYSTOLIC EXCURSION: 1.69 CM
TV REST PULMONARY ARTERY PRESSURE: 16 MMHG
WBC # BLD AUTO: 4.99 K/UL (ref 4.5–11)
Z-SCORE OF LEFT VENTRICULAR DIMENSION IN END DIASTOLE: -2.16
Z-SCORE OF LEFT VENTRICULAR DIMENSION IN END SYSTOLE: 0.21

## 2025-01-10 PROCEDURE — 36415 COLL VENOUS BLD VENIPUNCTURE: CPT | Performed by: STUDENT IN AN ORGANIZED HEALTH CARE EDUCATION/TRAINING PROGRAM

## 2025-01-10 PROCEDURE — 25000003 PHARM REV CODE 250: Performed by: STUDENT IN AN ORGANIZED HEALTH CARE EDUCATION/TRAINING PROGRAM

## 2025-01-10 PROCEDURE — 99291 CRITICAL CARE FIRST HOUR: CPT | Mod: ,,, | Performed by: STUDENT IN AN ORGANIZED HEALTH CARE EDUCATION/TRAINING PROGRAM

## 2025-01-10 PROCEDURE — 94761 N-INVAS EAR/PLS OXIMETRY MLT: CPT

## 2025-01-10 PROCEDURE — A9577 INJ MULTIHANCE: HCPCS | Performed by: STUDENT IN AN ORGANIZED HEALTH CARE EDUCATION/TRAINING PROGRAM

## 2025-01-10 PROCEDURE — 85025 COMPLETE CBC W/AUTO DIFF WBC: CPT | Performed by: STUDENT IN AN ORGANIZED HEALTH CARE EDUCATION/TRAINING PROGRAM

## 2025-01-10 PROCEDURE — 94799 UNLISTED PULMONARY SVC/PX: CPT

## 2025-01-10 PROCEDURE — 20000000 HC ICU ROOM

## 2025-01-10 PROCEDURE — 97161 PT EVAL LOW COMPLEX 20 MIN: CPT

## 2025-01-10 PROCEDURE — 25500020 PHARM REV CODE 255: Performed by: STUDENT IN AN ORGANIZED HEALTH CARE EDUCATION/TRAINING PROGRAM

## 2025-01-10 PROCEDURE — 92610 EVALUATE SWALLOWING FUNCTION: CPT

## 2025-01-10 PROCEDURE — 92523 SPEECH SOUND LANG COMPREHEN: CPT

## 2025-01-10 PROCEDURE — 97165 OT EVAL LOW COMPLEX 30 MIN: CPT

## 2025-01-10 PROCEDURE — 99900035 HC TECH TIME PER 15 MIN (STAT)

## 2025-01-10 RX ORDER — ONDANSETRON HYDROCHLORIDE 2 MG/ML
8 INJECTION, SOLUTION INTRAVENOUS EVERY 6 HOURS PRN
Status: DISCONTINUED | OUTPATIENT
Start: 2025-01-10 | End: 2025-01-16 | Stop reason: HOSPADM

## 2025-01-10 RX ORDER — BISACODYL 5 MG
10 TABLET, DELAYED RELEASE (ENTERIC COATED) ORAL DAILY PRN
Status: DISCONTINUED | OUTPATIENT
Start: 2025-01-10 | End: 2025-01-16 | Stop reason: HOSPADM

## 2025-01-10 RX ORDER — ACETAMINOPHEN 650 MG/1
650 SUPPOSITORY RECTAL EVERY 6 HOURS PRN
Status: DISCONTINUED | OUTPATIENT
Start: 2025-01-10 | End: 2025-01-16 | Stop reason: HOSPADM

## 2025-01-10 RX ORDER — ALUMINUM HYDROXIDE, MAGNESIUM HYDROXIDE, AND SIMETHICONE 2400; 240; 2400 MG/30ML; MG/30ML; MG/30ML
30 SUSPENSION ORAL EVERY 6 HOURS PRN
Status: DISCONTINUED | OUTPATIENT
Start: 2025-01-10 | End: 2025-01-16 | Stop reason: HOSPADM

## 2025-01-10 RX ORDER — TALC
6 POWDER (GRAM) TOPICAL NIGHTLY PRN
Status: DISCONTINUED | OUTPATIENT
Start: 2025-01-10 | End: 2025-01-16 | Stop reason: HOSPADM

## 2025-01-10 RX ORDER — TRAZODONE HYDROCHLORIDE 50 MG/1
50 TABLET ORAL NIGHTLY PRN
Status: DISCONTINUED | OUTPATIENT
Start: 2025-01-10 | End: 2025-01-16 | Stop reason: HOSPADM

## 2025-01-10 RX ORDER — GUAIFENESIN AND DEXTROMETHORPHAN HYDROBROMIDE 10; 100 MG/5ML; MG/5ML
10 SYRUP ORAL EVERY 6 HOURS PRN
Status: DISCONTINUED | OUTPATIENT
Start: 2025-01-10 | End: 2025-01-16 | Stop reason: HOSPADM

## 2025-01-10 RX ORDER — MUPIROCIN 20 MG/G
OINTMENT TOPICAL 2 TIMES DAILY
Status: COMPLETED | OUTPATIENT
Start: 2025-01-10 | End: 2025-01-15

## 2025-01-10 RX ORDER — DOCUSATE SODIUM 100 MG/1
100 CAPSULE, LIQUID FILLED ORAL 2 TIMES DAILY PRN
Status: DISCONTINUED | OUTPATIENT
Start: 2025-01-10 | End: 2025-01-16 | Stop reason: HOSPADM

## 2025-01-10 RX ORDER — DIPHENHYDRAMINE HCL 25 MG
50 CAPSULE ORAL EVERY 6 HOURS PRN
Status: DISCONTINUED | OUTPATIENT
Start: 2025-01-10 | End: 2025-01-16 | Stop reason: HOSPADM

## 2025-01-10 RX ORDER — ACETAMINOPHEN 500 MG
1000 TABLET ORAL EVERY 6 HOURS PRN
Status: DISCONTINUED | OUTPATIENT
Start: 2025-01-10 | End: 2025-01-16 | Stop reason: HOSPADM

## 2025-01-10 RX ORDER — DEXMEDETOMIDINE HYDROCHLORIDE 4 UG/ML
0-1.4 INJECTION, SOLUTION INTRAVENOUS CONTINUOUS
Status: DISCONTINUED | OUTPATIENT
Start: 2025-01-10 | End: 2025-01-13

## 2025-01-10 RX ADMIN — MUPIROCIN: 20 OINTMENT TOPICAL at 08:01

## 2025-01-10 RX ADMIN — DEXMEDETOMIDINE HYDROCHLORIDE 0.2 MCG/KG/HR: 4 INJECTION, SOLUTION INTRAVENOUS at 07:01

## 2025-01-10 RX ADMIN — GADOBENATE DIMEGLUMINE 15 ML: 529 INJECTION, SOLUTION INTRAVENOUS at 02:01

## 2025-01-10 NOTE — ASSESSMENT & PLAN NOTE
Left hemispheric ischemic event, low large vessel occlusion with ongoing right-sided deficits.    Status post TNK administration 12:45 p.m. 1/9/25      BP goal prior to IV thrombolytics - <185/110  BP goal post IV thrombolytics - <180/105 for at least 24 hrs.       -- Close monitoring in ICU for at least 24 hrs.  Q.1 hour neuro checks  -- No antithrombotics or DVT prophylaxis for 24 hrs.  -- MRI brain to be performed at the 24 hour lou (1/10/25 afternoon), if MRI delayed than CT scan should be performed at the 24 hour lou on 1/10/25 to rule out any hemorrhagic event  -- TTE w/ bubble  -- PT/OT/SLP   -hold off on any antiplatelets or anticoagulation for 24 hours, till CT scan is performed  -avoid Varghese catheter placement and invasive procedure

## 2025-01-10 NOTE — PT/OT/SLP EVAL
Physical Therapy Evaluation    Patient Name:  Mitul Torres   MRN:  [id number]    Recommendations:     Discharge Recommendations: Low Intensity Therapy   Discharge Equipment Recommendations: none   Barriers to discharge:  ongoing treatment    Assessment:     Mitul Torres is a 69 y.o. male admitted with a medical diagnosis of <principal problem not specified>.  He presents with the following impairments/functional limitations: weakness, gait instability, impaired balance, visual deficits, decreased safety awareness     Patient presented with good BLE strength; has expressive aphasia but answered some questions consistently. Lives alone but has family. Able to ambulate well but is a little unsteady; also some decreased safety awareness; PT will follow and progress as able    Rehab Prognosis: Good; patient would benefit from acute skilled PT services to address these deficits and reach maximum level of function.    Recent Surgery: * No surgery found *      Plan:     During this hospitalization, patient to be seen 5 x/week to address the identified rehab impairments via gait training, therapeutic activities, therapeutic exercises, neuromuscular re-education and progress toward the following goals:    Plan of Care Expires:  02/07/25    Subjective     Chief Complaint: expressive aphasia  Patient/Family Comments/goals: agreeable to evaluation  Pain/Comfort:  Pain Rating 1: 0/10    Patients cultural, spiritual, Latter day conflicts given the current situation:      Living Environment:  Home alone, few steps with rail  Prior to admission, patients level of function was independent and working.  Equipment used at home: none.  DME owned (not currently used): none.  Upon discharge, patient will have assistance from family.    Objective:     Communicated with nurse prior to session.  Patient found HOB elevated with murray catheter, pulse ox (continuous), telemetry, blood pressure cuff  upon PT entry to room.    General  Precautions: Standard, fall  Orthopedic Precautions:    Braces:    Respiratory Status: Room air    Exams:  Sensation:    -       Intact  RLE ROM: WNL  RLE Strength: WNL  LLE ROM: WNL  LLE Strength: WNL    Functional Mobility:  Bed Mobility:     Supine to Sit: contact guard assistance  Sit to Supine: stand by assistance  Transfers:     Sit to Stand:  contact guard assistance with no AD  Gait: 125 ft with no ad and CGA; a little unsteady and impulsive; complaints of visual and perception deficits  Balance: fair      AM-PAC 6 CLICK MOBILITY  Total Score:23       Treatment & Education:  Patient educated on the role of physical therapy in the acute care setting, call light usage, and safety including calling nurse for mobility  PT answered all patient questions within PT scope of practice  Mobility as noted      Patient left HOB elevated with all lines intact, call button in reach, and nurse notified.    GOALS:   Multidisciplinary Problems       Physical Therapy Goals          Problem: Physical Therapy    Goal Priority Disciplines Outcome Interventions   Physical Therapy Goal     PT, PT/OT Progressing    Description: Short term goals:  . Supine to sit with U[place]  . Sit to supine with Upson  . Sit to stand transfer with Upson  . Gait  x 300 feet with Upson using No Assistive Device.     Long term goals:  Patient will regain full independent functional mobility with lowest level of assistive device to return to desired living arrangement and prior ADL's.                          History:     Past Medical History:   Diagnosis Date    GERD (gastroesophageal reflux disease)     Hyperlipidemia     Hypertension        Past Surgical History:   Procedure Laterality Date    ECHOCARDIOGRAM,TRANSESOPHAGEAL  12/14/2023    Procedure: Transesophageal echo (GAEL) intra-procedure log documentation;  Surgeon: Jerry Be DO;  Location: [facility] CATH LAB;  Service: Cardiology;;    LEFT HEART CATHETERIZATION  Left 12/14/2023    Procedure: Left heart cath;  Surgeon: Jerry Be DO;  Location: [facility] CATH LAB;  Service: Cardiology;  Laterality: Left;    MITRAL VALVE REPAIR      REPAIR OF HEART VALVE      RIGHT HEART CATHETERIZATION Right 12/14/2023    Procedure: INSERTION, CATHETER, RIGHT HEART;  Surgeon: Jerry Be DO;  Location: [facility] CATH LAB;  Service: Cardiology;  Laterality: Right;       Time Tracking:     PT Received On: 01/10/25  PT Start Time: 0853     PT Stop Time: 0920  PT Total Time (min): 27 min     Billable Minutes: Evaluation 27      01/10/2025

## 2025-01-10 NOTE — HPI
69-year-old male with past medical history significant for atrial fibrillation anticoagulated with Eliquis and on metoprolol, history of severe MR status post complex MV repair and LAD a occlusion, anemia, prior history of chronic area of infarcts in the brain, GI bleed who presented to the hospital on 1/9/25 in the setting of aphasia and altered mental status around 830-9:00 a.m., 5 to right-sided deficits and now status post TNK for left hemispheric ischemic event.      I saw the patient after he had his TNK at 12:45 p.m. on 1/9/25.  The patient is still had significant right-sided deficits with power 2/5 in right lower extremity, 1/5 right upper extremity, right-sided facial droop and aphasia.  Patient was otherwise hemodynamically stable, hemoglobin at 12.7.  Electrolytes grossly within normal limits.  Imaging of CTA head and neck and CT head without contrast indicative of no acute large vessel occlusion and no evidence of hemorrhage.

## 2025-01-10 NOTE — PT/OT/SLP EVAL
Occupational Therapy   Evaluation    Name: Mitul Torres  MRN: [id number]  Admitting Diagnosis: <principal problem not specified>  Recent Surgery: * No surgery found *      Recommendations:     Discharge Recommendations:    Discharge Equipment Recommendations:  none  Barriers to discharge:  None    Assessment:     Mitul Torres is a 69 y.o. male with a medical diagnosis of <principal problem not specified>.  He presents with alert and agreeable to evaluation. Performance deficits affecting function: impaired endurance, impaired self care skills, impaired functional mobility, gait instability, weakness, decreased safety awareness, visual deficits, impaired balance.      Pt was admitted with CVA, underwent TNK. Pt is feeling much better but is having difficulty expressing himself. Pt demonstrates good (B) UE strength and ROM. Coordination appears to be good, but has some trouble with his processing. Pt demonstrates mildly decreased balance with ambulation, with a couple of oddly placed steps, but no loss of balance. OT will treat pt while he is hospitalized.     Rehab Prognosis: Good; patient would benefit from acute skilled OT services to address these deficits and reach maximum level of function.       Plan:     Patient to be seen 5 x/week to address the above listed problems via self-care/home management, therapeutic activities, therapeutic exercises  Plan of Care Expires: 02/07/25  Plan of Care Reviewed with: patient    Subjective     Chief Complaint: Pt is admitted with CVA  Patient/Family Comments/goals: Pt c/o everything appearing upside down    Occupational Profile:  Living Environment: Pt lives in a single level home with a few steps to enter with a rail  Previous level of function: Pt is independent with all mobility and self-care and IADL  Roles and Routines: Pt drives, is employed, and takes care of his home and himself  Equipment Used at Home: none  Assistance upon Discharge:  Uncertain    Pain/Comfort:  Pain Rating 1: 0/10  Pain Rating Post-Intervention 1: 0/10    Patients cultural, spiritual, Moravian conflicts given the current situation: no    Objective:     Communicated with: KWADWO Beyer prior to session.  Patient found HOB elevated with murray catheter, pulse ox (continuous), telemetry, blood pressure cuff upon OT entry to room.    General Precautions: Standard, fall  Orthopedic Precautions:    Braces:    Respiratory Status: Room air    Occupational Performance:    Bed Mobility:    Patient completed Supine to Sit with stand by assistance  Patient completed Sit to Supine with stand by assistance    Functional Mobility/Transfers:  Patient completed Sit <> Stand Transfer with stand by assistance  with  no assistive device   Functional Mobility: Pt ambulated around the unit with CGA without AD    Activities of Daily Living:  Upper Body Dressing: minimum assistance      Cognitive/Visual Perceptual:  Cognitive/Psychosocial Skills:     -       Oriented to: Person   -       Follows Commands/attention:Follows one-step commands  -       Communication: Pt is having difficulty getting the right words to express himself. When he speaks, it is clear  -       Safety awareness/insight to disability: impaired   -       Mood/Affect/Coping skills/emotional control: Cooperative  Visual/Perceptual:      -Pt appears to have some visual processing issues, pt is able to see and read from the board, but unable to tell time on the clock     Physical Exam:  Balance:    -       sitting with supervision, standing with CGA  Dominant hand:    -       uncertain, pt initially answered right, but then seemed to indicate his left hand was dominant  Upper Extremity Range of Motion:     -       Right Upper Extremity: WFL  -       Left Upper Extremity: WFL  Upper Extremity Strength:    -       Right Upper Extremity: WNL  -       Left Upper Extremity: WNL   Strength:    -       Right Upper Extremity: WNL  -        Left Upper Extremity: WNL  Fine Motor Coordination:    -       Intact  Left hand thumb/finger opposition skills and Right hand thumb/finger opposition skills  Gross motor coordination:   WFL    AMPAC 6 Click ADL:  AMPAC Total Score: 18    Treatment & Education:  Pt educated on OT role/POC.   Importance of OOB activity with staff assistance.  Importance of sitting up in the chair throughout the day as tolerated, especially for meals   Safety during functional t/f and mobility with staff assist  Importance of assisting with self-care activities   All questions/concerns answered within OT scope of practice     Patient left HOB elevated with all lines intact, call button in reach, and RN present    GOALS:   Multidisciplinary Problems       Occupational Therapy Goals          Problem: Occupational Therapy    Goal Priority Disciplines Outcome Interventions   Occupational Therapy Goal     OT, PT/OT Progressing    Description: STG: (in 1 week)  Pt will perform grooming with setup  Pt will bathe with Supervision  Pt will perform UE dressing with independence  Pt will perform LE dressing with Supervision  Pt will perform toileting with SBA  Pt will transfer bed/chair/bsc with mod(I)  Pt will perform standing task x 7 min with SBA   Pt will tolerate 20 minutes of tx without fatigue  Pt will perform light home making task with supervision      LTG: (in 5 weeks)  1.Restore to max I with self care and mobility.                         History:     Past Medical History:   Diagnosis Date    GERD (gastroesophageal reflux disease)     Hyperlipidemia     Hypertension          Past Surgical History:   Procedure Laterality Date    ECHOCARDIOGRAM,TRANSESOPHAGEAL  12/14/2023    Procedure: Transesophageal echo (GAEL) intra-procedure log documentation;  Surgeon: Jerry Be DO;  [email or web address] CATH LAB;  Service: Cardiology;;    LEFT HEART CATHETERIZATION Left 12/14/2023    Procedure: Left heart cath;  Surgeon: Jerry Be  ;  Location: [facility] CATH LAB;  Service: Cardiology;  Laterality: Left;    MITRAL VALVE REPAIR      REPAIR OF HEART VALVE      RIGHT HEART CATHETERIZATION Right 12/14/2023    Procedure: INSERTION, CATHETER, RIGHT HEART;  Surgeon: Jerry Be DO;  Location: [facility] CATH LAB;  Service: Cardiology;  Laterality: Right;       Time Tracking:     OT Date of Treatment: 01/10/25  OT Start Time: 0853  OT Stop Time: 0920  OT Total Time (min): 27 min    Billable Minutes:Evaluation low complexity    1/10/2025

## 2025-01-10 NOTE — H&P
[facility] Emergency Department  Critical Care Medicine  History & Physical    Patient Name: Mitul Torres  MRN: [id number]  Admission Date: 1/9/2025  Hospital Length of Stay: 0 days  Code Status: Prior  Attending Physician: Endy Levine MD   Primary Care Provider: Shannan Gorman FNP   Principal Problem: <principal problem not specified>    Subjective:     HPI:  69-year-old male with past medical history significant for atrial fibrillation anticoagulated with Eliquis and on metoprolol, history of severe MR status post complex MV repair and LAD a occlusion, anemia, prior history of chronic area of infarcts in the brain, GI bleed who presented to the hospital on 1/9/25 in the setting of aphasia and altered mental status around 830-9:00 a.m., 5 to right-sided deficits and now status post TNK for left hemispheric ischemic event.      I saw the patient after he had his TNK at 12:45 p.m. on 1/9/25.  The patient is still had significant right-sided deficits with power 2/5 in right lower extremity, 1/5 right upper extremity, right-sided facial droop and aphasia.  Patient was otherwise hemodynamically stable, hemoglobin at 12.7.  Electrolytes grossly within normal limits.  Imaging of CTA head and neck and CT head without contrast indicative of no acute large vessel occlusion and no evidence of hemorrhage.        Hospital/ICU Course:  No notes on file     Past Medical History:   Diagnosis Date    GERD (gastroesophageal reflux disease)     Hyperlipidemia     Hypertension        Past Surgical History:   Procedure Laterality Date    ECHOCARDIOGRAM,TRANSESOPHAGEAL  12/14/2023    Procedure: Transesophageal echo (GAEL) intra-procedure log documentation;  Surgeon: Jerry Be DO;  Location: [facility] CATH LAB;  Service: Cardiology;;    LEFT HEART CATHETERIZATION Left 12/14/2023    Procedure: Left heart cath;  Surgeon: Jerry Be DO;  Location: [facility] CATH LAB;  Service: Cardiology;  Laterality: Left;     MITRAL VALVE REPAIR      REPAIR OF HEART VALVE      RIGHT HEART CATHETERIZATION Right 2023    Procedure: INSERTION, CATHETER, RIGHT HEART;  Surgeon: Jerry Be DO;  Location: [facility] CATH LAB;  Service: Cardiology;  Laterality: Right;       Review of patient's allergies indicates:   Allergen Reactions    Sulfamethoxazole-trimethoprim Rash     Note: Emil Johson Syndrome       Family History       Problem Relation (Age of Onset)    Cataracts Mother    Diabetes Maternal Grandfather    Glaucoma Mother    Heart disease Brother    No Known Problems Father, Brother          Tobacco Use    Smoking status: Former     Current packs/day: 0.00     Types: Cigarettes     Quit date:      Years since quittin.0     Passive exposure: Past    Smokeless tobacco: Never   Substance and Sexual Activity    Alcohol use: Not Currently    Drug use: Not Currently     Types: Marijuana    Sexual activity: Yes      Review of Systems  Objective:     Vital Signs (Most Recent):  Temp: 97.5 °F (36.4 °C) (25 1211)  Pulse: 60 (25 192)  Resp: 16 (25)  BP: 126/69 (25)  SpO2: 95 % (25) Vital Signs (24h Range):  Temp:  [97.5 °F (36.4 °C)] 97.5 °F (36.4 °C)  Pulse:  [52-82] 60  Resp:  [10-18] 16  SpO2:  [91 %-97 %] 95 %  BP: (125-185)/(69-88) 126/69   Weight: 72.6 kg (160 lb)  Body mass index is 25.06 kg/m².    No intake or output data in the 24 hours ending 25       Physical Exam  Constitutional:       General: He is not in acute distress.     Appearance: Normal appearance. He is ill-appearing. He is not diaphoretic.   HENT:      Head: Normocephalic and atraumatic.      Nose: No congestion or rhinorrhea.      Mouth/Throat:      Mouth: Mucous membranes are moist.   Cardiovascular:      Rate and Rhythm: Normal rate and regular rhythm.      Pulses: Normal pulses.      Heart sounds: Normal heart sounds.   Pulmonary:      Effort: Pulmonary effort is normal. No respiratory  distress.      Breath sounds: Normal breath sounds. No stridor. No wheezing, rhonchi or rales.   Chest:      Chest wall: No tenderness.   Abdominal:      General: Abdomen is flat.   Musculoskeletal:      Cervical back: Normal range of motion. No rigidity.      Right lower leg: No edema.      Left lower leg: No edema.   Skin:     General: Skin is warm.      Findings: No erythema.   Neurological:      Mental Status: He is alert and oriented to person, place, and time.      Comments: Right upper extremity-1/5   Right lower extremity-2/5   Left upper and left lower extremity-5/5   Sensation impaired in right upper extremity and right lower extremity with sensation intact in left upper extremity and left lower extremity   Right-sided facial droop   Aphasia            Vents:     Lines/Drains/Airways       Peripheral Intravenous Line  Duration                  Peripheral IV - Single Lumen 01/09/25 1241 20 G Anterior;Distal;Left Upper Arm <1 day         Peripheral IV - Single Lumen 01/09/25 1241 20 G Right Antecubital <1 day                  Significant Labs:    CBC/Anemia Profile:  Recent Labs   Lab 01/09/25  1228 01/09/25  1244   WBC  --  6.20   HGB  --  12.7*   HCT 39 41.2   PLT  --  219   MCV  --  93.4   RDW  --  13.9        Chemistries:  Recent Labs   Lab 01/09/25  1244      K 4.2   *   CO2 23   BUN 16   CREATININE 1.13   CALCIUM 8.6*   ALBUMIN 3.9   PROT 6.8   BILITOT 0.5   ALKPHOS 66   ALT 13   AST 20       All pertinent labs within the past 24 hours have been reviewed.    Significant Imaging: I have reviewed all pertinent imaging results/findings within the past 24 hours.  Assessment/Plan:     Neuro  Embolic stroke involving left middle cerebral artery  Left hemispheric ischemic event, low large vessel occlusion with ongoing right-sided deficits.    Status post TNK administration 12:45 p.m. 1/9/25      BP goal prior to IV thrombolytics - <185/110  BP goal post IV thrombolytics - <180/105 for at least 24  hrs.       -- Close monitoring in ICU for at least 24 hrs.  Q.1 hour neuro checks  -- No antithrombotics or DVT prophylaxis for 24 hrs.  -- MRI brain to be performed at the 24 hour lou (1/10/25 afternoon), if MRI delayed than CT scan should be performed at the 24 hour lou on 1/10/25 to rule out any hemorrhagic event  -- TTE w/ bubble  -- PT/OT/SLP   -hold off on any antiplatelets or anticoagulation for 24 hours, till CT scan is performed  -avoid Varghese catheter placement and invasive procedure    Cardiac/Vascular  Coronary artery disease involving native coronary artery of native heart without angina pectoris  [facility] 12/14/2023- Dr. Be     Non-obstructive CAD  Single vessel CAD with a 30% prox RCA lesion  LVEF 55%  LVEDP 6 mmHg  Severe MR    Severe mitral regurgitation  Noted, status post complex MV repair    Paroxysmal atrial fibrillation  History of being on anticoagulation with atrial fibrillation which we will hold in the setting of thrombolytic therapy at this time   We will hold off on metoprolol at this time and continue to monitor-patient is rate controlled at this time  Initiate home rate control with metoprolol once patient is able to swallow     Critical Care Checklist    Refer to chart for details regarding spontaneous awakening trial (SAT) and spontaneous breathing trial (SBT), CAM-ICU assessment, early mobility and feeding.      Analgesia and sedation- not indicated  Thromboembolic prophylaxis- chemical DVT prophylaxis contraindicated in the setting of recent TNK administration  Height of bed greater than 30°- Yes  Stress ulcer prophylaxis- not indicated  Indwelling catheter (vascular access lines/Varghese catheter)-Reviewed  Deescalation of antimicrobials/pharmacotherapies-Reviewed and addressed appropriately  Code status- Prior        Critical Care Time:  35 minutes  Critical secondary to Patient has a condition that poses threat to life and bodily function:  Acute embolic/ischemic stroke status post  thrombolytic treatment now with close monitoring in the intensive care unit with q.1 hour neuro checks      Critical care was time spent personally by me on the following activities: development of treatment plan with patient or surrogate and bedside caregivers, discussions with consultants, evaluation of patient's response to treatment, examination of patient, ordering and performing treatments and interventions, ordering and review of laboratory studies, ordering and review of radiographic studies, pulse oximetry, re-evaluation of patient's condition. This critical care time did not overlap with that of any other provider or involve time for any procedures.     Endy Levine MD  Critical Care Medicine  [facility]

## 2025-01-10 NOTE — PLAN OF CARE
[facility] ICU  Initial Discharge Assessment       Primary Care Provider: Shannan Gorman FNP    Admission Diagnosis: Stroke [I63.9]  Acute focal neurological deficit [R29.818]  Embolic stroke involving left middle cerebral artery [I63.412]  Acute CVA (cerebrovascular accident) [I63.9]    Admission Date: 1/9/2025  Expected Discharge Date:     Transition of Care Barriers: None    Payor: MEDICARE / Plan: MEDICARE PART A & B / Product Type: Government /     Extended Emergency Contact Information  Primary Emergency Contact: Russ Torres  Mobile Phone: [phone]  Relation: Son  Preferred language: English   needed? No  Secondary Emergency Contact: shana Torres  Relation: Son  Preferred language: English   needed? No    Discharge Plan A: Home  Discharge Plan B: Home Health, Long-term acute care facility (LTAC), Rehab, Skilled Nursing Facility      [address]  [address] [place] [id number]  Phone: [phone] Fax: [phone]      Initial Assessment (most recent)       Adult Discharge Assessment - 01/10/25 1200          Discharge Assessment    Assessment Type Discharge Planning Assessment     Source of Information patient;family     Communicated ROLANDA with patient/caregiver Date not available/Unable to determine     People in Home alone     Do you expect to return to your current living situation? Other (see comments)   patient may benefit from rehab    Do you have help at home or someone to help you manage your care at home? Yes     Who are your caregiver(s) and their phone number(s)? both eugenia finn lives close to patient. also jamila perales     Prior to hospitilization cognitive status: Alert/Oriented     Current cognitive status: Alert/Oriented     Walking or Climbing Stairs Difficulty no     Dressing/Bathing Difficulty no     Equipment Currently Used at Home none     Patient currently being followed by  outpatient case management? No     Do you currently have service(s) that help you manage your care at home? No     Do you take prescription medications? Yes     Do you have prescription coverage? Yes     Coverage medicare     Do you have any problems affording any of your prescribed medications? No     Is the patient taking medications as prescribed? yes     Who is going to help you get home at discharge? family     How do you get to doctors appointments? car, drives self;family or friend will provide     Are you on dialysis? No     Do you take coumadin? No     Discharge Plan A Home     Discharge Plan B Home Health;Long-term acute care facility (LTAC);Rehab;Skilled Nursing Facility     DME Needed Upon Discharge  none     Discharge Plan discussed with: Patient;Adult children     Transition of Care Barriers None        Physical Activity    On average, how many days per week do you engage in moderate to strenuous exercise (like a brisk walk)? 0 days     On average, how many minutes do you engage in exercise at this level? 0 min        Financial Resource Strain    How hard is it for you to pay for the very basics like food, housing, medical care, and heating? Not hard at all        Housing Stability    In the last 12 months, was there a time when you were not able to pay the mortgage or rent on time? No     At any time in the past 12 months, were you homeless or living in a shelter (including now)? No        Transportation Needs    Has the lack of transportation kept you from medical appointments, meetings, work or from getting things needed for daily living? No        Food Insecurity    Within the past 12 months, you worried that your food would run out before you got the money to buy more. Never true     Within the past 12 months, the food you bought just didn't last and you didn't have money to get more. Never true        Stress    Do you feel stress - tense, restless, nervous, or anxious, or unable to sleep at night  because your mind is troubled all the time - these days? Not at all        Social Isolation    How often do you feel lonely or isolated from those around you?  Never        Alcohol Use    Q1: How often do you have a drink containing alcohol? Never     Q2: How many drinks containing alcohol do you have on a typical day when you are drinking? Patient does not drink     Q3: How often do you have six or more drinks on one occasion? Never        Utilities    In the past 12 months has the electric, gas, oil, or water company threatened to shut off services in your home? No        Health Literacy    How often do you need to have someone help you when you read instructions, pamphlets, or other written material from your doctor or pharmacy? Rarely                   Visited patients room then spoke with son coretta on the phone. Patient lives alone. Prior to admit patient was independent with all self care including driving. He uses no dme and had no home services. IM explained and signed by patient. Dc plan is home. Cm will follow.

## 2025-01-10 NOTE — PLAN OF CARE
Problem: Occupational Therapy  Goal: Occupational Therapy Goal  Description: STG: (in 1 week)  Pt will perform grooming with setup  Pt will bathe with Supervision  Pt will perform UE dressing with independence  Pt will perform LE dressing with Supervision  Pt will perform toileting with SBA  Pt will transfer bed/chair/bsc with mod(I)  Pt will perform standing task x 7 min with SBA   Pt will tolerate 20 minutes of tx without fatigue  Pt will perform light home making task with supervision      LTG: (in 5 weeks)  1.Restore to max I with self care and mobility.    Outcome: Progressing       Pt was admitted with CVA, underwent TNK. Pt is feeling much better but is having difficulty expressing himself. Pt demonstrates good (B) UE strength and ROM. Coordination appears to be good, but has some trouble with his processing. Pt demonstrates mildly decreased balance with ambulation, with a couple of oddly placed steps, but no loss of balance. OT will treat pt while he is hospitalized.

## 2025-01-10 NOTE — SUBJECTIVE & OBJECTIVE
Past Medical History:   Diagnosis Date    GERD (gastroesophageal reflux disease)     Hyperlipidemia     Hypertension        Past Surgical History:   Procedure Laterality Date    ECHOCARDIOGRAM,TRANSESOPHAGEAL  2023    Procedure: Transesophageal echo (GAEL) intra-procedure log documentation;  Surgeon: Jerry Be DO;  Location: [facility] CATH LAB;  Service: Cardiology;;    LEFT HEART CATHETERIZATION Left 2023    Procedure: Left heart cath;  Surgeon: Jerry Be DO;  Location: [facility] CATH LAB;  Service: Cardiology;  Laterality: Left;    MITRAL VALVE REPAIR      REPAIR OF HEART VALVE      RIGHT HEART CATHETERIZATION Right 2023    Procedure: INSERTION, CATHETER, RIGHT HEART;  Surgeon: Jerry Be DO;  Location: [facility] CATH LAB;  Service: Cardiology;  Laterality: Right;       Review of patient's allergies indicates:   Allergen Reactions    Sulfamethoxazole-trimethoprim Rash     Note: Emil Johson Syndrome       Family History       Problem Relation (Age of Onset)    Cataracts Mother    Diabetes Maternal Grandfather    Glaucoma Mother    Heart disease Brother    No Known Problems Father, Brother          Tobacco Use    Smoking status: Former     Current packs/day: 0.00     Types: Cigarettes     Quit date:      Years since quittin.0     Passive exposure: Past    Smokeless tobacco: Never   Substance and Sexual Activity    Alcohol use: Not Currently    Drug use: Not Currently     Types: Marijuana    Sexual activity: Yes      Review of Systems  Objective:     Vital Signs (Most Recent):  Temp: 97.5 °F (36.4 °C) (25 1211)  Pulse: 60 (25)  Resp: 16 (25)  BP: 126/69 (25)  SpO2: 95 % (25) Vital Signs (24h Range):  Temp:  [97.5 °F (36.4 °C)] 97.5 °F (36.4 °C)  Pulse:  [52-82] 60  Resp:  [10-18] 16  SpO2:  [91 %-97 %] 95 %  BP: (125-185)/(69-88) 126/69   Weight: 72.6 kg (160 lb)  Body mass index is 25.06 kg/m².    No intake or output  data in the 24 hours ending 01/09/25 2007       Physical Exam  Constitutional:       General: He is not in acute distress.     Appearance: Normal appearance. He is ill-appearing. He is not diaphoretic.   HENT:      Head: Normocephalic and atraumatic.      Nose: No congestion or rhinorrhea.      Mouth/Throat:      Mouth: Mucous membranes are moist.   Cardiovascular:      Rate and Rhythm: Normal rate and regular rhythm.      Pulses: Normal pulses.      Heart sounds: Normal heart sounds.   Pulmonary:      Effort: Pulmonary effort is normal. No respiratory distress.      Breath sounds: Normal breath sounds. No stridor. No wheezing, rhonchi or rales.   Chest:      Chest wall: No tenderness.   Abdominal:      General: Abdomen is flat.   Musculoskeletal:      Cervical back: Normal range of motion. No rigidity.      Right lower leg: No edema.      Left lower leg: No edema.   Skin:     General: Skin is warm.      Findings: No erythema.   Neurological:      Mental Status: He is alert and oriented to person, place, and time.      Comments: Right upper extremity-1/5   Right lower extremity-2/5   Left upper and left lower extremity-5/5   Sensation impaired in right upper extremity and right lower extremity with sensation intact in left upper extremity and left lower extremity   Right-sided facial droop   Aphasia            Vents:     Lines/Drains/Airways       Peripheral Intravenous Line  Duration                  Peripheral IV - Single Lumen 01/09/25 1241 20 G Anterior;Distal;Left Upper Arm <1 day         Peripheral IV - Single Lumen 01/09/25 1241 20 G Right Antecubital <1 day                  Significant Labs:    CBC/Anemia Profile:  Recent Labs   Lab 01/09/25  1228 01/09/25  1244   WBC  --  6.20   HGB  --  12.7*   HCT 39 41.2   PLT  --  219   MCV  --  93.4   RDW  --  13.9        Chemistries:  Recent Labs   Lab 01/09/25  1244      K 4.2   *   CO2 23   BUN 16   CREATININE 1.13   CALCIUM 8.6*   ALBUMIN 3.9   PROT 6.8    BILITOT 0.5   ALKPHOS 66   ALT 13   AST 20       All pertinent labs within the past 24 hours have been reviewed.    Significant Imaging: I have reviewed all pertinent imaging results/findings within the past 24 hours.

## 2025-01-10 NOTE — ASSESSMENT & PLAN NOTE
[facility] 12/14/2023- Dr. Be     Non-obstructive CAD  Single vessel CAD with a 30% prox RCA lesion  LVEF 55%  LVEDP 6 mmHg  Severe MR

## 2025-01-10 NOTE — PLAN OF CARE
Problem: Physical Therapy  Goal: Physical Therapy Goal  Description: Short term goals:  . Supine to sit with [place]  . Sit to supine with [place]  . Sit to stand transfer with [place]  . Gait  x 300 feet with [place] using No Assistive Device.     Long term goals:  Patient will regain full independent functional mobility with lowest level of assistive device to return to desired living arrangement and prior ADL's.     Outcome: Progressing

## 2025-01-10 NOTE — PLAN OF CARE
Problem: Thrombolytic Therapy  Goal: Absence of Bleeding  Outcome: Progressing  Goal: Unobstructed Breathing  Outcome: Progressing     Problem: Stroke, Ischemic (Includes Transient Ischemic Attack)  Goal: Optimal Coping  Outcome: Progressing  Goal: Effective Bowel Elimination  Outcome: Progressing  Goal: Optimal Cerebral Tissue Perfusion  Outcome: Progressing  Goal: Optimal Cognitive Function  Outcome: Progressing  Goal: Improved Communication Skills  Outcome: Progressing  Goal: Optimal Functional Ability  Outcome: Progressing  Goal: Optimal Nutrition Intake  Outcome: Progressing  Goal: Effective Oxygenation and Ventilation  Outcome: Progressing  Goal: Improved Sensorimotor Function  Outcome: Progressing  Goal: Safe and Effective Swallow  Outcome: Progressing  Goal: Effective Urinary Elimination  Outcome: Progressing     Problem: Infection  Goal: Absence of Infection Signs and Symptoms  Outcome: Progressing     Problem: Adult Inpatient Plan of Care  Goal: Plan of Care Review  Outcome: Progressing  Goal: Patient-Specific Goal (Individualized)  Outcome: Progressing  Goal: Absence of Hospital-Acquired Illness or Injury  Outcome: Progressing  Goal: Optimal Comfort and Wellbeing  Outcome: Progressing  Goal: Readiness for Transition of Care  Outcome: Progressing

## 2025-01-10 NOTE — PROGRESS NOTES
[facility]  Wound Care    Patient Name:  Mitul Torres   MRN:  [id number]  Date: 1/10/2025  Diagnosis: <principal problem not specified>    History:     Past Medical History:   Diagnosis Date    GERD (gastroesophageal reflux disease)     Hyperlipidemia     Hypertension        Social History     Socioeconomic History    Marital status: Single   Tobacco Use    Smoking status: Former     Current packs/day: 0.00     Types: Cigarettes     Quit date:      Years since quittin.0     Passive exposure: Past    Smokeless tobacco: Never   Substance and Sexual Activity    Alcohol use: Not Currently    Drug use: Not Currently     Types: Marijuana    Sexual activity: Yes     Social Drivers of Health     Financial Resource Strain: Low Risk  (1/10/2025)    Overall Financial Resource Strain (CARDIA)     Difficulty of Paying Living Expenses: Not hard at all   Food Insecurity: No Food Insecurity (1/10/2025)    Hunger Vital Sign     Worried About Running Out of Food in the Last Year: Never true     Ran Out of Food in the Last Year: Never true   Transportation Needs: No Transportation Needs (1/10/2025)    TRANSPORTATION NEEDS     Transportation : No   Physical Activity: Inactive (1/10/2025)    Exercise Vital Sign     Days of Exercise per Week: 0 days     Minutes of Exercise per Session: 0 min   Stress: No Stress Concern Present (1/10/2025)    [employer] [place] - Occupational Stress Questionnaire     Feeling of Stress : Not at all   Housing Stability: Low Risk  (1/10/2025)    Housing Stability Vital Sign     Unable to Pay for Housing in the Last Year: No     Homeless in the Last Year: No       Precautions:     Allergies as of 2025 - Reviewed 2025   Allergen Reaction Noted    Sulfamethoxazole-trimethoprim Rash 2022       [facility] Assessment Details/Treatment     Narrative: Seen patient for initiation of preventative skin care measures    Patient in bed. Nursing staff  states patient skin is ok. Has Mepliex foam borders in use. On low air loss mattress.   Bill score 19.    Consult wound care for any skin issues       01/10/2025

## 2025-01-10 NOTE — ASSESSMENT & PLAN NOTE
History of being on anticoagulation with atrial fibrillation which we will hold in the setting of thrombolytic therapy at this time   We will hold off on metoprolol at this time and continue to monitor-patient is rate controlled at this time  Initiate home rate control with metoprolol once patient is able to swallow

## 2025-01-10 NOTE — PT/OT/SLP EVAL
Speech Language Pathology Evaluation  Cognitive/Bedside Swallow    Patient Name:  Mitul Torres   MRN:  [id number]  Admitting Diagnosis: <principal problem not specified>    Recommendations:                  General Recommendations:  Speech/language therapy and Cognitive-linguistic therapy  Diet recommendations:  Regular, Thin   Aspiration Precautions: Remain upright 30 minutes post meal and Standard aspiration precautions   General Precautions: Standard    Communication strategies:   Patient presents with expressive aphasia.    Assessment:     Mitul Torres is a 69 y.o. male with an SLP diagnosis of  possible CVA .  He presents with expressive aphasia during SPEECH THERAPY evaluation.    History:     Past Medical History:   Diagnosis Date    GERD (gastroesophageal reflux disease)     Hyperlipidemia     Hypertension        Past Surgical History:   Procedure Laterality Date    ECHOCARDIOGRAM,TRANSESOPHAGEAL  12/14/2023    Procedure: Transesophageal echo (GAEL) intra-procedure log documentation;  Surgeon: Jerry Be DO;  Location: [facility] CATH LAB;  Service: Cardiology;;    LEFT HEART CATHETERIZATION Left 12/14/2023    Procedure: Left heart cath;  Surgeon: Jerry Be DO;  Location: [facility] CATH LAB;  Service: Cardiology;  Laterality: Left;    MITRAL VALVE REPAIR      REPAIR OF HEART VALVE      RIGHT HEART CATHETERIZATION Right 12/14/2023    Procedure: INSERTION, CATHETER, RIGHT HEART;  Surgeon: Jerry Be DO;  Location: [facility] CATH LAB;  Service: Cardiology;  Laterality: Right;       Social History: Patient lives in [place] and was still working.    Prior Intubation HX:  n/a    Modified Barium Swallow: n/a    Chest X-Rays: see chart    Prior diet: regular consistency.    Occupation/hobbies/homemaking: not stated.    Subjective     Patient lying in bed with eyes closed up SLP arrival. Patient easily aroused and agreeable to SPEECH THERAPY Evaluation/BEDSIDE SWALLOW EVALUATION.  Patient  goals: not stated     Pain/Comfort:  Pain Rating 1: 0/10 (No pain reported by pt during eval.)    Respiratory Status: Room air    Objective:     Cognitive Status:    Orientation Person      Receptive Language:   Comprehension:      Patient appears to understand what is being said to him and is able to follow simple commands.    Pragmatics:    Normal    Expressive Language:  Verbal:    Patient unable to answer most simple questions appropriately and had significant difficulty with word finding. Patient was able to tell me his son's name is Nahun. Patient also able to repeat some words.        Motor Speech:  WFL    Voice:   WFL        Oral Musculature Evaluation  Oral Musculature: WFL  Dentition: upper and lower dentures  Secretion Management: adequate  Mucosal Quality: adequate  Oral Labial Strength and Mobility: WFL  Lingual Strength and Mobility: WF    Bedside Swallow Eval:   Consistencies Assessed:  Thin liquids No difficulties noted.  Puree No difficulties noted.   Soft solids No difficulties noted. No overt s/s of aspiration noted with any trials of any consistencies tested.       Oral Phase:   WFL    Pharyngeal Phase:   no overt clinical signs/symptoms of aspiration  no overt clinical signs/symptoms of pharyngeal dysphagia    Compensatory Strategies  None    Treatment: Rec a regular consistency diet with thin liquids as tolerated. SPEECH THERAPY to begin treatment for speech/language/cognition and patient/family education.     Goals:   Multidisciplinary Problems       SLP Goals       Not on file                    Plan:     Patient to be seen:  5 x/week   Plan of Care expires:  01/24/25  Plan of Care reviewed with:      SLP Follow-Up:  Yes       Discharge recommendations:      Barriers to Discharge:   Patient may still require assistance at time of discharge.    Time Tracking:     SLP Treatment Date:      Speech Start Time:  0930  Speech Stop Time:  0955     Speech Total Time (min):  25 min    Billable  Minutes: Eval 12  and Eval Swallow and Oral Function 13    01/10/2025

## 2025-01-11 PROBLEM — R41.0 DELIRIUM: Status: ACTIVE | Noted: 2025-01-11

## 2025-01-11 PROBLEM — R10.9 ABDOMINAL PAIN: Status: ACTIVE | Noted: 2025-01-11

## 2025-01-11 LAB
ANION GAP SERPL CALCULATED.3IONS-SCNC: 10 MMOL/L (ref 7–16)
BASOPHILS # BLD AUTO: 0.03 K/UL (ref 0–0.2)
BASOPHILS NFR BLD AUTO: 0.6 % (ref 0–1)
BUN SERPL-MCNC: 14 MG/DL (ref 8–26)
BUN/CREAT SERPL: 11 (ref 6–20)
CALCIUM SERPL-MCNC: 8.7 MG/DL (ref 8.8–10)
CHLORIDE SERPL-SCNC: 106 MMOL/L (ref 98–107)
CO2 SERPL-SCNC: 27 MMOL/L (ref 23–31)
CREAT SERPL-MCNC: 1.25 MG/DL (ref 0.72–1.25)
EGFR (NO RACE VARIABLE) (RUSH/TITUS): 63 ML/MIN/1.73M2
EOSINOPHIL # BLD AUTO: 0.06 K/UL (ref 0–0.5)
EOSINOPHIL NFR BLD AUTO: 1.3 % (ref 1–4)
ERYTHROCYTE [DISTWIDTH] IN BLOOD BY AUTOMATED COUNT: 13.7 % (ref 11.5–14.5)
GLUCOSE SERPL-MCNC: 106 MG/DL (ref 82–115)
HCT VFR BLD AUTO: 38.1 % (ref 40–54)
HGB BLD-MCNC: 12.2 G/DL (ref 13.5–18)
IMM GRANULOCYTES # BLD AUTO: 0.02 K/UL (ref 0–0.04)
IMM GRANULOCYTES NFR BLD: 0.4 % (ref 0–0.4)
LACTATE SERPL-SCNC: 1 MMOL/L (ref 0.5–2.2)
LYMPHOCYTES # BLD AUTO: 0.96 K/UL (ref 1–4.8)
LYMPHOCYTES NFR BLD AUTO: 20.6 % (ref 27–41)
MCH RBC QN AUTO: 28.8 PG (ref 27–31)
MCHC RBC AUTO-ENTMCNC: 32 G/DL (ref 32–36)
MCV RBC AUTO: 90.1 FL (ref 80–96)
MONOCYTES # BLD AUTO: 0.38 K/UL (ref 0–0.8)
MONOCYTES NFR BLD AUTO: 8.2 % (ref 2–6)
MPC BLD CALC-MCNC: 11.2 FL (ref 9.4–12.4)
NEUTROPHILS # BLD AUTO: 3.2 K/UL (ref 1.8–7.7)
NEUTROPHILS NFR BLD AUTO: 68.9 % (ref 53–65)
NRBC # BLD AUTO: 0 X10E3/UL
NRBC, AUTO (.00): 0 %
OHS QRS DURATION: 102 MS
OHS QTC CALCULATION: 467 MS
PLATELET # BLD AUTO: 205 K/UL (ref 150–400)
POTASSIUM SERPL-SCNC: 4.2 MMOL/L (ref 3.5–5.1)
RBC # BLD AUTO: 4.23 M/UL (ref 4.6–6.2)
SODIUM SERPL-SCNC: 139 MMOL/L (ref 136–145)
TROPONIN I SERPL HS-MCNC: 4.8 NG/L
WBC # BLD AUTO: 4.65 K/UL (ref 4.5–11)

## 2025-01-11 PROCEDURE — 25500020 PHARM REV CODE 255: Performed by: STUDENT IN AN ORGANIZED HEALTH CARE EDUCATION/TRAINING PROGRAM

## 2025-01-11 PROCEDURE — 63600175 PHARM REV CODE 636 W HCPCS: Performed by: HOSPITALIST

## 2025-01-11 PROCEDURE — 80048 BASIC METABOLIC PNL TOTAL CA: CPT | Performed by: STUDENT IN AN ORGANIZED HEALTH CARE EDUCATION/TRAINING PROGRAM

## 2025-01-11 PROCEDURE — 99900035 HC TECH TIME PER 15 MIN (STAT)

## 2025-01-11 PROCEDURE — 99291 CRITICAL CARE FIRST HOUR: CPT | Mod: ,,, | Performed by: STUDENT IN AN ORGANIZED HEALTH CARE EDUCATION/TRAINING PROGRAM

## 2025-01-11 PROCEDURE — 84484 ASSAY OF TROPONIN QUANT: CPT | Performed by: STUDENT IN AN ORGANIZED HEALTH CARE EDUCATION/TRAINING PROGRAM

## 2025-01-11 PROCEDURE — 36415 COLL VENOUS BLD VENIPUNCTURE: CPT | Performed by: STUDENT IN AN ORGANIZED HEALTH CARE EDUCATION/TRAINING PROGRAM

## 2025-01-11 PROCEDURE — 93005 ELECTROCARDIOGRAM TRACING: CPT

## 2025-01-11 PROCEDURE — 25000003 PHARM REV CODE 250: Performed by: HOSPITALIST

## 2025-01-11 PROCEDURE — 25000003 PHARM REV CODE 250: Performed by: STUDENT IN AN ORGANIZED HEALTH CARE EDUCATION/TRAINING PROGRAM

## 2025-01-11 PROCEDURE — 83605 ASSAY OF LACTIC ACID: CPT | Performed by: STUDENT IN AN ORGANIZED HEALTH CARE EDUCATION/TRAINING PROGRAM

## 2025-01-11 PROCEDURE — 93010 ELECTROCARDIOGRAM REPORT: CPT | Mod: ,,, | Performed by: HOSPITALIST

## 2025-01-11 PROCEDURE — 85025 COMPLETE CBC W/AUTO DIFF WBC: CPT | Performed by: STUDENT IN AN ORGANIZED HEALTH CARE EDUCATION/TRAINING PROGRAM

## 2025-01-11 PROCEDURE — 94761 N-INVAS EAR/PLS OXIMETRY MLT: CPT

## 2025-01-11 PROCEDURE — 20000000 HC ICU ROOM

## 2025-01-11 PROCEDURE — 63600175 PHARM REV CODE 636 W HCPCS: Performed by: STUDENT IN AN ORGANIZED HEALTH CARE EDUCATION/TRAINING PROGRAM

## 2025-01-11 RX ORDER — HEPARIN SODIUM 5000 [USP'U]/ML
5000 INJECTION, SOLUTION INTRAVENOUS; SUBCUTANEOUS EVERY 8 HOURS
Status: DISCONTINUED | OUTPATIENT
Start: 2025-01-11 | End: 2025-01-16 | Stop reason: HOSPADM

## 2025-01-11 RX ORDER — ZIPRASIDONE MESYLATE 20 MG/ML
20 INJECTION, POWDER, LYOPHILIZED, FOR SOLUTION INTRAMUSCULAR ONCE
Status: COMPLETED | OUTPATIENT
Start: 2025-01-11 | End: 2025-01-11

## 2025-01-11 RX ORDER — THIAMINE HYDROCHLORIDE 100 MG/ML
100 INJECTION, SOLUTION INTRAMUSCULAR; INTRAVENOUS DAILY
Status: DISCONTINUED | OUTPATIENT
Start: 2025-01-11 | End: 2025-01-14

## 2025-01-11 RX ORDER — IOPAMIDOL 755 MG/ML
100 INJECTION, SOLUTION INTRAVASCULAR
Status: COMPLETED | OUTPATIENT
Start: 2025-01-11 | End: 2025-01-11

## 2025-01-11 RX ORDER — ATORVASTATIN CALCIUM 40 MG/1
40 TABLET, FILM COATED ORAL DAILY
Status: DISCONTINUED | OUTPATIENT
Start: 2025-01-12 | End: 2025-01-16 | Stop reason: HOSPADM

## 2025-01-11 RX ORDER — DIATRIZOATE MEGLUMINE AND DIATRIZOATE SODIUM 660; 100 MG/ML; MG/ML
30 SOLUTION ORAL; RECTAL
Status: ACTIVE | OUTPATIENT
Start: 2025-01-11 | End: 2025-01-11

## 2025-01-11 RX ORDER — NAPROXEN SODIUM 220 MG/1
81 TABLET, FILM COATED ORAL DAILY
Status: DISCONTINUED | OUTPATIENT
Start: 2025-01-12 | End: 2025-01-16 | Stop reason: HOSPADM

## 2025-01-11 RX ADMIN — ZIPRASIDONE MESYLATE 20 MG: 20 INJECTION, POWDER, LYOPHILIZED, FOR SOLUTION INTRAMUSCULAR at 01:01

## 2025-01-11 RX ADMIN — IOPAMIDOL 100 ML: 755 INJECTION, SOLUTION INTRAVENOUS at 04:01

## 2025-01-11 RX ADMIN — ONDANSETRON 8 MG: 2 INJECTION INTRAMUSCULAR; INTRAVENOUS at 08:01

## 2025-01-11 RX ADMIN — ALUMINUM HYDROXIDE, MAGNESIUM HYDROXIDE, AND DIMETHICONE 30 ML: 400; 400; 40 SUSPENSION ORAL at 10:01

## 2025-01-11 RX ADMIN — MUPIROCIN: 20 OINTMENT TOPICAL at 08:01

## 2025-01-11 RX ADMIN — HEPARIN SODIUM 5000 UNITS: 5000 INJECTION, SOLUTION INTRAVENOUS; SUBCUTANEOUS at 10:01

## 2025-01-11 RX ADMIN — Medication 6 MG: at 12:01

## 2025-01-11 RX ADMIN — DEXMEDETOMIDINE HYDROCHLORIDE 0.2 MCG/KG/HR: 4 INJECTION, SOLUTION INTRAVENOUS at 06:01

## 2025-01-11 RX ADMIN — TRAZODONE HYDROCHLORIDE 50 MG: 50 TABLET ORAL at 12:01

## 2025-01-11 RX ADMIN — THIAMINE HYDROCHLORIDE 100 MG: 100 INJECTION, SOLUTION INTRAMUSCULAR; INTRAVENOUS at 05:01

## 2025-01-11 NOTE — PROGRESS NOTES
[facility]  Critical Care Medicine  Progress Note    Patient Name: Mitul Torres  MRN: [id number]  Admission Date: 1/9/2025  Hospital Length of Stay: 2 days  Code Status: Full Code  Attending Provider: Endy Levine MD  Primary Care Provider: Shannan Gorman FNP   Principal Problem: <principal problem not specified>    Subjective:     HPI:  69-year-old male with past medical history significant for atrial fibrillation anticoagulated with Eliquis and on metoprolol, history of severe MR status post complex MV repair and LAD a occlusion, anemia, prior history of chronic area of infarcts in the brain, GI bleed who presented to the hospital on 1/9/25 in the setting of aphasia and altered mental status around 830-9:00 a.m., 5 to right-sided deficits and now status post TNK for left hemispheric ischemic event.      I saw the patient after he had his TNK at 12:45 p.m. on 1/9/25.  The patient is still had significant right-sided deficits with power 2/5 in right lower extremity, 1/5 right upper extremity, right-sided facial droop and aphasia.  Patient was otherwise hemodynamically stable, hemoglobin at 12.7.  Electrolytes grossly within normal limits.  Imaging of CTA head and neck and CT head without contrast indicative of no acute large vessel occlusion and no evidence of hemorrhage.        Hospital/ICU Course:  1/10/25-patient confusion delirious overnight without any worsening focal neurological deficits.  Hallucinations present, however significant improvement in the patient's deficits right-sided deficits with return of power and sensation.    Past Medical History:   Diagnosis Date    GERD (gastroesophageal reflux disease)     Hyperlipidemia     Hypertension        Past Surgical History:   Procedure Laterality Date    ECHOCARDIOGRAM,TRANSESOPHAGEAL  12/14/2023    Procedure: Transesophageal echo (GAEL) intra-procedure log documentation;  Surgeon: Jerry Be DO;  Location: [facility] CATH LAB;   Service: Cardiology;;    LEFT HEART CATHETERIZATION Left 2023    Procedure: Left heart cath;  Surgeon: Jerry Be DO;  Location: [facility] CATH LAB;  Service: Cardiology;  Laterality: Left;    MITRAL VALVE REPAIR      REPAIR OF HEART VALVE      RIGHT HEART CATHETERIZATION Right 2023    Procedure: INSERTION, CATHETER, RIGHT HEART;  Surgeon: Jerry Be DO;  Location: [facility] CATH LAB;  Service: Cardiology;  Laterality: Right;       Review of patient's allergies indicates:   Allergen Reactions    Sulfamethoxazole-trimethoprim Rash     Note: Emil Johson Syndrome       Family History       Problem Relation (Age of Onset)    Cataracts Mother    Diabetes Maternal Grandfather    Glaucoma Mother    Heart disease Brother    No Known Problems Father, Brother          Tobacco Use    Smoking status: Former     Current packs/day: 0.00     Types: Cigarettes     Quit date:      Years since quittin.0     Passive exposure: Past    Smokeless tobacco: Never   Substance and Sexual Activity    Alcohol use: Not Currently    Drug use: Not Currently     Types: Marijuana    Sexual activity: Yes      Review of Systems  Objective:     Vital Signs (Most Recent):  Temp: 97.9 °F (36.6 °C) (25 0701)  Pulse: 60 (25 1000)  Resp: 12 (25 1000)  BP: (!) 75/46 (25 1000)  SpO2: 96 % (25 1000) Vital Signs (24h Range):  Temp:  [97.5 °F (36.4 °C)-98.2 °F (36.8 °C)] 97.9 °F (36.6 °C)  Pulse:  [38-93] 60  Resp:  [7-24] 12  SpO2:  [93 %-99 %] 96 %  BP: ()/(33-87) 75/46   Weight: 75.5 kg (166 lb 7.2 oz)  Body mass index is 26.07 kg/m².      Intake/Output Summary (Last 24 hours) at 2025 1049  Last data filed at 2025 1011  Gross per 24 hour   Intake 41.78 ml   Output 970 ml   Net -928.22 ml          Physical Exam  Constitutional:       General: He is not in acute distress.     Appearance: Normal appearance. He is not ill-appearing or diaphoretic.   HENT:      Head: Normocephalic  and atraumatic.      Nose: No congestion or rhinorrhea.      Mouth/Throat:      Mouth: Mucous membranes are moist.   Cardiovascular:      Rate and Rhythm: Normal rate and regular rhythm.      Pulses: Normal pulses.      Heart sounds: Normal heart sounds.   Pulmonary:      Effort: Pulmonary effort is normal. No respiratory distress.      Breath sounds: Normal breath sounds. No stridor. No wheezing, rhonchi or rales.   Chest:      Chest wall: No tenderness.   Abdominal:      General: Abdomen is flat.   Musculoskeletal:      Cervical back: Normal range of motion. No rigidity.      Right lower leg: No edema.      Left lower leg: No edema.   Skin:     General: Skin is warm.      Findings: No erythema.   Neurological:      Mental Status: He is alert. He is disoriented.      Comments: Right upper extremity-4/5   Right lower extremity-4/5   Left upper and left lower extremity-5/5   Aphasia and facial droop resolved            Vents:     Lines/Drains/Airways       Drain  Duration                  Urethral Catheter 01/09/25 2020 1 day              Peripheral Intravenous Line  Duration                  Peripheral IV - Single Lumen 01/09/25 1241 20 G Anterior;Distal;Left Upper Arm 1 day         Peripheral IV - Single Lumen 01/11/25 0145 20 G No Anterior;Right Forearm <1 day                  Significant Labs:    CBC/Anemia Profile:  Recent Labs   Lab 01/09/25  1244 01/10/25  0411 01/11/25  0710   WBC 6.20 4.99 4.65   HGB 12.7* 11.8* 12.2*   HCT 41.2 37.4* 38.1*    209 205   MCV 93.4 90.1 90.1   RDW 13.9 13.8 13.7        Chemistries:  Recent Labs   Lab 01/09/25  1244 01/09/25  2036    140   K 4.2 3.6   * 109*   CO2 23 26   BUN 16 12   CREATININE 1.13 0.92   CALCIUM 8.6* 8.4*   ALBUMIN 3.9  --    PROT 6.8  --    BILITOT 0.5  --    ALKPHOS 66  --    ALT 13  --    AST 20  --        All pertinent labs within the past 24 hours have been reviewed.    Significant Imaging: I have reviewed all pertinent imaging  results/findings within the past 24 hours.    ABG  Recent Labs   Lab 01/09/25  1228   PH 7.34   PO2 25   PCO2 57*   HCO3 30.8*     Assessment/Plan:     Neuro  Embolic stroke involving left middle cerebral artery  Left hemispheric ischemic event, low large vessel occlusion with significant improvement in right-sided deficits 1/10/25  Status post TNK administration 12:45 p.m. 1/9/25      BP goal prior to IV thrombolytics - <185/110  BP goal post IV thrombolytics - <180/105 for at least 24 hrs.       -- Close monitoring in ICU for at least 24 hrs.  Q.1 hour neuro checks  -- No antithrombotics or DVT prophylaxis for 24 hrs.  -- MRI brain to be performed at the 24 hour lou (this afternoon), if MRI delayed than CT scan should be performed at the 24 hour lou on 1/10/25 to rule out any hemorrhagic event  -- TTE w/ bubble  -- PT/OT/SLP   -hold off on any antiplatelets or anticoagulation for 24 hours, till CT scan is performed  -avoid Varghese catheter placement and invasive procedure    Cardiac/Vascular  Coronary artery disease involving native coronary artery of native heart without angina pectoris  [facility] 12/14/2023- Dr. Be     Non-obstructive CAD  Single vessel CAD with a 30% prox RCA lesion  LVEF 55%  LVEDP 6 mmHg  Severe MR    Severe mitral regurgitation  Noted, status post complex MV repair    Paroxysmal atrial fibrillation  History of being on anticoagulation with atrial fibrillation which we will hold in the setting of thrombolytic therapy at this time   We will hold off on metoprolol at this time and continue to monitor-patient is rate controlled at this time  Initiate home rate control with metoprolol once patient is able to swallow    Critical Care Checklist     Refer to chart for details regarding spontaneous awakening trial (SAT) and spontaneous breathing trial (SBT), CAM-ICU assessment, early mobility and feeding.       Analgesia and sedation- not indicated  Thromboembolic prophylaxis- chemical DVT prophylaxis  contraindicated in the setting of recent TNK administration  Height of bed greater than 30°- Yes  Stress ulcer prophylaxis- not indicated  Indwelling catheter (vascular access lines/Varghese catheter)-Reviewed  Deescalation of antimicrobials/pharmacotherapies-Reviewed and addressed appropriately  Code status- Prior           Critical Care Time:  35 minutes  Critical secondary to Patient has a condition that poses threat to life and bodily function:  Acute embolic/ischemic stroke status post thrombolytic treatment now with close monitoring in the intensive care unit with q.1 hour neuro checks      Critical care was time spent personally by me on the following activities: development of treatment plan with patient or surrogate and bedside caregivers, discussions with consultants, evaluation of patient's response to treatment, examination of patient, ordering and performing treatments and interventions, ordering and review of laboratory studies, ordering and review of radiographic studies, pulse oximetry, re-evaluation of patient's condition. This critical care time did not overlap with that of any other provider or involve time for any procedures.     Endy Levine MD  Critical Care Medicine  [facility]

## 2025-01-11 NOTE — HOSPITAL COURSE
1/10/25-patient confusion delirious overnight without any worsening focal neurological deficits.  Hallucinations present, however significant improvement in the patient's deficits right-sided deficits with return of power and sensation.    1/11/25-the patient continued to have hallucinations today.  At 1 point, he complained of left lower quadrant abdominal pain as well as what he described as tunneled vision, alternating with episodes of delirium, waxing and waning lucidity with hallucinations.  Therefore, he had a stat CT head performed rule out any new neurological deficits (nothing focal in terms of his right-sided deficits returned).  CT head without any evidence of acute bleed.  CTA chest (ordered due to echocardiogram read, low probability pulmonary embolism) and CT abdomen pelvis with IV and oral contrast pending.    1/12/25-significantly improved in terms of patient's delirium.  No longer complaining of changes in vision/hallucinations.  No ongoing left lower quadrant pain.  CT abdomen and pelvis with evidence 6 mm obstructive stone at left UVJ.

## 2025-01-11 NOTE — ASSESSMENT & PLAN NOTE
[facility] 12/14/2023- Dr. Be     Non-obstructive CAD  Single vessel CAD with a 30% prox RCA lesion  LVEF 55%  LVEDP 6 mmHg  Severe MR

## 2025-01-11 NOTE — PLAN OF CARE
Problem: Thrombolytic Therapy  Goal: Absence of Bleeding  Outcome: Progressing  Goal: Unobstructed Breathing  Outcome: Progressing     Problem: Stroke, Ischemic (Includes Transient Ischemic Attack)  Goal: Optimal Coping  Outcome: Progressing  Goal: Effective Bowel Elimination  Outcome: Progressing  Goal: Optimal Cerebral Tissue Perfusion  Outcome: Progressing  Goal: Optimal Cognitive Function  Outcome: Progressing  Goal: Improved Communication Skills  Outcome: Progressing  Goal: Optimal Functional Ability  Outcome: Progressing  Goal: Optimal Nutrition Intake  Outcome: Progressing  Goal: Effective Oxygenation and Ventilation  Outcome: Progressing  Goal: Improved Sensorimotor Function  Outcome: Progressing  Goal: Safe and Effective Swallow  Outcome: Progressing  Goal: Effective Urinary Elimination  Outcome: Progressing     Problem: Infection  Goal: Absence of Infection Signs and Symptoms  Outcome: Progressing     Problem: Adult Inpatient Plan of Care  Goal: Plan of Care Review  Outcome: Progressing  Goal: Patient-Specific Goal (Individualized)  Outcome: Progressing  Goal: Absence of Hospital-Acquired Illness or Injury  Outcome: Progressing  Goal: Optimal Comfort and Wellbeing  Outcome: Progressing  Goal: Readiness for Transition of Care  Outcome: Progressing     Problem: Skin Injury Risk Increased  Goal: Skin Health and Integrity  Outcome: Progressing

## 2025-01-11 NOTE — ASSESSMENT & PLAN NOTE
Left hemispheric ischemic event, low large vessel occlusion with significant improvement in right-sided deficits 1/10/25  Status post TNK administration 12:45 p.m. 1/9/25      BP goal prior to IV thrombolytics - <185/110  BP goal post IV thrombolytics - <180/105 for at least 24 hrs.       -- Close monitoring in ICU for at least 24 hrs.  Q.1 hour neuro checks  -- No antithrombotics or DVT prophylaxis for 24 hrs.  -- MRI brain to be performed at the 24 hour lou (this afternoon), if MRI delayed than CT scan should be performed at the 24 hour lou on 1/10/25 to rule out any hemorrhagic event  -- TTE w/ bubble  -- PT/OT/SLP   -hold off on any antiplatelets or anticoagulation for 24 hours, till CT scan is performed  -avoid Varghese catheter placement and invasive procedure

## 2025-01-11 NOTE — SUBJECTIVE & OBJECTIVE
Past Medical History:   Diagnosis Date    GERD (gastroesophageal reflux disease)     Hyperlipidemia     Hypertension        Past Surgical History:   Procedure Laterality Date    ECHOCARDIOGRAM,TRANSESOPHAGEAL  2023    Procedure: Transesophageal echo (GAEL) intra-procedure log documentation;  Surgeon: Jerry Be DO;  Location: [facility] CATH LAB;  Service: Cardiology;;    LEFT HEART CATHETERIZATION Left 2023    Procedure: Left heart cath;  Surgeon: Jerry Be DO;  Location: [facility] CATH LAB;  Service: Cardiology;  Laterality: Left;    MITRAL VALVE REPAIR      REPAIR OF HEART VALVE      RIGHT HEART CATHETERIZATION Right 2023    Procedure: INSERTION, CATHETER, RIGHT HEART;  Surgeon: Jerry Be DO;  Location: [facility] CATH LAB;  Service: Cardiology;  Laterality: Right;       Review of patient's allergies indicates:   Allergen Reactions    Sulfamethoxazole-trimethoprim Rash     Note: Emil Johson Syndrome       Family History       Problem Relation (Age of Onset)    Cataracts Mother    Diabetes Maternal Grandfather    Glaucoma Mother    Heart disease Brother    No Known Problems Father, Brother          Tobacco Use    Smoking status: Former     Current packs/day: 0.00     Types: Cigarettes     Quit date:      Years since quittin.0     Passive exposure: Past    Smokeless tobacco: Never   Substance and Sexual Activity    Alcohol use: Not Currently    Drug use: Not Currently     Types: Marijuana    Sexual activity: Yes      Review of Systems  Objective:     Vital Signs (Most Recent):  Temp: 97.9 °F (36.6 °C) (25 0701)  Pulse: 60 (25 1000)  Resp: 12 (25 1000)  BP: (!) 75/46 (25 1000)  SpO2: 96 % (25 1000) Vital Signs (24h Range):  Temp:  [97.5 °F (36.4 °C)-98.2 °F (36.8 °C)] 97.9 °F (36.6 °C)  Pulse:  [38-93] 60  Resp:  [7-24] 12  SpO2:  [93 %-99 %] 96 %  BP: ()/(33-87) 75/46   Weight: 75.5 kg (166 lb 7.2 oz)  Body mass index is 26.07  kg/m².      Intake/Output Summary (Last 24 hours) at 1/11/2025 1049  Last data filed at 1/11/2025 1011  Gross per 24 hour   Intake 41.78 ml   Output 970 ml   Net -928.22 ml          Physical Exam  Constitutional:       General: He is not in acute distress.     Appearance: Normal appearance. He is not ill-appearing or diaphoretic.   HENT:      Head: Normocephalic and atraumatic.      Nose: No congestion or rhinorrhea.      Mouth/Throat:      Mouth: Mucous membranes are moist.   Cardiovascular:      Rate and Rhythm: Normal rate and regular rhythm.      Pulses: Normal pulses.      Heart sounds: Normal heart sounds.   Pulmonary:      Effort: Pulmonary effort is normal. No respiratory distress.      Breath sounds: Normal breath sounds. No stridor. No wheezing, rhonchi or rales.   Chest:      Chest wall: No tenderness.   Abdominal:      General: Abdomen is flat.   Musculoskeletal:      Cervical back: Normal range of motion. No rigidity.      Right lower leg: No edema.      Left lower leg: No edema.   Skin:     General: Skin is warm.      Findings: No erythema.   Neurological:      Mental Status: He is alert. He is disoriented.      Comments: Right upper extremity-4/5   Right lower extremity-4/5   Left upper and left lower extremity-5/5   Aphasia and facial droop resolved            Vents:     Lines/Drains/Airways       Drain  Duration                  Urethral Catheter 01/09/25 2020 1 day              Peripheral Intravenous Line  Duration                  Peripheral IV - Single Lumen 01/09/25 1241 20 G Anterior;Distal;Left Upper Arm 1 day         Peripheral IV - Single Lumen 01/11/25 0145 20 G No Anterior;Right Forearm <1 day                  Significant Labs:    CBC/Anemia Profile:  Recent Labs   Lab 01/09/25  1244 01/10/25  0411 01/11/25  0710   WBC 6.20 4.99 4.65   HGB 12.7* 11.8* 12.2*   HCT 41.2 37.4* 38.1*    209 205   MCV 93.4 90.1 90.1   RDW 13.9 13.8 13.7        Chemistries:  Recent Labs   Lab  01/09/25  1244 01/09/25 2036    140   K 4.2 3.6   * 109*   CO2 23 26   BUN 16 12   CREATININE 1.13 0.92   CALCIUM 8.6* 8.4*   ALBUMIN 3.9  --    PROT 6.8  --    BILITOT 0.5  --    ALKPHOS 66  --    ALT 13  --    AST 20  --        All pertinent labs within the past 24 hours have been reviewed.    Significant Imaging: I have reviewed all pertinent imaging results/findings within the past 24 hours.

## 2025-01-12 PROBLEM — N20.1 LEFT URETERAL STONE: Status: ACTIVE | Noted: 2025-01-12

## 2025-01-12 PROBLEM — N20.0 RENAL STONE: Status: ACTIVE | Noted: 2025-01-12

## 2025-01-12 LAB
ANION GAP SERPL CALCULATED.3IONS-SCNC: 13 MMOL/L (ref 7–16)
BASOPHILS # BLD AUTO: 0.03 K/UL (ref 0–0.2)
BASOPHILS NFR BLD AUTO: 0.6 % (ref 0–1)
BUN SERPL-MCNC: 13 MG/DL (ref 8–26)
BUN/CREAT SERPL: 13 (ref 6–20)
CALCIUM SERPL-MCNC: 8.5 MG/DL (ref 8.8–10)
CHLORIDE SERPL-SCNC: 106 MMOL/L (ref 98–107)
CO2 SERPL-SCNC: 26 MMOL/L (ref 23–31)
CREAT SERPL-MCNC: 0.99 MG/DL (ref 0.72–1.25)
DIFFERENTIAL METHOD BLD: ABNORMAL
EGFR (NO RACE VARIABLE) (RUSH/TITUS): 83 ML/MIN/1.73M2
EOSINOPHIL # BLD AUTO: 0.07 K/UL (ref 0–0.5)
EOSINOPHIL NFR BLD AUTO: 1.3 % (ref 1–4)
ERYTHROCYTE [DISTWIDTH] IN BLOOD BY AUTOMATED COUNT: 13.7 % (ref 11.5–14.5)
GLUCOSE SERPL-MCNC: 93 MG/DL (ref 82–115)
HCT VFR BLD AUTO: 35.6 % (ref 40–54)
HGB BLD-MCNC: 11.5 G/DL (ref 13.5–18)
IMM GRANULOCYTES # BLD AUTO: 0.01 K/UL (ref 0–0.04)
IMM GRANULOCYTES NFR BLD: 0.2 % (ref 0–0.4)
LYMPHOCYTES # BLD AUTO: 1.29 K/UL (ref 1–4.8)
LYMPHOCYTES NFR BLD AUTO: 24.6 % (ref 27–41)
MCH RBC QN AUTO: 29.1 PG (ref 27–31)
MCHC RBC AUTO-ENTMCNC: 32.3 G/DL (ref 32–36)
MCV RBC AUTO: 90.1 FL (ref 80–96)
MONOCYTES # BLD AUTO: 0.45 K/UL (ref 0–0.8)
MONOCYTES NFR BLD AUTO: 8.6 % (ref 2–6)
MPC BLD CALC-MCNC: 11.7 FL (ref 9.4–12.4)
NEUTROPHILS # BLD AUTO: 3.39 K/UL (ref 1.8–7.7)
NEUTROPHILS NFR BLD AUTO: 64.7 % (ref 53–65)
NRBC # BLD AUTO: 0 X10E3/UL
NRBC, AUTO (.00): 0 %
PLATELET # BLD AUTO: 198 K/UL (ref 150–400)
POTASSIUM SERPL-SCNC: 3.8 MMOL/L (ref 3.5–5.1)
RBC # BLD AUTO: 3.95 M/UL (ref 4.6–6.2)
SODIUM SERPL-SCNC: 141 MMOL/L (ref 136–145)
WBC # BLD AUTO: 5.24 K/UL (ref 4.5–11)

## 2025-01-12 PROCEDURE — 25000003 PHARM REV CODE 250: Performed by: STUDENT IN AN ORGANIZED HEALTH CARE EDUCATION/TRAINING PROGRAM

## 2025-01-12 PROCEDURE — 80048 BASIC METABOLIC PNL TOTAL CA: CPT | Performed by: STUDENT IN AN ORGANIZED HEALTH CARE EDUCATION/TRAINING PROGRAM

## 2025-01-12 PROCEDURE — 63600175 PHARM REV CODE 636 W HCPCS: Performed by: STUDENT IN AN ORGANIZED HEALTH CARE EDUCATION/TRAINING PROGRAM

## 2025-01-12 PROCEDURE — 99233 SBSQ HOSP IP/OBS HIGH 50: CPT | Mod: ,,, | Performed by: STUDENT IN AN ORGANIZED HEALTH CARE EDUCATION/TRAINING PROGRAM

## 2025-01-12 PROCEDURE — 99223 1ST HOSP IP/OBS HIGH 75: CPT | Mod: ,,, | Performed by: UROLOGY

## 2025-01-12 PROCEDURE — 36415 COLL VENOUS BLD VENIPUNCTURE: CPT | Performed by: STUDENT IN AN ORGANIZED HEALTH CARE EDUCATION/TRAINING PROGRAM

## 2025-01-12 PROCEDURE — 94761 N-INVAS EAR/PLS OXIMETRY MLT: CPT

## 2025-01-12 PROCEDURE — 11000001 HC ACUTE MED/SURG PRIVATE ROOM

## 2025-01-12 PROCEDURE — 92507 TX SP LANG VOICE COMM INDIV: CPT

## 2025-01-12 PROCEDURE — 85025 COMPLETE CBC W/AUTO DIFF WBC: CPT | Performed by: STUDENT IN AN ORGANIZED HEALTH CARE EDUCATION/TRAINING PROGRAM

## 2025-01-12 RX ADMIN — ASPIRIN 81 MG CHEWABLE TABLET 81 MG: 81 TABLET CHEWABLE at 08:01

## 2025-01-12 RX ADMIN — HEPARIN SODIUM 5000 UNITS: 5000 INJECTION, SOLUTION INTRAVENOUS; SUBCUTANEOUS at 02:01

## 2025-01-12 RX ADMIN — THIAMINE HYDROCHLORIDE 100 MG: 100 INJECTION, SOLUTION INTRAMUSCULAR; INTRAVENOUS at 08:01

## 2025-01-12 RX ADMIN — ATORVASTATIN CALCIUM 40 MG: 40 TABLET, FILM COATED ORAL at 08:01

## 2025-01-12 RX ADMIN — MUPIROCIN: 20 OINTMENT TOPICAL at 08:01

## 2025-01-12 RX ADMIN — HEPARIN SODIUM 5000 UNITS: 5000 INJECTION, SOLUTION INTRAVENOUS; SUBCUTANEOUS at 09:01

## 2025-01-12 RX ADMIN — HEPARIN SODIUM 5000 UNITS: 5000 INJECTION, SOLUTION INTRAVENOUS; SUBCUTANEOUS at 05:01

## 2025-01-12 RX ADMIN — MUPIROCIN: 20 OINTMENT TOPICAL at 09:01

## 2025-01-12 NOTE — PLAN OF CARE
Problem: SLP  Goal: SLP Goal  Description: Patient will be Independently oriented x4, 5/5 tx sessions.  Patient will answer yes/no questions with 80% accuracy Independently.   Patient will follow 1 part commands with 80% accuracy Independently.   Patient will complete automatic speech tasks with 80% accuracy Independently.   Patient will complete sentences with 80% accuracy Independently.   Patient will receptively ID objects with 80% accuracy Independently.   Patient will expressively ID objects with 80% accuracy Independently.   Patient will expressively ID object function with 80% accuracy Independently.   Patient will complete word finding tasks with 80% accuracy Independently.      Outcome: Progressing

## 2025-01-12 NOTE — ASSESSMENT & PLAN NOTE
6 mm obstructing stone at the left UVJ with associated diffuse left hydroureter and moderate left hydronephrosis.  Appreciate Urology coming to see the patient, we will defer to them for timing intervention.  Patient hemodynamically stable at this time with no evidence of hematuria with improvement in left lower quadrant abdominal pain.  We will continue to hold therapeutic anticoagulation and Plavix until the time of procedure.

## 2025-01-12 NOTE — PROGRESS NOTES
[facility]  Critical Care Medicine  Progress Note    Patient Name: Mitul Torres  MRN: [id number]  Admission Date: 1/9/2025  Hospital Length of Stay: 3 days  Code Status: Full Code  Attending Provider: Endy Levine MD  Primary Care Provider: Shannan Gorman FNP   Principal Problem: <principal problem not specified>    Subjective:     HPI:  69-year-old male with past medical history significant for atrial fibrillation anticoagulated with Eliquis and on metoprolol, history of severe MR status post complex MV repair and LAD a occlusion, anemia, prior history of chronic area of infarcts in the brain, GI bleed who presented to the hospital on 1/9/25 in the setting of aphasia and altered mental status around 830-9:00 a.m., 5 to right-sided deficits and now status post TNK for left hemispheric ischemic event.      I saw the patient after he had his TNK at 12:45 p.m. on 1/9/25.  The patient is still had significant right-sided deficits with power 2/5 in right lower extremity, 1/5 right upper extremity, right-sided facial droop and aphasia.  Patient was otherwise hemodynamically stable, hemoglobin at 12.7.  Electrolytes grossly within normal limits.  Imaging of CTA head and neck and CT head without contrast indicative of no acute large vessel occlusion and no evidence of hemorrhage.        Hospital/ICU Course:  1/10/25-patient confusion delirious overnight without any worsening focal neurological deficits.  Hallucinations present, however significant improvement in the patient's deficits right-sided deficits with return of power and sensation.    1/11/25-the patient continued to have hallucinations today.  At 1 point, he complained of left lower quadrant abdominal pain as well as what he described as tunneled vision, alternating with episodes of delirium, waxing and waning lucidity with hallucinations.  Therefore, he had a stat CT head performed rule out any new neurological deficits (nothing focal  in terms of his right-sided deficits returned).  CT head without any evidence of acute bleed.  CTA chest (ordered due to echocardiogram read, low probability pulmonary embolism) and CT abdomen pelvis with IV and oral contrast pending.    25-significantly improved in terms of patient's delirium.  No longer complaining of changes in vision/hallucinations.  No ongoing left lower quadrant pain.  CT abdomen and pelvis with evidence 6 mm obstructive stone at left UVJ.    Past Medical History:   Diagnosis Date    GERD (gastroesophageal reflux disease)     Hyperlipidemia     Hypertension        Past Surgical History:   Procedure Laterality Date    ECHOCARDIOGRAM,TRANSESOPHAGEAL  2023    Procedure: Transesophageal echo (GAEL) intra-procedure log documentation;  Surgeon: Jerry Be DO;  Location: [facility];  Service: Cardiology;;    LEFT HEART CATHETERIZATION Left 2023    Procedure: Left heart cath;  Surgeon: Jerry Be DO;  Location: [facility];  Service: Cardiology;  Laterality: Left;    MITRAL VALVE REPAIR      REPAIR OF HEART VALVE      RIGHT HEART CATHETERIZATION Right 2023    Procedure: INSERTION, CATHETER, RIGHT HEART;  Surgeon: Jerry Be DO;  Location: [facility];  Service: Cardiology;  Laterality: Right;       Review of patient's allergies indicates:   Allergen Reactions    Sulfamethoxazole-trimethoprim Rash     Note: Emil Johson Syndrome       Family History       Problem Relation (Age of Onset)    Cataracts Mother    Diabetes Maternal Grandfather    Glaucoma Mother    Heart disease Brother    No Known Problems Father, Brother          Tobacco Use    Smoking status: Former     Current packs/day: 0.00     Types: Cigarettes     Quit date:      Years since quittin.0     Passive exposure: Past    Smokeless tobacco: Never   Substance and Sexual Activity    Alcohol use: Not Currently    Drug use: Not Currently     Types: Marijuana    Sexual  activity: Yes      Review of Systems  Objective:     Vital Signs (Most Recent):  Temp: 98.4 °F (36.9 °C) (01/12/25 0701)  Pulse: 67 (01/12/25 0830)  Resp: 15 (01/12/25 0830)  BP: (!) 163/90 (01/12/25 0830)  SpO2: (!) 94 % (01/12/25 0830) Vital Signs (24h Range):  Temp:  [97.8 °F (36.6 °C)-98.4 °F (36.9 °C)] 98.4 °F (36.9 °C)  Pulse:  [39-86] 67  Resp:  [8-22] 15  SpO2:  [93 %-99 %] 94 %  BP: ()/() 163/90   Weight: 74.6 kg (164 lb 7.4 oz)  Body mass index is 25.76 kg/m².      Intake/Output Summary (Last 24 hours) at 1/12/2025 0902  Last data filed at 1/12/2025 0516  Gross per 24 hour   Intake 70.5 ml   Output 1300 ml   Net -1229.5 ml          Physical Exam  Constitutional:       General: He is not in acute distress.     Appearance: Normal appearance. He is not ill-appearing or diaphoretic.   HENT:      Head: Normocephalic and atraumatic.      Nose: No congestion or rhinorrhea.      Mouth/Throat:      Mouth: Mucous membranes are moist.   Cardiovascular:      Rate and Rhythm: Normal rate and regular rhythm.      Pulses: Normal pulses.      Heart sounds: Normal heart sounds.   Pulmonary:      Effort: Pulmonary effort is normal. No respiratory distress.      Breath sounds: Normal breath sounds. No stridor. No wheezing, rhonchi or rales.   Chest:      Chest wall: No tenderness.   Abdominal:      General: Abdomen is flat.      Comments: No rebound tenderness, mild tenderness on palpation left lower quadrant   Musculoskeletal:      Cervical back: Normal range of motion. No rigidity.      Right lower leg: No edema.      Left lower leg: No edema.   Skin:     General: Skin is warm.      Findings: No erythema.   Neurological:      General: No focal deficit present.      Mental Status: He is alert and oriented to person, place, and time.      Comments: Right upper extremity-5/5  Right lower extremity-5/5  Left upper and left lower extremity-5/5   Aphasia and facial droop resolved      Oriented this morning             Vents:  Oxygen Concentration (%): 21 (01/11/25 1510)  Lines/Drains/Airways       Peripheral Intravenous Line  Duration                  Peripheral IV - Single Lumen 01/09/25 1241 20 G Anterior;Distal;Left Upper Arm 2 days         Peripheral IV - Single Lumen 01/11/25 0145 20 G No Anterior;Right Forearm 1 day                  Significant Labs:    CBC/Anemia Profile:  Recent Labs   Lab 01/11/25  0710 01/12/25  0428   WBC 4.65 5.24   HGB 12.2* 11.5*   HCT 38.1* 35.6*    198   MCV 90.1 90.1   RDW 13.7 13.7        Chemistries:  Recent Labs   Lab 01/11/25  1320 01/12/25  0428    141   K 4.2 3.8    106   CO2 27 26   BUN 14 13   CREATININE 1.25 0.99   CALCIUM 8.7* 8.5*       All pertinent labs within the past 24 hours have been reviewed.    Significant Imaging: I have reviewed all pertinent imaging results/findings within the past 24 hours.    ABG  Recent Labs   Lab 01/09/25  1228   PH 7.34   PO2 25   PCO2 57*   HCO3 30.8*     Assessment/Plan:     Neuro  Delirium  Patient with significant delirium.  The etiology for this is wide, at this time it is multifactorial likely in the setting of acute critical care illness.  His delirium complicates his presentation has he is status post TNK in the setting of stroke.  Therefore, when the patient complained of hallucinations/double vision, repeat CT head was performed which showed no acute neurological change.  I have also initiated the patient on thiamine today.  He has periods of lucidity with no evidence of any other disturbance and then episodes of delirium where he complains of hallucinations and is verbally agitated.      We will continue with frequent reorientation, continue neurological checks by bedside to ensure there is no return of his previously seen right-sided deficits, aphasia and facial droop which have now completely resolved.    Embolic stroke involving left middle cerebral artery  Left hemispheric ischemic event, low large vessel occlusion  with significant improvement in right-sided deficits 1/10/25  Status post TNK administration 12:45 p.m. 1/9/25      BP goal prior to IV thrombolytics - <185/110  BP goal post IV thrombolytics - <180/105 for at least 24 hrs.       -- Close monitoring in ICU for at least 24 hrs.  Q.1 hour neuro checks  -- No antithrombotics or DVT prophylaxis for 24 hrs.  -- MRI brain performed  -- TTE w/ bubble performed  -- PT/OT/SLP     No evidence of pulmonary embolism on CTA chest.  Patient doing well.  We will hold therapeutic anticoagulation for atrial fibrillation and Plavix at this time, pending possible urological procedure.    Cardiac/Vascular  Coronary artery disease involving native coronary artery of native heart without angina pectoris  [facility] 12/14/2023- Dr. Be     Non-obstructive CAD  Single vessel CAD with a 30% prox RCA lesion  LVEF 55%  LVEDP 6 mmHg  Severe MR    Lactic acid checked today without evidence of elevation, troponin unremarkable.  PVCs on EKG today.  No evidence of chest pain.    Severe mitral regurgitation  Noted, status post complex MV repair    Paroxysmal atrial fibrillation  History of being on anticoagulation with atrial fibrillation which we will hold in the setting of possible planned procedure (urology).  We will hold off on metoprolol at this time and continue to monitor-patient is rate controlled at this time  Initiate home rate control with metoprolol once patient does not have any other additional bradycardic events    Renal/  Renal stone  6 mm obstructing stone at the left UVJ with associated diffuse left hydroureter and moderate left hydronephrosis.  Appreciate Urology coming to see the patient, we will defer to them for timing intervention.  Patient hemodynamically stable at this time with no evidence of hematuria with improvement in left lower quadrant abdominal pain.  We will continue to hold therapeutic anticoagulation and Plavix until the time of procedure.    GI  Abdominal  pain  Patient complained of left lower quadrant abdominal pain.  This was mildly tender to palpation with some evidence of guarding however without evidence of rebound tenderness.  Lactic acid within normal limits.  CT abdomen and pelvis with IV and oral contrast pending at this time.  We will continue to monitor, if there is any evidence of worsening abdominal pain, we will involve surgery.  Pending interpretation of images at this time.    Critical Care Checklist     Refer to chart for details regarding spontaneous awakening trial (SAT) and spontaneous breathing trial (SBT), CAM-ICU assessment, early mobility and feeding.       Analgesia and sedation- not indicated  Thromboembolic prophylaxis- we will initiate on subcutaneous heparin  Height of bed greater than 30°- Yes  Stress ulcer prophylaxis- not indicated  Indwelling catheter (vascular access lines/Varghese catheter)-Reviewed  Deescalation of antimicrobials/pharmacotherapies-Reviewed and addressed appropriately  Code status- full code             Endy Levine MD  Critical Care Medicine  [facility]

## 2025-01-12 NOTE — ASSESSMENT & PLAN NOTE
History of being on anticoagulation with atrial fibrillation which we will hold in the setting of possible planned procedure (urology).  We will hold off on metoprolol at this time and continue to monitor-patient is rate controlled at this time  Initiate home rate control with metoprolol once patient does not have any other additional bradycardic events

## 2025-01-12 NOTE — ASSESSMENT & PLAN NOTE
Patient with significant delirium.  The etiology for this is wide, at this time it is multifactorial likely in the setting of acute critical care illness.  His delirium complicates his presentation has he is status post TNK in the setting of stroke.  Therefore, when the patient complained of hallucinations/double vision, repeat CT head was performed which showed no acute neurological change.  I have also initiated the patient on thiamine today.  He has periods of lucidity with no evidence of any other disturbance and then episodes of delirium where he complains of hallucinations and is verbally agitated.      We will continue with frequent reorientation, continue neurological checks by bedside to ensure there is no return of his previously seen right-sided deficits, aphasia and facial droop which have now completely resolved.

## 2025-01-12 NOTE — SUBJECTIVE & OBJECTIVE
Past Medical History:   Diagnosis Date    GERD (gastroesophageal reflux disease)     Hyperlipidemia     Hypertension        Past Surgical History:   Procedure Laterality Date    ECHOCARDIOGRAM,TRANSESOPHAGEAL  2023    Procedure: Transesophageal echo (GAEL) intra-procedure log documentation;  Surgeon: Jerry Be DO;  Location: [facility];  Service: Cardiology;;    LEFT HEART CATHETERIZATION Left 2023    Procedure: Left heart cath;  Surgeon: Jerry Be DO;  Location: [facility];  Service: Cardiology;  Laterality: Left;    MITRAL VALVE REPAIR      REPAIR OF HEART VALVE      RIGHT HEART CATHETERIZATION Right 2023    Procedure: INSERTION, CATHETER, RIGHT HEART;  Surgeon: Jerry Be DO;  Location: [facility];  Service: Cardiology;  Laterality: Right;       Review of patient's allergies indicates:   Allergen Reactions    Sulfamethoxazole-trimethoprim Rash     Note: Emil Johson Syndrome       Family History       Problem Relation (Age of Onset)    Cataracts Mother    Diabetes Maternal Grandfather    Glaucoma Mother    Heart disease Brother    No Known Problems Father, Brother            Tobacco Use    Smoking status: Former     Current packs/day: 0.00     Types: Cigarettes     Quit date:      Years since quittin.0     Passive exposure: Past    Smokeless tobacco: Never   Substance and Sexual Activity    Alcohol use: Not Currently    Drug use: Not Currently     Types: Marijuana    Sexual activity: Yes       Review of Systems   Unable to perform ROS: Mental status change       Objective:     Temp:  [97.8 °F (36.6 °C)-100 °F (37.8 °C)] 100 °F (37.8 °C)  Pulse:  [39-86] 65  Resp:  [8-22] 15  SpO2:  [89 %-99 %] 95 %  BP: ()/() 147/74  Weight: 74.6 kg (164 lb 7.4 oz)  Body mass index is 25.76 kg/m².           Drains       None                    Physical Exam  Vitals reviewed.   Constitutional:       Appearance: He is not toxic-appearing or  diaphoretic.   Eyes:      General: No scleral icterus.     Extraocular Movements: Extraocular movements intact.      Conjunctiva/sclera: Conjunctivae normal.      Pupils: Pupils are equal, round, and reactive to light.   Cardiovascular:      Rate and Rhythm: Normal rate.   Pulmonary:      Effort: Pulmonary effort is normal. No respiratory distress.   Abdominal:      General: There is no distension.      Tenderness: There is abdominal tenderness.      Comments: Tenderness overlying the distal ureter with deep palpation on the left.    Musculoskeletal:         General: No deformity.   Skin:     General: Skin is warm.   Neurological:      Mental Status: He is alert. He is disoriented.      Coordination: Coordination normal.          Significant Labs:    BMP:  Recent Labs   Lab 01/09/25  2036 01/11/25  1320 01/12/25  0428    139 141   K 3.6 4.2 3.8   * 106 106   CO2 26 27 26   BUN 12 14 13   CREATININE 0.92 1.25 0.99   CALCIUM 8.4* 8.7* 8.5*       CBC:  Recent Labs   Lab 01/10/25  0411 01/11/25  0710 01/12/25  0428   WBC 4.99 4.65 5.24   HGB 11.8* 12.2* 11.5*   HCT 37.4* 38.1* 35.6*    205 198       All pertinent labs results from the past 24 hours have been reviewed.    Significant Imaging:  All pertinent imaging results/findings from the past 24 hours have been reviewed.

## 2025-01-12 NOTE — SUBJECTIVE & OBJECTIVE
Past Medical History:   Diagnosis Date    GERD (gastroesophageal reflux disease)     Hyperlipidemia     Hypertension        Past Surgical History:   Procedure Laterality Date    ECHOCARDIOGRAM,TRANSESOPHAGEAL  2023    Procedure: Transesophageal echo (GAEL) intra-procedure log documentation;  Surgeon: Jerry Be DO;  Location: [facility] CATH LAB;  Service: Cardiology;;    LEFT HEART CATHETERIZATION Left 2023    Procedure: Left heart cath;  Surgeon: Jerry Be DO;  Location: [facility] CATH [facility];  Service: Cardiology;  Laterality: Left;    MITRAL VALVE REPAIR      REPAIR OF HEART VALVE      RIGHT HEART CATHETERIZATION Right 2023    Procedure: INSERTION, CATHETER, RIGHT HEART;  Surgeon: Jerry Be DO;  Location: [facility] CATH [facility];  Service: Cardiology;  Laterality: Right;       Review of patient's allergies indicates:   Allergen Reactions    Sulfamethoxazole-trimethoprim Rash     Note: Emil Johson Syndrome       Family History       Problem Relation (Age of Onset)    Cataracts Mother    Diabetes Maternal Grandfather    Glaucoma Mother    Heart disease Brother    No Known Problems Father, Brother          Tobacco Use    Smoking status: Former     Current packs/day: 0.00     Types: Cigarettes     Quit date:      Years since quittin.0     Passive exposure: Past    Smokeless tobacco: Never   Substance and Sexual Activity    Alcohol use: Not Currently    Drug use: Not Currently     Types: Marijuana    Sexual activity: Yes      Review of Systems  Objective:     Vital Signs (Most Recent):  Temp: 98.2 °F (36.8 °C) (25 1906)  Pulse: 66 (25 181)  Resp: 10 (25 181)  BP: 124/77 (25 1800)  SpO2: 96 % (25) Vital Signs (24h Range):  Temp:  [97.5 °F (36.4 °C)-98.2 °F (36.8 °C)] 98.2 °F (36.8 °C)  Pulse:  [38-89] 66  Resp:  [7-22] 10  SpO2:  [93 %-99 %] 96 %  BP: ()/() 124/77   Weight: 75.5 kg (166 lb 7.2 oz)  Body mass index is 26.07  kg/m².      Intake/Output Summary (Last 24 hours) at 1/11/2025 1959  Last data filed at 1/11/2025 1800  Gross per 24 hour   Intake 41.78 ml   Output 1050 ml   Net -1008.22 ml          Physical Exam  Constitutional:       General: He is not in acute distress.     Appearance: Normal appearance. He is not ill-appearing or diaphoretic.   HENT:      Head: Normocephalic and atraumatic.      Nose: No congestion or rhinorrhea.      Mouth/Throat:      Mouth: Mucous membranes are moist.   Cardiovascular:      Rate and Rhythm: Normal rate and regular rhythm.      Pulses: Normal pulses.      Heart sounds: Normal heart sounds.   Pulmonary:      Effort: Pulmonary effort is normal. No respiratory distress.      Breath sounds: Normal breath sounds. No stridor. No wheezing, rhonchi or rales.   Chest:      Chest wall: No tenderness.   Abdominal:      General: Abdomen is flat.   Musculoskeletal:      Cervical back: Normal range of motion. No rigidity.      Right lower leg: No edema.      Left lower leg: No edema.   Skin:     General: Skin is warm.      Findings: No erythema.   Neurological:      Mental Status: He is alert. He is disoriented.      Comments: Right upper extremity-4/5   Right lower extremity-4/5   Left upper and left lower extremity-5/5   Aphasia and facial droop resolved    Disoriented but has periods of lucidity.            Vents:  Oxygen Concentration (%): 21 (01/11/25 1510)  Lines/Drains/Airways       Peripheral Intravenous Line  Duration                  Peripheral IV - Single Lumen 01/09/25 1241 20 G Anterior;Distal;Left Upper Arm 2 days         Peripheral IV - Single Lumen 01/11/25 0145 20 G No Anterior;Right Forearm <1 day                  Significant Labs:    CBC/Anemia Profile:  Recent Labs   Lab 01/10/25  0411 01/11/25  0710   WBC 4.99 4.65   HGB 11.8* 12.2*   HCT 37.4* 38.1*    205   MCV 90.1 90.1   RDW 13.8 13.7        Chemistries:  Recent Labs   Lab 01/09/25  2036 01/11/25  1320    139   K 3.6  4.2   * 106   CO2 26 27   BUN 12 14   CREATININE 0.92 1.25   CALCIUM 8.4* 8.7*       All pertinent labs within the past 24 hours have been reviewed.    Significant Imaging: I have reviewed all pertinent imaging results/findings within the past 24 hours.

## 2025-01-12 NOTE — ASSESSMENT & PLAN NOTE
[facility] 12/14/2023- Dr. Be     Non-obstructive CAD  Single vessel CAD with a 30% prox RCA lesion  LVEF 55%  LVEDP 6 mmHg  Severe MR    Lactic acid checked today without evidence of elevation, troponin unremarkable.  PVCs on EKG today.  No evidence of chest pain.

## 2025-01-12 NOTE — ASSESSMENT & PLAN NOTE
Left hemispheric ischemic event, low large vessel occlusion with significant improvement in right-sided deficits 1/10/25  Status post TNK administration 12:45 p.m. 1/9/25      BP goal prior to IV thrombolytics - <185/110  BP goal post IV thrombolytics - <180/105 for at least 24 hrs.       -- Close monitoring in ICU for at least 24 hrs.  Q.1 hour neuro checks  -- No antithrombotics or DVT prophylaxis for 24 hrs.  -- MRI brain performed  -- TTE w/ bubble performed  -- PT/OT/SLP     No evidence of pulmonary embolism on CTA chest.  Patient doing well.  We will hold therapeutic anticoagulation for atrial fibrillation and Plavix at this time, pending possible urological procedure.

## 2025-01-12 NOTE — PROGRESS NOTES
[facility]  Critical Care Medicine  Progress Note    Patient Name: Mitul Torres  MRN: [id number]  Admission Date: 1/9/2025  Hospital Length of Stay: 2 days  Code Status: Full Code  Attending Provider: Endy Levine MD  Primary Care Provider: Shannan Gorman FNP   Principal Problem: <principal problem not specified>    Subjective:     HPI:  69-year-old male with past medical history significant for atrial fibrillation anticoagulated with Eliquis and on metoprolol, history of severe MR status post complex MV repair and LAD a occlusion, anemia, prior history of chronic area of infarcts in the brain, GI bleed who presented to the hospital on 1/9/25 in the setting of aphasia and altered mental status around 830-9:00 a.m., 5 to right-sided deficits and now status post TNK for left hemispheric ischemic event.      I saw the patient after he had his TNK at 12:45 p.m. on 1/9/25.  The patient is still had significant right-sided deficits with power 2/5 in right lower extremity, 1/5 right upper extremity, right-sided facial droop and aphasia.  Patient was otherwise hemodynamically stable, hemoglobin at 12.7.  Electrolytes grossly within normal limits.  Imaging of CTA head and neck and CT head without contrast indicative of no acute large vessel occlusion and no evidence of hemorrhage.        Hospital/ICU Course:  1/10/25-patient confusion delirious overnight without any worsening focal neurological deficits.  Hallucinations present, however significant improvement in the patient's deficits right-sided deficits with return of power and sensation.    1/11/25-the patient continued to have hallucinations today.  At 1 point, he complained of left lower quadrant abdominal pain as well as what he described as tunneled vision, alternating with episodes of delirium, waxing and waning lucidity with hallucinations.  Therefore, he had a stat CT head performed rule out any new neurological deficits (nothing focal  in terms of his right-sided deficits returned).  CT head without any evidence of acute bleed.  CTA chest (ordered due to echocardiogram read, low probability pulmonary embolism) and CT abdomen pelvis with IV and oral contrast pending.  We will consider initiation of Eliquis and/or Plavix if there is no indication for an acute procedure at this time.          Past Medical History:   Diagnosis Date    GERD (gastroesophageal reflux disease)     Hyperlipidemia     Hypertension        Past Surgical History:   Procedure Laterality Date    ECHOCARDIOGRAM,TRANSESOPHAGEAL  2023    Procedure: Transesophageal echo (GAEL) intra-procedure log documentation;  Surgeon: Jerry Be DO;  Location: [facility] CATH LAB;  Service: Cardiology;;    LEFT HEART CATHETERIZATION Left 2023    Procedure: Left heart cath;  Surgeon: Jerry Be DO;  Location: [facility] CATH LAB;  Service: Cardiology;  Laterality: Left;    MITRAL VALVE REPAIR      REPAIR OF HEART VALVE      RIGHT HEART CATHETERIZATION Right 2023    Procedure: INSERTION, CATHETER, RIGHT HEART;  Surgeon: Jerry Be DO;  Location: [facility] CATH LAB;  Service: Cardiology;  Laterality: Right;       Review of patient's allergies indicates:   Allergen Reactions    Sulfamethoxazole-trimethoprim Rash     Note: Emil Johson Syndrome       Family History       Problem Relation (Age of Onset)    Cataracts Mother    Diabetes Maternal Grandfather    Glaucoma Mother    Heart disease Brother    No Known Problems Father, Brother          Tobacco Use    Smoking status: Former     Current packs/day: 0.00     Types: Cigarettes     Quit date:      Years since quittin.0     Passive exposure: Past    Smokeless tobacco: Never   Substance and Sexual Activity    Alcohol use: Not Currently    Drug use: Not Currently     Types: Marijuana    Sexual activity: Yes      Review of Systems  Objective:     Vital Signs (Most Recent):  Temp: 98.2 °F (36.8 °C) (25  1906)  Pulse: 66 (01/11/25 1815)  Resp: 10 (01/11/25 1815)  BP: 124/77 (01/11/25 1800)  SpO2: 96 % (01/11/25 1815) Vital Signs (24h Range):  Temp:  [97.5 °F (36.4 °C)-98.2 °F (36.8 °C)] 98.2 °F (36.8 °C)  Pulse:  [38-89] 66  Resp:  [7-22] 10  SpO2:  [93 %-99 %] 96 %  BP: ()/() 124/77   Weight: 75.5 kg (166 lb 7.2 oz)  Body mass index is 26.07 kg/m².      Intake/Output Summary (Last 24 hours) at 1/11/2025 1959  Last data filed at 1/11/2025 1800  Gross per 24 hour   Intake 41.78 ml   Output 1050 ml   Net -1008.22 ml          Physical Exam  Constitutional:       General: He is not in acute distress.     Appearance: Normal appearance. He is not ill-appearing or diaphoretic.   HENT:      Head: Normocephalic and atraumatic.      Nose: No congestion or rhinorrhea.      Mouth/Throat:      Mouth: Mucous membranes are moist.   Cardiovascular:      Rate and Rhythm: Normal rate and regular rhythm.      Pulses: Normal pulses.      Heart sounds: Normal heart sounds.   Pulmonary:      Effort: Pulmonary effort is normal. No respiratory distress.      Breath sounds: Normal breath sounds. No stridor. No wheezing, rhonchi or rales.   Chest:      Chest wall: No tenderness.   Abdominal:      General: Abdomen is flat.   Left lower quadrant mild tenderness on deep palpation without evidence of rebound tenderness.  Musculoskeletal:      Cervical back: Normal range of motion. No rigidity.      Right lower leg: No edema.      Left lower leg: No edema.   Skin:     General: Skin is warm.      Findings: No erythema.   Neurological:      Mental Status: He is alert. He is disoriented.      Comments: Right upper extremity-4/5   Right lower extremity-4/5   Left upper and left lower extremity-5/5   Aphasia and facial droop resolved    Disoriented but has periods of lucidity.            Vents:  Oxygen Concentration (%): 21 (01/11/25 1510)  Lines/Drains/Airways       Peripheral Intravenous Line  Duration                  Peripheral IV -  Single Lumen 01/09/25 1241 20 G Anterior;Distal;Left Upper Arm 2 days         Peripheral IV - Single Lumen 01/11/25 0145 20 G No Anterior;Right Forearm <1 day                  Significant Labs:    CBC/Anemia Profile:  Recent Labs   Lab 01/10/25  0411 01/11/25  0710   WBC 4.99 4.65   HGB 11.8* 12.2*   HCT 37.4* 38.1*    205   MCV 90.1 90.1   RDW 13.8 13.7        Chemistries:  Recent Labs   Lab 01/09/25  2036 01/11/25  1320    139   K 3.6 4.2   * 106   CO2 26 27   BUN 12 14   CREATININE 0.92 1.25   CALCIUM 8.4* 8.7*       All pertinent labs within the past 24 hours have been reviewed.    Significant Imaging: I have reviewed all pertinent imaging results/findings within the past 24 hours.    ABG  Recent Labs   Lab 01/09/25  1228   PH 7.34   PO2 25   PCO2 57*   HCO3 30.8*     Assessment/Plan:     Neuro  Delirium  Patient with significant delirium.  The etiology for this is wide, at this time it is multifactorial likely in the setting of acute critical care illness.  His delirium complicates his presentation has he is status post TNK in the setting of stroke.  Therefore, when the patient complained of hallucinations/double vision, repeat CT head was performed which showed no acute neurological change.  I have also initiated the patient on thiamine today.  He has periods of lucidity with no evidence of any other disturbance and then episodes of delirium where he complains of hallucinations and is verbally agitated.      We will continue with frequent reorientation, continue neurological checks by bedside to ensure there is no return of his previously seen right-sided deficits, aphasia and facial droop which have now completely resolved.    Embolic stroke involving left middle cerebral artery  Left hemispheric ischemic event, low large vessel occlusion with significant improvement in right-sided deficits 1/10/25  Status post TNK administration 12:45 p.m. 1/9/25      BP goal prior to IV thrombolytics -  <185/110  BP goal post IV thrombolytics - <180/105 for at least 24 hrs.       -- Close monitoring in ICU for at least 24 hrs.  Q.1 hour neuro checks  -- No antithrombotics or DVT prophylaxis for 24 hrs.  -- MRI brain performed  -- TTE w/ bubble performed  -- PT/OT/SLP   -hold off on any antiplatelets or anticoagulation for 24 hours, till CT scan is performed  -avoid Varghese catheter placement and invasive procedure    Upload, given patient's echocardiogram read revealing RV strain, CTA chest was ordered.  Final officially read pending at this time but no immediately visible large filling defect present, pending complete/official radiology report.  Patient not hypoxic or tachycardic at this time.  We will follow up.    Cardiac/Vascular  Coronary artery disease involving native coronary artery of native heart without angina pectoris  [facility] 12/14/2023- Dr. Be     Non-obstructive CAD  Single vessel CAD with a 30% prox RCA lesion  LVEF 55%  LVEDP 6 mmHg  Severe MR    Lactic acid checked today without evidence of elevation, troponin unremarkable.  PVCs on EKG today.  No evidence of chest pain.    Severe mitral regurgitation  Noted, status post complex MV repair    Paroxysmal atrial fibrillation  History of being on anticoagulation with atrial fibrillation which we will hold in the setting of thrombolytic therapy at this time   We will hold off on metoprolol at this time and continue to monitor-patient is rate controlled at this time  Initiate home rate control with metoprolol once patient does not have any other additional bradycardic events    GI  Abdominal pain  Patient complained of left lower quadrant abdominal pain.  This was mildly tender to palpation with some evidence of guarding however without evidence of rebound tenderness.  Lactic acid within normal limits.  CT abdomen and pelvis with IV and oral contrast pending at this time.  We will continue to monitor, if there is any evidence of worsening abdominal pain,  we will involve surgery.  Pending interpretation of images at this time.    Critical Care Checklist     Refer to chart for details regarding spontaneous awakening trial (SAT) and spontaneous breathing trial (SBT), CAM-ICU assessment, early mobility and feeding.       Analgesia and sedation- not indicated  Thromboembolic prophylaxis- we will initiate on subcutaneous heparin  Height of bed greater than 30°- Yes  Stress ulcer prophylaxis- not indicated  Indwelling catheter (vascular access lines/Varghese catheter)-Reviewed  Deescalation of antimicrobials/pharmacotherapies-Reviewed and addressed appropriately  Code status- full code         Critical Care Time:  35 minutes  Critical secondary to Patient has a condition that poses threat to life and bodily function:  Acute embolic/ischemic stroke status post thrombolytic treatment now with close monitoring in the intensive care unit secondary to delirium       Critical care was time spent personally by me on the following activities: development of treatment plan with patient or surrogate and bedside caregivers, discussions with consultants, evaluation of patient's response to treatment, examination of patient, ordering and performing treatments and interventions, ordering and review of laboratory studies, ordering and review of radiographic studies, pulse oximetry, re-evaluation of patient's condition. This critical care time did not overlap with that of any other provider or involve time for any procedures.     Endy Levine MD  Critical Care Medicine  [facility]

## 2025-01-12 NOTE — ASSESSMENT & PLAN NOTE
History of being on anticoagulation with atrial fibrillation which we will hold in the setting of thrombolytic therapy at this time   We will hold off on metoprolol at this time and continue to monitor-patient is rate controlled at this time  Initiate home rate control with metoprolol once patient does not have any other additional bradycardic events

## 2025-01-12 NOTE — SUBJECTIVE & OBJECTIVE
Past Medical History:   Diagnosis Date    GERD (gastroesophageal reflux disease)     Hyperlipidemia     Hypertension        Past Surgical History:   Procedure Laterality Date    ECHOCARDIOGRAM,TRANSESOPHAGEAL  2023    Procedure: Transesophageal echo (GAEL) intra-procedure log documentation;  Surgeon: Jerry Be DO;  Location: [facility] CATH LAB;  Service: Cardiology;;    LEFT HEART CATHETERIZATION Left 2023    Procedure: Left heart cath;  Surgeon: Jerry Be DO;  Location: [facility] CATH LAB;  Service: Cardiology;  Laterality: Left;    MITRAL VALVE REPAIR      REPAIR OF HEART VALVE      RIGHT HEART CATHETERIZATION Right 2023    Procedure: INSERTION, CATHETER, RIGHT HEART;  Surgeon: Jerry Be DO;  Location: [facility] CATH LAB;  Service: Cardiology;  Laterality: Right;       Review of patient's allergies indicates:   Allergen Reactions    Sulfamethoxazole-trimethoprim Rash     Note: Emil Johson Syndrome       Family History       Problem Relation (Age of Onset)    Cataracts Mother    Diabetes Maternal Grandfather    Glaucoma Mother    Heart disease Brother    No Known Problems Father, Brother          Tobacco Use    Smoking status: Former     Current packs/day: 0.00     Types: Cigarettes     Quit date:      Years since quittin.0     Passive exposure: Past    Smokeless tobacco: Never   Substance and Sexual Activity    Alcohol use: Not Currently    Drug use: Not Currently     Types: Marijuana    Sexual activity: Yes      Review of Systems  Objective:     Vital Signs (Most Recent):  Temp: 98.4 °F (36.9 °C) (25 0701)  Pulse: 67 (25 08)  Resp: 15 (25)  BP: (!) 163/90 (25)  SpO2: (!) 94 % (25) Vital Signs (24h Range):  Temp:  [97.8 °F (36.6 °C)-98.4 °F (36.9 °C)] 98.4 °F (36.9 °C)  Pulse:  [39-86] 67  Resp:  [8-22] 15  SpO2:  [93 %-99 %] 94 %  BP: ()/() 163/90   Weight: 74.6 kg (164 lb 7.4 oz)  Body mass index is 25.76  kg/m².      Intake/Output Summary (Last 24 hours) at 1/12/2025 0902  Last data filed at 1/12/2025 0516  Gross per 24 hour   Intake 70.5 ml   Output 1300 ml   Net -1229.5 ml          Physical Exam  Constitutional:       General: He is not in acute distress.     Appearance: Normal appearance. He is not ill-appearing or diaphoretic.   HENT:      Head: Normocephalic and atraumatic.      Nose: No congestion or rhinorrhea.      Mouth/Throat:      Mouth: Mucous membranes are moist.   Cardiovascular:      Rate and Rhythm: Normal rate and regular rhythm.      Pulses: Normal pulses.      Heart sounds: Normal heart sounds.   Pulmonary:      Effort: Pulmonary effort is normal. No respiratory distress.      Breath sounds: Normal breath sounds. No stridor. No wheezing, rhonchi or rales.   Chest:      Chest wall: No tenderness.   Abdominal:      General: Abdomen is flat.      Comments: No rebound tenderness, mild tenderness on palpation left lower quadrant   Musculoskeletal:      Cervical back: Normal range of motion. No rigidity.      Right lower leg: No edema.      Left lower leg: No edema.   Skin:     General: Skin is warm.      Findings: No erythema.   Neurological:      General: No focal deficit present.      Mental Status: He is alert and oriented to person, place, and time.      Comments: Right upper extremity-5/5  Right lower extremity-5/5  Left upper and left lower extremity-5/5   Aphasia and facial droop resolved      Oriented this morning            Vents:  Oxygen Concentration (%): 21 (01/11/25 1510)  Lines/Drains/Airways       Peripheral Intravenous Line  Duration                  Peripheral IV - Single Lumen 01/09/25 1241 20 G Anterior;Distal;Left Upper Arm 2 days         Peripheral IV - Single Lumen 01/11/25 0145 20 G No Anterior;Right Forearm 1 day                  Significant Labs:    CBC/Anemia Profile:  Recent Labs   Lab 01/11/25  0710 01/12/25  0428   WBC 4.65 5.24   HGB 12.2* 11.5*   HCT 38.1* 35.6*     198   MCV 90.1 90.1   RDW 13.7 13.7        Chemistries:  Recent Labs   Lab 01/11/25  1320 01/12/25  0428    141   K 4.2 3.8    106   CO2 27 26   BUN 14 13   CREATININE 1.25 0.99   CALCIUM 8.7* 8.5*       All pertinent labs within the past 24 hours have been reviewed.    Significant Imaging: I have reviewed all pertinent imaging results/findings within the past 24 hours.

## 2025-01-12 NOTE — ASSESSMENT & PLAN NOTE
Patient complained of left lower quadrant abdominal pain.  This was mildly tender to palpation with some evidence of guarding however without evidence of rebound tenderness.  Lactic acid within normal limits.  CT abdomen and pelvis with IV and oral contrast pending at this time.  We will continue to monitor, if there is any evidence of worsening abdominal pain, we will involve surgery.  Pending interpretation of images at this time.

## 2025-01-12 NOTE — CONSULTS
[facility]  Urology  Consult Note    Patient Name: Mitul Torres  MRN: [id number]  Admission Date: 1/9/2025  Hospital Length of Stay: 3   Code Status: Full Code   Attending Provider: Endy Levine MD   Consulting Provider: ENRIQUE MCDANIEL MD  Primary Care Physician: Shannan Gorman FNP  Principal Problem:<principal problem not specified>    Inpatient consult to Urology  Consult performed by: Enrique Mcdaniel MD  Consult ordered by: Endy Levine MD  Reason for consult: Left hydronephrosis  Assessment/Recommendations: I personally reviewed the CT scan of the abdomen and pelvis which demonstrates a distal stone with secondary signs of obstruction and discussed the patient's current risk factors and that he is currently off antiplatelets and that it has been more than 48 hours since fibrinolytic therapy and recommended intervention given risk of deterioration from left ureteral obstruction which could manifest in either retching, intractable pain and possibly acute kidney injury which could exacerbate his ischemic event.  Plan for NPO after midnight and will coordinate with anesthesia optimal timing for intervention as well as optimal approach.           Subjective:     HPI:  The patient is a 69-year-old male with a history of an embolic stroke that underwent fibrinolytic therapy after presenting with evidence of aphasia and altered mental status who was found to have a distal left ureteral stone with secondary signs of obstruction and Urology was consulted.  The patient is being evaluated by speech therapy and is reporting that he has forgotten how to use his telephone and is disoriented but has regained motor function of his hands.  He describes pain in the left lower abdomen that is mild and intermittent and radiates to the left back and distal groin.  He denies vomiting.     Past Medical History:   Diagnosis Date    GERD (gastroesophageal reflux disease)     Hyperlipidemia     Hypertension         Past Surgical History:   Procedure Laterality Date    ECHOCARDIOGRAM,TRANSESOPHAGEAL  2023    Procedure: Transesophageal echo (GAEL) intra-procedure log documentation;  Surgeon: Jerry Be DO;  Location: [facility] CATH LAB;  Service: Cardiology;;    LEFT HEART CATHETERIZATION Left 2023    Procedure: Left heart cath;  Surgeon: Jerry Be DO;  Location: [facility] CATH LAB;  Service: Cardiology;  Laterality: Left;    MITRAL VALVE REPAIR      REPAIR OF HEART VALVE      RIGHT HEART CATHETERIZATION Right 2023    Procedure: INSERTION, CATHETER, RIGHT HEART;  Surgeon: Jerry Be DO;  Location: [facility] CATH LAB;  Service: Cardiology;  Laterality: Right;       Review of patient's allergies indicates:   Allergen Reactions    Sulfamethoxazole-trimethoprim Rash     Note: Emil Johson Syndrome       Family History       Problem Relation (Age of Onset)    Cataracts Mother    Diabetes Maternal Grandfather    Glaucoma Mother    Heart disease Brother    No Known Problems Father, Brother            Tobacco Use    Smoking status: Former     Current packs/day: 0.00     Types: Cigarettes     Quit date:      Years since quittin.0     Passive exposure: Past    Smokeless tobacco: Never   Substance and Sexual Activity    Alcohol use: Not Currently    Drug use: Not Currently     Types: Marijuana    Sexual activity: Yes       Review of Systems   Unable to perform ROS: Mental status change       Objective:     Temp:  [97.8 °F (36.6 °C)-100 °F (37.8 °C)] 100 °F (37.8 °C)  Pulse:  [39-86] 65  Resp:  [8-22] 15  SpO2:  [89 %-99 %] 95 %  BP: ()/() 147/74  Weight: 74.6 kg (164 lb 7.4 oz)  Body mass index is 25.76 kg/m².           Drains       None                    Physical Exam  Vitals reviewed.   Constitutional:       Appearance: He is not toxic-appearing or diaphoretic.   Eyes:      General: No scleral icterus.     Extraocular Movements: Extraocular movements intact.       Conjunctiva/sclera: Conjunctivae normal.      Pupils: Pupils are equal, round, and reactive to light.   Cardiovascular:      Rate and Rhythm: Normal rate.   Pulmonary:      Effort: Pulmonary effort is normal. No respiratory distress.   Abdominal:      General: There is no distension.      Tenderness: There is abdominal tenderness.      Comments: Tenderness overlying the distal ureter with deep palpation on the left.    Musculoskeletal:         General: No deformity.   Skin:     General: Skin is warm.   Neurological:      Mental Status: He is alert. He is disoriented.      Coordination: Coordination normal.          Significant Labs:    BMP:  Recent Labs   Lab 01/09/25  2036 01/11/25  1320 01/12/25  0428    139 141   K 3.6 4.2 3.8   * 106 106   CO2 26 27 26   BUN 12 14 13   CREATININE 0.92 1.25 0.99   CALCIUM 8.4* 8.7* 8.5*       CBC:  Recent Labs   Lab 01/10/25  0411 01/11/25  0710 01/12/25  0428   WBC 4.99 4.65 5.24   HGB 11.8* 12.2* 11.5*   HCT 37.4* 38.1* 35.6*    205 198       All pertinent labs results from the past 24 hours have been reviewed.    Significant Imaging:  All pertinent imaging results/findings from the past 24 hours have been reviewed.                    Assessment and Plan:     Left ureteral stone       Chronic kidney disease, stage 3a       Embolic stroke involving left middle cerebral artery       Severe mitral regurgitation           VTE Risk Mitigation (From admission, onward)           Ordered     heparin (porcine) injection 5,000 Units  Every 8 hours         01/11/25 2007                    ANDREI MCHUGH MD  Urology  [facility]

## 2025-01-12 NOTE — PT/OT/SLP PROGRESS
Speech Language Pathology Treatment    Patient Name:  Mitul Torres   MRN:  [id number]  Admitting Diagnosis: <principal problem not specified>    Recommendations:                 General Recommendations:  Speech/language therapy and Cognitive-linguistic therapy  Diet recommendations:  Regular, Liquid Diet Level: Thin   Aspiration Precautions: Standard aspiration precautions   General Precautions: Standard,    Communication strategies:   Patient's communication is better but still has some confusion. Patient did not know how to work his phone this morning.     Assessment:     Mitul Torres is a 69 y.o. male with an SLP diagnosis of  AMS .  He presents with being able to communicate better today, but still having some confusion.    Subjective     Patient sitting up in bed awake and alert. Patient agreeable to SPEECH THERAPY.  Patient goals: to get better and go home     Pain/Comfort:       Respiratory Status: Room air    Objective:     Has the patient been evaluated by SLP for swallowing?   Yes  Keep patient NPO? No   Current Respiratory Status:        Patient Independently oriented to person, place, and month, oriented to year with cues.  Patient answered yes/no questions with 80% accuracy Independently.  Patient followed 1 part commands with 90% accuracy Independently.   Patient completed automatic speech tasks (RIZWAN and SULMA) with 70% accuracy Independently.  Patient completed sentences with 80% accuracy Independently.   Patient receptively ID objects with 100% accuracy Independently.  Patient expressively ID objects with 90% accuracy Independently.  Patient expressively ID object function with 90% accuracy Independently.  Patient completed word finding tasks with 70% accuracy Independently.     Patient reported that he could not remember how to work his phone this morning and got a nurse to send a text message for him.    Goals:   Multidisciplinary Problems       SLP Goals       Not on file                     Plan:     Patient to be seen:  5 x/week   Plan of Care expires:  01/24/25  Plan of Care reviewed with:      SLP Follow-Up:  Yes       Discharge recommendations:      Barriers to Discharge:  Decreased Care Giver Support    Time Tracking:     SLP Treatment Date:   01/12/25  Speech Start Time:  1040  Speech Stop Time:  1100     Speech Total Time (min):  20 min    Billable Minutes: Speech Therapy Individual 20    01/12/2025

## 2025-01-12 NOTE — PLAN OF CARE
Problem: Thrombolytic Therapy  Goal: Absence of Bleeding  Outcome: Progressing  Intervention: Prevent and Manage Risk of Hemorrhage  Flowsheets (Taken 1/12/2025 0430)  Bleeding Precautions: blood pressure closely monitored  Goal: Unobstructed Breathing  Outcome: Progressing  Intervention: Manage Acute Allergic Reaction  Flowsheets (Taken 1/12/2025 0430)  Orolingual Edema Management: airway patency maintained     Problem: Stroke, Ischemic (Includes Transient Ischemic Attack)  Goal: Optimal Coping  Outcome: Progressing  Intervention: Support Psychosocial Response to Stroke  Flowsheets (Taken 1/12/2025 0430)  Supportive Measures:   active listening utilized   verbalization of feelings encouraged  Family/Support System Care: self-care encouraged  Goal: Effective Bowel Elimination  Outcome: Progressing  Goal: Optimal Cerebral Tissue Perfusion  Outcome: Progressing  Intervention: Protect and Optimize Cerebral Perfusion  Flowsheets (Taken 1/12/2025 0430)  Sensory Stimulation Regulation: care clustered  Cerebral Perfusion Promotion: blood pressure monitored  Goal: Optimal Cognitive Function  Outcome: Progressing  Intervention: Optimize Cognitive Function  Flowsheets (Taken 1/12/2025 0430)  Sensory Stimulation Regulation: care clustered  Goal: Improved Communication Skills  Outcome: Progressing  Goal: Optimal Functional Ability  Outcome: Progressing  Goal: Optimal Nutrition Intake  Outcome: Progressing  Goal: Effective Oxygenation and Ventilation  Outcome: Progressing  Goal: Improved Sensorimotor Function  Outcome: Progressing  Goal: Safe and Effective Swallow  Outcome: Progressing  Goal: Effective Urinary Elimination  Outcome: Progressing     Problem: Infection  Goal: Absence of Infection Signs and Symptoms  Outcome: Progressing     Problem: Adult Inpatient Plan of Care  Goal: Plan of Care Review  Outcome: Progressing  Goal: Patient-Specific Goal (Individualized)  Outcome: Progressing  Goal: Absence of Hospital-Acquired  Illness or Injury  Outcome: Progressing  Goal: Optimal Comfort and Wellbeing  Outcome: Progressing  Goal: Readiness for Transition of Care  Outcome: Progressing     Problem: Skin Injury Risk Increased  Goal: Skin Health and Integrity  Outcome: Progressing

## 2025-01-12 NOTE — HPI
The patient is a 69-year-old male with a history of an embolic stroke that underwent fibrinolytic therapy after presenting with evidence of aphasia and altered mental status who was found to have a distal left ureteral stone with secondary signs of obstruction and Urology was consulted.  The patient is being evaluated by speech therapy and is reporting that he has forgotten how to use his telephone and is disoriented but has regained motor function of his hands.  He describes pain in the left lower abdomen that is mild and intermittent and radiates to the left back and distal groin.  He denies vomiting.

## 2025-01-12 NOTE — ASSESSMENT & PLAN NOTE
Left hemispheric ischemic event, low large vessel occlusion with significant improvement in right-sided deficits 1/10/25  Status post TNK administration 12:45 p.m. 1/9/25      BP goal prior to IV thrombolytics - <185/110  BP goal post IV thrombolytics - <180/105 for at least 24 hrs.       -- Close monitoring in ICU for at least 24 hrs.  Q.1 hour neuro checks  -- No antithrombotics or DVT prophylaxis for 24 hrs.  -- MRI brain performed  -- TTE w/ bubble performed  -- PT/OT/SLP   -hold off on any antiplatelets or anticoagulation for 24 hours, till CT scan is performed  -avoid Varghese catheter placement and invasive procedure    Upload, given patient's echocardiogram read revealing RV strain, CTA chest was ordered.  Final officially read pending at this time but no immediately visible large filling defect present, pending complete/official radiology report.  Patient not hypoxic or tachycardic at this time.  We will follow up.

## 2025-01-13 ENCOUNTER — ANESTHESIA EVENT (OUTPATIENT)
Dept: SURGERY | Facility: HOSPITAL | Age: 70
DRG: 988 | End: 2025-01-13
Payer: MEDICARE

## 2025-01-13 ENCOUNTER — ANESTHESIA (OUTPATIENT)
Dept: SURGERY | Facility: HOSPITAL | Age: 70
DRG: 988 | End: 2025-01-13
Payer: MEDICARE

## 2025-01-13 LAB
ANION GAP SERPL CALCULATED.3IONS-SCNC: 10 MMOL/L (ref 7–16)
BASOPHILS # BLD AUTO: 0.03 K/UL (ref 0–0.2)
BASOPHILS NFR BLD AUTO: 0.6 % (ref 0–1)
BUN SERPL-MCNC: 18 MG/DL (ref 8–26)
BUN/CREAT SERPL: 18 (ref 6–20)
CALCIUM SERPL-MCNC: 8.5 MG/DL (ref 8.8–10)
CHLORIDE SERPL-SCNC: 109 MMOL/L (ref 98–107)
CO2 SERPL-SCNC: 28 MMOL/L (ref 23–31)
CREAT SERPL-MCNC: 1 MG/DL (ref 0.72–1.25)
DIFFERENTIAL METHOD BLD: ABNORMAL
EGFR (NO RACE VARIABLE) (RUSH/TITUS): 82 ML/MIN/1.73M2
EOSINOPHIL # BLD AUTO: 0.09 K/UL (ref 0–0.5)
EOSINOPHIL NFR BLD AUTO: 1.7 % (ref 1–4)
ERYTHROCYTE [DISTWIDTH] IN BLOOD BY AUTOMATED COUNT: 13.5 % (ref 11.5–14.5)
GLUCOSE SERPL-MCNC: 98 MG/DL (ref 82–115)
HCT VFR BLD AUTO: 36.9 % (ref 40–54)
HGB BLD-MCNC: 11.9 G/DL (ref 13.5–18)
IMM GRANULOCYTES # BLD AUTO: 0.01 K/UL (ref 0–0.04)
IMM GRANULOCYTES NFR BLD: 0.2 % (ref 0–0.4)
LYMPHOCYTES # BLD AUTO: 1.2 K/UL (ref 1–4.8)
LYMPHOCYTES NFR BLD AUTO: 22.8 % (ref 27–41)
MCH RBC QN AUTO: 28.9 PG (ref 27–31)
MCHC RBC AUTO-ENTMCNC: 32.2 G/DL (ref 32–36)
MCV RBC AUTO: 89.6 FL (ref 80–96)
MONOCYTES # BLD AUTO: 0.47 K/UL (ref 0–0.8)
MONOCYTES NFR BLD AUTO: 8.9 % (ref 2–6)
MPC BLD CALC-MCNC: 11.2 FL (ref 9.4–12.4)
NEUTROPHILS # BLD AUTO: 3.46 K/UL (ref 1.8–7.7)
NEUTROPHILS NFR BLD AUTO: 65.8 % (ref 53–65)
NRBC # BLD AUTO: 0 X10E3/UL
NRBC, AUTO (.00): 0 %
PLATELET # BLD AUTO: 197 K/UL (ref 150–400)
POTASSIUM SERPL-SCNC: 3.8 MMOL/L (ref 3.5–5.1)
RBC # BLD AUTO: 4.12 M/UL (ref 4.6–6.2)
SODIUM SERPL-SCNC: 143 MMOL/L (ref 136–145)
WBC # BLD AUTO: 5.26 K/UL (ref 4.5–11)

## 2025-01-13 PROCEDURE — 92507 TX SP LANG VOICE COMM INDIV: CPT

## 2025-01-13 PROCEDURE — 99232 SBSQ HOSP IP/OBS MODERATE 35: CPT | Mod: GC,,, | Performed by: FAMILY MEDICINE

## 2025-01-13 PROCEDURE — 74420 UROGRAPHY RTRGR +-KUB: CPT | Mod: 26,,, | Performed by: UROLOGY

## 2025-01-13 PROCEDURE — 63600175 PHARM REV CODE 636 W HCPCS: Performed by: ANESTHESIOLOGY

## 2025-01-13 PROCEDURE — BT1F1ZZ FLUOROSCOPY OF LEFT KIDNEY, URETER AND BLADDER USING LOW OSMOLAR CONTRAST: ICD-10-PCS | Performed by: UROLOGY

## 2025-01-13 PROCEDURE — 85025 COMPLETE CBC W/AUTO DIFF WBC: CPT | Performed by: STUDENT IN AN ORGANIZED HEALTH CARE EDUCATION/TRAINING PROGRAM

## 2025-01-13 PROCEDURE — 36000706: Performed by: UROLOGY

## 2025-01-13 PROCEDURE — 97110 THERAPEUTIC EXERCISES: CPT

## 2025-01-13 PROCEDURE — 11000001 HC ACUTE MED/SURG PRIVATE ROOM

## 2025-01-13 PROCEDURE — D9220A PRA ANESTHESIA: Mod: ANES,,, | Performed by: ANESTHESIOLOGY

## 2025-01-13 PROCEDURE — 80048 BASIC METABOLIC PNL TOTAL CA: CPT | Performed by: STUDENT IN AN ORGANIZED HEALTH CARE EDUCATION/TRAINING PROGRAM

## 2025-01-13 PROCEDURE — 27201423 OPTIME MED/SURG SUP & DEVICES STERILE SUPPLY: Performed by: UROLOGY

## 2025-01-13 PROCEDURE — 0TC78ZZ EXTIRPATION OF MATTER FROM LEFT URETER, VIA NATURAL OR ARTIFICIAL OPENING ENDOSCOPIC: ICD-10-PCS | Performed by: UROLOGY

## 2025-01-13 PROCEDURE — 25000003 PHARM REV CODE 250: Performed by: STUDENT IN AN ORGANIZED HEALTH CARE EDUCATION/TRAINING PROGRAM

## 2025-01-13 PROCEDURE — D9220A PRA ANESTHESIA: Mod: CRNA,,, | Performed by: ANESTHESIOLOGY

## 2025-01-13 PROCEDURE — 37000009 HC ANESTHESIA EA ADD 15 MINS: Performed by: UROLOGY

## 2025-01-13 PROCEDURE — 71000033 HC RECOVERY, INTIAL HOUR: Performed by: UROLOGY

## 2025-01-13 PROCEDURE — C2617 STENT, NON-COR, TEM W/O DEL: HCPCS | Performed by: UROLOGY

## 2025-01-13 PROCEDURE — 0T778DZ DILATION OF LEFT URETER WITH INTRALUMINAL DEVICE, VIA NATURAL OR ARTIFICIAL OPENING ENDOSCOPIC: ICD-10-PCS | Performed by: UROLOGY

## 2025-01-13 PROCEDURE — C1758 CATHETER, URETERAL: HCPCS | Performed by: UROLOGY

## 2025-01-13 PROCEDURE — 36000707: Performed by: UROLOGY

## 2025-01-13 PROCEDURE — 25000003 PHARM REV CODE 250: Performed by: ANESTHESIOLOGY

## 2025-01-13 PROCEDURE — 97116 GAIT TRAINING THERAPY: CPT

## 2025-01-13 PROCEDURE — 37000008 HC ANESTHESIA 1ST 15 MINUTES: Performed by: UROLOGY

## 2025-01-13 PROCEDURE — 52356 CYSTO/URETERO W/LITHOTRIPSY: CPT | Mod: LT,,, | Performed by: UROLOGY

## 2025-01-13 PROCEDURE — 87086 URINE CULTURE/COLONY COUNT: CPT | Performed by: UROLOGY

## 2025-01-13 PROCEDURE — C1769 GUIDE WIRE: HCPCS | Performed by: UROLOGY

## 2025-01-13 PROCEDURE — 97530 THERAPEUTIC ACTIVITIES: CPT

## 2025-01-13 PROCEDURE — 25000003 PHARM REV CODE 250: Performed by: HOSPITALIST

## 2025-01-13 PROCEDURE — 63600175 PHARM REV CODE 636 W HCPCS: Performed by: STUDENT IN AN ORGANIZED HEALTH CARE EDUCATION/TRAINING PROGRAM

## 2025-01-13 PROCEDURE — 36415 COLL VENOUS BLD VENIPUNCTURE: CPT | Performed by: STUDENT IN AN ORGANIZED HEALTH CARE EDUCATION/TRAINING PROGRAM

## 2025-01-13 DEVICE — STENT CONTOUR VL 7F 22-30CM: Type: IMPLANTABLE DEVICE | Site: URETER | Status: FUNCTIONAL

## 2025-01-13 RX ORDER — LIDOCAINE HYDROCHLORIDE 20 MG/ML
INJECTION, SOLUTION EPIDURAL; INFILTRATION; INTRACAUDAL; PERINEURAL
Status: DISCONTINUED | OUTPATIENT
Start: 2025-01-13 | End: 2025-01-13

## 2025-01-13 RX ORDER — PROPOFOL 10 MG/ML
VIAL (ML) INTRAVENOUS
Status: DISCONTINUED | OUTPATIENT
Start: 2025-01-13 | End: 2025-01-13

## 2025-01-13 RX ORDER — EPHEDRINE SULFATE 50 MG/ML
INJECTION, SOLUTION INTRAVENOUS
Status: DISCONTINUED | OUTPATIENT
Start: 2025-01-13 | End: 2025-01-13

## 2025-01-13 RX ORDER — ONDANSETRON HYDROCHLORIDE 2 MG/ML
INJECTION, SOLUTION INTRAVENOUS
Status: DISCONTINUED | OUTPATIENT
Start: 2025-01-13 | End: 2025-01-13

## 2025-01-13 RX ORDER — CEFAZOLIN SODIUM 1 G/3ML
INJECTION, POWDER, FOR SOLUTION INTRAMUSCULAR; INTRAVENOUS
Status: DISCONTINUED | OUTPATIENT
Start: 2025-01-13 | End: 2025-01-13

## 2025-01-13 RX ORDER — FENTANYL CITRATE 50 UG/ML
INJECTION, SOLUTION INTRAMUSCULAR; INTRAVENOUS
Status: DISCONTINUED | OUTPATIENT
Start: 2025-01-13 | End: 2025-01-13

## 2025-01-13 RX ADMIN — EPHEDRINE SULFATE 10 MG: 50 INJECTION INTRAVENOUS at 01:01

## 2025-01-13 RX ADMIN — EPHEDRINE SULFATE 20 MG: 50 INJECTION INTRAVENOUS at 01:01

## 2025-01-13 RX ADMIN — ATORVASTATIN CALCIUM 40 MG: 40 TABLET, FILM COATED ORAL at 08:01

## 2025-01-13 RX ADMIN — FENTANYL CITRATE 50 MCG: 50 INJECTION, SOLUTION INTRAMUSCULAR; INTRAVENOUS at 01:01

## 2025-01-13 RX ADMIN — CEFAZOLIN 2000 MG: 1 INJECTION, POWDER, FOR SOLUTION INTRAMUSCULAR; INTRAVENOUS; PARENTERAL at 12:01

## 2025-01-13 RX ADMIN — LIDOCAINE HYDROCHLORIDE 50 MG: 20 INJECTION, SOLUTION EPIDURAL; INFILTRATION; INTRACAUDAL; PERINEURAL at 12:01

## 2025-01-13 RX ADMIN — PROPOFOL 100 MG: 10 INJECTION, EMULSION INTRAVENOUS at 12:01

## 2025-01-13 RX ADMIN — FENTANYL CITRATE 50 MCG: 50 INJECTION, SOLUTION INTRAMUSCULAR; INTRAVENOUS at 12:01

## 2025-01-13 RX ADMIN — ONDANSETRON 4 MG: 2 INJECTION INTRAMUSCULAR; INTRAVENOUS at 12:01

## 2025-01-13 RX ADMIN — ACETAMINOPHEN 1000 MG: 500 TABLET ORAL at 08:01

## 2025-01-13 RX ADMIN — MUPIROCIN: 20 OINTMENT TOPICAL at 08:01

## 2025-01-13 RX ADMIN — HEPARIN SODIUM 5000 UNITS: 5000 INJECTION, SOLUTION INTRAVENOUS; SUBCUTANEOUS at 09:01

## 2025-01-13 RX ADMIN — ASPIRIN 81 MG CHEWABLE TABLET 81 MG: 81 TABLET CHEWABLE at 08:01

## 2025-01-13 RX ADMIN — SODIUM CHLORIDE: 9 INJECTION, SOLUTION INTRAVENOUS at 12:01

## 2025-01-13 RX ADMIN — THIAMINE HYDROCHLORIDE 100 MG: 100 INJECTION, SOLUTION INTRAMUSCULAR; INTRAVENOUS at 08:01

## 2025-01-13 NOTE — PROGRESS NOTES
[facility] Hospital Medicine  Progress Note    Patient Name: Mitul Torres  MRN: [id number]  Patient Class: IP- Inpatient   Admission Date: 1/9/2025  Length of Stay: 4 days  Attending Physician: Heather Nicholas DO  Primary Care Provider: Shannan Gorman FNP        Subjective     Principal Problem:Embolic stroke involving left middle cerebral artery        HPI:  No notes on file    Overview/Hospital Course:  1/9  Patient presented to the ED with Aphasia and AMS. He was given Tenecteplase for Left hemispheric ischemic event    1/10/25  patient confusion delirious overnight without any worsening focal neurological deficits.  Hallucinations present, however significant improvement in the patient's deficits right-sided deficits with return of power and sensation.     1/11/25  the patient continued to have hallucinations today.  At 1 point, he complained of left lower quadrant abdominal pain as well as what he described as tunneled vision, alternating with episodes of delirium, waxing and waning lucidity with hallucinations.  Therefore, he had a stat CT head performed rule out any new neurological deficits (nothing focal in terms of his right-sided deficits returned).  CT head without any evidence of acute bleed.  CTA chest (ordered due to echocardiogram read, low probability pulmonary embolism) and CT abdomen pelvis with IV and oral contrast pending.     1/12/25  significantly improved in terms of patient's delirium.  No longer complaining of changes in vision/hallucinations.  No ongoing left lower quadrant pain.  CT abdomen and pelvis with evidence 6 mm obstructive stone at left UVJ.   Urology Consulted  Patient NPO  1/13  Patient is admitted to the medicine team as a ICU Down grade  Plans to take patient to OR for lithotripsy  PT/OT consult after operation         No new subjective & objective note has been filed under this hospital service since the last note was  generated.      Assessment and Plan     * Embolic stroke involving left middle cerebral artery    Antithrombotics for secondary stroke prevention: Antiplatelets: Aspirin: 81 mg daily    Statins for secondary stroke prevention and hyperlipidemia, if present:   Statins: Atorvastatin- 40 mg daily    Aggressive risk factor modification: HTN, HLD, A-Fib, CAD     Rehab efforts: The patient has been evaluated by a stroke team provider and the therapy needs have been fully considered based off the presenting complaints and exam findings. The following therapy evaluations are needed: PT evaluate and treat, OT evaluate and treat    Diagnostics ordered/pending: CT scan of head without contrast to asses brain parenchyma, CTA Head to assess vasculature , Other: CT Abdomen and Pelvis    VTE prophylaxis: Heparin 5000 units SQ every 8 hours    BP parameters: Infarct: Post Thrombolytic therapy, SBP <180        Left ureteral stone  1/13  Plan to remove via Lithotripsy by Urology      Renal stone  1/13  Plan to remove via Lithotripsy by Urology    Delirium  1/13  Delirium experienced in the ICU  No signs of delirium or hallucinations presently  Will continue to monitor and adjust when medically indicated          Chronic kidney disease, stage 3a  Creatine stable for now. BMP reviewed- noted Estimated Creatinine Clearance: 66.1 mL/min (based on SCr of 1 mg/dL). according to latest data. Based on current GFR, CKD stage is stage 2 - GFR 60-89.  Monitor UOP and serial BMP and adjust therapy as needed. Renally dose meds. Avoid nephrotoxic medications and procedures.    Coronary artery disease involving native coronary artery of native heart without angina pectoris  Patient with known CAD s/p stent placement and CABG, which is controlled Will continue ASA and Statin and monitor for S/Sx of angina/ACS. Continue to monitor on telemetry.     Severe mitral regurgitation  Echocardiogram with evidence of mitral regurgitation that is moderate .  The patient's most recent echocardiogram result is listed below. Patient followed by Cardiology    Echo    Result Date: 1/10/2025    Left Ventricle: The left ventricle is mildly dilated. Mildly increased   wall thickness. There is normal systolic function. Ejection fraction is   approximately 60%. Grade I diastolic dysfunction.    Right Ventricle: Moderate right ventricular enlargement. RV/LV Ratio is   1.0, which exceeds threshold, is consistent with moderate RV strain.   Systolic function is moderately reduced.    Aortic Valve: The aortic valve is a trileaflet valve. Mildly calcified   cusps.    Mitral Valve: There is moderate anterior leaflet sclerosis. Moderately   thickened anterior leaflet. Moderately calcified anterior leaflet. There   is mild to moderate stenosis. The mean pressure gradient across the mitral   valve is 3 mmHg at a heart rate of  bpm. There is mild regurgitation with   an eccentric jet.    IVC/SVC: Normal venous pressure at 3 mmHg.        Echo    Result Date: 12/16/2024    Left Ventricle: The left ventricle is normal in size. Mildly increased   wall thickness. There is concentric remodeling. There is normal systolic   function. Quantitated ejection fraction is 55%.    Right Ventricle: Normal right ventricular cavity size. Systolic   function could not be assesed.    Left Atrium: Left atrium is mildly dilated.    Tricuspid Valve: There is mild regurgitation.    Pulmonary Artery: The estimated pulmonary artery systolic pressure is   26 mmHg.    IVC/SVC: Normal venous pressure at 3 mmHg.          Paroxysmal atrial fibrillation  Patient has paroxysmal (<7 days) atrial fibrillation. Patient is currently in sinus rhythm. [id number] Score: 4. The patients heart rate in the last 24 hours is as follows:  Pulse  Min: 58  Max: 77     Antiarrhythmics       Anticoagulants  heparin (porcine) injection 5,000 Units, Every 8 hours, Subcutaneous    Plan  - Replete lytes with a goal of K>4, Mg >2  - Patient is  anticoagulated, see medications listed above.  - Patient's afib is currently controlled  - Patient to remain on telemetry           VTE Risk Mitigation (From admission, onward)           Ordered     heparin (porcine) injection 5,000 Units  Every 8 hours         01/11/25 2007                    Discharge Planning   ROLANDA:      Code Status: Full Code   Medical Readiness for Discharge Date:   Discharge Plan A: Home                Please place Justification for DME        Jorge Perez Jr., MD  Department of Hospital Medicine   [facility]

## 2025-01-13 NOTE — ASSESSMENT & PLAN NOTE
Creatine stable for now. BMP reviewed- noted Estimated Creatinine Clearance: 66.1 mL/min (based on SCr of 1 mg/dL). according to latest data. Based on current GFR, CKD stage is stage 2 - GFR 60-89.  Monitor UOP and serial BMP and adjust therapy as needed. Renally dose meds. Avoid nephrotoxic medications and procedures.

## 2025-01-13 NOTE — ANESTHESIA PROCEDURE NOTES
Intubation    Date/Time: 1/13/2025 12:35 PM    Performed by: Isabel Bermudez CRNA  Authorized by: Jerson Garcia MD    Intubation:     Induction:  Intravenous    Intubated:  Postinduction    Mask Ventilation:  Easy mask    Attempts:  1    Attempted By:  CRNA    Difficult Airway Encountered?: No      Complications:  None    Airway Device:  Supraglottic airway/LMA    Airway Device Size:  4.0    Style/Cuff Inflation:  Cuffed (inflated to minimal occlusive pressure)    Placement Verified By:  Capnometry    Complicating Factors:  None    Findings Post-Intubation:  BS equal bilateral and atraumatic/condition of teeth unchanged

## 2025-01-13 NOTE — HOSPITAL COURSE
1/9  Patient presented to the ED with Aphasia and AMS. He was given Tenecteplase for Left hemispheric ischemic event    1/10/25  patient confusion delirious overnight without any worsening focal neurological deficits.  Hallucinations present, however significant improvement in the patient's deficits right-sided deficits with return of power and sensation.     1/11/25  the patient continued to have hallucinations today.  At 1 point, he complained of left lower quadrant abdominal pain as well as what he described as tunneled vision, alternating with episodes of delirium, waxing and waning lucidity with hallucinations.  Therefore, he had a stat CT head performed rule out any new neurological deficits (nothing focal in terms of his right-sided deficits returned).  CT head without any evidence of acute bleed.  CTA chest (ordered due to echocardiogram read, low probability pulmonary embolism) and CT abdomen pelvis with IV and oral contrast pending.     1/12/25  significantly improved in terms of patient's delirium.  No longer complaining of changes in vision/hallucinations.  No ongoing left lower quadrant pain.  CT abdomen and pelvis with evidence 6 mm obstructive stone at left UVJ.   Urology Consulted  Patient NPO  1/13  Patient is admitted to the medicine team as a ICU Down grade  Plans to take patient to OR for lithotripsy  PT/OT consult after operation     1/14  Patient tolerated operation well  Working with PT  Case Management obtaining home health  Started Flomax post stent placement for spasm  1/15  Murray still in place  Urology to remove murray at a later date  No overnight complaints  1/16  Urology noted for patient to be allowed to discharge with murray inplace until January 21st.  Oral anticoagulation restarted and secured at discharge  Patient is medically stable and has maximally benefited from this hospital stay. Patient is being discharged home follow up appointment with PCP and specialist have been secured  at discharge.

## 2025-01-13 NOTE — OP NOTE
DATE OF OPERATION: 1/13/2025     PREOPERATIVE DIAGNOSIS: Left distal ureteral calculus.     POSTOPERATIVE DIAGNOSIS: Same as above      OPERATIONS PERFORMED:  Left Ureteroscopy with holmium laser lithotripsy   Retrograde Pyelography performance, supervision and interpretation  Placement of double-J stent 7fr Soft      SURGEON: Enrique Mcdaniel     ASSISTANT: Jacques Hodges     EBL: Trace      SPECIMENS: Left Renal Pelvis aspirate for culture      ANESTHESIA: General       DESCRIPTION OF OPERATION: The patient was brought to the operating room and underwent induction of general anesthesia without complications. He was positioned in the cystolithotomy position and prepped and draped. Cystoscopy was performed. The anterior urethra was normal. The prostate demonstrated visual obstruction. The Left ureteral orifice was deviated consistent with a retained stone. The right ureteral orifice was orthotopic and normal. There were no bladder lesions.  Under fluoroscopic guidance a high performance glide-wire was advanced into the left ureteral orifice.  The stone was obstructing but the wire was advanced along side the stone. A 5fr ureteral catheter was attempted but the stone was obstructing the ureter. Semi-rigid ureteroscopy was then performed. The ureteroscope was advanced to the stone in the ureter. The stone was too large to basket and was pulverized. Using low wattage laser lithotripsy the stone was broken. The fragments were removed with the basket. The ureteroscope was advanced to the proximal ureter. There were no stone fragments. The ureteroscope was then removed and upon withdrawal the ureter was inspected. There were no stone fragments. The 5fr ureteral catheter was advanced over the wire to the renal pelvis. There was a brisk hydronephrotic drip. The renal pelvis was decompressed and a specimen was collected. A retrograde pyelogram was performed. There was pelvicaliectasis. The ureteral catheter was removed and  the wire left in place. The wire was converted into a Soft 7fr double-J ureteral stent 22cm/32cm in length. The stent was seen curled in good position in the renal pelvis on fluoroscopy and in the bladder on cystoscopy. The bladder was drained. 10ml of 2% viscous lidocaine was instilled per urethra to help with comfort post procedure. The patient was awakened and brought to the PACU in satisfactory condition. He tolerated the procedure well. Plan to maintain the stent in place temporarily and continue murray catheter drainage.      Complications: None

## 2025-01-13 NOTE — PT/OT/SLP PROGRESS
Speech Language Pathology Treatment    Patient Name:  Mitul Torres   MRN:  [id number]  Admitting Diagnosis: Embolic stroke involving left middle cerebral artery    Recommendations:                 General Recommendations:  Speech/language therapy and Cognitive-linguistic therapy  Diet recommendations:  Regular, Liquid Diet Level: Thin   Aspiration Precautions: Remain upright 30 minutes post meal, Small bites/sips, and Standard aspiration precautions   General Precautions: Standard    Communication strategies:   Patient doing very well with communication today.     Assessment:     Mitul Torres is a 69 y.o. male with an SLP diagnosis of  CVA .  He presents with doing much better with communication today.    Subjective     Patient lying in bed awake and alert. Patient had a procedure earlier today but says he feels fine now and not drowsy at all.   Patient goals: to feel better and go home     Pain/Comfort:  Pain Rating 1: 0/10 (No pain reported by pt during ST.)    Respiratory Status: Room air    Objective:     Has the patient been evaluated by SLP for swallowing?   Yes  Keep patient NPO? No   Current Respiratory Status:        Patient Independently oriented to x4.  Patient answered yes/no questions with 100% accuracy Independently.  Patient followed 1 part commands with 100% accuracy Independently.   Patient completed automatic speech tasks (RIZWAN and SULMA) with 100% accuracy Independently.  Patient completed sentences with 100% accuracy Independently.   Patient completed word finding tasks with 80% accuracy Independently in conversation.     Goals:   Multidisciplinary Problems       SLP Goals          Problem: SLP    Goal Priority Disciplines Outcome   SLP Goal     SLP Progressing   Description: Patient will be Independently oriented x4, 5/5 tx sessions.  Patient will answer yes/no questions with 80% accuracy Independently.   Patient will follow 1 part commands with 80% accuracy Independently.   Patient will  complete automatic speech tasks with 80% accuracy Independently.   Patient will complete sentences with 80% accuracy Independently.   Patient will receptively ID objects with 80% accuracy Independently.   Patient will expressively ID objects with 80% accuracy Independently.   Patient will expressively ID object function with 80% accuracy Independently.   Patient will complete word finding tasks with 80% accuracy Independently.                           Plan:     Patient to be seen:  5 x/week   Plan of Care expires:  01/24/25  Plan of Care reviewed with:      SLP Follow-Up:  Yes       Discharge recommendations:      Barriers to Discharge:  None    Time Tracking:     SLP Treatment Date:   01/13/25  Speech Start Time:  1525  Speech Stop Time:  1550     Speech Total Time (min):  25 min    Billable Minutes: Speech Therapy Individual 25 01/13/2025

## 2025-01-13 NOTE — ANESTHESIA PREPROCEDURE EVALUATION
01/13/2025  Mitul Torres is a 69 y.o., male.      Pre-op Assessment    I have reviewed the Patient Summary Reports.    I have reviewed the NPO Status.   I have reviewed the Medications.     Review of Systems         Anesthesia Plan  Type of Anesthesia, risks & benefits discussed:    Anesthesia Type: Gen Supraglottic Airway  Post Op Pain Control Plan: IV/PO Opioids PRN  Induction:  IV  Informed Consent: Informed consent signed with the Patient and all parties understand the risks and agree with anesthesia plan.  All questions answered.   ASA Score: 3    Ready For Surgery From Anesthesia Perspective.     .  NPO greater than 8 hours  No anesthetic complications  Allergies      Sulfamethoxazole-trimethoprim Drug Rash High Allergy 7/20/2022   Note: Emil Johson Syndrome     Hct 37  Ca 8.5  1/11/25 EKG: Sinus rhythm; 81 bpm  Borderline prolonged QT interval   Left ventricular hypertrophy   Lateral ST-T abnormality may be due to the hypertrophy and/or ischemia   1/10/25 Echo: mild MR; EF 60%    H/o CVA x2 (most recent was on 1/9/25)  H/o Jovita valve replacement  CKD  CAD  Paroxysmal atrial fibrillation  H/o GI bleed  Dyspnea on exertion  GERD  H/o seizure    MP II; dentures; adequate ROM at neck

## 2025-01-13 NOTE — PLAN OF CARE
CM was consulted on pt for home health. CM called and spoke with son, choice obtained for [facility]. CM notified Ernestine Saha of referral and sent info.

## 2025-01-13 NOTE — TRANSFER OF CARE
"Anesthesia Transfer of Care Note    Patient: Mitul Torres    Procedure(s) Performed: Procedure(s) (LRB):  CYSTOURETEROSCOPY, WITH HOLMIUM LASER LITHOTRIPSY OF URETERAL CALCULUS AND STENT INSERTION (Left)    Patient location: PACU    Anesthesia Type: general    Transport from OR: Transported from OR on room air with adequate spontaneous ventilation    Post pain: adequate analgesia    Post assessment: no apparent anesthetic complications and tolerated procedure well    Post vital signs: stable    Level of consciousness: responds to stimulation    Nausea/Vomiting: no nausea/vomiting    Complications: none    Transfer of care protocol was followedComments: Report Given to PACU rn VSS      Last vitals: Visit Vitals  BP (!) 148/81 (Patient Position: Lying)   Pulse 76   Temp 36.6 °C (97.9 °F)   Resp 10   Ht 5' 7" (1.702 m)   Wt 74.6 kg (164 lb 7.4 oz)   SpO2 95%   BMI 25.76 kg/m²     "

## 2025-01-13 NOTE — PROGRESS NOTES
Assessment:   Left distal ureteral stone with secondary signs of obstruction       Plan:     The patient has been transferred from ICU to the  for parada and his activities and treatments are being advanced as tolerated.  We discussed plans for cystoscopy with left ureteral stent placement possible left ureteroscopy and laser lithotripsy if needed.  We discussed the risks of the procedure and the expected outcome. Plan for cystoscopy with left ureteral stent placement with possible left ureteroscopy and laser lithotripsy of the distal stone if needed.         Enriqeu Mcdaniel MD  Urology  01/13/2025        Patient examined and chart reviewed.  The patient is transitioned from the ICU to the parada overnight and has been working with therapy to help with altered sensations since the embolic stroke.  He denies any vomiting or radiation of the left flank pain.       Past Medical History:   Diagnosis Date    GERD (gastroesophageal reflux disease)     Hyperlipidemia     Hypertension      Past Surgical History:   Procedure Laterality Date    ECHOCARDIOGRAM,TRANSESOPHAGEAL  12/14/2023    Procedure: Transesophageal echo (GAEL) intra-procedure log documentation;  Surgeon: Jerry Be DO;  Location: [facility] CATH LAB;  Service: Cardiology;;    LEFT HEART CATHETERIZATION Left 12/14/2023    Procedure: Left heart cath;  Surgeon: Jerry Be DO;  Location: [facility] CATH LAB;  Service: Cardiology;  Laterality: Left;    MITRAL VALVE REPAIR      REPAIR OF HEART VALVE      RIGHT HEART CATHETERIZATION Right 12/14/2023    Procedure: INSERTION, CATHETER, RIGHT HEART;  Surgeon: Jerry Be DO;  Location: [facility] CATH LAB;  Service: Cardiology;  Laterality: Right;        No current facility-administered medications on file prior to encounter.     Current Outpatient Medications on File Prior to Encounter   Medication Sig Dispense Refill    aspirin (ECOTRIN) 81 MG EC tablet Take 1 tablet by mouth every morning.      apixaban  (ELIQUIS) 5 mg Tab Take 1 tablet (5 mg total) by mouth 2 (two) times daily. (Patient not taking: Reported on 1/6/2025) 180 tablet 3    levETIRAcetam (KEPPRA) 500 MG Tab TAKE 1/2 TABLET TWICE DAILY FOR 2 OR 3 DAYS, AND THEN START WHOLE TABLET PO BID. (Patient not taking: Reported on 1/6/2025) 60 tablet 0    metoprolol succinate (TOPROL-XL) 50 MG 24 hr tablet Take 1 tablet (50 mg total) by mouth once daily. (Patient not taking: Reported on 1/6/2025) 90 tablet 3        Review of patient's allergies indicates:   Allergen Reactions    Sulfamethoxazole-trimethoprim Rash     Note: Emil Johson Syndrome     Vitals:    01/13/25 1100   BP: (!) 162/81   Pulse: 73   Resp: 16   Temp: 98.2 °F (36.8 °C)          Review of Systems   Gastrointestinal:  Negative for abdominal distention and vomiting.   Genitourinary:  Negative for difficulty urinating and hematuria.        Objective:     Physical Exam  Vitals reviewed.   Constitutional:       Appearance: He is not diaphoretic.   Eyes:      General: No scleral icterus.     Extraocular Movements: Extraocular movements intact.      Pupils: Pupils are equal, round, and reactive to light.   Cardiovascular:      Rate and Rhythm: Normal rate.   Pulmonary:      Effort: Pulmonary effort is normal.      Breath sounds: No wheezing.   Abdominal:      General: There is no distension.      Tenderness: There is abdominal tenderness. There is left CVA tenderness. There is no guarding.   Musculoskeletal:         General: No swelling.   Neurological:      Mental Status: He is alert.          Lab Results   Component Value Date    PSA 6.980 (H) 10/11/2023        CMP  Sodium   Date Value Ref Range Status   01/13/2025 143 136 - 145 mmol/L Final   02/14/2024 142 136 - 145 mmol/L Final     Potassium   Date Value Ref Range Status   01/13/2025 3.8 3.5 - 5.1 mmol/L Final   02/14/2024 3.7 3.5 - 5.1 mmol/L Final     Chloride   Date Value Ref Range Status   01/13/2025 109 (H) 98 - 107 mmol/L Final   02/14/2024  109 100 - 109 mmol/L Final     CO2   Date Value Ref Range Status   01/13/2025 28 23 - 31 mmol/L Final     Carbon Dioxide   Date Value Ref Range Status   02/14/2024 27 22 - 33 mmol/L Final     Glucose   Date Value Ref Range Status   01/13/2025 98 82 - 115 mg/dL Final     BUN   Date Value Ref Range Status   01/13/2025 18 8 - 26 mg/dL Final     Blood Urea Nitrogen   Date Value Ref Range Status   02/14/2024 16 5 - 25 mg/dL Final     Creatinine   Date Value Ref Range Status   01/13/2025 1.00 0.72 - 1.25 mg/dL Final   02/14/2024 1.02 0.57 - 1.25 mg/dL Final     Calcium   Date Value Ref Range Status   01/13/2025 8.5 (L) 8.8 - 10.0 mg/dL Final   02/14/2024 7.8 (L) 8.8 - 10.6 mg/dL Final     Total Protein   Date Value Ref Range Status   01/09/2025 6.8 5.8 - 7.6 g/dL Final     Albumin   Date Value Ref Range Status   01/09/2025 3.9 3.4 - 4.8 g/dL Final     Albumin Level   Date Value Ref Range Status   02/12/2024 2.1 (L) 3.5 - 5.0 g/dl Final     Bilirubin, Total   Date Value Ref Range Status   01/09/2025 0.5 <=1.5 mg/dL Final     Alk Phos   Date Value Ref Range Status   01/09/2025 66 40 - 150 U/L Final     AST   Date Value Ref Range Status   01/09/2025 20 5 - 34 U/L Final     ALT   Date Value Ref Range Status   01/09/2025 13 <=55 U/L Final     Anion Gap   Date Value Ref Range Status   01/13/2025 10 7 - 16 mmol/L Final   02/14/2024 6 (L) 8 - 16 mmol/L Final     eGFR   Date Value Ref Range Status   01/13/2025 82 >=60 mL/min/1.73m2 Final      BMP  Lab Results   Component Value Date     01/13/2025    K 3.8 01/13/2025     (H) 01/13/2025    CO2 28 01/13/2025    BUN 18 01/13/2025    CREATININE 1.00 01/13/2025    CALCIUM 8.5 (L) 01/13/2025    ANIONGAP 10 01/13/2025    EGFRNORACEVR 82 01/13/2025

## 2025-01-13 NOTE — SUBJECTIVE & OBJECTIVE
Interval History: Patient is resting comfortably in bed with no acute events or complaints. Patient is oriented with no signs of hallucinations. Patient is awaiting Urologic procedure.    Review of Systems   Constitutional:  Negative for activity change, appetite change, chills, diaphoresis, fatigue and fever.   HENT:  Negative for congestion.    Eyes: Negative.    Respiratory:  Negative for cough, choking, shortness of breath, wheezing and stridor.    Cardiovascular:  Negative for chest pain, palpitations and leg swelling.   Gastrointestinal:  Negative for abdominal distention, abdominal pain, anal bleeding, blood in stool, constipation, nausea and vomiting.   Genitourinary:  Negative for dysuria, flank pain, frequency, hematuria and urgency.   Musculoskeletal:  Positive for arthralgias. Negative for back pain.   Skin: Negative.    Neurological:  Negative for numbness and headaches.   Psychiatric/Behavioral: Negative.       Objective:     Vital Signs (Most Recent):  Temp: 97.6 °F (36.4 °C) (01/13/25 1428)  Pulse: (!) 58 (01/13/25 1428)  Resp: 11 (01/13/25 1415)  BP: (!) 161/83 (01/13/25 1428)  SpO2: (!) 93 % (01/13/25 1428) Vital Signs (24h Range):  Temp:  [97.5 °F (36.4 °C)-98.7 °F (37.1 °C)] 97.6 °F (36.4 °C)  Pulse:  [58-77] 58  Resp:  [10-18] 11  SpO2:  [93 %-98 %] 93 %  BP: (131-162)/() 161/83     Weight: 74.6 kg (164 lb 7.4 oz)  Body mass index is 25.76 kg/m².    Intake/Output Summary (Last 24 hours) at 1/13/2025 1457  Last data filed at 1/13/2025 1415  Gross per 24 hour   Intake --   Output 35 ml   Net -35 ml         Physical Exam  Vitals reviewed.   Constitutional:       General: He is not in acute distress.     Appearance: Normal appearance. He is normal weight.   HENT:      Head: Normocephalic and atraumatic.      Right Ear: External ear normal.      Left Ear: External ear normal.      Nose: Nose normal.      Mouth/Throat:      Mouth: Mucous membranes are dry.   Eyes:      Extraocular Movements:  Extraocular movements intact.      Pupils: Pupils are equal, round, and reactive to light.   Cardiovascular:      Rate and Rhythm: Normal rate and regular rhythm.      Heart sounds: Normal heart sounds.   Pulmonary:      Effort: Pulmonary effort is normal.      Breath sounds: No decreased breath sounds, wheezing, rhonchi or rales.   Chest:      Chest wall: No tenderness.   Abdominal:      General: Abdomen is flat. Bowel sounds are normal.      Palpations: Abdomen is soft.   Musculoskeletal:         General: Normal range of motion.      Cervical back: Normal range of motion. No tenderness.   Skin:     General: Skin is warm and dry.      Capillary Refill: Capillary refill takes less than 2 seconds.   Neurological:      General: No focal deficit present.      Mental Status: He is alert and oriented to person, place, and time. Mental status is at baseline.   Psychiatric:         Mood and Affect: Mood normal.         Behavior: Behavior normal.         Thought Content: Thought content normal.             Significant Labs: All pertinent labs within the past 24 hours have been reviewed.    Significant Imaging: I have reviewed all pertinent imaging results/findings within the past 24 hours.

## 2025-01-13 NOTE — ASSESSMENT & PLAN NOTE
1/13  Delirium experienced in the ICU  No signs of delirium or hallucinations presently  Will continue to monitor and adjust when medically indicated

## 2025-01-13 NOTE — PT/OT/SLP PROGRESS
Occupational Therapy   Treatment    Name: Mitul Torres  MRN: [id number]  Admitting Diagnosis:  Embolic stroke involving left middle cerebral artery  Day of Surgery    Recommendations:     Discharge Recommendations:    Discharge Equipment Recommendations:  none  Barriers to discharge:       Assessment:     Mitul Torres is a 69 y.o. male with a medical diagnosis of Embolic stroke involving left middle cerebral artery.  He presents with weakness and decline in ADLs. Performance deficits affecting function are impaired endurance, impaired self care skills, impaired functional mobility, gait instability, weakness, decreased safety awareness, visual deficits, impaired balance. Pt with improvements noted with all mobility and fine motor coordination. Pt independent with performing ADL tasks and functional mobility.     Rehab Prognosis:  Good; patient would benefit from acute skilled OT services to address these deficits and reach maximum level of function.       Plan:     Patient to be seen 5 x/week to address the above listed problems via self-care/home management, therapeutic activities, therapeutic exercises  Plan of Care Expires: 02/07/25  Plan of Care Reviewed with: patient    Subjective     Chief Complaint: CVA  Patient/Family Comments/goals: pt agreeable to OT tx  Pain/Comfort:  Pain Rating 1: 0/10    Objective:     Communicated with: KWADWO Motta prior to session.  Patient found supine with peripheral IV, telemetry upon OT entry to room.    General Precautions: Standard, fall    Orthopedic Precautions:   Braces:    Respiratory Status: Room air     Occupational Performance:     Bed Mobility:    Patient completed Supine to Sit with modified independence  Patient completed Sit to Supine with modified independence     Functional Mobility/Transfers:  Patient completed Sit <> Stand Transfer with independence  with  no assistive device   Functional Mobility: pt performed functional mobility to door and back to bed  independently    Activities of Daily Living:  Lower Body Dressing: independence to balaji socks      [facility] 6 Click ADL:      Treatment & Education:  Pt performed functional mobility and ADL tasks as listed above. Pt performed dynamic standing activity with retrieving objects from floor. Pt with no LOB noted.     Patient left supine with all lines intact, call button in reach, and KWADWO Motta notified    GOALS:   Multidisciplinary Problems       Occupational Therapy Goals          Problem: Occupational Therapy    Goal Priority Disciplines Outcome Interventions   Occupational Therapy Goal     OT, PT/OT Progressing    Description: STG: (in 1 week)  Pt will perform grooming with setup  Pt will bathe with Supervision  Pt will perform UE dressing with independence  Pt will perform LE dressing with Supervision  Pt will perform toileting with SBA  Pt will transfer bed/chair/bsc with mod(I)  Pt will perform standing task x 7 min with SBA   Pt will tolerate 20 minutes of tx without fatigue  Pt will perform light home making task with supervision      LTG: (in 5 weeks)  1.Restore to max I with self care and mobility.                         Time Tracking:     OT Date of Treatment: 01/13/25  OT Start Time: 0941  OT Stop Time: 0949  OT Total Time (min): 8 min    Billable Minutes:Therapeutic Activity 8 minutes    OT/DANIELLE: OT          1/13/2025

## 2025-01-13 NOTE — ASSESSMENT & PLAN NOTE
Echocardiogram with evidence of mitral regurgitation that is moderate . The patient's most recent echocardiogram result is listed below. Patient followed by Cardiology    Echo    Result Date: 1/10/2025    Left Ventricle: The left ventricle is mildly dilated. Mildly increased   wall thickness. There is normal systolic function. Ejection fraction is   approximately 60%. Grade I diastolic dysfunction.    Right Ventricle: Moderate right ventricular enlargement. RV/LV Ratio is   1.0, which exceeds threshold, is consistent with moderate RV strain.   Systolic function is moderately reduced.    Aortic Valve: The aortic valve is a trileaflet valve. Mildly calcified   cusps.    Mitral Valve: There is moderate anterior leaflet sclerosis. Moderately   thickened anterior leaflet. Moderately calcified anterior leaflet. There   is mild to moderate stenosis. The mean pressure gradient across the mitral   valve is 3 mmHg at a heart rate of  bpm. There is mild regurgitation with   an eccentric jet.    IVC/SVC: Normal venous pressure at 3 mmHg.        Echo    Result Date: 12/16/2024    Left Ventricle: The left ventricle is normal in size. Mildly increased   wall thickness. There is concentric remodeling. There is normal systolic   function. Quantitated ejection fraction is 55%.    Right Ventricle: Normal right ventricular cavity size. Systolic   function could not be assesed.    Left Atrium: Left atrium is mildly dilated.    Tricuspid Valve: There is mild regurgitation.    Pulmonary Artery: The estimated pulmonary artery systolic pressure is   26 mmHg.    IVC/SVC: Normal venous pressure at 3 mmHg.

## 2025-01-13 NOTE — OR NURSING
1347 PT REC'D TO PACU. VSS. SATS 97% ON RA. DENIES PAIN AT THIS TIME. HOLGUIN CATH NOTED W/ BLOOD TINGED URINE PRESENT. WILL CONT TO MONITOR.    1425 TRANSFERRED  IN STABLE CONDITION. REPORT GIVEN TO ARIANNE BURKETT. /77 HR 64 O2 SAT 96% ON RA TEMP 97.7 ORAL. SURGICAL SITES ADDRESSED. HOLGUIN AND URINE NOTED. DENTURES PRESENT IN PT MOUTH. TELE MONITOR HOOKED UP. RN AT BEDSIDE.

## 2025-01-13 NOTE — ASSESSMENT & PLAN NOTE
Patient has paroxysmal (<7 days) atrial fibrillation. Patient is currently in sinus rhythm. [id number] Score: 4. The patients heart rate in the last 24 hours is as follows:  Pulse  Min: 58  Max: 77     Antiarrhythmics       Anticoagulants  heparin (porcine) injection 5,000 Units, Every 8 hours, Subcutaneous    Plan  - Replete lytes with a goal of K>4, Mg >2  - Patient is anticoagulated, see medications listed above.  - Patient's afib is currently controlled  - Patient to remain on telemetry

## 2025-01-13 NOTE — ASSESSMENT & PLAN NOTE
Antithrombotics for secondary stroke prevention: Antiplatelets: Aspirin: 81 mg daily    Statins for secondary stroke prevention and hyperlipidemia, if present:   Statins: Atorvastatin- 40 mg daily    Aggressive risk factor modification: HTN, HLD, A-Fib, CAD     Rehab efforts: The patient has been evaluated by a stroke team provider and the therapy needs have been fully considered based off the presenting complaints and exam findings. The following therapy evaluations are needed: PT evaluate and treat, OT evaluate and treat    Diagnostics ordered/pending: CT scan of head without contrast to asses brain parenchyma, CTA Head to assess vasculature , Other: CT Abdomen and Pelvis    VTE prophylaxis: Heparin 5000 units SQ every 8 hours    BP parameters: Infarct: Post Thrombolytic therapy, SBP <180

## 2025-01-14 LAB
ANION GAP SERPL CALCULATED.3IONS-SCNC: 13 MMOL/L (ref 7–16)
BASOPHILS # BLD AUTO: 0.03 K/UL (ref 0–0.2)
BASOPHILS NFR BLD AUTO: 0.6 % (ref 0–1)
BUN SERPL-MCNC: 17 MG/DL (ref 8–26)
BUN/CREAT SERPL: 15 (ref 6–20)
CALCIUM SERPL-MCNC: 8.4 MG/DL (ref 8.8–10)
CHLORIDE SERPL-SCNC: 110 MMOL/L (ref 98–107)
CO2 SERPL-SCNC: 26 MMOL/L (ref 23–31)
CREAT SERPL-MCNC: 1.1 MG/DL (ref 0.72–1.25)
DIFFERENTIAL METHOD BLD: ABNORMAL
EGFR (NO RACE VARIABLE) (RUSH/TITUS): 73 ML/MIN/1.73M2
EOSINOPHIL # BLD AUTO: 0.06 K/UL (ref 0–0.5)
EOSINOPHIL NFR BLD AUTO: 1.1 % (ref 1–4)
ERYTHROCYTE [DISTWIDTH] IN BLOOD BY AUTOMATED COUNT: 13.6 % (ref 11.5–14.5)
GLUCOSE SERPL-MCNC: 98 MG/DL (ref 82–115)
HCT VFR BLD AUTO: 37.5 % (ref 40–54)
HGB BLD-MCNC: 11.8 G/DL (ref 13.5–18)
IMM GRANULOCYTES # BLD AUTO: 0.02 K/UL (ref 0–0.04)
IMM GRANULOCYTES NFR BLD: 0.4 % (ref 0–0.4)
LYMPHOCYTES # BLD AUTO: 0.83 K/UL (ref 1–4.8)
LYMPHOCYTES NFR BLD AUTO: 15.6 % (ref 27–41)
MCH RBC QN AUTO: 28.8 PG (ref 27–31)
MCHC RBC AUTO-ENTMCNC: 31.5 G/DL (ref 32–36)
MCV RBC AUTO: 91.5 FL (ref 80–96)
MONOCYTES # BLD AUTO: 0.4 K/UL (ref 0–0.8)
MONOCYTES NFR BLD AUTO: 7.5 % (ref 2–6)
MPC BLD CALC-MCNC: 11 FL (ref 9.4–12.4)
NEUTROPHILS # BLD AUTO: 3.98 K/UL (ref 1.8–7.7)
NEUTROPHILS NFR BLD AUTO: 74.8 % (ref 53–65)
NRBC # BLD AUTO: 0 X10E3/UL
NRBC, AUTO (.00): 0 %
PLATELET # BLD AUTO: 179 K/UL (ref 150–400)
POTASSIUM SERPL-SCNC: 3.7 MMOL/L (ref 3.5–5.1)
RBC # BLD AUTO: 4.1 M/UL (ref 4.6–6.2)
SODIUM SERPL-SCNC: 145 MMOL/L (ref 136–145)
WBC # BLD AUTO: 5.32 K/UL (ref 4.5–11)

## 2025-01-14 PROCEDURE — 80048 BASIC METABOLIC PNL TOTAL CA: CPT | Performed by: STUDENT IN AN ORGANIZED HEALTH CARE EDUCATION/TRAINING PROGRAM

## 2025-01-14 PROCEDURE — 92507 TX SP LANG VOICE COMM INDIV: CPT

## 2025-01-14 PROCEDURE — 99232 SBSQ HOSP IP/OBS MODERATE 35: CPT | Mod: GC,,, | Performed by: FAMILY MEDICINE

## 2025-01-14 PROCEDURE — 25000003 PHARM REV CODE 250: Performed by: STUDENT IN AN ORGANIZED HEALTH CARE EDUCATION/TRAINING PROGRAM

## 2025-01-14 PROCEDURE — 85025 COMPLETE CBC W/AUTO DIFF WBC: CPT | Performed by: STUDENT IN AN ORGANIZED HEALTH CARE EDUCATION/TRAINING PROGRAM

## 2025-01-14 PROCEDURE — 25000003 PHARM REV CODE 250

## 2025-01-14 PROCEDURE — 36415 COLL VENOUS BLD VENIPUNCTURE: CPT | Performed by: STUDENT IN AN ORGANIZED HEALTH CARE EDUCATION/TRAINING PROGRAM

## 2025-01-14 PROCEDURE — 11000001 HC ACUTE MED/SURG PRIVATE ROOM

## 2025-01-14 PROCEDURE — 25000003 PHARM REV CODE 250: Performed by: HOSPITALIST

## 2025-01-14 PROCEDURE — 97116 GAIT TRAINING THERAPY: CPT

## 2025-01-14 PROCEDURE — 63600175 PHARM REV CODE 636 W HCPCS: Performed by: STUDENT IN AN ORGANIZED HEALTH CARE EDUCATION/TRAINING PROGRAM

## 2025-01-14 PROCEDURE — 25000003 PHARM REV CODE 250: Performed by: UROLOGY

## 2025-01-14 PROCEDURE — 25000003 PHARM REV CODE 250: Performed by: FAMILY MEDICINE

## 2025-01-14 RX ORDER — THIAMINE HCL 100 MG
100 TABLET ORAL DAILY
Status: DISCONTINUED | OUTPATIENT
Start: 2025-01-15 | End: 2025-01-16 | Stop reason: HOSPADM

## 2025-01-14 RX ORDER — HYDROCODONE BITARTRATE AND ACETAMINOPHEN 5; 325 MG/1; MG/1
1 TABLET ORAL EVERY 6 HOURS PRN
Status: DISCONTINUED | OUTPATIENT
Start: 2025-01-14 | End: 2025-01-16 | Stop reason: HOSPADM

## 2025-01-14 RX ORDER — CEFDINIR 300 MG/1
300 CAPSULE ORAL EVERY 12 HOURS
Status: DISCONTINUED | OUTPATIENT
Start: 2025-01-14 | End: 2025-01-16 | Stop reason: HOSPADM

## 2025-01-14 RX ORDER — TAMSULOSIN HYDROCHLORIDE 0.4 MG/1
0.4 CAPSULE ORAL DAILY
Status: DISCONTINUED | OUTPATIENT
Start: 2025-01-14 | End: 2025-01-15

## 2025-01-14 RX ADMIN — TAMSULOSIN HYDROCHLORIDE 0.4 MG: 0.4 CAPSULE ORAL at 02:01

## 2025-01-14 RX ADMIN — MUPIROCIN: 20 OINTMENT TOPICAL at 09:01

## 2025-01-14 RX ADMIN — HEPARIN SODIUM 5000 UNITS: 5000 INJECTION, SOLUTION INTRAVENOUS; SUBCUTANEOUS at 02:01

## 2025-01-14 RX ADMIN — ASPIRIN 81 MG CHEWABLE TABLET 81 MG: 81 TABLET CHEWABLE at 09:01

## 2025-01-14 RX ADMIN — ACETAMINOPHEN 1000 MG: 500 TABLET ORAL at 09:01

## 2025-01-14 RX ADMIN — MUPIROCIN: 20 OINTMENT TOPICAL at 10:01

## 2025-01-14 RX ADMIN — THIAMINE HYDROCHLORIDE 100 MG: 100 INJECTION, SOLUTION INTRAMUSCULAR; INTRAVENOUS at 09:01

## 2025-01-14 RX ADMIN — CEFDINIR 300 MG: 300 CAPSULE ORAL at 09:01

## 2025-01-14 RX ADMIN — HYDROCODONE BITARTRATE AND ACETAMINOPHEN 1 TABLET: 5; 325 TABLET ORAL at 03:01

## 2025-01-14 RX ADMIN — HEPARIN SODIUM 5000 UNITS: 5000 INJECTION, SOLUTION INTRAVENOUS; SUBCUTANEOUS at 05:01

## 2025-01-14 RX ADMIN — ATORVASTATIN CALCIUM 40 MG: 40 TABLET, FILM COATED ORAL at 09:01

## 2025-01-14 RX ADMIN — CEFDINIR 300 MG: 300 CAPSULE ORAL at 02:01

## 2025-01-14 RX ADMIN — HEPARIN SODIUM 5000 UNITS: 5000 INJECTION, SOLUTION INTRAVENOUS; SUBCUTANEOUS at 09:01

## 2025-01-14 NOTE — ASSESSMENT & PLAN NOTE
Patient has paroxysmal (<7 days) atrial fibrillation. Patient is currently in sinus rhythm. [id number] Score: 4. The patients heart rate in the last 24 hours is as follows:  Pulse  Min: 38  Max: 76     Antiarrhythmics       Anticoagulants  heparin (porcine) injection 5,000 Units, Every 8 hours, Subcutaneous    Plan  - Replete lytes with a goal of K>4, Mg >2  - Patient is anticoagulated, see medications listed above.  - Patient's afib is currently controlled  - Patient to remain on telemetry

## 2025-01-14 NOTE — PT/OT/SLP PROGRESS
Speech Language Pathology Treatment/Discharge Note    Patient Name:  Mitul Torres   MRN:  [id number]  Admitting Diagnosis: Embolic stroke involving left middle cerebral artery    Recommendations:                 General Recommendations:  Speech/language therapy and Cognitive-linguistic therapy  Diet recommendations:  Regular, Liquid Diet Level: Thin   Aspiration Precautions: Remain upright 30 minutes post meal, Small bites/sips, and Standard aspiration precautions   General Precautions: Standard    Communication strategies:   Patient doing very well with communication today.     Assessment:     Mitul Torres is a 69 y.o. male with an SLP diagnosis of  CVA .  He presents with doing much better with communication today.    Subjective     Patient lying in bed awake and alert. Patient reported that they told him he could take a shower. He doesn't know when he is going home yet. Patient's speech back to baseline. No difficulties noted today.   Patient goals: to feel better and go home     Pain/Comfort:  Pain Rating 1:  (Pt reported that they had given him some tylenol for pain a couple of hours ago.)    Respiratory Status: Room air    Objective:     Has the patient been evaluated by SLP for swallowing?   Yes  Keep patient NPO? No   Current Respiratory Status:  room air    Patient Independently oriented to x4.  Patient answered yes/no questions with 100% accuracy Independently.  Patient followed 1 part commands with 100% accuracy Independently.   Patient completed automatic speech tasks (RIZWAN and SULMA) with 100% accuracy Independently.  Patient completed sentences with 100% accuracy Independently.   Patient completed word finding tasks with 90% accuracy Independently in conversation.     Goals:   Multidisciplinary Problems       SLP Goals          Problem: SLP    Goal Priority Disciplines Outcome   SLP Goal     SLP Progressing   Description: Patient will be Independently oriented x4, 5/5 tx sessions.  Patient will  answer yes/no questions with 80% accuracy Independently.   Patient will follow 1 part commands with 80% accuracy Independently.   Patient will complete automatic speech tasks with 80% accuracy Independently.   Patient will complete sentences with 80% accuracy Independently.   Patient will receptively ID objects with 80% accuracy Independently.   Patient will expressively ID objects with 80% accuracy Independently.   Patient will expressively ID object function with 80% accuracy Independently.   Patient will complete word finding tasks with 80% accuracy Independently.                           Plan:     Patient to be seen:  5 x/week   Plan of Care expires:  01/24/25  Plan of Care reviewed with:      SLP Follow-Up:  No (D/C at this time. Pt is back to baseline.)       Discharge recommendations:      Barriers to Discharge:  None    Time Tracking:     SLP Treatment Date:   01/14/25  Speech Start Time:  1340  Speech Stop Time:  1400     Speech Total Time (min):  20 min    Billable Minutes: Speech Therapy Individual 20    01/14/2025

## 2025-01-14 NOTE — ASSESSMENT & PLAN NOTE
1/13  Plan to remove via Lithotripsy by Urology  1/14  Patient tolerated procedure well  Working well with PT

## 2025-01-14 NOTE — ASSESSMENT & PLAN NOTE
Creatine stable for now. BMP reviewed- noted Estimated Creatinine Clearance: 60.1 mL/min (based on SCr of 1.1 mg/dL). according to latest data. Based on current GFR, CKD stage is stage 2 - GFR 60-89.  Monitor UOP and serial BMP and adjust therapy as needed. Renally dose meds. Avoid nephrotoxic medications and procedures.

## 2025-01-14 NOTE — ASSESSMENT & PLAN NOTE
Echocardiogram with evidence of mitral regurgitation that is moderate . The patient's most recent echocardiogram result is listed below. Patient followed by Cardiology    Echo    Result Date: 1/10/2025    Left Ventricle: The left ventricle is mildly dilated. Mildly increased   wall thickness. There is normal systolic function. Ejection fraction is   approximately 60%. Grade I diastolic dysfunction.    Right Ventricle: Moderate right ventricular enlargement. RV/LV Ratio is   1.0, which exceeds threshold, is consistent with moderate RV strain.   Systolic function is moderately reduced.    Aortic Valve: The aortic valve is a trileaflet valve. Mildly calcified   cusps.    Mitral Valve: There is moderate anterior leaflet sclerosis. Moderately   thickened anterior leaflet. Moderately calcified anterior leaflet. There   is mild to moderate stenosis. The mean pressure gradient across the mitral   valve is 3 mmHg at a heart rate of  bpm. There is mild regurgitation with   an eccentric jet.    IVC/SVC: Normal venous pressure at 3 mmHg.        Echo    Result Date: 12/16/2024    Left Ventricle: The left ventricle is normal in size. Mildly increased   wall thickness. There is concentric remodeling. There is normal systolic   function. Quantitated ejection fraction is 55%.    Right Ventricle: Normal right ventricular cavity size. Systolic   function could not be assesed.    Left Atrium: Left atrium is mildly dilated.    Tricuspid Valve: There is mild regurgitation.    Pulmonary Artery: The estimated pulmonary artery systolic pressure is   26 mmHg.    IVC/SVC: Normal venous pressure at 3 mmHg.

## 2025-01-14 NOTE — SUBJECTIVE & OBJECTIVE
Interval History: Patient tolerated Urologic procedure well. Working well with PT. Complains of left sided flank pain.    Review of Systems   Constitutional:  Negative for activity change, appetite change, chills, diaphoresis, fatigue and fever.   HENT:  Negative for congestion.    Eyes: Negative.    Respiratory:  Negative for cough, choking, shortness of breath, wheezing and stridor.    Cardiovascular:  Negative for chest pain, palpitations and leg swelling.   Gastrointestinal:  Negative for abdominal distention, abdominal pain, anal bleeding, blood in stool, constipation, nausea and vomiting.   Genitourinary:  Positive for flank pain. Negative for dysuria, frequency, hematuria and urgency.   Musculoskeletal:  Positive for arthralgias. Negative for back pain.   Skin: Negative.    Neurological:  Negative for numbness and headaches.   Psychiatric/Behavioral: Negative.       Objective:     Vital Signs (Most Recent):  Temp: 98.4 °F (36.9 °C) (01/14/25 1236)  Pulse: 66 (01/14/25 1236)  Resp: 16 (01/14/25 1236)  BP: (!) 166/80 (01/14/25 1236)  SpO2: 98 % (01/14/25 1236) Vital Signs (24h Range):  Temp:  [97.7 °F (36.5 °C)-98.5 °F (36.9 °C)] 98.4 °F (36.9 °C)  Pulse:  [38-76] 66  Resp:  [14-18] 16  SpO2:  [94 %-98 %] 98 %  BP: (132-166)/(65-93) 166/80     Weight: 74.6 kg (164 lb 7.4 oz)  Body mass index is 25.76 kg/m².    Intake/Output Summary (Last 24 hours) at 1/14/2025 1445  Last data filed at 1/14/2025 0943  Gross per 24 hour   Intake --   Output 1050 ml   Net -1050 ml         Physical Exam  Vitals reviewed.   Constitutional:       General: He is not in acute distress.     Appearance: Normal appearance. He is normal weight.   HENT:      Head: Normocephalic and atraumatic.      Right Ear: External ear normal.      Left Ear: External ear normal.      Nose: Nose normal.      Mouth/Throat:      Mouth: Mucous membranes are dry.   Eyes:      Extraocular Movements: Extraocular movements intact.      Pupils: Pupils are equal,  round, and reactive to light.   Cardiovascular:      Rate and Rhythm: Normal rate and regular rhythm.      Heart sounds: Normal heart sounds.   Pulmonary:      Effort: Pulmonary effort is normal.      Breath sounds: No decreased breath sounds, wheezing, rhonchi or rales.   Chest:      Chest wall: No tenderness.   Abdominal:      General: Abdomen is flat. Bowel sounds are normal.      Palpations: Abdomen is soft.   Musculoskeletal:         General: Normal range of motion.      Cervical back: Normal range of motion. No tenderness.   Skin:     General: Skin is warm and dry.      Capillary Refill: Capillary refill takes less than 2 seconds.   Neurological:      General: No focal deficit present.      Mental Status: He is alert and oriented to person, place, and time. Mental status is at baseline.   Psychiatric:         Mood and Affect: Mood normal.         Behavior: Behavior normal.         Thought Content: Thought content normal.             Significant Labs: All pertinent labs within the past 24 hours have been reviewed.    Significant Imaging: I have reviewed all pertinent imaging results/findings within the past 24 hours.

## 2025-01-14 NOTE — PROGRESS NOTES
[facility] Hospital Medicine  Progress Note    Patient Name: Mitul Torres  MRN: [id number]  Patient Class: IP- Inpatient   Admission Date: 1/9/2025  Length of Stay: 5 days  Attending Physician: Heather Nicholas DO  Primary Care Provider: Shannan Gorman FNP        Subjective     Principal Problem:Embolic stroke involving left middle cerebral artery        HPI:  No notes on file    Overview/Hospital Course:  1/9  Patient presented to the ED with Aphasia and AMS. He was given Tenecteplase for Left hemispheric ischemic event    1/10/25  patient confusion delirious overnight without any worsening focal neurological deficits.  Hallucinations present, however significant improvement in the patient's deficits right-sided deficits with return of power and sensation.     1/11/25  the patient continued to have hallucinations today.  At 1 point, he complained of left lower quadrant abdominal pain as well as what he described as tunneled vision, alternating with episodes of delirium, waxing and waning lucidity with hallucinations.  Therefore, he had a stat CT head performed rule out any new neurological deficits (nothing focal in terms of his right-sided deficits returned).  CT head without any evidence of acute bleed.  CTA chest (ordered due to echocardiogram read, low probability pulmonary embolism) and CT abdomen pelvis with IV and oral contrast pending.     1/12/25  significantly improved in terms of patient's delirium.  No longer complaining of changes in vision/hallucinations.  No ongoing left lower quadrant pain.  CT abdomen and pelvis with evidence 6 mm obstructive stone at left UVJ.   Urology Consulted  Patient NPO  1/13  Patient is admitted to the medicine team as a ICU Down grade  Plans to take patient to OR for lithotripsy  PT/OT consult after operation     1/14  Patient tolerated operation well  Working with PT  Case Management obtaining home health  Started Flomax  post stent placement for spasm    Interval History: Patient tolerated Urologic procedure well. Working well with PT. Complains of left sided flank pain.    Review of Systems   Constitutional:  Negative for activity change, appetite change, chills, diaphoresis, fatigue and fever.   HENT:  Negative for congestion.    Eyes: Negative.    Respiratory:  Negative for cough, choking, shortness of breath, wheezing and stridor.    Cardiovascular:  Negative for chest pain, palpitations and leg swelling.   Gastrointestinal:  Negative for abdominal distention, abdominal pain, anal bleeding, blood in stool, constipation, nausea and vomiting.   Genitourinary:  Positive for flank pain. Negative for dysuria, frequency, hematuria and urgency.   Musculoskeletal:  Positive for arthralgias. Negative for back pain.   Skin: Negative.    Neurological:  Negative for numbness and headaches.   Psychiatric/Behavioral: Negative.       Objective:     Vital Signs (Most Recent):  Temp: 98.4 °F (36.9 °C) (01/14/25 1236)  Pulse: 66 (01/14/25 1236)  Resp: 16 (01/14/25 1236)  BP: (!) 166/80 (01/14/25 1236)  SpO2: 98 % (01/14/25 1236) Vital Signs (24h Range):  Temp:  [97.7 °F (36.5 °C)-98.5 °F (36.9 °C)] 98.4 °F (36.9 °C)  Pulse:  [38-76] 66  Resp:  [14-18] 16  SpO2:  [94 %-98 %] 98 %  BP: (132-166)/(65-93) 166/80     Weight: 74.6 kg (164 lb 7.4 oz)  Body mass index is 25.76 kg/m².    Intake/Output Summary (Last 24 hours) at 1/14/2025 1445  Last data filed at 1/14/2025 0943  Gross per 24 hour   Intake --   Output 1050 ml   Net -1050 ml         Physical Exam  Vitals reviewed.   Constitutional:       General: He is not in acute distress.     Appearance: Normal appearance. He is normal weight.   HENT:      Head: Normocephalic and atraumatic.      Right Ear: External ear normal.      Left Ear: External ear normal.      Nose: Nose normal.      Mouth/Throat:      Mouth: Mucous membranes are dry.   Eyes:      Extraocular Movements: Extraocular movements  intact.      Pupils: Pupils are equal, round, and reactive to light.   Cardiovascular:      Rate and Rhythm: Normal rate and regular rhythm.      Heart sounds: Normal heart sounds.   Pulmonary:      Effort: Pulmonary effort is normal.      Breath sounds: No decreased breath sounds, wheezing, rhonchi or rales.   Chest:      Chest wall: No tenderness.   Abdominal:      General: Abdomen is flat. Bowel sounds are normal.      Palpations: Abdomen is soft.   Musculoskeletal:         General: Normal range of motion.      Cervical back: Normal range of motion. No tenderness.   Skin:     General: Skin is warm and dry.      Capillary Refill: Capillary refill takes less than 2 seconds.   Neurological:      General: No focal deficit present.      Mental Status: He is alert and oriented to person, place, and time. Mental status is at baseline.   Psychiatric:         Mood and Affect: Mood normal.         Behavior: Behavior normal.         Thought Content: Thought content normal.             Significant Labs: All pertinent labs within the past 24 hours have been reviewed.    Significant Imaging: I have reviewed all pertinent imaging results/findings within the past 24 hours.    Assessment and Plan     * Embolic stroke involving left middle cerebral artery    Antithrombotics for secondary stroke prevention: Antiplatelets: Aspirin: 81 mg daily    Statins for secondary stroke prevention and hyperlipidemia, if present:   Statins: Atorvastatin- 40 mg daily    Aggressive risk factor modification: HTN, HLD, A-Fib, CAD     Rehab efforts: The patient has been evaluated by a stroke team provider and the therapy needs have been fully considered based off the presenting complaints and exam findings. The following therapy evaluations are needed: PT evaluate and treat, OT evaluate and treat    Diagnostics ordered/pending: CT scan of head without contrast to asses brain parenchyma, CTA Head to assess vasculature , Other: CT Abdomen and  Pelvis    VTE prophylaxis: Heparin 5000 units SQ every 8 hours    BP parameters: Infarct: Post Thrombolytic therapy, SBP <180        Left ureteral stone  1/13  Plan to remove via Lithotripsy by Urology  1/14  Patient tolerated procedure well\  Stent placed during procedure  Started Flomax for spasms      Renal stone  1/13  Plan to remove via Lithotripsy by Urology  1/14  Patient tolerated procedure well  Working well with PT     Delirium  1/13  Delirium experienced in the ICU  No signs of delirium or hallucinations presently  Will continue to monitor and adjust when medically indicated          Chronic kidney disease, stage 3a  Creatine stable for now. BMP reviewed- noted Estimated Creatinine Clearance: 60.1 mL/min (based on SCr of 1.1 mg/dL). according to latest data. Based on current GFR, CKD stage is stage 2 - GFR 60-89.  Monitor UOP and serial BMP and adjust therapy as needed. Renally dose meds. Avoid nephrotoxic medications and procedures.    Coronary artery disease involving native coronary artery of native heart without angina pectoris  Patient with known CAD s/p stent placement and CABG, which is controlled Will continue ASA and Statin and monitor for S/Sx of angina/ACS. Continue to monitor on telemetry.     Severe mitral regurgitation  Echocardiogram with evidence of mitral regurgitation that is moderate . The patient's most recent echocardiogram result is listed below. Patient followed by Cardiology    Echo    Result Date: 1/10/2025    Left Ventricle: The left ventricle is mildly dilated. Mildly increased   wall thickness. There is normal systolic function. Ejection fraction is   approximately 60%. Grade I diastolic dysfunction.    Right Ventricle: Moderate right ventricular enlargement. RV/LV Ratio is   1.0, which exceeds threshold, is consistent with moderate RV strain.   Systolic function is moderately reduced.    Aortic Valve: The aortic valve is a trileaflet valve. Mildly calcified   cusps.    Mitral  Valve: There is moderate anterior leaflet sclerosis. Moderately   thickened anterior leaflet. Moderately calcified anterior leaflet. There   is mild to moderate stenosis. The mean pressure gradient across the mitral   valve is 3 mmHg at a heart rate of  bpm. There is mild regurgitation with   an eccentric jet.    IVC/SVC: Normal venous pressure at 3 mmHg.        Echo    Result Date: 12/16/2024    Left Ventricle: The left ventricle is normal in size. Mildly increased   wall thickness. There is concentric remodeling. There is normal systolic   function. Quantitated ejection fraction is 55%.    Right Ventricle: Normal right ventricular cavity size. Systolic   function could not be assesed.    Left Atrium: Left atrium is mildly dilated.    Tricuspid Valve: There is mild regurgitation.    Pulmonary Artery: The estimated pulmonary artery systolic pressure is   26 mmHg.    IVC/SVC: Normal venous pressure at 3 mmHg.          Paroxysmal atrial fibrillation  Patient has paroxysmal (<7 days) atrial fibrillation. Patient is currently in sinus rhythm. [id number] Score: 4. The patients heart rate in the last 24 hours is as follows:  Pulse  Min: 38  Max: 76     Antiarrhythmics       Anticoagulants  heparin (porcine) injection 5,000 Units, Every 8 hours, Subcutaneous    Plan  - Replete lytes with a goal of K>4, Mg >2  - Patient is anticoagulated, see medications listed above.  - Patient's afib is currently controlled  - Patient to remain on telemetry           VTE Risk Mitigation (From admission, onward)           Ordered     heparin (porcine) injection 5,000 Units  Every 8 hours         01/11/25 2007                    Discharge Planning   ROLANDA:      Code Status: Full Code   Medical Readiness for Discharge Date:   Discharge Plan A: Home                Please place Justification for DME        Jorge Perez Jr., MD  Department of Hospital Medicine   [facility] - [facility]

## 2025-01-14 NOTE — TREATMENT PLAN
The patient underwent ureteroscopy with lithotripsy of the stone. There is a ureteral stent in place. The ureteral stent has a string attached to it and this is attached to the Murray catheter so that when the catheter is removed the stent will be removed at the same time. I plan to leave the murray catheter in place for a few more days as his activities and treatments are advanced as tolerated. I will maintain him on antibiotics which I ordered. Please maintain the Murray catheter in place until it is removed by me.

## 2025-01-14 NOTE — PROGRESS NOTES
Pharmacist Intervention IV to PO Note    Mitul Torres is a 69 y.o. male being treated with IV medication thiamine    Patient Data:    Vital Signs (Most Recent):  Temp: 98.4 °F (36.9 °C) (01/14/25 1236)  Pulse: 66 (01/14/25 1236)  Resp: 16 (01/14/25 1236)  BP: (!) 166/80 (01/14/25 1236)  SpO2: 98 % (01/14/25 1236) Vital Signs (72h Range):  Temp:  [97.5 °F (36.4 °C)-100 °F (37.8 °C)]   Pulse:  [38-86]   Resp:  [8-22]   BP: ()/()   SpO2:  [89 %-98 %]      CBC:  Recent Labs   Lab 01/12/25  0428 01/13/25  0450 01/14/25  0410   WBC 5.24 5.26 5.32   RBC 3.95* 4.12* 4.10*   HGB 11.5* 11.9* 11.8*   HCT 35.6* 36.9* 37.5*    197 179   MCV 90.1 89.6 91.5   MCH 29.1 28.9 28.8   MCHC 32.3 32.2 31.5*     CMP:     Recent Labs   Lab 01/09/25  1244 01/09/25  2036 01/12/25  0428 01/13/25  0449 01/14/25  0410   GLU 99   < > 93 98 98   CALCIUM 8.6*   < > 8.5* 8.5* 8.4*   ALBUMIN 3.9  --   --   --   --    PROT 6.8  --   --   --   --       < > 141 143 145   K 4.2   < > 3.8 3.8 3.7   CO2 23   < > 26 28 26   *   < > 106 109* 110*   BUN 16   < > 13 18 17   CREATININE 1.13   < > 0.99 1.00 1.10   ALKPHOS 66  --   --   --   --    ALT 13  --   --   --   --    AST 20  --   --   --   --    BILITOT 0.5  --   --   --   --     < > = values in this interval not displayed.       Dietary Orders:  Diet Orders            Diet Adult Regular: Regular starting at 01/13 1352            Based on the following criteria, this patient qualifies for intravenous to oral conversion:  [x] The patients gastrointestinal tract is functioning (tolerating medications via oral or enteral route for 24 hours and tolerating food or enteral feeds for 24 hours).  [x] The patient is hemodynamically stable for 24 hours (heart rate <100 beats per minute, systolic blood pressure >99 mm Hg, and respiratory rate <20 breaths per minute).  [x] The patient shows clinical improvement (afebrile for at least 24 hours and white blood cell count  downtrending or normalized). Additionally, the patient must be non-neutropenic (absolute neutrophil count >500 cells/mm3).  [x] For antimicrobials, the patient has received IV therapy for at least 24 hours.    IV medication thiamine will be changed to oral medication thiamine    Pharmacist's Name: Zabrina Hairston  Pharmacist's Extension: [phone]

## 2025-01-14 NOTE — ANESTHESIA POSTPROCEDURE EVALUATION
Anesthesia Post Evaluation    Patient: Mitul Torres    Procedure(s) Performed: Procedure(s) (LRB):  CYSTOURETEROSCOPY, WITH HOLMIUM LASER LITHOTRIPSY OF URETERAL CALCULUS AND STENT INSERTION (Left)    Final Anesthesia Type: general      Patient location during evaluation: PACU  Post-procedure vital signs: reviewed and stable  Pain management: adequate  Airway patency: patent    PONV status at discharge: No PONV  Anesthetic complications: no      Cardiovascular status: hemodynamically stable  Respiratory status: unassisted  Hydration status: euvolemic  Follow-up not needed.              Vitals Value Taken Time   /77 01/13/25 1628   Temp 36.5 °C (97.7 °F) 01/13/25 1641   Pulse 67 01/13/25 1641   Resp 15 01/13/25 1641   SpO2 96 % 01/13/25 1641         Event Time   Out of Recovery 01/13/2025 14:17:00         Pain/Bridget Score: Bridget Score: 10 (1/13/2025  2:15 PM)

## 2025-01-14 NOTE — PT/OT/SLP PROGRESS
Physical Therapy Treatment    Patient Name:  Mitul Torres   MRN:  [id number]    Recommendations:     Discharge Recommendations: No Therapy Indicated  Discharge Equipment Recommendations: none  Barriers to discharge: None    Assessment:     Mitul Torres is a 69 y.o. male admitted with a medical diagnosis of Embolic stroke involving left middle cerebral artery.  He presents with the following impairments/functional limitations: weakness, gait instability, impaired balance, visual deficits, decreased safety awareness Patient at baseline with mobility. Okay for home from PT Standpoint. Awaiting murray dc and dc from [facility].    Rehab Prognosis: Good; patient would benefit from acute skilled PT services to address these deficits and reach maximum level of function.    Recent Surgery: Procedure(s) (LRB):  CYSTOURETEROSCOPY, WITH HOLMIUM LASER LITHOTRIPSY OF URETERAL CALCULUS AND STENT INSERTION (Left) 1 Day Post-Op    Plan:     During this hospitalization, patient to be seen 5 x/week to address the identified rehab impairments via gait training, therapeutic activities, therapeutic exercises, neuromuscular re-education and progress toward the following goals:    Plan of Care Expires:  02/07/25    Subjective     Chief Complaint: none  Patient/Family Comments/goals: Patient states he hopes to go home in next day or two when murray is out  Pain/Comfort:  Pain Rating 1: 0/10      Objective:     Communicated with nurse prior to session.  Patient found supine with murray catheter upon PT entry to room.     General Precautions: Standard, fall  Orthopedic Precautions:    Braces:    Respiratory Status: Room air     Functional Mobility:  Bed Mobility:     Supine to Sit: contact guard assistance  Sit to Supine: contact guard assistance  Transfers:     Sit to Stand:  contact guard assistance with no AD  Gait: ambulated 200 feet independent      AM-PAC 6 CLICK MOBILITY  Turning over in bed (including adjusting bedclothes, sheets and  blankets)?: 4  Sitting down on and standing up from a chair with arms (e.g., wheelchair, bedside commode, etc.): 4  Moving from lying on back to sitting on the side of the bed?: 4  Moving to and from a bed to a chair (including a wheelchair)?: 4  Need to walk in hospital room?: 4  Climbing 3-5 steps with a railing?: 4  Basic Mobility Total Score: 24       Treatment & Education:  Tx plan    Patient left supine with call button in reach..    GOALS:   Multidisciplinary Problems       Physical Therapy Goals          Problem: Physical Therapy    Goal Priority Disciplines Outcome Interventions   Physical Therapy Goal     PT, PT/OT Progressing    Description: Short term goals:  . Supine to sit with [place]  . Sit to supine with [place]  . Sit to stand transfer with [place]  . Gait  x 300 feet with [place] using No Assistive Device.     Long term goals:  Patient will regain full independent functional mobility with lowest level of assistive device to return to desired living arrangement and prior ADL's.                          Time Tracking:     PT Received On: 01/14/25  PT Start Time: 0930     PT Stop Time: 0950  PT Total Time (min): 20 min     Billable Minutes: Gait Training 10    Treatment Type: Treatment  PT/PTA: PT     Number of PTA visits since last PT visit: 0     01/14/2025

## 2025-01-14 NOTE — ASSESSMENT & PLAN NOTE
1/13  Plan to remove via Lithotripsy by Urology  1/14  Patient tolerated procedure well\  Stent placed during procedure  Started Flomax for spasms

## 2025-01-14 NOTE — PT/OT/SLP PROGRESS
Physical Therapy Treatment    Patient Name:  Mitul Torres   MRN:  [id number]    Recommendations:     Discharge Recommendations: Low Intensity Therapy  Discharge Equipment Recommendations: none  Barriers to discharge: None    Assessment:     Mitul Torres is a 69 y.o. male admitted with a medical diagnosis of Embolic stroke involving left middle cerebral artery.  He presents with the following impairments/functional limitations: weakness, gait instability, impaired balance, visual deficits, decreased safety awareness Patient appears to be at baseline mobility. Plan is home with hhpt..    Rehab Prognosis: Good; patient would benefit from acute skilled PT services to address these deficits and reach maximum level of function.    Recent Surgery: Procedure(s) (LRB):  CYSTOURETEROSCOPY, WITH HOLMIUM LASER LITHOTRIPSY OF URETERAL CALCULUS AND STENT INSERTION (Left) Day of Surgery    Plan:     During this hospitalization, patient to be seen 5 x/week to address the identified rehab impairments via gait training, therapeutic activities, therapeutic exercises, neuromuscular re-education and progress toward the following goals:    Plan of Care Expires:  02/07/25    Subjective     Chief Complaint: none  Patient/Family Comments/goals: Patient with lithotripsy earlier today  Pain/Comfort:  Pain Rating 1: 0/10      Objective:     Communicated with nurse prior to session.  Patient found supine with   upon PT entry to room.     General Precautions: Standard, fall  Orthopedic Precautions:    Braces:    Respiratory Status: Room air     Functional Mobility:  Bed Mobility:     Supine to Sit: contact guard assistance  Sit to Supine: contact guard assistance  Transfers:     Sit to Stand:  contact guard assistance with no AD  Gait: ambulated 120 feet cga      AM-PAC 6 CLICK MOBILITY  Turning over in bed (including adjusting bedclothes, sheets and blankets)?: 4  Sitting down on and standing up from a chair with arms (e.g., wheelchair,  bedside commode, etc.): 4  Moving from lying on back to sitting on the side of the bed?: 4  Moving to and from a bed to a chair (including a wheelchair)?: 4  Need to walk in hospital room?: 4  Climbing 3-5 steps with a railing?: 4  Basic Mobility Total Score: 24       Treatment & Education:      Patient left supine with call button in reach..    GOALS:   Multidisciplinary Problems       Physical Therapy Goals          Problem: Physical Therapy    Goal Priority Disciplines Outcome Interventions   Physical Therapy Goal     PT, PT/OT Progressing    Description: Short term goals:  . Supine to sit with [place]  . Sit to supine with [place]  . Sit to stand transfer with [place]  . Gait  x 300 feet with [place] using No Assistive Device.     Long term goals:  Patient will regain full independent functional mobility with lowest level of assistive device to return to desired living arrangement and prior ADL's.                          Time Tracking:     PT Received On: 01/13/25  PT Start Time: 1700     PT Stop Time: 1715  PT Total Time (min): 15     Billable Minutes: gait 15    Treatment Type: Treatment        Number of PTA visits since last PT visit: 0     01/13/2025

## 2025-01-14 NOTE — PT/OT/SLP DISCHARGE
Occupational Therapy Discharge Summary    Mitul Torres  MRN: [id number]   Principal Problem: Embolic stroke involving left middle cerebral artery      Patient Discharged from acute Occupational Therapy on 1/14/25.  Please refer to prior OT note dated 1/13/25 for functional status.    Assessment:      Patient has met all goals and is not appropriate for therapy.    Objective:     GOALS:   Multidisciplinary Problems       Occupational Therapy Goals          Problem: Occupational Therapy    Goal Priority Disciplines Outcome Interventions   Occupational Therapy Goal     OT, PT/OT Progressing    Description: STG: (in 1 week)  Pt will perform grooming with setup  Pt will bathe with Supervision  Pt will perform UE dressing with independence  Pt will perform LE dressing with Supervision  Pt will perform toileting with SBA  Pt will transfer bed/chair/bsc with mod(I)  Pt will perform standing task x 7 min with SBA   Pt will tolerate 20 minutes of tx without fatigue  Pt will perform light home making task with supervision      LTG: (in 5 weeks)  1.Restore to max I with self care and mobility.                         Reasons for Discontinuation of Therapy Services  Satisfactory goal achievement.      Plan:     Patient Discharged to: Home no OT services needed    1/14/2025

## 2025-01-15 PROBLEM — R31.0 GROSS HEMATURIA: Status: ACTIVE | Noted: 2025-01-15

## 2025-01-15 LAB
ANION GAP SERPL CALCULATED.3IONS-SCNC: 10 MMOL/L (ref 7–16)
BASOPHILS # BLD AUTO: 0.03 K/UL (ref 0–0.2)
BASOPHILS NFR BLD AUTO: 0.5 % (ref 0–1)
BUN SERPL-MCNC: 17 MG/DL (ref 8–26)
BUN/CREAT SERPL: 17 (ref 6–20)
CALCIUM SERPL-MCNC: 8.4 MG/DL (ref 8.8–10)
CHLORIDE SERPL-SCNC: 108 MMOL/L (ref 98–107)
CO2 SERPL-SCNC: 28 MMOL/L (ref 23–31)
CREAT SERPL-MCNC: 1.02 MG/DL (ref 0.72–1.25)
DIFFERENTIAL METHOD BLD: ABNORMAL
EGFR (NO RACE VARIABLE) (RUSH/TITUS): 80 ML/MIN/1.73M2
EOSINOPHIL # BLD AUTO: 0.13 K/UL (ref 0–0.5)
EOSINOPHIL NFR BLD AUTO: 2.3 % (ref 1–4)
ERYTHROCYTE [DISTWIDTH] IN BLOOD BY AUTOMATED COUNT: 13.7 % (ref 11.5–14.5)
GLUCOSE SERPL-MCNC: 104 MG/DL (ref 82–115)
HCT VFR BLD AUTO: 36 % (ref 40–54)
HGB BLD-MCNC: 11.3 G/DL (ref 13.5–18)
IMM GRANULOCYTES # BLD AUTO: 0.01 K/UL (ref 0–0.04)
IMM GRANULOCYTES NFR BLD: 0.2 % (ref 0–0.4)
LYMPHOCYTES # BLD AUTO: 1.1 K/UL (ref 1–4.8)
LYMPHOCYTES NFR BLD AUTO: 19.7 % (ref 27–41)
MCH RBC QN AUTO: 28.5 PG (ref 27–31)
MCHC RBC AUTO-ENTMCNC: 31.4 G/DL (ref 32–36)
MCV RBC AUTO: 90.9 FL (ref 80–96)
MONOCYTES # BLD AUTO: 0.53 K/UL (ref 0–0.8)
MONOCYTES NFR BLD AUTO: 9.5 % (ref 2–6)
MPC BLD CALC-MCNC: 11.1 FL (ref 9.4–12.4)
NEUTROPHILS # BLD AUTO: 3.78 K/UL (ref 1.8–7.7)
NEUTROPHILS NFR BLD AUTO: 67.8 % (ref 53–65)
NRBC # BLD AUTO: 0 X10E3/UL
NRBC, AUTO (.00): 0 %
PLATELET # BLD AUTO: 178 K/UL (ref 150–400)
POTASSIUM SERPL-SCNC: 3.8 MMOL/L (ref 3.5–5.1)
RBC # BLD AUTO: 3.96 M/UL (ref 4.6–6.2)
SODIUM SERPL-SCNC: 142 MMOL/L (ref 136–145)
UA COMPLETE W REFLEX CULTURE PNL UR: NO GROWTH
WBC # BLD AUTO: 5.58 K/UL (ref 4.5–11)

## 2025-01-15 PROCEDURE — 85025 COMPLETE CBC W/AUTO DIFF WBC: CPT | Performed by: STUDENT IN AN ORGANIZED HEALTH CARE EDUCATION/TRAINING PROGRAM

## 2025-01-15 PROCEDURE — 25000003 PHARM REV CODE 250

## 2025-01-15 PROCEDURE — 63600175 PHARM REV CODE 636 W HCPCS: Performed by: STUDENT IN AN ORGANIZED HEALTH CARE EDUCATION/TRAINING PROGRAM

## 2025-01-15 PROCEDURE — 25000003 PHARM REV CODE 250: Performed by: HOSPITALIST

## 2025-01-15 PROCEDURE — 11000001 HC ACUTE MED/SURG PRIVATE ROOM

## 2025-01-15 PROCEDURE — 99232 SBSQ HOSP IP/OBS MODERATE 35: CPT | Mod: GC,,, | Performed by: FAMILY MEDICINE

## 2025-01-15 PROCEDURE — 80048 BASIC METABOLIC PNL TOTAL CA: CPT | Performed by: STUDENT IN AN ORGANIZED HEALTH CARE EDUCATION/TRAINING PROGRAM

## 2025-01-15 PROCEDURE — 36415 COLL VENOUS BLD VENIPUNCTURE: CPT | Performed by: STUDENT IN AN ORGANIZED HEALTH CARE EDUCATION/TRAINING PROGRAM

## 2025-01-15 PROCEDURE — 25000003 PHARM REV CODE 250: Performed by: STUDENT IN AN ORGANIZED HEALTH CARE EDUCATION/TRAINING PROGRAM

## 2025-01-15 PROCEDURE — 25000003 PHARM REV CODE 250: Performed by: FAMILY MEDICINE

## 2025-01-15 PROCEDURE — 25000003 PHARM REV CODE 250: Performed by: UROLOGY

## 2025-01-15 RX ADMIN — Medication 6 MG: at 10:01

## 2025-01-15 RX ADMIN — CEFDINIR 300 MG: 300 CAPSULE ORAL at 10:01

## 2025-01-15 RX ADMIN — TAMSULOSIN HYDROCHLORIDE 0.4 MG: 0.4 CAPSULE ORAL at 09:01

## 2025-01-15 RX ADMIN — MUPIROCIN: 20 OINTMENT TOPICAL at 09:01

## 2025-01-15 RX ADMIN — HYDROCODONE BITARTRATE AND ACETAMINOPHEN 1 TABLET: 5; 325 TABLET ORAL at 10:01

## 2025-01-15 RX ADMIN — HEPARIN SODIUM 5000 UNITS: 5000 INJECTION, SOLUTION INTRAVENOUS; SUBCUTANEOUS at 10:01

## 2025-01-15 RX ADMIN — Medication 100 MG: at 09:01

## 2025-01-15 RX ADMIN — CEFDINIR 300 MG: 300 CAPSULE ORAL at 09:01

## 2025-01-15 RX ADMIN — HEPARIN SODIUM 5000 UNITS: 5000 INJECTION, SOLUTION INTRAVENOUS; SUBCUTANEOUS at 02:01

## 2025-01-15 RX ADMIN — HEPARIN SODIUM 5000 UNITS: 5000 INJECTION, SOLUTION INTRAVENOUS; SUBCUTANEOUS at 05:01

## 2025-01-15 RX ADMIN — ATORVASTATIN CALCIUM 40 MG: 40 TABLET, FILM COATED ORAL at 09:01

## 2025-01-15 RX ADMIN — HYDROCODONE BITARTRATE AND ACETAMINOPHEN 1 TABLET: 5; 325 TABLET ORAL at 09:01

## 2025-01-15 RX ADMIN — ASPIRIN 81 MG CHEWABLE TABLET 81 MG: 81 TABLET CHEWABLE at 09:01

## 2025-01-15 NOTE — ASSESSMENT & PLAN NOTE
Patient ordered anticoagulants for Middle Cerebral Artery Stroke and CAD  Kidney stone found in left Ureter  Bleeding likely due to stone removal or murray catheter placement  Urology to remove catheter before discharge.

## 2025-01-15 NOTE — ASSESSMENT & PLAN NOTE
1/13  Plan to remove via Lithotripsy by Urology  1/14  Patient tolerated procedure well  Stent placed during procedure  Started Flomax for spasms  1/15  Flomax dc/d due increased pain by patient

## 2025-01-15 NOTE — PLAN OF CARE
Problem: Stroke, Ischemic (Includes Transient Ischemic Attack)  Goal: Optimal Coping  Outcome: Progressing  Goal: Effective Bowel Elimination  Outcome: Progressing  Goal: Optimal Cerebral Tissue Perfusion  Outcome: Progressing  Goal: Optimal Cognitive Function  Outcome: Progressing  Goal: Improved Communication Skills  Outcome: Progressing  Goal: Optimal Functional Ability  Outcome: Progressing  Goal: Optimal Nutrition Intake  Outcome: Progressing  Goal: Effective Oxygenation and Ventilation  Outcome: Progressing  Goal: Improved Sensorimotor Function  Outcome: Progressing  Goal: Safe and Effective Swallow  Outcome: Progressing  Goal: Effective Urinary Elimination  Outcome: Progressing     Problem: Infection  Goal: Absence of Infection Signs and Symptoms  Outcome: Progressing     Problem: Adult Inpatient Plan of Care  Goal: Plan of Care Review  Outcome: Progressing  Goal: Patient-Specific Goal (Individualized)  Outcome: Progressing  Goal: Absence of Hospital-Acquired Illness or Injury  Outcome: Progressing  Goal: Optimal Comfort and Wellbeing  Outcome: Progressing  Goal: Readiness for Transition of Care  Outcome: Progressing

## 2025-01-15 NOTE — ASSESSMENT & PLAN NOTE
Patient has paroxysmal (<7 days) atrial fibrillation. Patient is currently in sinus rhythm. [id number] Score: 4. The patients heart rate in the last 24 hours is as follows:  Pulse  Min: 54  Max: 72     Antiarrhythmics       Anticoagulants  heparin (porcine) injection 5,000 Units, Every 8 hours, Subcutaneous    Plan  - Replete lytes with a goal of K>4, Mg >2  - Patient is anticoagulated, see medications listed above.  - Patient's afib is currently controlled  - Patient to remain on telemetry

## 2025-01-15 NOTE — SUBJECTIVE & OBJECTIVE
Interval History: No acute events overnight. Patient is resting comfortably in be without complaint. Some left flank pain noted but treated with PO [place]    Review of Systems   Constitutional:  Negative for activity change, appetite change, chills, diaphoresis, fatigue and fever.   HENT:  Negative for congestion.    Eyes: Negative.    Respiratory:  Negative for cough, choking, shortness of breath, wheezing and stridor.    Cardiovascular:  Negative for chest pain, palpitations and leg swelling.   Gastrointestinal:  Negative for abdominal distention, abdominal pain, anal bleeding, blood in stool, constipation, nausea and vomiting.   Genitourinary:  Positive for flank pain. Negative for dysuria, frequency, hematuria and urgency.   Musculoskeletal:  Negative for arthralgias and back pain.   Skin: Negative.    Neurological:  Negative for numbness and headaches.   Psychiatric/Behavioral: Negative.       Objective:     Vital Signs (Most Recent):  Temp: 97.8 °F (36.6 °C) (01/15/25 1146)  Pulse: 72 (01/15/25 1146)  Resp: 18 (01/15/25 1146)  BP: (!) 153/84 (01/15/25 1146)  SpO2: 95 % (01/15/25 1146) Vital Signs (24h Range):  Temp:  [97.5 °F (36.4 °C)-98.1 °F (36.7 °C)] 97.8 °F (36.6 °C)  Pulse:  [54-72] 72  Resp:  [15-19] 18  SpO2:  [95 %-97 %] 95 %  BP: (126-177)/(84-95) 153/84     Weight: 74.6 kg (164 lb 7.4 oz)  Body mass index is 25.76 kg/m².    Intake/Output Summary (Last 24 hours) at 1/15/2025 2586  Last data filed at 1/15/2025 0441  Gross per 24 hour   Intake --   Output 850 ml   Net -850 ml         Physical Exam  Vitals reviewed.   Constitutional:       General: He is not in acute distress.     Appearance: Normal appearance. He is normal weight.   HENT:      Head: Normocephalic and atraumatic.      Right Ear: External ear normal.      Left Ear: External ear normal.      Nose: Nose normal.      Mouth/Throat:      Mouth: Mucous membranes are dry.   Eyes:      Extraocular Movements: Extraocular movements intact.       Pupils: Pupils are equal, round, and reactive to light.   Cardiovascular:      Rate and Rhythm: Normal rate and regular rhythm.      Heart sounds: Normal heart sounds.   Pulmonary:      Effort: Pulmonary effort is normal.      Breath sounds: No decreased breath sounds, wheezing, rhonchi or rales.   Chest:      Chest wall: No tenderness.   Abdominal:      General: Abdomen is flat. Bowel sounds are normal.      Palpations: Abdomen is soft.   Musculoskeletal:         General: Normal range of motion.      Cervical back: Normal range of motion. No tenderness.   Skin:     General: Skin is warm and dry.      Capillary Refill: Capillary refill takes less than 2 seconds.   Neurological:      General: No focal deficit present.      Mental Status: He is alert and oriented to person, place, and time. Mental status is at baseline.   Psychiatric:         Mood and Affect: Mood normal.         Behavior: Behavior normal.         Thought Content: Thought content normal.             Significant Labs: All pertinent labs within the past 24 hours have been reviewed.    Significant Imaging: I have reviewed all pertinent imaging results/findings within the past 24 hours.

## 2025-01-15 NOTE — ASSESSMENT & PLAN NOTE
Creatine stable for now. BMP reviewed- noted Estimated Creatinine Clearance: 64.8 mL/min (based on SCr of 1.02 mg/dL). according to latest data. Based on current GFR, CKD stage is stage 2 - GFR 60-89.  Monitor UOP and serial BMP and adjust therapy as needed. Renally dose meds. Avoid nephrotoxic medications and procedures.

## 2025-01-15 NOTE — PROGRESS NOTES
[facility] Hospital Medicine  Progress Note    Patient Name: Mitul Torres  MRN: [id number]  Patient Class: IP- Inpatient   Admission Date: 1/9/2025  Length of Stay: 6 days  Attending Physician: Heather Nicholas DO  Primary Care Provider: Shannan Gorman FNP        Subjective     Principal Problem:Embolic stroke involving left middle cerebral artery        HPI:  No notes on file    Overview/Hospital Course:  1/9  Patient presented to the ED with Aphasia and AMS. He was given Tenecteplase for Left hemispheric ischemic event    1/10/25  patient confusion delirious overnight without any worsening focal neurological deficits.  Hallucinations present, however significant improvement in the patient's deficits right-sided deficits with return of power and sensation.     1/11/25  the patient continued to have hallucinations today.  At 1 point, he complained of left lower quadrant abdominal pain as well as what he described as tunneled vision, alternating with episodes of delirium, waxing and waning lucidity with hallucinations.  Therefore, he had a stat CT head performed rule out any new neurological deficits (nothing focal in terms of his right-sided deficits returned).  CT head without any evidence of acute bleed.  CTA chest (ordered due to echocardiogram read, low probability pulmonary embolism) and CT abdomen pelvis with IV and oral contrast pending.     1/12/25  significantly improved in terms of patient's delirium.  No longer complaining of changes in vision/hallucinations.  No ongoing left lower quadrant pain.  CT abdomen and pelvis with evidence 6 mm obstructive stone at left UVJ.   Urology Consulted  Patient NPO  1/13  Patient is admitted to the medicine team as a ICU Down grade  Plans to take patient to OR for lithotripsy  PT/OT consult after operation     1/14  Patient tolerated operation well  Working with PT  Case Management obtaining home health  Started Flomax  post stent placement for spasm  1/15  Murray still in place  Urology to remove murray at a later date  No overnight complaints    Interval History: No acute events overnight. Patient is resting comfortably in be without complaint. Some left flank pain noted but treated with PO [place]    Review of Systems   Constitutional:  Negative for activity change, appetite change, chills, diaphoresis, fatigue and fever.   HENT:  Negative for congestion.    Eyes: Negative.    Respiratory:  Negative for cough, choking, shortness of breath, wheezing and stridor.    Cardiovascular:  Negative for chest pain, palpitations and leg swelling.   Gastrointestinal:  Negative for abdominal distention, abdominal pain, anal bleeding, blood in stool, constipation, nausea and vomiting.   Genitourinary:  Positive for flank pain. Negative for dysuria, frequency, hematuria and urgency.   Musculoskeletal:  Negative for arthralgias and back pain.   Skin: Negative.    Neurological:  Negative for numbness and headaches.   Psychiatric/Behavioral: Negative.       Objective:     Vital Signs (Most Recent):  Temp: 97.8 °F (36.6 °C) (01/15/25 1146)  Pulse: 72 (01/15/25 1146)  Resp: 18 (01/15/25 1146)  BP: (!) 153/84 (01/15/25 1146)  SpO2: 95 % (01/15/25 1146) Vital Signs (24h Range):  Temp:  [97.5 °F (36.4 °C)-98.1 °F (36.7 °C)] 97.8 °F (36.6 °C)  Pulse:  [54-72] 72  Resp:  [15-19] 18  SpO2:  [95 %-97 %] 95 %  BP: (126-177)/(84-95) 153/84     Weight: 74.6 kg (164 lb 7.4 oz)  Body mass index is 25.76 kg/m².    Intake/Output Summary (Last 24 hours) at 1/15/2025 2540  Last data filed at 1/15/2025 0441  Gross per 24 hour   Intake --   Output 850 ml   Net -850 ml         Physical Exam  Vitals reviewed.   Constitutional:       General: He is not in acute distress.     Appearance: Normal appearance. He is normal weight.   HENT:      Head: Normocephalic and atraumatic.      Right Ear: External ear normal.      Left Ear: External ear normal.      Nose: Nose normal.       Mouth/Throat:      Mouth: Mucous membranes are dry.   Eyes:      Extraocular Movements: Extraocular movements intact.      Pupils: Pupils are equal, round, and reactive to light.   Cardiovascular:      Rate and Rhythm: Normal rate and regular rhythm.      Heart sounds: Normal heart sounds.   Pulmonary:      Effort: Pulmonary effort is normal.      Breath sounds: No decreased breath sounds, wheezing, rhonchi or rales.   Chest:      Chest wall: No tenderness.   Abdominal:      General: Abdomen is flat. Bowel sounds are normal.      Palpations: Abdomen is soft.   Musculoskeletal:         General: Normal range of motion.      Cervical back: Normal range of motion. No tenderness.   Skin:     General: Skin is warm and dry.      Capillary Refill: Capillary refill takes less than 2 seconds.   Neurological:      General: No focal deficit present.      Mental Status: He is alert and oriented to person, place, and time. Mental status is at baseline.   Psychiatric:         Mood and Affect: Mood normal.         Behavior: Behavior normal.         Thought Content: Thought content normal.             Significant Labs: All pertinent labs within the past 24 hours have been reviewed.    Significant Imaging: I have reviewed all pertinent imaging results/findings within the past 24 hours.    Assessment and Plan     * Embolic stroke involving left middle cerebral artery    Antithrombotics for secondary stroke prevention: Antiplatelets: Aspirin: 81 mg daily    Statins for secondary stroke prevention and hyperlipidemia, if present:   Statins: Atorvastatin- 40 mg daily    Aggressive risk factor modification: HTN, HLD, A-Fib, CAD     Rehab efforts: The patient has been evaluated by a stroke team provider and the therapy needs have been fully considered based off the presenting complaints and exam findings. The following therapy evaluations are needed: PT evaluate and treat, OT evaluate and treat    Diagnostics ordered/pending: CT scan of  head without contrast to asses brain parenchyma, CTA Head to assess vasculature , Other: CT Abdomen and Pelvis    VTE prophylaxis: Heparin 5000 units SQ every 8 hours    BP parameters: Infarct: Post Thrombolytic therapy, SBP <180        Gross hematuria    Patient ordered anticoagulants for Middle Cerebral Artery Stroke and CAD  Kidney stone found in left Ureter  Bleeding likely due to stone removal or murray catheter placement  Urology to remove catheter before discharge.    Left ureteral stone  1/13  Plan to remove via Lithotripsy by Urology  1/14  Patient tolerated procedure well  Stent placed during procedure  Started Flomax for spasms  1/15  Flomax dc/d due increased pain by patient      Renal stone  1/13  Plan to remove via Lithotripsy by Urology  1/14  Patient tolerated procedure well  Working well with PT   1/15  Murray remains in place  Urology to remove before discharge    Delirium  1/13  Delirium experienced in the ICU  No signs of delirium or hallucinations presently  Will continue to monitor and adjust when medically indicated          Chronic kidney disease, stage 3a  Creatine stable for now. BMP reviewed- noted Estimated Creatinine Clearance: 64.8 mL/min (based on SCr of 1.02 mg/dL). according to latest data. Based on current GFR, CKD stage is stage 2 - GFR 60-89.  Monitor UOP and serial BMP and adjust therapy as needed. Renally dose meds. Avoid nephrotoxic medications and procedures.    Coronary artery disease involving native coronary artery of native heart without angina pectoris  Patient with known CAD s/p stent placement and CABG, which is controlled Will continue ASA and Statin and monitor for S/Sx of angina/ACS. Continue to monitor on telemetry.     Severe mitral regurgitation  Echocardiogram with evidence of mitral regurgitation that is moderate . The patient's most recent echocardiogram result is listed below. Patient followed by Cardiology    Echo    Result Date: 1/10/2025    Left Ventricle:  The left ventricle is mildly dilated. Mildly increased   wall thickness. There is normal systolic function. Ejection fraction is   approximately 60%. Grade I diastolic dysfunction.    Right Ventricle: Moderate right ventricular enlargement. RV/LV Ratio is   1.0, which exceeds threshold, is consistent with moderate RV strain.   Systolic function is moderately reduced.    Aortic Valve: The aortic valve is a trileaflet valve. Mildly calcified   cusps.    Mitral Valve: There is moderate anterior leaflet sclerosis. Moderately   thickened anterior leaflet. Moderately calcified anterior leaflet. There   is mild to moderate stenosis. The mean pressure gradient across the mitral   valve is 3 mmHg at a heart rate of  bpm. There is mild regurgitation with   an eccentric jet.    IVC/SVC: Normal venous pressure at 3 mmHg.        Echo    Result Date: 12/16/2024    Left Ventricle: The left ventricle is normal in size. Mildly increased   wall thickness. There is concentric remodeling. There is normal systolic   function. Quantitated ejection fraction is 55%.    Right Ventricle: Normal right ventricular cavity size. Systolic   function could not be assesed.    Left Atrium: Left atrium is mildly dilated.    Tricuspid Valve: There is mild regurgitation.    Pulmonary Artery: The estimated pulmonary artery systolic pressure is   26 mmHg.    IVC/SVC: Normal venous pressure at 3 mmHg.          Paroxysmal atrial fibrillation  Patient has paroxysmal (<7 days) atrial fibrillation. Patient is currently in sinus rhythm. [id number] Score: 4. The patients heart rate in the last 24 hours is as follows:  Pulse  Min: 54  Max: 72     Antiarrhythmics       Anticoagulants  heparin (porcine) injection 5,000 Units, Every 8 hours, Subcutaneous    Plan  - Replete lytes with a goal of K>4, Mg >2  - Patient is anticoagulated, see medications listed above.  - Patient's afib is currently controlled  - Patient to remain on telemetry             VTE Risk  Mitigation (From admission, onward)           Ordered     heparin (porcine) injection 5,000 Units  Every 8 hours         01/11/25 2007                    Discharge Planning   ROLANDA:      Code Status: Full Code   Medical Readiness for Discharge Date:   Discharge Plan A: Home                        Jorge Perez Jr., MD  Department of Hospital Medicine   [facility] - [facility]

## 2025-01-15 NOTE — ASSESSMENT & PLAN NOTE
1/13  Plan to remove via Lithotripsy by Urology  1/14  Patient tolerated procedure well  Working well with PT   1/15  Varghese remains in place  Urology to remove before discharge

## 2025-01-16 VITALS
RESPIRATION RATE: 18 BRPM | BODY MASS INDEX: 25.81 KG/M2 | OXYGEN SATURATION: 97 % | HEIGHT: 67 IN | HEART RATE: 65 BPM | SYSTOLIC BLOOD PRESSURE: 173 MMHG | WEIGHT: 164.44 LBS | DIASTOLIC BLOOD PRESSURE: 87 MMHG | TEMPERATURE: 98 F

## 2025-01-16 DIAGNOSIS — Z96.0 URETERAL STENT PRESENT: Primary | ICD-10-CM

## 2025-01-16 PROBLEM — F41.9 ANXIETY: Status: ACTIVE | Noted: 2025-01-16

## 2025-01-16 LAB
ANION GAP SERPL CALCULATED.3IONS-SCNC: 12 MMOL/L (ref 7–16)
BASOPHILS # BLD AUTO: 0.04 K/UL (ref 0–0.2)
BASOPHILS NFR BLD AUTO: 0.8 % (ref 0–1)
BUN SERPL-MCNC: 17 MG/DL (ref 8–26)
BUN/CREAT SERPL: 18 (ref 6–20)
CALCIUM SERPL-MCNC: 8.7 MG/DL (ref 8.8–10)
CHLORIDE SERPL-SCNC: 109 MMOL/L (ref 98–107)
CO2 SERPL-SCNC: 22 MMOL/L (ref 23–31)
CREAT SERPL-MCNC: 0.94 MG/DL (ref 0.72–1.25)
DIFFERENTIAL METHOD BLD: ABNORMAL
EGFR (NO RACE VARIABLE) (RUSH/TITUS): 88 ML/MIN/1.73M2
EOSINOPHIL # BLD AUTO: 0.14 K/UL (ref 0–0.5)
EOSINOPHIL NFR BLD AUTO: 2.8 % (ref 1–4)
ERYTHROCYTE [DISTWIDTH] IN BLOOD BY AUTOMATED COUNT: 13.7 % (ref 11.5–14.5)
GLUCOSE SERPL-MCNC: 85 MG/DL (ref 82–115)
HCT VFR BLD AUTO: 34.2 % (ref 40–54)
HGB BLD-MCNC: 11 G/DL (ref 13.5–18)
IMM GRANULOCYTES # BLD AUTO: 0.02 K/UL (ref 0–0.04)
IMM GRANULOCYTES NFR BLD: 0.4 % (ref 0–0.4)
LYMPHOCYTES # BLD AUTO: 1.41 K/UL (ref 1–4.8)
LYMPHOCYTES NFR BLD AUTO: 28.7 % (ref 27–41)
MCH RBC QN AUTO: 28.9 PG (ref 27–31)
MCHC RBC AUTO-ENTMCNC: 32.2 G/DL (ref 32–36)
MCV RBC AUTO: 90 FL (ref 80–96)
MONOCYTES # BLD AUTO: 0.48 K/UL (ref 0–0.8)
MONOCYTES NFR BLD AUTO: 9.8 % (ref 2–6)
MPC BLD CALC-MCNC: 11.8 FL (ref 9.4–12.4)
NEUTROPHILS # BLD AUTO: 2.83 K/UL (ref 1.8–7.7)
NEUTROPHILS NFR BLD AUTO: 57.5 % (ref 53–65)
NRBC # BLD AUTO: 0 X10E3/UL
NRBC, AUTO (.00): 0 %
PLATELET # BLD AUTO: 179 K/UL (ref 150–400)
POTASSIUM SERPL-SCNC: 4.3 MMOL/L (ref 3.5–5.1)
RBC # BLD AUTO: 3.8 M/UL (ref 4.6–6.2)
SODIUM SERPL-SCNC: 139 MMOL/L (ref 136–145)
WBC # BLD AUTO: 4.92 K/UL (ref 4.5–11)

## 2025-01-16 PROCEDURE — 99239 HOSP IP/OBS DSCHRG MGMT >30: CPT | Mod: GC,,, | Performed by: INTERNAL MEDICINE

## 2025-01-16 PROCEDURE — 63600175 PHARM REV CODE 636 W HCPCS: Performed by: STUDENT IN AN ORGANIZED HEALTH CARE EDUCATION/TRAINING PROGRAM

## 2025-01-16 PROCEDURE — 25000003 PHARM REV CODE 250: Performed by: STUDENT IN AN ORGANIZED HEALTH CARE EDUCATION/TRAINING PROGRAM

## 2025-01-16 PROCEDURE — 25000003 PHARM REV CODE 250: Performed by: UROLOGY

## 2025-01-16 PROCEDURE — 25000003 PHARM REV CODE 250: Performed by: FAMILY MEDICINE

## 2025-01-16 PROCEDURE — 85025 COMPLETE CBC W/AUTO DIFF WBC: CPT | Performed by: STUDENT IN AN ORGANIZED HEALTH CARE EDUCATION/TRAINING PROGRAM

## 2025-01-16 PROCEDURE — 80048 BASIC METABOLIC PNL TOTAL CA: CPT | Performed by: STUDENT IN AN ORGANIZED HEALTH CARE EDUCATION/TRAINING PROGRAM

## 2025-01-16 PROCEDURE — 36415 COLL VENOUS BLD VENIPUNCTURE: CPT | Performed by: STUDENT IN AN ORGANIZED HEALTH CARE EDUCATION/TRAINING PROGRAM

## 2025-01-16 RX ORDER — HYDROXYZINE HYDROCHLORIDE 25 MG/1
25 TABLET, FILM COATED ORAL 3 TIMES DAILY PRN
Status: DISCONTINUED | OUTPATIENT
Start: 2025-01-16 | End: 2025-01-16 | Stop reason: HOSPADM

## 2025-01-16 RX ORDER — LEVETIRACETAM 250 MG/1
250 TABLET ORAL 2 TIMES DAILY
Qty: 60 TABLET | Refills: 0 | Status: SHIPPED | OUTPATIENT
Start: 2025-01-16 | End: 2025-01-16

## 2025-01-16 RX ORDER — METOPROLOL SUCCINATE 50 MG/1
50 TABLET, EXTENDED RELEASE ORAL DAILY
Qty: 30 TABLET | Refills: 0 | Status: SHIPPED | OUTPATIENT
Start: 2025-01-16 | End: 2025-01-22

## 2025-01-16 RX ORDER — LEVETIRACETAM 500 MG/1
500 TABLET ORAL 2 TIMES DAILY
Qty: 60 TABLET | Refills: 0 | Status: SHIPPED | OUTPATIENT
Start: 2025-01-16 | End: 2025-01-22

## 2025-01-16 RX ADMIN — HYDROCODONE BITARTRATE AND ACETAMINOPHEN 1 TABLET: 5; 325 TABLET ORAL at 05:01

## 2025-01-16 RX ADMIN — ATORVASTATIN CALCIUM 40 MG: 40 TABLET, FILM COATED ORAL at 09:01

## 2025-01-16 RX ADMIN — ASPIRIN 81 MG CHEWABLE TABLET 81 MG: 81 TABLET CHEWABLE at 09:01

## 2025-01-16 RX ADMIN — CEFDINIR 300 MG: 300 CAPSULE ORAL at 09:01

## 2025-01-16 RX ADMIN — HYDROCODONE BITARTRATE AND ACETAMINOPHEN 1 TABLET: 5; 325 TABLET ORAL at 01:01

## 2025-01-16 RX ADMIN — Medication 100 MG: at 09:01

## 2025-01-16 RX ADMIN — HEPARIN SODIUM 5000 UNITS: 5000 INJECTION, SOLUTION INTRAVENOUS; SUBCUTANEOUS at 02:01

## 2025-01-16 NOTE — NURSING
Pt left floor in wheelchair with son. Pt is aware of meeting with Jonas on January 21st, 2025 at 10 AM.

## 2025-01-16 NOTE — ASSESSMENT & PLAN NOTE
1/13  Delirium experienced in the ICU  No signs of delirium or hallucinations presently  Will continue to monitor and adjust when medically indicated  1/16  No signs of delirium  Patient is medically stable and has maximally benefited from this hospital stay. Patient is being discharged home follow up appointment with PCP and specialist have been secured at discharge

## 2025-01-16 NOTE — DISCHARGE SUMMARY
[facility] Hospital Medicine  Discharge Summary      Patient Name: Mitul Torres  MRN: [id number]  GLENYS: [id number]  Patient Class: IP- Inpatient  Admission Date: 1/9/2025  Hospital Length of Stay: 7 days  Discharge Date and Time:  01/16/2025 2:45 PM  Attending Physician: Joaquín Arreguin MD   Discharging Provider: Jorge Perez Jr., MD  Primary Care Provider: Shannan Gorman FNP    Primary Care Team: Networked reference to record PCT     HPI:   No notes on file    Procedure(s) (LRB):  CYSTOURETEROSCOPY, WITH HOLMIUM LASER LITHOTRIPSY OF URETERAL CALCULUS AND STENT INSERTION (Left)      Hospital Course:   1/9  Patient presented to the ED with Aphasia and AMS. He was given Tenecteplase for Left hemispheric ischemic event    1/10/25  patient confusion delirious overnight without any worsening focal neurological deficits.  Hallucinations present, however significant improvement in the patient's deficits right-sided deficits with return of power and sensation.     1/11/25  the patient continued to have hallucinations today.  At 1 point, he complained of left lower quadrant abdominal pain as well as what he described as tunneled vision, alternating with episodes of delirium, waxing and waning lucidity with hallucinations.  Therefore, he had a stat CT head performed rule out any new neurological deficits (nothing focal in terms of his right-sided deficits returned).  CT head without any evidence of acute bleed.  CTA chest (ordered due to echocardiogram read, low probability pulmonary embolism) and CT abdomen pelvis with IV and oral contrast pending.     1/12/25  significantly improved in terms of patient's delirium.  No longer complaining of changes in vision/hallucinations.  No ongoing left lower quadrant pain.  CT abdomen and pelvis with evidence 6 mm obstructive stone at left UVJ.   Urology Consulted  Patient NPO  1/13  Patient is admitted to the medicine team as a ICU  Down grade  Plans to take patient to OR for lithotripsy  PT/OT consult after operation     1/14  Patient tolerated operation well  Working with PT  Case Management obtaining home health  Started Flomax post stent placement for spasm  1/15  Murray still in place  Urology to remove murray at a later date  No overnight complaints  1/16  Urology noted for patient to be allowed to discharge with murray inplace until January 21st.  Oral anticoagulation restarted and secured at discharge  Patient is medically stable and has maximally benefited from this hospital stay. Patient is being discharged home follow up appointment with PCP and specialist have been secured at discharge.       Goals of Care Treatment Preferences:  Code Status: Full Code      SDOH Screening:  The patient was screened for utility difficulties, food insecurity, transport difficulties, housing insecurity, and interpersonal safety and there were no concerns identified this admission.     Consults:   Consults (From admission, onward)          Status Ordering Provider     Inpatient consult to Social Work  Once        Provider:  (Not yet assigned)    Completed KATHERYN ZHAO     Inpatient consult to Urology  Once        Provider:  Enrique Mcdaniel MD    Completed SHANNAN LEVINE     Inpatient Consult Tele-Vascular Neurology  Once        Provider:  Shannan Levine MD    Acknowledged SHANNAN LEVINE     Consult to Telemedicine - Acute Stroke  Once        Provider:  America Thomas MD    Completed JIGNA EPPS            * Embolic stroke involving left middle cerebral artery    Antithrombotics for secondary stroke prevention: Antiplatelets: Aspirin: 81 mg daily    Statins for secondary stroke prevention and hyperlipidemia, if present:   Statins: Atorvastatin- 40 mg daily    Aggressive risk factor modification: HTN, HLD, A-Fib, CAD     Rehab efforts: The patient has been evaluated by a stroke team provider and the therapy needs have been fully considered based off  the presenting complaints and exam findings. The following therapy evaluations are needed: PT evaluate and treat, OT evaluate and treat    Diagnostics ordered/pending: CT scan of head without contrast to asses brain parenchyma, CTA Head to assess vasculature , Other: CT Abdomen and Pelvis    VTE prophylaxis: Heparin 5000 units SQ every 8 hours    BP parameters: Infarct: Post Thrombolytic therapy, SBP <180    1/16  Eliquis restarted at discharge  Patient is medically stable and has maximally benefited from this hospital stay. Patient is being discharged home follow up appointment with PCP and specialist have been secured at discharge        Anxiety  Patient has described anxious symptoms from past traumatic events.   Patient uncomfortable in new environment and limited mobiltiy. Patient is used to being active  Added Atarax 25mg PRN for anxiety  Patient is medically stable and has maximally benefited from this hospital stay. Patient is being discharged home follow up appointment with PCP and specialist have been secured at discharge        Gross hematuria    Patient ordered anticoagulants for Middle Cerebral Artery Stroke and CAD  Kidney stone found in left Ureter  Bleeding likely due to stone removal or murray catheter placement  Urology to remove catheter before discharge.    1/16  Patient restarted oral anticoagulation at discharge  Murray remains to be removed at urology clinic   Patient is expected to have continued bleeding  Patient is medically stable and has maximally benefited from this hospital stay. Patient is being discharged home follow up appointment with PCP and specialist have been secured at discharge      Left ureteral stone  1/13  Plan to remove via Lithotripsy by Urology  1/14  Patient tolerated procedure well  Stent placed during procedure  Started Flomax for spasms  1/15  Flomax dc/d due increased pain by patient  1/16  Murray remains after discharge, to be removed in urology clinic  Patient  educated on proper murray care at discharge  Patient is medically stable and has maximally benefited from this hospital stay. Patient is being discharged home follow up appointment with PCP and specialist have been secured at discharge      Renal stone  1/13  Plan to remove via Lithotripsy by Urology  1/14  Patient tolerated procedure well  Working well with PT   1/15  Murray remains in place  Urology to remove before discharge  1/16  Murray Remains after discharge, to be removed in urology clinic  Patient is medically stable and has maximally benefited from this hospital stay. Patient is being discharged home follow up appointment with PCP and specialist have been secured at discharge      Delirium  1/13  Delirium experienced in the ICU  No signs of delirium or hallucinations presently  Will continue to monitor and adjust when medically indicated  1/16  No signs of delirium  Patient is medically stable and has maximally benefited from this hospital stay. Patient is being discharged home follow up appointment with PCP and specialist have been secured at discharge            Chronic kidney disease, stage 3a  Creatine stable for now. BMP reviewed- noted Estimated Creatinine Clearance: 70.3 mL/min (based on SCr of 0.94 mg/dL). according to latest data. Based on current GFR, CKD stage is stage 2 - GFR 60-89.  Monitor UOP and serial BMP and adjust therapy as needed. Renally dose meds. Avoid nephrotoxic medications and procedures.    1/16  Patient is medically stable and has maximally benefited from this hospital stay. Patient is being discharged home follow up appointment with PCP and specialist have been secured at discharge      Coronary artery disease involving native coronary artery of native heart without angina pectoris  Patient with known CAD s/p stent placement and CABG, which is controlled Will continue ASA and Statin and monitor for S/Sx of angina/ACS. Continue to monitor on telemetry.    1/16  Statin and Eliquis  restarted at discharge  Patient is medically stable and has maximally benefited from this hospital stay. Patient is being discharged home follow up appointment with PCP and specialist have been secured at discharge       Severe mitral regurgitation  Echocardiogram with evidence of mitral regurgitation that is moderate . The patient's most recent echocardiogram result is listed below. Patient followed by Cardiology    Echo    Result Date: 1/10/2025    Left Ventricle: The left ventricle is mildly dilated. Mildly increased   wall thickness. There is normal systolic function. Ejection fraction is   approximately 60%. Grade I diastolic dysfunction.    Right Ventricle: Moderate right ventricular enlargement. RV/LV Ratio is   1.0, which exceeds threshold, is consistent with moderate RV strain.   Systolic function is moderately reduced.    Aortic Valve: The aortic valve is a trileaflet valve. Mildly calcified   cusps.    Mitral Valve: There is moderate anterior leaflet sclerosis. Moderately   thickened anterior leaflet. Moderately calcified anterior leaflet. There   is mild to moderate stenosis. The mean pressure gradient across the mitral   valve is 3 mmHg at a heart rate of  bpm. There is mild regurgitation with   an eccentric jet.    IVC/SVC: Normal venous pressure at 3 mmHg.        Echo    Result Date: 12/16/2024    Left Ventricle: The left ventricle is normal in size. Mildly increased   wall thickness. There is concentric remodeling. There is normal systolic   function. Quantitated ejection fraction is 55%.    Right Ventricle: Normal right ventricular cavity size. Systolic   function could not be assesed.    Left Atrium: Left atrium is mildly dilated.    Tricuspid Valve: There is mild regurgitation.    Pulmonary Artery: The estimated pulmonary artery systolic pressure is   26 mmHg.    IVC/SVC: Normal venous pressure at 3 mmHg.        1/16  Patient is medically stable and has maximally benefited from this hospital stay.  Patient is being discharged home follow up appointment with PCP and specialist have been secured at discharge      Paroxysmal atrial fibrillation  Patient has paroxysmal (<7 days) atrial fibrillation. Patient is currently in sinus rhythm. [id number] Score: 4. The patients heart rate in the last 24 hours is as follows:  Pulse  Min: 55  Max: 87     Antiarrhythmics  metoprolol (TOPROL-XL) 24 hr tablet, Daily, Oral    Anticoagulants  heparin (porcine) injection 5,000 Units, Every 8 hours, Subcutaneous  apixaban tablet, 2 times daily, Oral    Plan  - Replete lytes with a goal of K>4, Mg >2  - Patient is anticoagulated, see medications listed above.  - Patient's afib is currently controlled  - Patient to remain on telemetry     1/16  Patient has reached his window to restart oral anticoagulants   Eliquis restarted and secured at discharge  Patient is medically stable and has maximally benefited from this hospital stay. Patient is being discharged home follow up appointment with PCP and specialist have been secured at discharge          Final Active Diagnoses:    Diagnosis Date Noted POA    PRINCIPAL PROBLEM:  Embolic stroke involving left middle cerebral artery [I63.412] 08/16/2024 Yes    Anxiety [F41.9] 01/16/2025 Clinically Undetermined    Gross hematuria [R31.0] 01/15/2025 Unknown    Renal stone [N20.0] 01/12/2025 Yes    Left ureteral stone [N20.1] 01/12/2025 Yes    Delirium [R41.0] 01/11/2025 Yes    Chronic kidney disease, stage 3a [N18.31] 09/11/2024 Yes    Severe mitral regurgitation [I34.0] 03/15/2024 Yes    Coronary artery disease involving native coronary artery of native heart without angina pectoris [I25.10] 03/15/2024 Yes    Paroxysmal atrial fibrillation [I48.0] 02/10/2024 Yes      Problems Resolved During this Admission:       Discharged Condition: stable    Disposition: Home or Self Care    Follow Up:   Follow-up Information       Shannan Gorman FNP Follow up in 1 week(s).    Specialty: Family  Medicine  Contact information:  [address] [place] [address]  [phone]               Enrique Mcdaniel MD Follow up on 1/21/2025.    Specialty: Urology  Contact information:  [address] [facility] [place] [address]  [phone]                           Patient Instructions:      Diet Cardiac     Notify your health care provider if you experience any of the following:  temperature >100.4     Notify your health care provider if you experience any of the following:  persistent nausea and vomiting or diarrhea     Notify your health care provider if you experience any of the following:  increased confusion or weakness     Notify your health care provider if you experience any of the following:  redness, tenderness, or signs of infection (pain, swelling, redness, odor or green/yellow discharge around incision site)     Activity as tolerated       Significant Diagnostic Studies: Labs: BMP:   Recent Labs   Lab 01/15/25  0431 01/16/25  0350    85    139   K 3.8 4.3   * 109*   CO2 28 22*   BUN 17 17   CREATININE 1.02 0.94   CALCIUM 8.4* 8.7*    and CBC   Recent Labs   Lab 01/15/25  0431 01/16/25  0350   WBC 5.58 4.92   HGB 11.3* 11.0*   HCT 36.0* 34.2*    179       Pending Diagnostic Studies:       Procedure Component Value Units Date/Time    EKG 12-lead [[id number]]     Order Status: Sent Lab Status: No result            Medications:  Reconciled Home Medications:      Medication List        CHANGE how you take these medications      levETIRAcetam 500 MG Tab  Commonly known as: KEPPRA  TAKE 1/2 TABLET TWICE DAILY FOR 2 OR 3 DAYS, AND THEN START WHOLE TABLET BY MOUTH TWICE DAILY  What changed:   how much to take  how to take this  when to take this  additional instructions            CONTINUE taking these medications      aspirin 81 MG EC tablet  Commonly known as: ECOTRIN  Take 1 tablet by mouth every morning.     ELIQUIS 5 mg Tab  Generic drug: apixaban  Take 1 tablet (5 mg total) by mouth 2 (two)  times daily.     metoprolol succinate 50 MG 24 hr tablet  Commonly known as: TOPROL-XL  Take 1 tablet (50 mg total) by mouth once daily.              Indwelling Lines/Drains at time of discharge:   Lines/Drains/Airways       Drain  Duration                  Urethral Catheter Silicone 16 Fr. -- days                    Time spent on the discharge of patient: 45 minutes         Jorge Perez Jr., MD  Department of Hospital Medicine  [facility]

## 2025-01-16 NOTE — ASSESSMENT & PLAN NOTE
Creatine stable for now. BMP reviewed- noted Estimated Creatinine Clearance: 70.3 mL/min (based on SCr of 0.94 mg/dL). according to latest data. Based on current GFR, CKD stage is stage 2 - GFR 60-89.  Monitor UOP and serial BMP and adjust therapy as needed. Renally dose meds. Avoid nephrotoxic medications and procedures.    1/16  Patient is medically stable and has maximally benefited from this hospital stay. Patient is being discharged home follow up appointment with PCP and specialist have been secured at discharge

## 2025-01-16 NOTE — PLAN OF CARE
Problem: Stroke, Ischemic (Includes Transient Ischemic Attack)  Goal: Effective Urinary Elimination  Outcome: Progressing

## 2025-01-16 NOTE — ASSESSMENT & PLAN NOTE
Patient with known CAD s/p stent placement and CABG, which is controlled Will continue ASA and Statin and monitor for S/Sx of angina/ACS. Continue to monitor on telemetry.    1/16  Statin and Eliquis restarted at discharge  Patient is medically stable and has maximally benefited from this hospital stay. Patient is being discharged home follow up appointment with PCP and specialist have been secured at discharge

## 2025-01-16 NOTE — ASSESSMENT & PLAN NOTE
1/13  Plan to remove via Lithotripsy by Urology  1/14  Patient tolerated procedure well  Working well with PT   1/15  Varghese remains in place  Urology to remove before discharge  1/16  Varghese Remains after discharge, to be removed in urology clinic  Patient is medically stable and has maximally benefited from this hospital stay. Patient is being discharged home follow up appointment with PCP and specialist have been secured at discharge

## 2025-01-16 NOTE — ASSESSMENT & PLAN NOTE
Patient has paroxysmal (<7 days) atrial fibrillation. Patient is currently in sinus rhythm. [id number] Score: 4. The patients heart rate in the last 24 hours is as follows:  Pulse  Min: 55  Max: 87     Antiarrhythmics  metoprolol (TOPROL-XL) 24 hr tablet, Daily, Oral    Anticoagulants  heparin (porcine) injection 5,000 Units, Every 8 hours, Subcutaneous  apixaban tablet, 2 times daily, Oral    Plan  - Replete lytes with a goal of K>4, Mg >2  - Patient is anticoagulated, see medications listed above.  - Patient's afib is currently controlled  - Patient to remain on telemetry     1/16  Patient has reached his window to restart oral anticoagulants   Eliquis restarted and secured at discharge  Patient is medically stable and has maximally benefited from this hospital stay. Patient is being discharged home follow up appointment with PCP and specialist have been secured at discharge

## 2025-01-16 NOTE — PROGRESS NOTES
[facility]  Urology  Progress Note    Patient Name: Mitul Torres  MRN: [id number]  Admission Date: 1/9/2025  Hospital Length of Stay: 7 days  Code Status: Full Code   Attending Provider: Joaquín Arreguin MD   Primary Care Physician: Shannan Gorman FNP    Subjective:     HPI:  The patient is a 69-year-old male with a history of an embolic stroke that underwent fibrinolytic therapy after presenting with evidence of aphasia and altered mental status who was found to have a distal left ureteral stone with secondary signs of obstruction and Urology was consulted.  The patient is being evaluated by speech therapy and is reporting that he has forgotten how to use his telephone and is disoriented but has regained motor function of his hands.  He describes pain in the left lower abdomen that is mild and intermittent and radiates to the left back and distal groin.  He denies vomiting.     Interval History:  Maintain Varghese catheter.  Plan to remove Varghese catheter when transitions to oral anticoagulation.  Urology to coordinate Varghese catheter removal.  Creatinine was 0.94 with a BUN of 17 and potassium of 4.3 today.  Urine light pink.    Review of Systems   Constitutional:  Negative for activity change.   HENT:  Negative for voice change.    Eyes:  Negative for discharge.   Respiratory:  Negative for cough, choking and shortness of breath.    Cardiovascular:  Negative for chest pain.   Gastrointestinal:  Negative for abdominal distention.   Genitourinary:  Positive for hematuria. Negative for difficulty urinating.   Skin:  Negative for color change.   Neurological:  Negative for tremors.   Psychiatric/Behavioral:  Negative for agitation and behavioral problems.      Objective:     Temp:  [97.8 °F (36.6 °C)-98.4 °F (36.9 °C)] 97.8 °F (36.6 °C)  Pulse:  [55-87] 60  Resp:  [16-20] 20  SpO2:  [95 %-97 %] 97 %  BP: (136-171)/(80-90) 159/86     Body mass index is 25.76 kg/m².    Date  01/16/25 0700 - 01/17/25 0659   Shift 6074-5377 8038-1906 7218-6143 24 Hour Total   INTAKE   Shift Total(mL/kg)       OUTPUT   Urine(mL/kg/hr) 300   300   Shift Total(mL/kg) 300(4)   300(4)   Weight (kg) 74.6 74.6 74.6 74.6          Drains       Drain  Duration                  Urethral Catheter Silicone 16 Fr. -- days                     Physical Exam  Vitals and nursing note reviewed.   HENT:      Head: Normocephalic.   Eyes:      Conjunctiva/sclera: Conjunctivae normal.   Cardiovascular:      Rate and Rhythm: Bradycardia present.   Pulmonary:      Effort: Pulmonary effort is normal. No respiratory distress.      Breath sounds: No wheezing, rhonchi or rales.   Abdominal:      General: There is no distension.   Genitourinary:     Comments: Indwelling Varghese catheter with 2 L Varghese bag attached intact and draining  Musculoskeletal:         General: Normal range of motion.      Cervical back: Normal range of motion.   Skin:     General: Skin is warm and dry.   Neurological:      General: No focal deficit present.      Mental Status: He is alert.   Psychiatric:         Mood and Affect: Mood normal.         Behavior: Behavior normal.           Significant Labs:    BMP:  Recent Labs   Lab 01/14/25  0410 01/15/25  0431 01/16/25  0350    142 139   K 3.7 3.8 4.3   * 108* 109*   CO2 26 28 22*   BUN 17 17 17   CREATININE 1.10 1.02 0.94   CALCIUM 8.4* 8.4* 8.7*       CBC:   Recent Labs   Lab 01/14/25  0410 01/15/25  0431 01/16/25  0350   WBC 5.32 5.58 4.92   HGB 11.8* 11.3* 11.0*   HCT 37.5* 36.0* 34.2*    178 179       All pertinent labs results from the past 24 hours have been reviewed.    Significant Imaging:  All pertinent imaging results/findings from the past 24 hours have been reviewed.                  Assessment/Plan:     * Embolic stroke involving left middle cerebral artery       Gross hematuria  Maintain Varghese catheter    Left ureteral stone        Renal stone       Chronic kidney disease, stage  3a       Severe mitral regurgitation           VTE Risk Mitigation (From admission, onward)           Ordered     heparin (porcine) injection 5,000 Units  Every 8 hours         01/11/25 2007                    Ruy Cochran NP  Urology  [facility] - [address] [place] [address]

## 2025-01-16 NOTE — TREATMENT PLAN
The patient is being discharged home on oral anticoagulation. I have scheduled this patient for follow-up on Tuesday January 21st at 10am for catheter and stent removal in the urology clinic. Please teach leg bag use and large bag use. Please show him the ureteral stent string that is attached to the murray catheter. Please call with any questions.

## 2025-01-16 NOTE — ASSESSMENT & PLAN NOTE
1/13  Plan to remove via Lithotripsy by Urology  1/14  Patient tolerated procedure well  Stent placed during procedure  Started Flomax for spasms  1/15  Flomax dc/d due increased pain by patient  1/16  Murray remains after discharge, to be removed in urology clinic  Patient educated on proper murray care at discharge  Patient is medically stable and has maximally benefited from this hospital stay. Patient is being discharged home follow up appointment with PCP and specialist have been secured at discharge

## 2025-01-16 NOTE — NURSING
Educated Pt on changing from standard drainage bag to leg bag. Provided leg bag, gave pt information on when to call HCP or come to emergency room. Son was in room as I demonstrated both on changing leg bag. IV's taken out. Waiting on pharmacy to deliver medications.

## 2025-01-16 NOTE — ASSESSMENT & PLAN NOTE
Patient ordered anticoagulants for Middle Cerebral Artery Stroke and CAD  Kidney stone found in left Ureter  Bleeding likely due to stone removal or murray catheter placement  Urology to remove catheter before discharge.    1/16  Patient restarted oral anticoagulation at discharge  Murray remains to be removed at urology clinic   Patient is expected to have continued bleeding  Patient is medically stable and has maximally benefited from this hospital stay. Patient is being discharged home follow up appointment with PCP and specialist have been secured at discharge

## 2025-01-16 NOTE — SUBJECTIVE & OBJECTIVE
Interval History:  Maintain Varghese catheter.  Plan to remove Varghese catheter when transitions to oral anticoagulation.  Urology to coordinate Varghese catheter removal.  Creatinine was 0.94 with a BUN of 17 and potassium of 4.3 today.  Urine light pink.    Review of Systems   Constitutional:  Negative for activity change.   HENT:  Negative for voice change.    Eyes:  Negative for discharge.   Respiratory:  Negative for cough, choking and shortness of breath.    Cardiovascular:  Negative for chest pain.   Gastrointestinal:  Negative for abdominal distention.   Genitourinary:  Positive for hematuria. Negative for difficulty urinating.   Skin:  Negative for color change.   Neurological:  Negative for tremors.   Psychiatric/Behavioral:  Negative for agitation and behavioral problems.      Objective:     Temp:  [97.8 °F (36.6 °C)-98.4 °F (36.9 °C)] 97.8 °F (36.6 °C)  Pulse:  [55-87] 60  Resp:  [16-20] 20  SpO2:  [95 %-97 %] 97 %  BP: (136-171)/(80-90) 159/86     Body mass index is 25.76 kg/m².    Date 01/16/25 0700 - 01/17/25 0659   Shift 2737-2516 4657-2132 4264-0885 24 Hour Total   INTAKE   Shift Total(mL/kg)       OUTPUT   Urine(mL/kg/hr) 300   300   Shift Total(mL/kg) 300(4)   300(4)   Weight (kg) 74.6 74.6 74.6 74.6          Drains       Drain  Duration                  Urethral Catheter Silicone 16 Fr. -- days                     Physical Exam  Vitals and nursing note reviewed.   HENT:      Head: Normocephalic.   Eyes:      Conjunctiva/sclera: Conjunctivae normal.   Cardiovascular:      Rate and Rhythm: Bradycardia present.   Pulmonary:      Effort: Pulmonary effort is normal. No respiratory distress.      Breath sounds: No wheezing, rhonchi or rales.   Abdominal:      General: There is no distension.   Genitourinary:     Comments: Indwelling Varghese catheter with 2 L Varghese bag attached intact and draining  Musculoskeletal:         General: Normal range of motion.      Cervical back: Normal range of motion.   Skin:      General: Skin is warm and dry.   Neurological:      General: No focal deficit present.      Mental Status: He is alert.   Psychiatric:         Mood and Affect: Mood normal.         Behavior: Behavior normal.           Significant Labs:    BMP:  Recent Labs   Lab 01/14/25  0410 01/15/25  0431 01/16/25  0350    142 139   K 3.7 3.8 4.3   * 108* 109*   CO2 26 28 22*   BUN 17 17 17   CREATININE 1.10 1.02 0.94   CALCIUM 8.4* 8.4* 8.7*       CBC:   Recent Labs   Lab 01/14/25  0410 01/15/25  0431 01/16/25  0350   WBC 5.32 5.58 4.92   HGB 11.8* 11.3* 11.0*   HCT 37.5* 36.0* 34.2*    178 179       All pertinent labs results from the past 24 hours have been reviewed.    Significant Imaging:  All pertinent imaging results/findings from the past 24 hours have been reviewed.

## 2025-01-16 NOTE — ASSESSMENT & PLAN NOTE
Patient has described anxious symptoms from past traumatic events.   Patient uncomfortable in new environment and limited mobiltiy. Patient is used to being active  Added Atarax 25mg PRN for anxiety  Patient is medically stable and has maximally benefited from this hospital stay. Patient is being discharged home follow up appointment with PCP and specialist have been secured at discharge

## 2025-01-16 NOTE — ASSESSMENT & PLAN NOTE
Echocardiogram with evidence of mitral regurgitation that is moderate . The patient's most recent echocardiogram result is listed below. Patient followed by Cardiology    Echo    Result Date: 1/10/2025    Left Ventricle: The left ventricle is mildly dilated. Mildly increased   wall thickness. There is normal systolic function. Ejection fraction is   approximately 60%. Grade I diastolic dysfunction.    Right Ventricle: Moderate right ventricular enlargement. RV/LV Ratio is   1.0, which exceeds threshold, is consistent with moderate RV strain.   Systolic function is moderately reduced.    Aortic Valve: The aortic valve is a trileaflet valve. Mildly calcified   cusps.    Mitral Valve: There is moderate anterior leaflet sclerosis. Moderately   thickened anterior leaflet. Moderately calcified anterior leaflet. There   is mild to moderate stenosis. The mean pressure gradient across the mitral   valve is 3 mmHg at a heart rate of  bpm. There is mild regurgitation with   an eccentric jet.    IVC/SVC: Normal venous pressure at 3 mmHg.        Echo    Result Date: 12/16/2024    Left Ventricle: The left ventricle is normal in size. Mildly increased   wall thickness. There is concentric remodeling. There is normal systolic   function. Quantitated ejection fraction is 55%.    Right Ventricle: Normal right ventricular cavity size. Systolic   function could not be assesed.    Left Atrium: Left atrium is mildly dilated.    Tricuspid Valve: There is mild regurgitation.    Pulmonary Artery: The estimated pulmonary artery systolic pressure is   26 mmHg.    IVC/SVC: Normal venous pressure at 3 mmHg.        1/16  Patient is medically stable and has maximally benefited from this hospital stay. Patient is being discharged home follow up appointment with PCP and specialist have been secured at discharge

## 2025-01-16 NOTE — ASSESSMENT & PLAN NOTE
Antithrombotics for secondary stroke prevention: Antiplatelets: Aspirin: 81 mg daily    Statins for secondary stroke prevention and hyperlipidemia, if present:   Statins: Atorvastatin- 40 mg daily    Aggressive risk factor modification: HTN, HLD, A-Fib, CAD     Rehab efforts: The patient has been evaluated by a stroke team provider and the therapy needs have been fully considered based off the presenting complaints and exam findings. The following therapy evaluations are needed: PT evaluate and treat, OT evaluate and treat    Diagnostics ordered/pending: CT scan of head without contrast to asses brain parenchyma, CTA Head to assess vasculature , Other: CT Abdomen and Pelvis    VTE prophylaxis: Heparin 5000 units SQ every 8 hours    BP parameters: Infarct: Post Thrombolytic therapy, SBP <180    1/16  Eliquis restarted at discharge  Patient is medically stable and has maximally benefited from this hospital stay. Patient is being discharged home follow up appointment with PCP and specialist have been secured at discharge

## 2025-01-17 ENCOUNTER — PATIENT OUTREACH (OUTPATIENT)
Dept: ADMINISTRATIVE | Facility: CLINIC | Age: 70
End: 2025-01-17
Payer: MEDICARE

## 2025-01-17 NOTE — PLAN OF CARE
[facility] Telemetry  Discharge Final Note    Primary Care Provider: Shannan Gorman FNP    Expected Discharge Date: 1/16/2025    Final Discharge Note (most recent)       Final Note - 01/17/25 0812          Final Note    Assessment Type Final Discharge Note     Anticipated Discharge Disposition Home-Health Care [facility]     Hospital Resources/Appts/Education Provided Provided patient/caregiver with written discharge plan information        Post-Acute Status    Post-Acute Authorization Home Health     Home Health Status Set-up Complete/Auth obtained     Patient choice form signed by patient/caregiver List with quality metrics by geographic area provided;List from CMS Compare;List from System Post-Acute Care     Discharge Delays None known at this time                     Important Message from Medicare  Important Message from Medicare regarding Discharge Appeal Rights: Given to patient/caregiver, Explained to patient/caregiver, Signed/date by patient/caregiver     Date IMM was signed: 01/15/25  Time IMM was signed: 1525    Contact Info       Shannan Gorman FNP   Specialty: Family Medicine   Relationship: PCP - General    [address] [place] [address]   Phone: [phone]       Next Steps: Follow up in 1 week(s)    Enrique Mcdaniel MD   Specialty: Urology    [address] [facility] [place] [address]   Phone: [phone]       Next Steps: Follow up on 1/21/2025          Pt discharged home with [facility]

## 2025-01-17 NOTE — PROGRESS NOTES
C3 nurse spoke with Mitul Torres  for a TCC post hospital discharge follow up call. [facility]  on 1/22/25 @ 033.

## 2025-01-22 ENCOUNTER — OFFICE VISIT (OUTPATIENT)
Dept: FAMILY MEDICINE | Facility: CLINIC | Age: 70
End: 2025-01-22
Payer: MEDICARE

## 2025-01-22 VITALS
OXYGEN SATURATION: 97 % | HEIGHT: 67 IN | DIASTOLIC BLOOD PRESSURE: 64 MMHG | WEIGHT: 159.31 LBS | RESPIRATION RATE: 20 BRPM | SYSTOLIC BLOOD PRESSURE: 148 MMHG | TEMPERATURE: 98 F | HEART RATE: 55 BPM | BODY MASS INDEX: 25 KG/M2

## 2025-01-22 DIAGNOSIS — D64.9 ANEMIA, UNSPECIFIED TYPE: Primary | ICD-10-CM

## 2025-01-22 DIAGNOSIS — I48.0 PAROXYSMAL ATRIAL FIBRILLATION: ICD-10-CM

## 2025-01-22 DIAGNOSIS — I10 HYPERTENSION, UNSPECIFIED TYPE: ICD-10-CM

## 2025-01-22 DIAGNOSIS — Z98.890 S/P MITRAL VALVE REPAIR: ICD-10-CM

## 2025-01-22 DIAGNOSIS — R56.9 SEIZURE-LIKE ACTIVITY: ICD-10-CM

## 2025-01-22 DIAGNOSIS — E78.5 HYPERLIPIDEMIA, UNSPECIFIED HYPERLIPIDEMIA TYPE: ICD-10-CM

## 2025-01-22 DIAGNOSIS — R25.2 LEG CRAMPING: ICD-10-CM

## 2025-01-22 DIAGNOSIS — I25.10 CORONARY ARTERY DISEASE INVOLVING NATIVE CORONARY ARTERY OF NATIVE HEART WITHOUT ANGINA PECTORIS: ICD-10-CM

## 2025-01-22 DIAGNOSIS — I63.412 EMBOLIC STROKE INVOLVING LEFT MIDDLE CEREBRAL ARTERY: ICD-10-CM

## 2025-01-22 DIAGNOSIS — K26.9 DUODENAL ULCER: ICD-10-CM

## 2025-01-22 LAB
ANION GAP SERPL CALCULATED.3IONS-SCNC: 13 MMOL/L (ref 7–16)
BASOPHILS # BLD AUTO: 0.04 K/UL (ref 0–0.2)
BASOPHILS NFR BLD AUTO: 0.7 % (ref 0–1)
BUN SERPL-MCNC: 14 MG/DL (ref 8–26)
BUN/CREAT SERPL: 13 (ref 6–20)
CALCIUM SERPL-MCNC: 8.9 MG/DL (ref 8.8–10)
CHLORIDE SERPL-SCNC: 107 MMOL/L (ref 98–107)
CO2 SERPL-SCNC: 25 MMOL/L (ref 23–31)
CREAT SERPL-MCNC: 1.11 MG/DL (ref 0.72–1.25)
DIFFERENTIAL METHOD BLD: ABNORMAL
EGFR (NO RACE VARIABLE) (RUSH/TITUS): 72 ML/MIN/1.73M2
EOSINOPHIL # BLD AUTO: 0.22 K/UL (ref 0–0.5)
EOSINOPHIL NFR BLD AUTO: 3.9 % (ref 1–4)
ERYTHROCYTE [DISTWIDTH] IN BLOOD BY AUTOMATED COUNT: 13.8 % (ref 11.5–14.5)
GLUCOSE SERPL-MCNC: 111 MG/DL (ref 82–115)
HCT VFR BLD AUTO: 40 % (ref 40–54)
HGB BLD-MCNC: 12.7 G/DL (ref 13.5–18)
IMM GRANULOCYTES # BLD AUTO: 0.02 K/UL (ref 0–0.04)
IMM GRANULOCYTES NFR BLD: 0.4 % (ref 0–0.4)
LYMPHOCYTES # BLD AUTO: 1.12 K/UL (ref 1–4.8)
LYMPHOCYTES NFR BLD AUTO: 19.9 % (ref 27–41)
MCH RBC QN AUTO: 28.7 PG (ref 27–31)
MCHC RBC AUTO-ENTMCNC: 31.8 G/DL (ref 32–36)
MCV RBC AUTO: 90.5 FL (ref 80–96)
MONOCYTES # BLD AUTO: 0.46 K/UL (ref 0–0.8)
MONOCYTES NFR BLD AUTO: 8.2 % (ref 2–6)
MPC BLD CALC-MCNC: 11.4 FL (ref 9.4–12.4)
NEUTROPHILS # BLD AUTO: 3.76 K/UL (ref 1.8–7.7)
NEUTROPHILS NFR BLD AUTO: 66.9 % (ref 53–65)
NRBC # BLD AUTO: 0 X10E3/UL
NRBC, AUTO (.00): 0 %
PLATELET # BLD AUTO: 278 K/UL (ref 150–400)
POTASSIUM SERPL-SCNC: 4.1 MMOL/L (ref 3.5–5.1)
RBC # BLD AUTO: 4.42 M/UL (ref 4.6–6.2)
SODIUM SERPL-SCNC: 141 MMOL/L (ref 136–145)
WBC # BLD AUTO: 5.62 K/UL (ref 4.5–11)

## 2025-01-22 PROCEDURE — 99496 TRANSJ CARE MGMT HIGH F2F 7D: CPT | Mod: ,,, | Performed by: NURSE PRACTITIONER

## 2025-01-22 PROCEDURE — 80048 BASIC METABOLIC PNL TOTAL CA: CPT | Mod: ,,, | Performed by: CLINICAL MEDICAL LABORATORY

## 2025-01-22 PROCEDURE — 85025 COMPLETE CBC W/AUTO DIFF WBC: CPT | Mod: ,,, | Performed by: CLINICAL MEDICAL LABORATORY

## 2025-01-22 RX ORDER — METOPROLOL SUCCINATE 25 MG/1
25 TABLET, EXTENDED RELEASE ORAL DAILY
COMMUNITY

## 2025-01-22 RX ORDER — ATORVASTATIN CALCIUM 20 MG/1
20 TABLET, FILM COATED ORAL DAILY
Qty: 90 TABLET | Refills: 3 | Status: SHIPPED | OUTPATIENT
Start: 2025-01-22 | End: 2026-01-22

## 2025-01-22 RX ORDER — LISINOPRIL 10 MG/1
10 TABLET ORAL DAILY
Qty: 90 TABLET | Refills: 3 | Status: SHIPPED | OUTPATIENT
Start: 2025-01-22 | End: 2026-01-22

## 2025-01-22 NOTE — ASSESSMENT & PLAN NOTE
BP elevated, HR low. Patient did not tolerated increased dose of metoprolol prescribed by cardiology. States HR dropped to 30s. Patient returned to 25 mg dose metoprolol. Patient BP remains slightly elevated. Will add low dose lisinopril

## 2025-01-22 NOTE — ASSESSMENT & PLAN NOTE
Antithrombotics for secondary stroke prevention: Antiplatelets: Aspirin: 81 mg daily    Statins for secondary stroke prevention and hyperlipidemia, if present:   Statins: Atorvastatin- 40 mg daily    Aggressive risk factor modification: HTN, HLD, A-Fib, CAD     Rehab efforts:  complete for PT/OT    Diagnostics ordered/pending: CT scan of head without contrast to asses brain parenchyma, CTA Head to assess vasculature , Other: CT Abdomen and Pelvis    VTE prophylaxis: eliquis 5 mg BID, asa 81 mg daily    BP parameters: BP elevated, HR low. Patient did not tolerated increased dose of metoprolol prescribed by cardiology. States HR dropped to 30s. Patient returned to 25 mg dose metoprolol. Patient BP remains slightly elevated. Will add low dose lisinopril

## 2025-01-22 NOTE — ASSESSMENT & PLAN NOTE
Patient has paroxysmal (<7 days) atrial fibrillation. Patient is currently in sinus rhythm. [id number] Score: 4.Pulse 55 currently    Antiarrhythmics  , Daily, Oral metoprolol 25 mg   BP elevated, HR low. Patient did not tolerated increased dose of metoprolol prescribed by cardiology. States HR dropped to 30s. Patient returned to 25 mg dose metoprolol. Patient BP remains slightly elevated. Will add low dose lisinopril     Anticoagulants   Eliquis BID, asa daily    Plan  Follow up with cardiology Friday as scheduled

## 2025-01-22 NOTE — PROGRESS NOTES
[facility] - FAMILY MEDICINE       PATIENT NAME: Mitul Torres   : 1955    AGE: 69 y.o. DATE OF ENCOUNTER: 25    MRN: [id number]      PCP: Shannan Gorman FNP    Reason for Visit / Chief Complaint:  Hospital Follow Up and Medication Problem (Metoprolol 50mg)         274}    Subjective:     HPI:    Mitul Torres presents for a [facility] hospital discharge follow-up visit. He was admitted to [facility] on 2025 for CVA, renal stone found incidentally on CT requiring stent, lithotripsy and was discharged on 2025.     Family and/or Caretaker present at visit?  No.  Diagnostic tests reviewed/disposition: No diagnosic tests pending after this hospitalization.  Home health/community services discussion/referrals: Patient has home health established at discharge. Patient states OT complete, met goals .   Establishment or re-establishment of referral orders for community resources: No other necessary community resources.   Discussion with other health care providers: No discussion with other health care providers necessary.     Discharge and current medications have been reconciled.    Review of Systems:   Review of Systems   Constitutional:  Negative for activity change, appetite change, chills, diaphoresis, fatigue, fever and unexpected weight change.   HENT:  Negative for congestion, ear pain, facial swelling, hearing loss, nosebleeds and sore throat.    Eyes: Negative.    Respiratory:  Negative for apnea, cough, shortness of breath and wheezing.    Cardiovascular:  Negative for chest pain, palpitations and leg swelling.   Gastrointestinal:  Negative for abdominal distention, abdominal pain, blood in stool, constipation, diarrhea and nausea.   Endocrine: Negative for cold intolerance, heat intolerance, polydipsia, polyphagia and polyuria.   Genitourinary:  Negative for decreased urine volume, difficulty urinating, dysuria, flank pain, frequency,  hematuria and urgency.   Musculoskeletal:  Negative for arthralgias, joint swelling and myalgias.   Skin:  Negative for color change and rash.   Allergic/Immunologic: Negative.    Neurological:  Negative for dizziness, tremors, seizures, syncope, facial asymmetry, speech difficulty, weakness, light-headedness, numbness and headaches.   Hematological:  Negative for adenopathy. Does not bruise/bleed easily.   Psychiatric/Behavioral:  Negative for behavioral problems and confusion.        Allergies and Meds: 274}     Review of patient's allergies indicates:   Allergen Reactions    Sulfamethoxazole-trimethoprim Rash     Note: Emil Johson Syndrome        Current Outpatient Medications:     apixaban (ELIQUIS) 5 mg Tab, Take 1 tablet (5 mg total) by mouth 2 (two) times daily., Disp: 60 tablet, Rfl: 0    aspirin (ECOTRIN) 81 MG EC tablet, Take 1 tablet by mouth every morning., Disp: , Rfl:     metoprolol succinate (TOPROL-XL) 25 MG 24 hr tablet, Take 25 mg by mouth once daily., Disp: , Rfl:     atorvastatin (LIPITOR) 20 MG tablet, Take 1 tablet (20 mg total) by mouth once daily., Disp: 90 tablet, Rfl: 3    lisinopriL 10 MG tablet, Take 1 tablet (10 mg total) by mouth once daily., Disp: 90 tablet, Rfl: 3    Labs:274}    I have reviewed old labs below:  Lab Results   Component Value Date    WBC 4.92 01/16/2025    RBC 3.80 (L) 01/16/2025    HGB 11.0 (L) 01/16/2025    HCT 34.2 (L) 01/16/2025     01/16/2025     01/16/2025    K 4.3 01/16/2025     (H) 01/16/2025    CALCIUM 8.7 (L) 01/16/2025    GLU 85 01/16/2025    BUN 17 01/16/2025    CREATININE 0.94 01/16/2025    ESTGFRAFRICA 81 02/14/2024    EGFRNONAA 76 05/26/2022    ALT 13 01/09/2025    AST 20 01/09/2025    INR 1.06 01/09/2025    CHOL 131 01/09/2025    TRIG 61 01/09/2025    HDL 52 01/09/2025    LDLCALC 67 01/09/2025    TSH 1.992 01/09/2025    PSA 6.980 (H) 10/11/2023    HGBA1C 5.2 01/26/2024       Medical History: 274}     Past Medical History:    Diagnosis Date    GERD (gastroesophageal reflux disease)     Hyperlipidemia     Hypertension       Social History     Tobacco Use   Smoking Status Former    Current packs/day: 0.00    Types: Cigarettes    Quit date:     Years since quittin.0    Passive exposure: Past   Smokeless Tobacco Never      Past Surgical History:   Procedure Laterality Date    CYSTOURETEROSCOPY, WITH HOLMIUM LASER LITHOTRIPSY OF URETERAL CALCULUS AND STENT INSERTION Left 2025    Procedure: CYSTOURETEROSCOPY, WITH HOLMIUM LASER LITHOTRIPSY OF URETERAL CALCULUS AND STENT INSERTION;  Surgeon: Enrique Mcdaniel MD;  Location: [facility] OR;  Service: Urology;  Laterality: Left;    ECHOCARDIOGRAM,TRANSESOPHAGEAL  2023    Procedure: Transesophageal echo (GAEL) intra-procedure log documentation;  Surgeon: Jerry Be DO;  Location: [facility] CATH LAB;  Service: Cardiology;;    LEFT HEART CATHETERIZATION Left 2023    Procedure: Left heart cath;  Surgeon: Jerry Be DO;  Location: [facility] CATH LAB;  Service: Cardiology;  Laterality: Left;    MITRAL VALVE REPAIR      REPAIR OF HEART VALVE      RIGHT HEART CATHETERIZATION Right 2023    Procedure: INSERTION, CATHETER, RIGHT HEART;  Surgeon: Jerry Be DO;  Location: [facility] CATH LAB;  Service: Cardiology;  Laterality: Right;        Health Maintenance: 274}     Health Maintenance         Date Due Completion Date    TETANUS VACCINE Never done ---    High Dose Statin Never done ---    Colorectal Cancer Screening Never done ---    Shingles Vaccine (1 of 2) Never done ---    Pneumococcal Vaccines (Age 50+) (1 of 1 - PCV) Never done ---    RSV Vaccine (Age 60+ and Pregnant patients) (1 - Risk 60-74 years 1-dose series) Never done ---    Influenza Vaccine (1) Never done ---    COVID-19 Vaccine ( -  season) Never done ---    Lipid Panel 2030            Objective:  274}   Vital Signs  Temp: 98 °F (36.7 °C)  Temp Source: Oral  Pulse: (!)  "55  Resp: 20  SpO2: 97 %  BP: (!) 148/64  BP Location: Left arm  Patient Position: Sitting  Pain Score:   4  Pain Loc:  (urinary catheter burns)  Height and Weight  Height: 5' 7" (170.2 cm)  Weight: 72.3 kg (159 lb 4.8 oz)  BSA (Calculated - sq m): 1.85 sq meters  BMI (Calculated): 24.9  Weight in (lb) to have BMI = 25: 159.3    Over the last two weeks how often have you been bothered by little interest or pleasure in doing things: 0  Over the last two weeks how often have you been bothered by feeling down, depressed or hopeless: 0  PHQ-2 Total Score: 0  PHQ-9 Score: 0  PHQ-9 Interpretation: Minimal or None    Wt Readings from Last 3 Encounters:   01/22/25 72.3 kg (159 lb 4.8 oz)   01/12/25 74.6 kg (164 lb 7.4 oz)   01/06/25 72.5 kg (159 lb 12.8 oz)       Physical Exam  Vitals and nursing note reviewed.   Constitutional:       Appearance: Normal appearance. He is normal weight.   HENT:      Head: Normocephalic.      Right Ear: External ear normal.      Left Ear: External ear normal.      Nose: Nose normal.      Mouth/Throat:      Mouth: Mucous membranes are moist.   Eyes:      Extraocular Movements: Extraocular movements intact.      Conjunctiva/sclera: Conjunctivae normal.      Pupils: Pupils are equal, round, and reactive to light.   Cardiovascular:      Rate and Rhythm: Normal rate and regular rhythm.      Pulses: Normal pulses.      Heart sounds: Normal heart sounds. No murmur heard.  Pulmonary:      Effort: Pulmonary effort is normal.      Breath sounds: Normal breath sounds. No stridor. No wheezing or rhonchi.   Abdominal:      General: Bowel sounds are normal. There is no distension.      Palpations: Abdomen is soft. There is no mass.      Tenderness: There is no abdominal tenderness.   Genitourinary:     Comments: Catheter with leg bag  Musculoskeletal:         General: No swelling or tenderness. Normal range of motion.      Cervical back: Normal range of motion and neck supple.      Right lower leg: No " edema.      Left lower leg: No edema.   Skin:     General: Skin is warm and dry.      Capillary Refill: Capillary refill takes less than 2 seconds.   Neurological:      General: No focal deficit present.      Mental Status: He is alert and oriented to person, place, and time. Mental status is at baseline.      Cranial Nerves: No cranial nerve deficit.      Sensory: No sensory deficit.      Motor: No weakness.   Psychiatric:         Mood and Affect: Mood normal.         Behavior: Behavior normal.         Thought Content: Thought content normal.         Judgment: Judgment normal.          Assessment and Plan: 274}     1. Anemia, unspecified type  -     CBC Auto Differential; Future; Expected date: 01/22/2025  -     Basic Metabolic Panel; Future; Expected date: 01/22/2025    2. Embolic stroke involving left middle cerebral artery  Overview:  left occipital  righ vision impairment    Impression:     Chronic area of infarct of the left superior occipital lobe.  Mild chronic microvascular ischemic change.  No acute abnormality.        Electronically signed by:Germán Claros  Date:                                            08/15/2024  Time:                                           16:06    Assessment & Plan:    Antithrombotics for secondary stroke prevention: Antiplatelets: Aspirin: 81 mg daily    Statins for secondary stroke prevention and hyperlipidemia, if present:   Statins: Atorvastatin- 40 mg daily    Aggressive risk factor modification: HTN, HLD, A-Fib, CAD     Rehab efforts:  complete for PT/OT    Diagnostics ordered/pending: CT scan of head without contrast to asses brain parenchyma, CTA Head to assess vasculature , Other: CT Abdomen and Pelvis    VTE prophylaxis: eliquis 5 mg BID, asa 81 mg daily    BP parameters: BP elevated, HR low. Patient did not tolerated increased dose of metoprolol prescribed by cardiology. States HR dropped to 30s. Patient returned to 25 mg dose metoprolol. Patient BP remains slightly  elevated. Will add low dose lisinopril               3. Seizure-like activity  Assessment & Plan:  Keppra prescribed. Not taking due to side effects.   Follow up with neurology as scheduled.       4. S/P mitral valve repair  Overview:  1/30/2024- Dr. Daniels- [facility]  Complex mitral valve repair and MOY closure      5. Paroxysmal atrial fibrillation  Overview:  Patient presented to [facility] 2/10/2024 with c/o sudden onset lightheadedness, nausea and palpitations. EKG demonstrated atrial fibrillation with RVR; rate 140 bpm in the presence of GIB. The patient was airlifted to [facility] with an urgent GI consult. The atrial fibrillation was converted chemically.    Assessment & Plan:  Patient has paroxysmal (<7 days) atrial fibrillation. Patient is currently in sinus rhythm. [id number] Score: 4.Pulse 55 currently    Antiarrhythmics  , Daily, Oral metoprolol 25 mg   BP elevated, HR low. Patient did not tolerated increased dose of metoprolol prescribed by cardiology. States HR dropped to 30s. Patient returned to 25 mg dose metoprolol. Patient BP remains slightly elevated. Will add low dose lisinopril     Anticoagulants   Eliquis BID, asa daily    Plan  Follow up with cardiology Friday as scheduled             6. Hyperlipidemia, unspecified hyperlipidemia type  Assessment & Plan:  Continue asa  Noncompliant with statin       7. Coronary artery disease involving native coronary artery of native heart without angina pectoris  Overview:  [facility] 12/14/2023- Dr. Be    Non-obstructive CAD  Single vessel CAD with a 30% prox RCA lesion  LVEF 55%  LVEDP 6 mmHg  Severe MR    Assessment & Plan:           8. Hypertension, unspecified type  Assessment & Plan:  BP elevated, HR low. Patient did not tolerated increased dose of metoprolol prescribed by cardiology. States HR dropped to 30s. Patient returned to 25 mg dose metoprolol. Patient BP remains slightly elevated. Will add low dose lisinopril       9. Duodenal  ulcer  Assessment & Plan:  Continue protonix      Other orders  -     lisinopriL 10 MG tablet; Take 1 tablet (10 mg total) by mouth once daily.  Dispense: 90 tablet; Refill: 3  -     atorvastatin (LIPITOR) 20 MG tablet; Take 1 tablet (20 mg total) by mouth once daily.  Dispense: 90 tablet; Refill: 3        Diagnosis, risks, benefits, and side effects of meds and treatment plan were discussed with the patient.  Any workup results obtained in office or prior to appointment were reviewed and all questions were answered.  Patient was advised to call or f/u with any new or worsening symptoms or problems prior to next appointment.  Go to ER for any urgent complications.  All questions were answered to the satisfaction of the patient, and pt verbalized understanding and agreement with treatment plan.      No follow-ups on file.    Signature:  Shannan Gorman [facility]    Future Appointments   Date Time Provider Department Center   1/23/2025  2:00 PM Enrique Mcdaniel MD [facility] UROL [facility]   1/24/2025 11:45 AM NURSE, [facility] CARD [facility]   2/19/2025  3:30 PM Yamil Day FNP [facility] NEURO [facility]   3/31/2025 11:00 AM Leesa Martínez, BETH [facility] [address] [facility] Alberto   4/7/2025 11:15 AM Trish Levine MD [facility] CARD [facility]   4/10/2025  8:00 AM AWHERB NURSE, [facility] FAMILY MEDICINE [facility] [address] AYALA Alberto

## 2025-01-23 ENCOUNTER — OFFICE VISIT (OUTPATIENT)
Dept: UROLOGY | Facility: CLINIC | Age: 70
End: 2025-01-23
Payer: MEDICARE

## 2025-01-23 VITALS
SYSTOLIC BLOOD PRESSURE: 170 MMHG | WEIGHT: 157 LBS | HEART RATE: 89 BPM | OXYGEN SATURATION: 94 % | BODY MASS INDEX: 24.64 KG/M2 | HEIGHT: 67 IN | DIASTOLIC BLOOD PRESSURE: 89 MMHG

## 2025-01-23 DIAGNOSIS — Z96.0 URETERAL STENT PRESENT: ICD-10-CM

## 2025-01-23 DIAGNOSIS — R25.2 LEG CRAMPING: Primary | ICD-10-CM

## 2025-01-23 LAB — MAGNESIUM SERPL-MCNC: 2 MG/DL (ref 1.6–2.6)

## 2025-01-23 PROCEDURE — 99213 OFFICE O/P EST LOW 20 MIN: CPT | Mod: PBBFAC | Performed by: UROLOGY

## 2025-01-23 PROCEDURE — 99213 OFFICE O/P EST LOW 20 MIN: CPT | Mod: S$PBB,,, | Performed by: UROLOGY

## 2025-01-23 PROCEDURE — 83735 ASSAY OF MAGNESIUM: CPT | Mod: ,,, | Performed by: CLINICAL MEDICAL LABORATORY

## 2025-01-23 PROCEDURE — 99999 PR PBB SHADOW E&M-EST. PATIENT-LVL III: CPT | Mod: PBBFAC,,, | Performed by: UROLOGY

## 2025-01-23 NOTE — PROGRESS NOTES
Assessment:   1. Ureteral stent present  -     Cystoscopy         Plan:     I removed both the catheter and the ureteral stent.  The patient was instructed on dietary changes and increased hydration and plan to see him back in 3 months to evaluate for any risk factors that might be modifiable through our 24 hour urinalysis and Uro risk testing.           Enrique Mcdaniel MD  Urology  01/23/2025          UROLOGY HISTORY AND PHYSICAL EXAM    Subjective:      Patient ID: Mitul Torres is a 69 y.o. male.    Chief Complaint::   HPI    The patient is a 69-year-old male that presents for catheter removal and stent removal.  He reports that he has made significant progress with his rehabilitation program since the stroke.     Past Medical History:   Diagnosis Date    GERD (gastroesophageal reflux disease)     Hyperlipidemia     Hypertension      Past Surgical History:   Procedure Laterality Date    CYSTOURETEROSCOPY, WITH HOLMIUM LASER LITHOTRIPSY OF URETERAL CALCULUS AND STENT INSERTION Left 1/13/2025    Procedure: CYSTOURETEROSCOPY, WITH HOLMIUM LASER LITHOTRIPSY OF URETERAL CALCULUS AND STENT INSERTION;  Surgeon: Enrique Mcdaniel MD;  Location: [facility], [place] OR;  Service: Urology;  Laterality: Left;    ECHOCARDIOGRAM,TRANSESOPHAGEAL  12/14/2023    Procedure: Transesophageal echo (GAEL) intra-procedure log documentation;  Surgeon: Jerry Be DO;  Location: [facility], [place] CATH LAB;  Service: Cardiology;;    LEFT HEART CATHETERIZATION Left 12/14/2023    Procedure: Left heart cath;  Surgeon: Jerry Be DO;  Location: [facility], [place] CATH LAB;  Service: Cardiology;  Laterality: Left;    MITRAL VALVE REPAIR      REPAIR OF HEART VALVE      RIGHT HEART CATHETERIZATION Right 12/14/2023    Procedure: INSERTION, CATHETER, RIGHT HEART;  Surgeon: Jerry Be DO;  Location: [facility], [place] CATH LAB;  Service: Cardiology;  Laterality: Right;        Current Outpatient Medications on File Prior to Visit   Medication Sig Dispense  Refill    apixaban (ELIQUIS) 5 mg Tab Take 1 tablet (5 mg total) by mouth 2 (two) times daily. 60 tablet 0    aspirin (ECOTRIN) 81 MG EC tablet Take 1 tablet by mouth every morning.      atorvastatin (LIPITOR) 20 MG tablet Take 1 tablet (20 mg total) by mouth once daily. 90 tablet 3    lisinopriL 10 MG tablet Take 1 tablet (10 mg total) by mouth once daily. 90 tablet 3    metoprolol succinate (TOPROL-XL) 25 MG 24 hr tablet Take 25 mg by mouth once daily.       No current facility-administered medications on file prior to visit.        Review of patient's allergies indicates:   Allergen Reactions    Sulfamethoxazole-trimethoprim Rash     Note: Emil Johson Syndrome     Vitals:    01/23/25 1336   BP: (!) 170/89   Pulse: 89          Review of Systems   Genitourinary:  Negative for decreased urine volume, hematuria and penile pain.      Objective:     Physical Exam  Vitals reviewed.   Constitutional:       Appearance: He is not diaphoretic.   Cardiovascular:      Rate and Rhythm: Normal rate.   Pulmonary:      Effort: Pulmonary effort is normal. No respiratory distress.   Abdominal:      General: There is no distension.      Palpations: Abdomen is soft.   Genitourinary:     Penis: Normal.       Testes: Normal.      Comments: The ureteral stent was attached to the Varghese in both the stent and the Varghese removed intact.   Musculoskeletal:         General: No swelling, tenderness or deformity.   Skin:     General: Skin is warm.   Neurological:      Mental Status: He is alert.        Lab Results   Component Value Date    PSA 6.980 (H) 10/11/2023        CMP  Sodium   Date Value Ref Range Status   01/22/2025 141 136 - 145 mmol/L Final   02/14/2024 142 136 - 145 mmol/L Final     Potassium   Date Value Ref Range Status   01/22/2025 4.1 3.5 - 5.1 mmol/L Final   02/14/2024 3.7 3.5 - 5.1 mmol/L Final     Chloride   Date Value Ref Range Status   01/22/2025 107 98 - 107 mmol/L Final   02/14/2024 109 100 - 109 mmol/L Final      CO2   Date Value Ref Range Status   01/22/2025 25 23 - 31 mmol/L Final     Carbon Dioxide   Date Value Ref Range Status   02/14/2024 27 22 - 33 mmol/L Final     Glucose   Date Value Ref Range Status   01/22/2025 111 82 - 115 mg/dL Final     BUN   Date Value Ref Range Status   01/22/2025 14 8 - 26 mg/dL Final     Blood Urea Nitrogen   Date Value Ref Range Status   02/14/2024 16 5 - 25 mg/dL Final     Creatinine   Date Value Ref Range Status   01/22/2025 1.11 0.72 - 1.25 mg/dL Final   02/14/2024 1.02 0.57 - 1.25 mg/dL Final     Calcium   Date Value Ref Range Status   01/22/2025 8.9 8.8 - 10.0 mg/dL Final   02/14/2024 7.8 (L) 8.8 - 10.6 mg/dL Final     Total Protein   Date Value Ref Range Status   01/09/2025 6.8 5.8 - 7.6 g/dL Final     Albumin   Date Value Ref Range Status   01/09/2025 3.9 3.4 - 4.8 g/dL Final     Albumin Level   Date Value Ref Range Status   02/12/2024 2.1 (L) 3.5 - 5.0 g/dl Final     Bilirubin, Total   Date Value Ref Range Status   01/09/2025 0.5 <=1.5 mg/dL Final     Alk Phos   Date Value Ref Range Status   01/09/2025 66 40 - 150 U/L Final     AST   Date Value Ref Range Status   01/09/2025 20 5 - 34 U/L Final     ALT   Date Value Ref Range Status   01/09/2025 13 <=55 U/L Final     Anion Gap   Date Value Ref Range Status   01/22/2025 13 7 - 16 mmol/L Final   02/14/2024 6 (L) 8 - 16 mmol/L Final     eGFR   Date Value Ref Range Status   01/22/2025 72 >=60 mL/min/1.73m2 Final      BMP  Lab Results   Component Value Date     01/22/2025    K 4.1 01/22/2025     01/22/2025    CO2 25 01/22/2025    BUN 14 01/22/2025    CREATININE 1.11 01/22/2025    CALCIUM 8.9 01/22/2025    ANIONGAP 13 01/22/2025    EGFRNORACEVR 72 01/22/2025

## 2025-01-27 ENCOUNTER — OFFICE VISIT (OUTPATIENT)
Dept: UROLOGY | Facility: CLINIC | Age: 70
End: 2025-01-27
Payer: MEDICARE

## 2025-01-27 VITALS
WEIGHT: 157 LBS | OXYGEN SATURATION: 98 % | DIASTOLIC BLOOD PRESSURE: 74 MMHG | SYSTOLIC BLOOD PRESSURE: 144 MMHG | HEART RATE: 81 BPM | TEMPERATURE: 99 F | BODY MASS INDEX: 24.59 KG/M2

## 2025-01-27 DIAGNOSIS — R82.81 PYURIA: ICD-10-CM

## 2025-01-27 DIAGNOSIS — N18.31 CHRONIC KIDNEY DISEASE, STAGE 3A: ICD-10-CM

## 2025-01-27 DIAGNOSIS — N39.0 URINARY TRACT INFECTION WITH HEMATURIA, SITE UNSPECIFIED: Primary | ICD-10-CM

## 2025-01-27 DIAGNOSIS — I63.412 EMBOLIC STROKE INVOLVING LEFT MIDDLE CEREBRAL ARTERY: ICD-10-CM

## 2025-01-27 DIAGNOSIS — N20.1 URETERAL STONE: ICD-10-CM

## 2025-01-27 DIAGNOSIS — R31.9 URINARY TRACT INFECTION WITH HEMATURIA, SITE UNSPECIFIED: Primary | ICD-10-CM

## 2025-01-27 DIAGNOSIS — R33.9 INCOMPLETE BLADDER EMPTYING: ICD-10-CM

## 2025-01-27 PROCEDURE — 87086 URINE CULTURE/COLONY COUNT: CPT | Mod: ,,, | Performed by: CLINICAL MEDICAL LABORATORY

## 2025-01-27 PROCEDURE — 96372 THER/PROPH/DIAG INJ SC/IM: CPT | Mod: PBBFAC | Performed by: UROLOGY

## 2025-01-27 PROCEDURE — 87077 CULTURE AEROBIC IDENTIFY: CPT | Mod: ,,, | Performed by: CLINICAL MEDICAL LABORATORY

## 2025-01-27 PROCEDURE — 99214 OFFICE O/P EST MOD 30 MIN: CPT | Mod: S$PBB,25,, | Performed by: UROLOGY

## 2025-01-27 PROCEDURE — 87186 SC STD MICRODIL/AGAR DIL: CPT | Mod: ,,, | Performed by: CLINICAL MEDICAL LABORATORY

## 2025-01-27 PROCEDURE — 99214 OFFICE O/P EST MOD 30 MIN: CPT | Mod: PBBFAC | Performed by: UROLOGY

## 2025-01-27 PROCEDURE — 99999 PR PBB SHADOW E&M-EST. PATIENT-LVL IV: CPT | Mod: PBBFAC,,, | Performed by: UROLOGY

## 2025-01-27 PROCEDURE — 99999PBSHW PR PBB SHADOW TECHNICAL ONLY FILED TO HB: Mod: PBBFAC,,,

## 2025-01-27 RX ORDER — CEFTRIAXONE 1 G/1
1 INJECTION, POWDER, FOR SOLUTION INTRAMUSCULAR; INTRAVENOUS
Status: COMPLETED | OUTPATIENT
Start: 2025-01-27 | End: 2025-01-27

## 2025-01-27 RX ORDER — TAMSULOSIN HYDROCHLORIDE 0.4 MG/1
0.4 CAPSULE ORAL DAILY
Qty: 90 CAPSULE | Refills: 3 | Status: SHIPPED | OUTPATIENT
Start: 2025-01-27 | End: 2026-01-27

## 2025-01-27 RX ORDER — AMOXICILLIN AND CLAVULANATE POTASSIUM 875; 125 MG/1; MG/1
1 TABLET, FILM COATED ORAL 2 TIMES DAILY
Qty: 28 TABLET | Refills: 0 | Status: SHIPPED | OUTPATIENT
Start: 2025-01-27 | End: 2025-02-10

## 2025-01-27 RX ADMIN — CEFTRIAXONE SODIUM 1 G: 1 INJECTION, POWDER, FOR SOLUTION INTRAMUSCULAR; INTRAVENOUS at 01:01

## 2025-01-27 NOTE — PROGRESS NOTES
Rocephin 1 gram reconstituted with 1% lidocaine given IM to right dorsalgluteal after no blood return with aspiration.  Patient tolerated injection without difficulty, waited in exam room for 15 minutes then left in clinic in stable condition.

## 2025-01-27 NOTE — PROGRESS NOTES
Assessment:   1. Pyuria  -     amoxicillin-clavulanate 875-125mg (AUGMENTIN) 875-125 mg per tablet; Take 1 tablet by mouth 2 (two) times daily. for 14 days  Dispense: 28 tablet; Refill: 0    2. Incomplete bladder emptying  -     tamsulosin (FLOMAX) 0.4 mg Cap; Take 1 capsule (0.4 mg total) by mouth once daily.  Dispense: 90 capsule; Refill: 3    3. Ureteral stone    4. Chronic kidney disease, stage 3a  Overview:  eGFR 60      5. Embolic stroke involving left middle cerebral artery  Overview:  left occipital  righ vision impairment    Impression:     Chronic area of infarct of the left superior occipital lobe.  Mild chronic microvascular ischemic change.  No acute abnormality.        Electronically signed by:Germán Claros  Date:                                            08/15/2024  Time:                                           16:06           Plan:     The patient's postvoid residual was 125 and there was pyuria on his urinalysis which was submitted for culture.  We discussed plans to treat with empiric antibiotics 1 g ceftriaxone IM followed by a course of Augmentin.  Plan to start tamsulosin for BPH and have the patient follow-up in 2 weeks for repeat examination given frailty and whether there is improvement in lower urinary tract symptoms and pyuria.     Plan:   Urine culture today  Chart check urine culture  Ceftriaxone empirically along with Augmentin            Enrique Mcdaniel MD  Urology  01/27/2025          UROLOGY HISTORY AND PHYSICAL EXAM    Subjective:      Patient ID: Mitul Torres is a 69 y.o. male.    Chief Complaint::   Penis Pain  The patient's primary symptoms include penile pain. Associated symptoms include dysuria and a fever. Pertinent negatives include no abdominal pain, chills, coughing, frequency or shortness of breath.     The patient is a 69-year-old male that previously presented with an embolic stroke underwent treatment and was found to have a ureteral stone with secondary signs of  obstruction.  The patient underwent lithotripsy and ureteral stent placement and the stent and Varghese have since been removed.  He presents today reporting the onset of low-grade fever 99 and dysuria.  His urinalysis shows pyuria on dipstick and his PVR is 125.  He reports the symptoms started about 2 days ago.     Past Medical History:   Diagnosis Date    GERD (gastroesophageal reflux disease)     Hyperlipidemia     Hypertension      Past Surgical History:   Procedure Laterality Date    CYSTOURETEROSCOPY, WITH HOLMIUM LASER LITHOTRIPSY OF URETERAL CALCULUS AND STENT INSERTION Left 1/13/2025    Procedure: CYSTOURETEROSCOPY, WITH HOLMIUM LASER LITHOTRIPSY OF URETERAL CALCULUS AND STENT INSERTION;  Surgeon: Enrique Mcdaniel MD;  Location: [facility] OR;  Service: Urology;  Laterality: Left;    ECHOCARDIOGRAM,TRANSESOPHAGEAL  12/14/2023    Procedure: Transesophageal echo (GAEL) intra-procedure log documentation;  Surgeon: Jerry Be DO;  Location: [facility] CATH LAB;  Service: Cardiology;;    LEFT HEART CATHETERIZATION Left 12/14/2023    Procedure: Left heart cath;  Surgeon: Jerry Be DO;  Location: [facility] CATH LAB;  Service: Cardiology;  Laterality: Left;    MITRAL VALVE REPAIR      REPAIR OF HEART VALVE      RIGHT HEART CATHETERIZATION Right 12/14/2023    Procedure: INSERTION, CATHETER, RIGHT HEART;  Surgeon: Jerry Be DO;  Location: [facility] CATH LAB;  Service: Cardiology;  Laterality: Right;        Current Outpatient Medications on File Prior to Visit   Medication Sig Dispense Refill    apixaban (ELIQUIS) 5 mg Tab Take 1 tablet (5 mg total) by mouth 2 (two) times daily. 60 tablet 0    aspirin (ECOTRIN) 81 MG EC tablet Take 1 tablet by mouth every morning.      atorvastatin (LIPITOR) 20 MG tablet Take 1 tablet (20 mg total) by mouth once daily. (Patient not taking: Reported on 1/27/2025) 90 tablet 3    lisinopriL 10 MG tablet Take 1 tablet (10 mg total) by mouth once daily. (Patient not taking:  Reported on 1/27/2025) 90 tablet 3    metoprolol succinate (TOPROL-XL) 25 MG 24 hr tablet Take 25 mg by mouth once daily. (Patient not taking: Reported on 1/27/2025)       No current facility-administered medications on file prior to visit.        Review of patient's allergies indicates:   Allergen Reactions    Sulfamethoxazole-trimethoprim Rash     Note: Emil Steele Syndrome     Vitals:    01/27/25 1112   BP: (!) 144/74   Pulse: 81   Temp: 99.2 °F (37.3 °C)          Review of Systems   Constitutional:  Positive for activity change and fever. Negative for chills.   Respiratory:  Negative for cough and shortness of breath.    Gastrointestinal:  Negative for abdominal distention and abdominal pain.   Genitourinary:  Positive for dysuria and penile pain. Negative for frequency.   Musculoskeletal:  Negative for arthralgias.   Skin:  Negative for wound.   Neurological:  Positive for weakness.   Psychiatric/Behavioral:  Negative for hallucinations.       Objective:     Physical Exam  Vitals and nursing note reviewed.   Constitutional:       General: He is not in acute distress.     Appearance: Normal appearance. He is normal weight. He is not toxic-appearing or diaphoretic.   HENT:      Head: Normocephalic and atraumatic.   Eyes:      General: No scleral icterus.     Conjunctiva/sclera: Conjunctivae normal.   Cardiovascular:      Rate and Rhythm: Normal rate.   Pulmonary:      Effort: Pulmonary effort is normal. No respiratory distress.      Breath sounds: No wheezing.   Abdominal:      General: There is no distension.      Palpations: Abdomen is soft. There is no mass.      Tenderness: There is no abdominal tenderness. There is no right CVA tenderness or left CVA tenderness.   Genitourinary:     Comments: PVR 125ml  Musculoskeletal:         General: No deformity.   Skin:     General: Skin is warm and dry.      Findings: No rash.   Neurological:      Mental Status: He is alert and oriented to person, place, and time.    Psychiatric:         Mood and Affect: Mood normal.         Behavior: Behavior normal.         Thought Content: Thought content normal.         Judgment: Judgment normal.        Lab Results   Component Value Date    PSA 6.980 (H) 10/11/2023        CMP  Sodium   Date Value Ref Range Status   01/22/2025 141 136 - 145 mmol/L Final   02/14/2024 142 136 - 145 mmol/L Final     Potassium   Date Value Ref Range Status   01/22/2025 4.1 3.5 - 5.1 mmol/L Final   02/14/2024 3.7 3.5 - 5.1 mmol/L Final     Chloride   Date Value Ref Range Status   01/22/2025 107 98 - 107 mmol/L Final   02/14/2024 109 100 - 109 mmol/L Final     CO2   Date Value Ref Range Status   01/22/2025 25 23 - 31 mmol/L Final     Carbon Dioxide   Date Value Ref Range Status   02/14/2024 27 22 - 33 mmol/L Final     Glucose   Date Value Ref Range Status   01/22/2025 111 82 - 115 mg/dL Final     BUN   Date Value Ref Range Status   01/22/2025 14 8 - 26 mg/dL Final     Blood Urea Nitrogen   Date Value Ref Range Status   02/14/2024 16 5 - 25 mg/dL Final     Creatinine   Date Value Ref Range Status   01/22/2025 1.11 0.72 - 1.25 mg/dL Final   02/14/2024 1.02 0.57 - 1.25 mg/dL Final     Calcium   Date Value Ref Range Status   01/22/2025 8.9 8.8 - 10.0 mg/dL Final   02/14/2024 7.8 (L) 8.8 - 10.6 mg/dL Final     Total Protein   Date Value Ref Range Status   01/09/2025 6.8 5.8 - 7.6 g/dL Final     Albumin   Date Value Ref Range Status   01/09/2025 3.9 3.4 - 4.8 g/dL Final     Albumin Level   Date Value Ref Range Status   02/12/2024 2.1 (L) 3.5 - 5.0 g/dl Final     Bilirubin, Total   Date Value Ref Range Status   01/09/2025 0.5 <=1.5 mg/dL Final     Alk Phos   Date Value Ref Range Status   01/09/2025 66 40 - 150 U/L Final     AST   Date Value Ref Range Status   01/09/2025 20 5 - 34 U/L Final     ALT   Date Value Ref Range Status   01/09/2025 13 <=55 U/L Final     Anion Gap   Date Value Ref Range Status   01/22/2025 13 7 - 16 mmol/L Final   02/14/2024 6 (L) 8 - 16 mmol/L  Final     eGFR   Date Value Ref Range Status   01/22/2025 72 >=60 mL/min/1.73m2 Final      BMP  Lab Results   Component Value Date     01/22/2025    K 4.1 01/22/2025     01/22/2025    CO2 25 01/22/2025    BUN 14 01/22/2025    CREATININE 1.11 01/22/2025    CALCIUM 8.9 01/22/2025    ANIONGAP 13 01/22/2025    EGFRNORACEVR 72 01/22/2025

## 2025-01-29 LAB
OHS QRS DURATION: 82 MS
OHS QTC CALCULATION: 426 MS

## 2025-01-30 LAB — UA COMPLETE W REFLEX CULTURE PNL UR: ABNORMAL

## 2025-02-12 ENCOUNTER — OFFICE VISIT (OUTPATIENT)
Dept: UROLOGY | Facility: CLINIC | Age: 70
End: 2025-02-12
Payer: MEDICARE

## 2025-02-12 VITALS
WEIGHT: 157 LBS | DIASTOLIC BLOOD PRESSURE: 83 MMHG | HEART RATE: 76 BPM | OXYGEN SATURATION: 98 % | BODY MASS INDEX: 24.64 KG/M2 | HEIGHT: 67 IN | SYSTOLIC BLOOD PRESSURE: 131 MMHG

## 2025-02-12 DIAGNOSIS — R20.9 ALTERATIONS OF SENSATIONS FOLLOWING CEREBROVASCULAR ACCIDENT (CVA): ICD-10-CM

## 2025-02-12 DIAGNOSIS — Z98.890 S/P MITRAL VALVE REPAIR: ICD-10-CM

## 2025-02-12 DIAGNOSIS — I69.398 ALTERATIONS OF SENSATIONS FOLLOWING CEREBROVASCULAR ACCIDENT (CVA): ICD-10-CM

## 2025-02-12 DIAGNOSIS — I48.0 PAROXYSMAL ATRIAL FIBRILLATION: ICD-10-CM

## 2025-02-12 DIAGNOSIS — R31.29 OTHER MICROSCOPIC HEMATURIA: Primary | ICD-10-CM

## 2025-02-12 PROCEDURE — 99213 OFFICE O/P EST LOW 20 MIN: CPT | Mod: PBBFAC | Performed by: UROLOGY

## 2025-02-12 PROCEDURE — 99999 PR PBB SHADOW E&M-EST. PATIENT-LVL III: CPT | Mod: PBBFAC,,, | Performed by: UROLOGY

## 2025-02-12 PROCEDURE — 99213 OFFICE O/P EST LOW 20 MIN: CPT | Mod: S$PBB,,, | Performed by: UROLOGY

## 2025-02-12 NOTE — PROGRESS NOTES
Assessment:   1. Other microscopic hematuria    2. S/P mitral valve repair  Overview:  1/30/2024- Dr. Daniels- [facility]  Complex mitral valve repair and MOY closure      3. Paroxysmal atrial fibrillation  Overview:  Patient presented to [facility] 2/10/2024 with c/o sudden onset lightheadedness, nausea and palpitations. EKG demonstrated atrial fibrillation with RVR; rate 140 bpm in the presence of GIB. The patient was airlifted to [facility] with an urgent GI consult. The atrial fibrillation was converted chemically.      4. Alterations of sensations following cerebrovascular accident (CVA)         Plan:     The patient is doing well after treatment of his infection there is no bacteriuria or pyuria on today's urinalysis.  He is having intermittent orthostasis with the tamsulosin and I recommended for him to stop this therapy.  He is voiding well without any problems plan to continue observation.     Stopped tamsulosin.  Continue observation.  Return in May for UA and symptom check.           Enrique Mcdaniel MD  Urology  02/12/2025          UROLOGY HISTORY AND PHYSICAL EXAM    Subjective:      Patient ID: Mitul Torres is a 69 y.o. male.    Chief Complaint::   HPI    The patient is a 69-year-old male that presented with a stroke an embolic stroke and also presented with the acute kidney injury and a ureteral stone that was resulting in obstruction under going ureteral stone removal and ureteral stent removal and the patient presents today for follow-up after being treated for urinary tract infection.  He reports no dysuria or hematuria and reports that he is voiding well.  He has noticed that he has some residual orthostasis when he quickly stands that he believes is secondary to tamsulosin.     Past Medical History:   Diagnosis Date    GERD (gastroesophageal reflux disease)     Hyperlipidemia     Hypertension      Past Surgical History:   Procedure Laterality Date    CYSTOURETEROSCOPY, WITH HOLMIUM  LASER LITHOTRIPSY OF URETERAL CALCULUS AND STENT INSERTION Left 1/13/2025    Procedure: CYSTOURETEROSCOPY, WITH HOLMIUM LASER LITHOTRIPSY OF URETERAL CALCULUS AND STENT INSERTION;  Surgeon: Enrique Mcdaniel MD;  Location: [facility] OR;  Service: Urology;  Laterality: Left;    ECHOCARDIOGRAM,TRANSESOPHAGEAL  12/14/2023    Procedure: Transesophageal echo (GAEL) intra-procedure log documentation;  Surgeon: Jerry Be DO;  Location: [facility] CATH LAB;  Service: Cardiology;;    LEFT HEART CATHETERIZATION Left 12/14/2023    Procedure: Left heart cath;  Surgeon: Jerry Be DO;  Location: [facility] CATH LAB;  Service: Cardiology;  Laterality: Left;    MITRAL VALVE REPAIR      REPAIR OF HEART VALVE      RIGHT HEART CATHETERIZATION Right 12/14/2023    Procedure: INSERTION, CATHETER, RIGHT HEART;  Surgeon: Jerry Be DO;  Location: [facility] CATH LAB;  Service: Cardiology;  Laterality: Right;        Current Outpatient Medications on File Prior to Visit   Medication Sig Dispense Refill    apixaban (ELIQUIS) 5 mg Tab Take 1 tablet (5 mg total) by mouth 2 (two) times daily. 60 tablet 0    aspirin (ECOTRIN) 81 MG EC tablet Take 1 tablet by mouth every morning.      [DISCONTINUED] tamsulosin (FLOMAX) 0.4 mg Cap Take 1 capsule (0.4 mg total) by mouth once daily. 90 capsule 3    atorvastatin (LIPITOR) 20 MG tablet Take 1 tablet (20 mg total) by mouth once daily. (Patient not taking: Reported on 2/12/2025) 90 tablet 3    lisinopriL 10 MG tablet Take 1 tablet (10 mg total) by mouth once daily. (Patient not taking: Reported on 2/12/2025) 90 tablet 3    metoprolol succinate (TOPROL-XL) 25 MG 24 hr tablet Take 25 mg by mouth once daily. (Patient not taking: Reported on 2/12/2025)       No current facility-administered medications on file prior to visit.        Review of patient's allergies indicates:   Allergen Reactions    Sulfamethoxazole-trimethoprim Rash     Note: Emil Jimson Syndrome     Vitals:    02/12/25 1441    BP: 131/83   Pulse: 76          Review of Systems   Constitutional:  Negative for fever.   Respiratory:  Negative for shortness of breath.    Gastrointestinal:  Negative for abdominal pain.   Endocrine: Negative for polyuria.   Genitourinary:  Negative for dysuria, frequency, hematuria and urgency.   Musculoskeletal:  Positive for arthralgias. Negative for myalgias.   Skin:  Negative for wound.   Neurological:  Positive for light-headedness.   Psychiatric/Behavioral:  Negative for sleep disturbance.       Objective:     Physical Exam  Vitals and nursing note reviewed.   Constitutional:       Appearance: Normal appearance. He is normal weight.   HENT:      Head: Normocephalic and atraumatic.   Eyes:      General: No scleral icterus.     Conjunctiva/sclera: Conjunctivae normal.   Cardiovascular:      Rate and Rhythm: Normal rate.   Pulmonary:      Effort: No respiratory distress.      Breath sounds: No wheezing.   Abdominal:      General: There is no distension.      Palpations: Abdomen is soft. There is no mass.      Tenderness: There is no abdominal tenderness.   Musculoskeletal:         General: No deformity.   Skin:     General: Skin is warm and dry.      Findings: No rash.   Neurological:      Mental Status: He is alert.      Comments: He showed me in the clinic that when he stands up he feels lightheaded.    Psychiatric:         Mood and Affect: Mood normal.         Behavior: Behavior normal.         Thought Content: Thought content normal.         Judgment: Judgment normal.        Lab Results   Component Value Date    PSA 6.980 (H) 10/11/2023        CMP  Sodium   Date Value Ref Range Status   01/22/2025 141 136 - 145 mmol/L Final   02/14/2024 142 136 - 145 mmol/L Final     Potassium   Date Value Ref Range Status   01/22/2025 4.1 3.5 - 5.1 mmol/L Final   02/14/2024 3.7 3.5 - 5.1 mmol/L Final     Chloride   Date Value Ref Range Status   01/22/2025 107 98 - 107 mmol/L Final   02/14/2024 109 100 - 109 mmol/L  Final     CO2   Date Value Ref Range Status   01/22/2025 25 23 - 31 mmol/L Final     Carbon Dioxide   Date Value Ref Range Status   02/14/2024 27 22 - 33 mmol/L Final     Glucose   Date Value Ref Range Status   01/22/2025 111 82 - 115 mg/dL Final     BUN   Date Value Ref Range Status   01/22/2025 14 8 - 26 mg/dL Final     Blood Urea Nitrogen   Date Value Ref Range Status   02/14/2024 16 5 - 25 mg/dL Final     Creatinine   Date Value Ref Range Status   01/22/2025 1.11 0.72 - 1.25 mg/dL Final   02/14/2024 1.02 0.57 - 1.25 mg/dL Final     Calcium   Date Value Ref Range Status   01/22/2025 8.9 8.8 - 10.0 mg/dL Final   02/14/2024 7.8 (L) 8.8 - 10.6 mg/dL Final     Total Protein   Date Value Ref Range Status   01/09/2025 6.8 5.8 - 7.6 g/dL Final     Albumin   Date Value Ref Range Status   01/09/2025 3.9 3.4 - 4.8 g/dL Final     Albumin Level   Date Value Ref Range Status   02/12/2024 2.1 (L) 3.5 - 5.0 g/dl Final     Bilirubin, Total   Date Value Ref Range Status   01/09/2025 0.5 <=1.5 mg/dL Final     Alk Phos   Date Value Ref Range Status   01/09/2025 66 40 - 150 U/L Final     AST   Date Value Ref Range Status   01/09/2025 20 5 - 34 U/L Final     ALT   Date Value Ref Range Status   01/09/2025 13 <=55 U/L Final     Anion Gap   Date Value Ref Range Status   01/22/2025 13 7 - 16 mmol/L Final   02/14/2024 6 (L) 8 - 16 mmol/L Final     eGFR   Date Value Ref Range Status   01/22/2025 72 >=60 mL/min/1.73m2 Final      BMP  Lab Results   Component Value Date     01/22/2025    K 4.1 01/22/2025     01/22/2025    CO2 25 01/22/2025    BUN 14 01/22/2025    CREATININE 1.11 01/22/2025    CALCIUM 8.9 01/22/2025    ANIONGAP 13 01/22/2025    EGFRNORACEVR 72 01/22/2025

## 2025-03-10 ENCOUNTER — OFFICE VISIT (OUTPATIENT)
Dept: FAMILY MEDICINE | Facility: CLINIC | Age: 70
End: 2025-03-10
Payer: MEDICARE

## 2025-03-10 VITALS
WEIGHT: 164 LBS | HEIGHT: 67 IN | HEART RATE: 88 BPM | BODY MASS INDEX: 25.74 KG/M2 | RESPIRATION RATE: 20 BRPM | SYSTOLIC BLOOD PRESSURE: 136 MMHG | TEMPERATURE: 99 F | DIASTOLIC BLOOD PRESSURE: 61 MMHG | OXYGEN SATURATION: 96 %

## 2025-03-10 DIAGNOSIS — R73.01 IMPAIRED FASTING GLUCOSE: ICD-10-CM

## 2025-03-10 DIAGNOSIS — N18.31 CHRONIC KIDNEY DISEASE, STAGE 3A: ICD-10-CM

## 2025-03-10 DIAGNOSIS — N52.9 ERECTILE DYSFUNCTION, UNSPECIFIED ERECTILE DYSFUNCTION TYPE: Primary | ICD-10-CM

## 2025-03-10 DIAGNOSIS — K21.9 GASTROESOPHAGEAL REFLUX DISEASE, UNSPECIFIED WHETHER ESOPHAGITIS PRESENT: ICD-10-CM

## 2025-03-10 DIAGNOSIS — Z98.890 S/P MITRAL VALVE REPAIR: ICD-10-CM

## 2025-03-10 DIAGNOSIS — E78.5 HYPERLIPIDEMIA, UNSPECIFIED HYPERLIPIDEMIA TYPE: ICD-10-CM

## 2025-03-10 DIAGNOSIS — I25.10 CORONARY ARTERY DISEASE INVOLVING NATIVE CORONARY ARTERY OF NATIVE HEART WITHOUT ANGINA PECTORIS: ICD-10-CM

## 2025-03-10 DIAGNOSIS — I10 HYPERTENSION, UNSPECIFIED TYPE: ICD-10-CM

## 2025-03-10 DIAGNOSIS — I63.412 EMBOLIC STROKE INVOLVING LEFT MIDDLE CEREBRAL ARTERY: ICD-10-CM

## 2025-03-10 DIAGNOSIS — I48.0 PAROXYSMAL ATRIAL FIBRILLATION: ICD-10-CM

## 2025-03-10 DIAGNOSIS — R94.6 ABNORMAL THYROID FUNCTION TEST: ICD-10-CM

## 2025-03-10 LAB
CHOLEST SERPL-MCNC: 175 MG/DL
CHOLEST/HDLC SERPL: 2.6 {RATIO}
EST. AVERAGE GLUCOSE BLD GHB EST-MCNC: 108 MG/DL
FSH SERPL-ACNC: 6.8 MIU/ML
HBA1C MFR BLD HPLC: 5.4 %
HDLC SERPL-MCNC: 68 MG/DL (ref 35–60)
LDLC SERPL CALC-MCNC: 91 MG/DL
LDLC/HDLC SERPL: 1.3 {RATIO}
LH SERPL-ACNC: 5 MIU/ML
NONHDLC SERPL-MCNC: 107 MG/DL
T4 FREE SERPL-MCNC: 0.98 NG/DL (ref 0.7–1.48)
TRIGL SERPL-MCNC: 79 MG/DL (ref 34–140)
TSH SERPL DL<=0.005 MIU/L-ACNC: 2.24 UIU/ML (ref 0.35–4.94)
VLDLC SERPL-MCNC: 16 MG/DL

## 2025-03-10 PROCEDURE — 99214 OFFICE O/P EST MOD 30 MIN: CPT | Mod: ,,,

## 2025-03-10 NOTE — PATIENT INSTRUCTIONS
advised to continue DASH diet and daily BP checks.  Continue low cholesterol/fat diet.   Will get labs today  Declines vaccines  Follow up in 3 months and as needed.

## 2025-03-10 NOTE — PROGRESS NOTES
Leesa Martínez NP   [address] [place] [address]     PATIENT NAME: Mitul Torres  : 1955  DATE: 3/10/25  MRN: [id number]      Billing Provider: Leesa Martínez NP  Level of Service:   Patient PCP Information       Provider PCP Type    Primary Doctor No General            Reason for Visit / Chief Complaint: Hypertension and Follow-up (Pt presents to the clinic for 3m f.u/Pt will like to take to you about getting injection of testosterone bc he is having issues with erectile dysfunction)       Update PCP  Update Chief Complaint         History of Present Illness / Problem Focused Workflow     Mitul Torres presents to the clinic with Hypertension and Follow-up (Pt presents to the clinic for 3m f.u/Pt will like to take to you about getting injection of testosterone bc he is having issues with erectile dysfunction)     Patient here today for follow up of HTN. BP at goal today. Patient reports that he stopped taking metoprolol and Lisinopril. Patient advised to continue DASH diet and daily BP checks. He has a history CVA and is taking ASA and Eliquis. He is followed by cardiology (Dr. Levine). Today he has complaints of erectile dysfunction for about the past month, after having a kidney stone. He reports that he has a desire to be intimate and can achieve an erection, but only for a short period of time. He is requesting to have his testosterone checked and does not want to start PDE5 inhibitor at this time. He has seen urology. Will get labs today. Declines vaccines. Follow up in 3 months and as needed.         Hypertension  Pertinent negatives include no chest pain, headaches, palpitations or shortness of breath.   Follow-up  Pertinent negatives include no abdominal pain, chest pain, coughing, headaches or vomiting.     Review of Systems     Review of Systems   Constitutional: Negative.    HENT: Negative.     Eyes: Negative.    Respiratory: Negative.  Negative for cough, shortness  of breath and wheezing.    Cardiovascular: Negative.  Negative for chest pain, palpitations and leg swelling.   Gastrointestinal: Negative.  Negative for abdominal pain, diarrhea and vomiting.   Endocrine: Negative.    Genitourinary: Negative.    Musculoskeletal: Negative.    Integumentary:  Negative.   Allergic/Immunologic: Negative.    Neurological: Negative.  Negative for dizziness and headaches.   Psychiatric/Behavioral: Negative.        Medical / Social / Family History     Past Medical History:   Diagnosis Date    GERD (gastroesophageal reflux disease)     Hyperlipidemia     Hypertension        Past Surgical History:   Procedure Laterality Date    CYSTOURETEROSCOPY, WITH HOLMIUM LASER LITHOTRIPSY OF URETERAL CALCULUS AND STENT INSERTION Left 1/13/2025    Procedure: CYSTOURETEROSCOPY, WITH HOLMIUM LASER LITHOTRIPSY OF URETERAL CALCULUS AND STENT INSERTION;  Surgeon: Enrique Mcdaniel MD;  Location: [facility] OR;  Service: Urology;  Laterality: Left;    ECHOCARDIOGRAM,TRANSESOPHAGEAL  12/14/2023    Procedure: Transesophageal echo (GAEL) intra-procedure log documentation;  Surgeon: Jerry Be DO;  Location: [facility] CATH LAB;  Service: Cardiology;;    LEFT HEART CATHETERIZATION Left 12/14/2023    Procedure: Left heart cath;  Surgeon: Jerry Be DO;  Location: [facility] CATH LAB;  Service: Cardiology;  Laterality: Left;    MITRAL VALVE REPAIR      REPAIR OF HEART VALVE      RIGHT HEART CATHETERIZATION Right 12/14/2023    Procedure: INSERTION, CATHETER, RIGHT HEART;  Surgeon: Jerry Be DO;  Location: [facility] CATH LAB;  Service: Cardiology;  Laterality: Right;       Social History    reports that he quit smoking about 33 years ago. His smoking use included cigarettes. He has been exposed to tobacco smoke. He has never used smokeless tobacco. He reports that he does not currently use alcohol. He reports that he does not currently use drugs after having used the following drugs:  "Marijuana.    Family History  's family history includes Cataracts in his mother; Diabetes in his maternal grandfather; Glaucoma in his mother; Heart disease in his brother; No Known Problems in his brother and father.    Medications and Allergies     Medications  Outpatient Medications Marked as Taking for the 3/10/25 encounter (Office Visit) with Leesa Martínez NP   Medication Sig Dispense Refill    aspirin (ECOTRIN) 81 MG EC tablet Take 1 tablet by mouth every morning.      metoprolol succinate (TOPROL-XL) 25 MG 24 hr tablet Take 25 mg by mouth once daily.         Allergies  Review of patient's allergies indicates:   Allergen Reactions    Sulfamethoxazole-trimethoprim Rash     Note: Emil Johson Syndrome       Physical Examination   /61 (BP Location: Right arm, Patient Position: Sitting)   Pulse 88   Temp 98.6 °F (37 °C) (Oral)   Resp 20   Ht 5' 7" (1.702 m)   Wt 74.4 kg (164 lb)   SpO2 96%   BMI 25.69 kg/m²    Physical Exam  Vitals reviewed.   Constitutional:       Appearance: Normal appearance.   HENT:      Right Ear: Tympanic membrane, ear canal and external ear normal.      Left Ear: Tympanic membrane, ear canal and external ear normal.      Nose: Nose normal.      Mouth/Throat:      Mouth: Mucous membranes are moist.      Pharynx: Oropharynx is clear. No posterior oropharyngeal erythema.   Eyes:      Extraocular Movements: Extraocular movements intact.      Conjunctiva/sclera: Conjunctivae normal.      Pupils: Pupils are equal, round, and reactive to light.   Cardiovascular:      Rate and Rhythm: Normal rate and regular rhythm.      Pulses: Normal pulses.      Heart sounds: Normal heart sounds. No murmur heard.  Pulmonary:      Effort: Pulmonary effort is normal. No respiratory distress.      Breath sounds: Normal breath sounds. No wheezing.   Abdominal:      General: Bowel sounds are normal.      Palpations: Abdomen is soft.      Tenderness: There is no abdominal tenderness. There is " no right CVA tenderness, left CVA tenderness or guarding.   Musculoskeletal:         General: Normal range of motion.      Cervical back: Normal range of motion and neck supple.   Lymphadenopathy:      Cervical: No cervical adenopathy.   Skin:     General: Skin is warm and dry.      Capillary Refill: Capillary refill takes less than 2 seconds.   Neurological:      General: No focal deficit present.      Mental Status: He is alert and oriented to person, place, and time.   Psychiatric:         Mood and Affect: Mood normal.         Behavior: Behavior normal.         Thought Content: Thought content normal.         Judgment: Judgment normal.        Assessment and Plan (including Health Maintenance)      Problem List  Smart Sets  Document Outside HM   :    Plan:    advised to continue DASH diet and daily BP checks.  Continue low cholesterol/fat diet.   Will get labs today  Declines vaccines  Follow up in 3 months and as needed.             Health Maintenance Due   Topic Date Due    TETANUS VACCINE  Never done    Colorectal Cancer Screening  Never done    Shingles Vaccine (1 of 2) Never done    Pneumococcal Vaccines (Age 50+) (1 of 1 - PCV) Never done    RSV Vaccine (Age 60+ and Pregnant patients) (1 - Risk 60-74 years 1-dose series) Never done    Influenza Vaccine (1) Never done    COVID-19 Vaccine (1 - 2024-25 season) Never done       Problem List Items Addressed This Visit    None      Health Maintenance Topics with due status: Not Due       Topic Last Completion Date    Lipid Panel 01/09/2025    High Dose Statin 01/23/2025       Future Appointments   Date Time Provider Department Center   4/7/2025 11:15 AM Trish Levine MD [facility] CARD [facility]   4/10/2025  8:00 AM AWV NURSE, [facility] FAMILY MEDICINE [facility]   6/12/2025 10:00 AM [facility] GI ROOM 01 [facility]O [facility]            Signature:  Leesa Martínez NP      [address] [place] [address]    Date of encounter: 3/10/25

## 2025-03-13 ENCOUNTER — HOSPITAL ENCOUNTER (EMERGENCY)
Facility: HOSPITAL | Age: 70
Discharge: HOME | End: 2025-03-13
Attending: EMERGENCY MEDICINE
Payer: MEDICARE

## 2025-03-13 VITALS
DIASTOLIC BLOOD PRESSURE: 68 MMHG | RESPIRATION RATE: 11 BRPM | WEIGHT: 167 LBS | OXYGEN SATURATION: 95 % | HEART RATE: 53 BPM | TEMPERATURE: 99 F | SYSTOLIC BLOOD PRESSURE: 154 MMHG | BODY MASS INDEX: 23.91 KG/M2 | HEIGHT: 70 IN

## 2025-03-13 DIAGNOSIS — R00.1 BRADYCARDIA: ICD-10-CM

## 2025-03-13 DIAGNOSIS — G45.9 TIA (TRANSIENT ISCHEMIC ATTACK): Primary | ICD-10-CM

## 2025-03-13 DIAGNOSIS — R41.0 CONFUSION: ICD-10-CM

## 2025-03-13 LAB
ALBUMIN SERPL BCP-MCNC: 3.5 G/DL (ref 3.4–4.8)
ALBUMIN/GLOB SERPL: 1.3 {RATIO}
ALP SERPL-CCNC: 56 U/L (ref 40–150)
ALT SERPL W P-5'-P-CCNC: 12 U/L
AMMONIA PLAS-SCNC: 26 ΜMOL/L (ref 18–72)
ANION GAP SERPL CALCULATED.3IONS-SCNC: 12 MMOL/L (ref 7–16)
AST SERPL W P-5'-P-CCNC: 21 U/L (ref 11–45)
BASOPHILS # BLD AUTO: 0.02 K/UL (ref 0–0.2)
BASOPHILS NFR BLD AUTO: 0.2 % (ref 0–1)
BILIRUB SERPL-MCNC: 0.6 MG/DL
BILIRUB UR QL STRIP: NEGATIVE
BUN SERPL-MCNC: 19 MG/DL (ref 8–26)
BUN/CREAT SERPL: 19 (ref 6–20)
CALCIUM SERPL-MCNC: 9.1 MG/DL (ref 8.8–10)
CHLORIDE SERPL-SCNC: 107 MMOL/L (ref 98–107)
CLARITY UR: CLEAR
CO2 SERPL-SCNC: 25 MMOL/L (ref 23–31)
COLOR UR: NORMAL
CREAT SERPL-MCNC: 1.02 MG/DL (ref 0.72–1.25)
DIFFERENTIAL METHOD BLD: ABNORMAL
EGFR (NO RACE VARIABLE) (RUSH/TITUS): 80 ML/MIN/1.73M2
EOSINOPHIL # BLD AUTO: 0.03 K/UL (ref 0–0.5)
EOSINOPHIL NFR BLD AUTO: 0.4 % (ref 1–4)
ERYTHROCYTE [DISTWIDTH] IN BLOOD BY AUTOMATED COUNT: 14.6 % (ref 11.5–14.5)
GLOBULIN SER-MCNC: 2.8 G/DL (ref 2–4)
GLUCOSE SERPL-MCNC: 97 MG/DL (ref 82–115)
GLUCOSE UR STRIP-MCNC: NORMAL MG/DL
HCT VFR BLD AUTO: 37.8 % (ref 40–54)
HGB BLD-MCNC: 12.1 G/DL (ref 13.5–18)
IMM GRANULOCYTES # BLD AUTO: 0.02 K/UL (ref 0–0.04)
IMM GRANULOCYTES NFR BLD: 0.2 % (ref 0–0.4)
KETONES UR STRIP-SCNC: NEGATIVE MG/DL
LEUKOCYTE ESTERASE UR QL STRIP: NEGATIVE
LYMPHOCYTES # BLD AUTO: 0.93 K/UL (ref 1–4.8)
LYMPHOCYTES NFR BLD AUTO: 11 % (ref 27–41)
MCH RBC QN AUTO: 28.5 PG (ref 27–31)
MCHC RBC AUTO-ENTMCNC: 32 G/DL (ref 32–36)
MCV RBC AUTO: 88.9 FL (ref 80–96)
MONOCYTES # BLD AUTO: 0.54 K/UL (ref 0–0.8)
MONOCYTES NFR BLD AUTO: 6.4 % (ref 2–6)
MPC BLD CALC-MCNC: 10.8 FL (ref 9.4–12.4)
NEUTROPHILS # BLD AUTO: 6.91 K/UL (ref 1.8–7.7)
NEUTROPHILS NFR BLD AUTO: 81.8 % (ref 53–65)
NITRITE UR QL STRIP: NEGATIVE
NRBC # BLD AUTO: 0 X10E3/UL
NRBC, AUTO (.00): 0 %
PH UR STRIP: 6 PH UNITS
PLATELET # BLD AUTO: 215 K/UL (ref 150–400)
POTASSIUM SERPL-SCNC: 3.9 MMOL/L (ref 3.5–5.1)
PROT SERPL-MCNC: 6.3 G/DL (ref 5.8–7.6)
PROT UR QL STRIP: NEGATIVE
RBC # BLD AUTO: 4.25 M/UL (ref 4.6–6.2)
RBC # UR STRIP: NEGATIVE /UL
SODIUM SERPL-SCNC: 140 MMOL/L (ref 136–145)
SP GR UR STRIP: 1.02
UROBILINOGEN UR STRIP-ACNC: NORMAL MG/DL
WBC # BLD AUTO: 8.45 K/UL (ref 4.5–11)

## 2025-03-13 PROCEDURE — 99285 EMERGENCY DEPT VISIT HI MDM: CPT | Mod: 25

## 2025-03-13 PROCEDURE — 80053 COMPREHEN METABOLIC PANEL: CPT

## 2025-03-13 PROCEDURE — 81003 URINALYSIS AUTO W/O SCOPE: CPT

## 2025-03-13 PROCEDURE — 36415 COLL VENOUS BLD VENIPUNCTURE: CPT

## 2025-03-13 PROCEDURE — 93005 ELECTROCARDIOGRAM TRACING: CPT

## 2025-03-13 PROCEDURE — 93010 ELECTROCARDIOGRAM REPORT: CPT | Mod: ,,, | Performed by: INTERNAL MEDICINE

## 2025-03-13 PROCEDURE — 85025 COMPLETE CBC W/AUTO DIFF WBC: CPT

## 2025-03-13 PROCEDURE — 82140 ASSAY OF AMMONIA: CPT

## 2025-03-13 NOTE — ED PROVIDER NOTES
Encounter Date: 3/13/2025       History     Chief Complaint   Patient presents with    Altered Mental Status     Pt states he woke up at 5am felt normal.  At 6am he noticed he had some confusion when trying to use phone.  Pt states he feels normal now.  Does have hx of stroke which makes it difficult to remember some things.  No weakness in extremities, no slurred speech or facial droop.       Patient is a 69-year-old male who presented to the ED for an episode of confusion. The patient reported that around 6 a.m. , he woke up to get ready for work and felt really confused. He said that he tried to call his son but was not able to dial his number. Describing the feeling, he said it was like zoning out. He reported that the whole episode of confusion lasted a few minutes. He felt generalized weakness making him unable to dial or text. He also felt lightheaded. He has had similar episodes since his stroke (January 2025) but this episode has been the worse.   The patient denied any accompanying shortness of breath, palpitations, syncope or falls. At the time of the encounter, he had a few abrasions on his forehead that he attributed to contact with a bush. The patient's son was at the bedside and reported that when the patient called him, he did not sound like himself, prompting him to call the ambulance.    The patient had a stroke in January 2025 that affected the right side of his body. He has been taking his medications regularly for that. Patient did report having sporadic confusion episode since stroke diagnoses.   The patient has a history of AFib as well and is on Eliquis for that. For rate control, he is on metoprolol.    At the time of the encounter, the patient was lying down in his bed comfortably. He was not in any acute distress. He was oriented x3. The patient's son at the bedside reported that the patient is looking better than in the morning but is still not at his baseline.   Patient lives alone at  his home. He is ambulatory and is independent in ADLs.        Review of patient's allergies indicates:   Allergen Reactions    Sulfamethoxazole-trimethoprim Rash     Note: Emil Johson Syndrome     Past Medical History:   Diagnosis Date    A-fib     GERD (gastroesophageal reflux disease)     Hyperlipidemia     Hypertension     Seizures     Stroke      Past Surgical History:   Procedure Laterality Date    CYSTOURETEROSCOPY, WITH HOLMIUM LASER LITHOTRIPSY OF URETERAL CALCULUS AND STENT INSERTION Left 1/13/2025    Procedure: CYSTOURETEROSCOPY, WITH HOLMIUM LASER LITHOTRIPSY OF URETERAL CALCULUS AND STENT INSERTION;  Surgeon: Enrique Mcdaniel MD;  Location: [facility] OR;  Service: Urology;  Laterality: Left;    ECHOCARDIOGRAM,TRANSESOPHAGEAL  12/14/2023    Procedure: Transesophageal echo (GAEL) intra-procedure log documentation;  Surgeon: Jerry Be DO;  Location: [facility] CATH LAB;  Service: Cardiology;;    LEFT HEART CATHETERIZATION Left 12/14/2023    Procedure: Left heart cath;  Surgeon: Jerry Be DO;  Location: [facility] CATH LAB;  Service: Cardiology;  Laterality: Left;    MITRAL VALVE REPAIR      REPAIR OF HEART VALVE      RIGHT HEART CATHETERIZATION Right 12/14/2023    Procedure: INSERTION, CATHETER, RIGHT HEART;  Surgeon: Jerry Be DO;  Location: [facility] CATH LAB;  Service: Cardiology;  Laterality: Right;     Family History   Problem Relation Name Age of Onset    Cataracts Mother      Glaucoma Mother      No Known Problems Father      Heart disease Brother          stent placement    No Known Problems Brother      Diabetes Maternal Grandfather       Social History[1]  Review of Systems   Constitutional:  Negative for fever.   Cardiovascular:  Negative for chest pain and palpitations.   Genitourinary: Negative.    Musculoskeletal: Negative.    Skin: Negative.    Neurological:  Positive for light-headedness. Negative for syncope.   Psychiatric/Behavioral:  Positive for confusion. Negative  for behavioral problems.        Physical Exam     Initial Vitals [03/13/25 1135]   BP Pulse Resp Temp SpO2   (!) 154/80 (!) 58 16 98.5 °F (36.9 °C) 95 %      MAP       --         Physical Exam    Constitutional: He appears well-developed and well-nourished.   HENT:   Head: Normocephalic and atraumatic.   Eyes: Conjunctivae and EOM are normal. Pupils are equal, round, and reactive to light.   Neck:   Normal range of motion.  Pulmonary/Chest: Breath sounds normal. No respiratory distress. He has no wheezes.   Abdominal: Abdomen is soft.   Musculoskeletal:         General: Normal range of motion.      Cervical back: Normal range of motion.     Neurological: He is alert and oriented to person, place, and time. No sensory deficit. GCS eye subscore is 4. GCS verbal subscore is 5. GCS motor subscore is 6.   Skin: Skin is warm and dry.   Psychiatric: He has a normal mood and affect.         Medical Screening Exam   See Full Note    ED Course   Procedures  Labs Reviewed   CBC WITH DIFFERENTIAL - Abnormal       Result Value    WBC 8.45      RBC 4.25 (*)     Hemoglobin 12.1 (*)     Hematocrit 37.8 (*)     MCV 88.9      MCH 28.5      MCHC 32.0      RDW 14.6 (*)     Platelet Count 215      MPV 10.8      Neutrophils % 81.8 (*)     Lymphocytes % 11.0 (*)     Monocytes % 6.4 (*)     Eosinophils % 0.4 (*)     Basophils % 0.2      Immature Granulocytes % 0.2      nRBC, Auto 0.0      Neutrophils, Abs 6.91      Lymphocytes, Absolute 0.93 (*)     Monocytes, Absolute 0.54      Eosinophils, Absolute 0.03      Basophils, Absolute 0.02      Immature Granulocytes, Absolute 0.02      nRBC, Absolute 0.00      Diff Type Auto     AMMONIA - Normal    Ammonia 26     CBC W/ AUTO DIFFERENTIAL    Narrative:     The following orders were created for panel order CBC auto differential.  Procedure                               Abnormality         Status                     ---------                               -----------         ------                      CBC with Differential[[id number]]       Abnormal            Final result                 Please view results for these tests on the individual orders.   COMPREHENSIVE METABOLIC PANEL    Sodium 140      Potassium 3.9      Chloride 107      CO2 25      Anion Gap 12      Glucose 97      BUN 19      Creatinine 1.02      BUN/Creatinine Ratio 19      Calcium 9.1      Total Protein 6.3      Albumin 3.5      Globulin 2.8      A/G Ratio 1.3      Bilirubin, Total 0.6      Alk Phos 56      ALT 12      AST 21      eGFR 80     URINALYSIS, REFLEX TO URINE CULTURE    Color, UA Light Yellow      Clarity, UA Clear      pH, UA 6.0      Leukocytes, UA Negative      Nitrites, UA Negative      Protein, UA Negative      Glucose, UA Normal      Ketones, UA Negative      Urobilinogen, UA Normal      Bilirubin, UA Negative      Blood, UA Negative      Specific Gravity, UA 1.020            Imaging Results              CT Head Without Contrast (Final result)  Result time 03/13/25 12:53:05      Final result by Sergey Guzman MD (03/13/25 12:53:05)                   Impression:      No CT evidence of acute intracranial abnormality.    No detrimental change when compared with 01/11/2025.      Electronically signed by: Sergey Guzman MD  Date:    03/13/2025  Time:    12:53               Narrative:    EXAMINATION:  CT HEAD WITHOUT CONTRAST    CLINICAL HISTORY:  Mental status change, unknown cause;    TECHNIQUE:  Low dose axial images were obtained through the head.  Coronal and sagittal reformations were also performed. Contrast was not administered.    COMPARISON:  01/11/2025.    FINDINGS:  Remote left parieto-occipital lobe infarct as seen previously.  Probable mild chronic microvascular ischemic changes.  Brain parenchyma is similar to the prior study.    No evidence of acute territorial infarct, hemorrhage, mass effect, or midline shift.    Ventricles are similar in size and configuration.    No displaced calvarial  fracture.    Minimal mucosal thickening in the paranasal sinuses.  No air-fluid levels.  Mastoid air cells are essentially clear.                                       X-Ray Chest AP Portable (Final result)  Result time 03/13/25 13:05:47      Final result by Jerome Vieira MD (03/13/25 13:05:47)                   Impression:      No evidence of acute cardiopulmonary disease.      Electronically signed by: Jerome Vieira  Date:    03/13/2025  Time:    13:05               Narrative:    EXAMINATION:  XR CHEST AP PORTABLE    CLINICAL HISTORY:  Disorientation, unspecified    FINDINGS:  Portable chest radiograph at 12:31 hours compared to prior exams shows median sternotomy wires and mediastinal surgical clips.  The cardiomediastinal silhouette and pulmonary vasculature are within normal limits.    The lungs are normally expanded, with no consolidation, large pleural effusion, or evidence of pulmonary edema. No pneumothorax. There are no significant osseous abnormalities.                                       Medications - No data to display  Medical Decision Making             ED Course as of 03/13/25 1549   Thu Mar 13, 2025   1202 I have personally seen and examined patient and agree with history and physical exam documented by resident.  Patient reports transient confusion.  No focal neurologic deficits on exam. [BB]      ED Course User Index  [BB] Frank Sauer MD        Patient presented with brief episode of confusion that occurred earlier in the morning. CT head, lab work, CXR and UA was unremarkable for any acute abnormalities.                    Clinical Impression:   Final diagnoses:  [G45.9] TIA (transient ischemic attack) (Primary)        ED Disposition Condition    Discharge Stable          ED Prescriptions    None       Follow-up Information       Follow up With Specialties Details Why Contact Info    Leesa Martínez, NP Family Medicine Schedule an appointment as soon as possible for a visit in 3 days ED  follow up [facility] [address]  [phone]               Pretty Pelayo MD  Resident  [phone]         [1]   Social History  Tobacco Use    Smoking status: Former     Current packs/day: 0.00     Types: Cigarettes     Quit date:      Years since quittin.2     Passive exposure: Past    Smokeless tobacco: Never   Substance Use Topics    Alcohol use: Not Currently    Drug use: Yes     Types: Marijuana        Frank Sauer MD  [phone]

## 2025-03-14 ENCOUNTER — TELEPHONE (OUTPATIENT)
Dept: EMERGENCY MEDICINE | Facility: HOSPITAL | Age: 70
End: 2025-03-14
Payer: MEDICARE

## 2025-03-15 LAB
TESTOST FREE SERPL-MCNC: 13.2 NG/DL (ref 3.47–13)
TESTOST SERPL-MCNC: 754 NG/DL (ref 240–950)

## 2025-03-17 ENCOUNTER — RESULTS FOLLOW-UP (OUTPATIENT)
Dept: FAMILY MEDICINE | Facility: CLINIC | Age: 70
End: 2025-03-17

## 2025-03-18 LAB
OHS QRS DURATION: 92 MS
OHS QTC CALCULATION: 484 MS

## 2025-03-21 ENCOUNTER — TELEPHONE (OUTPATIENT)
Dept: CARDIOLOGY | Facility: CLINIC | Age: 70
End: 2025-03-21
Payer: MEDICARE

## 2025-03-21 DIAGNOSIS — I48.0 PAROXYSMAL ATRIAL FIBRILLATION: Primary | ICD-10-CM

## 2025-03-21 NOTE — TELEPHONE ENCOUNTER
----- Message from Nora sent at 3/21/2025  9:37 AM CDT -----  Regarding: MEDICATION  Please return call to patient at [phone].RE: Medication Thanks

## 2025-03-21 NOTE — TELEPHONE ENCOUNTER
Eliquis 5 mg   Lot # [id number]  Exp: 06/26  14 tabs per box  2 boxes given.    Also patient assistance to be utilized.

## 2025-03-25 ENCOUNTER — TELEPHONE (OUTPATIENT)
Dept: PHARMACY | Facility: CLINIC | Age: 70
End: 2025-03-25
Payer: MEDICARE

## 2025-03-25 NOTE — TELEPHONE ENCOUNTER
Welcome letter sent via letter to inform patient of the application process for Eliquis. Please ensure the chart reflects a current order for the requested medication. Follow-up phone call will be made to patient in 5 business days.    Your help is appreciated  Tad Chan  Pharmacy Patient Assistance Team

## 2025-03-25 NOTE — TELEPHONE ENCOUNTER
----- Message from Tech Chiki sent at 3/21/2025 12:28 PM CDT -----  Regarding: FW: Order for ELADIO BOUDREAUX    ----- Message -----  From: Margaert Mccormick LPN  Sent: 3/21/2025  11:56 AM CDT  To: Pharmacy Patient Assistance Team  Subject: Order for ELADIO BOUDREAUX

## 2025-03-25 NOTE — LETTER
March 25, 2025    Mitul Torres  [address] [place] [address]               Dear Mr. Torres,      My name is Tad Chan  I am reaching out on behalf of [facility]s Pharmacy Patient Assistance Team in regards to your request for medication assistance. Our goal is to assist qualified [facility] patients with obtaining financial assistance for prescribed medications.     Please note that enrollment into available support may require the following documents:      Proof of household Income (such as social security award letter, pension statement,1040)  Copy of all insurance cards (front and back)  Print out from your insurance or pharmacy showing how much you have spent on prescriptions for the current year  Completed Medication Access [facility] Authorization Forms        Please reach out to my phone number below if you are still in need of assistance with your medications. Follow-up call will be made in 5 business days. We look forward to hearing from you soon!    Thank you for choosing [facility] for your healthcare needs      Sincerely  Tad LEE @[phone]  Pharmacy Patient Assistance Team  [address] [place] [address]  [address] [place] [id number]  Fax: [phone]  Email: [email or web address]

## 2025-04-03 NOTE — PROGRESS NOTES
"Referral to dr. Sofia.ED  Mitul Torres presented for a follow-up Medicare AWV today. The following components were reviewed and updated:    Medical history  Family History  Social history  Allergies and Current Medications  Health Risk Assessment  Health Maintenance  Care Team    **See Completed Assessments for Annual Wellness visit with in the encounter summary    The following assessments were completed:  Depression Screening  Cognitive function Screening  Timed Get Up Test  Whisper Test      Opioid documentation:      Patient does not have a current opioid prescription.          Vitals:    04/10/25 0820 04/10/25 0848   BP: (!) 170/92 (!) 160/80   BP Location: Left arm    Patient Position: Sitting    Pulse: 60    Temp: 98.3 °F (36.8 °C)    TempSrc: Oral    SpO2: 95%    Weight: 73.9 kg (163 lb)    Height: 5' 7.5" (1.715 m)      Body mass index is 25.15 kg/m².       Physical Exam  Vitals reviewed.   Constitutional:       Appearance: Normal appearance.   HENT:      Right Ear: Tympanic membrane, ear canal and external ear normal.      Left Ear: Tympanic membrane, ear canal and external ear normal.      Nose: Nose normal.      Mouth/Throat:      Mouth: Mucous membranes are moist.      Pharynx: Oropharynx is clear. No posterior oropharyngeal erythema.   Eyes:      Extraocular Movements: Extraocular movements intact.      Conjunctiva/sclera: Conjunctivae normal.      Pupils: Pupils are equal, round, and reactive to light.   Cardiovascular:      Rate and Rhythm: Normal rate and regular rhythm.      Pulses: Normal pulses.      Heart sounds: Normal heart sounds.   Pulmonary:      Effort: Pulmonary effort is normal. No respiratory distress.      Breath sounds: Normal breath sounds. No wheezing or rales.   Abdominal:      General: Bowel sounds are normal.      Palpations: Abdomen is soft.      Tenderness: There is no abdominal tenderness. There is no guarding.   Musculoskeletal:         General: Normal range of motion. "      Cervical back: Normal range of motion and neck supple. No tenderness.   Skin:     General: Skin is warm and dry.      Capillary Refill: Capillary refill takes less than 2 seconds.   Neurological:      General: No focal deficit present.      Mental Status: He is alert and oriented to person, place, and time.   Psychiatric:         Mood and Affect: Mood normal.         Behavior: Behavior normal.       Diagnoses and health risks identified today and associated recommendations/orders:  1. Encounter for subsequent annual wellness visit (AWV) in Medicare patient    2. Hypertension, unspecified type  BP not at goal today. Patient stopped taking Lisinopril because it made his BP too low. Patient reports normal BP readings at home. Advised on DASH diet and to continue daily BP checks. Return to clinic in 1 week for BP check.     3. Coronary artery disease involving native coronary artery of native heart without angina pectoris  Patient stopped taking Atorvastatin because he never had high cholesterol. Advised on medication use as preventive measure. Advised on low cholesterol and fat diet. Currently taking ASA and Eliquis.  Overview:  [facility] 12/14/2023- Dr. Be    Non-obstructive CAD  Single vessel CAD with a 30% prox RCA lesion  LVEF 55%  LVEDP 6 mmHg  Severe MR      4. Paroxysmal atrial fibrillation  Followed by cardiology and currently taking Eliquis and ASA  Overview:  Patient presented to [facility] 2/10/2024 with c/o sudden onset lightheadedness, nausea and palpitations. EKG demonstrated atrial fibrillation with RVR; rate 140 bpm in the presence of GIB. The patient was airlifted to [facility] with an urgent GI consult. The atrial fibrillation was converted chemically.      5. Severe mitral regurgitation  Followed by cardiology.  Overview:  TTE 2/10/2024      Conclusion: When compared to prior study, 2/6/2024. Patient in atrial   fibrillation during study. Cardiac evaluation made difficult by abnormal   rhythm.     Left Ventricle: Moderately to severely increased wall thickness. Normal   (55 - 60%) ejection fraction. Diastolic function could not be graded due   to A-Fib.     Left Atrium: Left atrium volume index is severely increased.     Right Atrium: Right atrium is mildly dilated.     Mitral Valve: Normal structure status post mitral valve ring repair. No   mitral regurgitation.     Tricuspid Valve: Trace tricuspid regurgitation. RVSP is 34.00.     Pericardium: No pericardial effusion. No cardiac tamponade present.      1/30/2024 complex mitral valve repair and MOY occlusion - Dr. Daniels- [facility]'s     GAEL 12/14/2023- Dr. Levine  Conclusion: Severe mitral regurgitation secondary to severe prolpase of the posterior leaflet of the mitral valve with flail P2 segment.         6. Chronic kidney disease, stage 3a  Labs today. Patient stopped taking Lisinopril  Overview:  eGFR 60      7. Cerebrovascular accident (CVA), unspecified mechanism  Followed by cardiology and is currently taking Eliquis and ASA.    8. Other reduced mobility    9. BMI 25.0-25.9,adult    10. Screening for prostate cancer  PSA screening today.      Erectile dysfunction  Labs discussed with patient. He is requesting referral to urology for further evaluation of ED. He does not want to start medications at this time.     Provided Mitul with a 5-10 year written screening schedule and personal prevention plan. Recommendations were developed using the USPSTF age appropriate recommendations. Education, counseling, and referrals were provided as needed.  After Visit Summary printed and given to patient which includes a list of additional screenings\tests needed.    Follow up in about 1 year (around 4/10/2026).    Covid vaccine - declined, Tetanus vaccine - declined, Shingles vaccine - declined, Influenza vaccine - declined, Pneumonia vaccine - declined, PSA screening - ordered this visit, Colon screening - Colonoscopy scheduled for 6/12/25.    MINESH MARTINEZ  KWADWO CHOI      I offered to discuss advanced care planning, including how to pick a person who would make decisions for you if you were unable to make them for yourself, called a health care power of , and what kind of decisions you might make such as use of life sustaining treatments such as ventilators and tube feeding when faced with a life limiting illness recorded on a living will that they will need to know. (How you want to be cared for as you near the end of your natural life)     X Patient is interested in learning more about how to make advanced directives.  I provided them paperwork and offered to discuss this with them.

## 2025-04-07 ENCOUNTER — OFFICE VISIT (OUTPATIENT)
Dept: CARDIOLOGY | Facility: CLINIC | Age: 70
End: 2025-04-07
Payer: MEDICARE

## 2025-04-07 VITALS
BODY MASS INDEX: 23.31 KG/M2 | HEART RATE: 50 BPM | SYSTOLIC BLOOD PRESSURE: 140 MMHG | OXYGEN SATURATION: 97 % | HEIGHT: 70 IN | WEIGHT: 162.81 LBS | DIASTOLIC BLOOD PRESSURE: 80 MMHG

## 2025-04-07 DIAGNOSIS — Z98.890 S/P MITRAL VALVE REPAIR: ICD-10-CM

## 2025-04-07 DIAGNOSIS — I48.0 PAROXYSMAL ATRIAL FIBRILLATION: Primary | ICD-10-CM

## 2025-04-07 PROCEDURE — 99214 OFFICE O/P EST MOD 30 MIN: CPT | Mod: PBBFAC | Performed by: STUDENT IN AN ORGANIZED HEALTH CARE EDUCATION/TRAINING PROGRAM

## 2025-04-07 PROCEDURE — 99999 PR PBB SHADOW E&M-EST. PATIENT-LVL IV: CPT | Mod: PBBFAC,,, | Performed by: STUDENT IN AN ORGANIZED HEALTH CARE EDUCATION/TRAINING PROGRAM

## 2025-04-07 PROCEDURE — 99214 OFFICE O/P EST MOD 30 MIN: CPT | Mod: S$PBB,,, | Performed by: STUDENT IN AN ORGANIZED HEALTH CARE EDUCATION/TRAINING PROGRAM

## 2025-04-07 RX ORDER — METOPROLOL SUCCINATE 25 MG/1
25 TABLET, EXTENDED RELEASE ORAL DAILY
Qty: 90 TABLET | Refills: 3 | Status: SHIPPED | OUTPATIENT
Start: 2025-04-07

## 2025-04-07 NOTE — PROGRESS NOTES
Samples of Eliquis given  5mg Lot # [id number]          Exp: 6/26  1 box of 14 given    Eliquis 2.5mg  Lot# [id number]  Exp: 3/26  2 boxes of 14 tabs given

## 2025-04-07 NOTE — PATIENT INSTRUCTIONS
Refer to Dr. Curiel   Please call if you have not heard back from pt assistance by next week   Refills for metoprolol and eliquis sent   Follow-up in 6 months

## 2025-04-10 ENCOUNTER — OFFICE VISIT (OUTPATIENT)
Dept: FAMILY MEDICINE | Facility: CLINIC | Age: 70
End: 2025-04-10
Payer: MEDICARE

## 2025-04-10 ENCOUNTER — RESULTS FOLLOW-UP (OUTPATIENT)
Dept: FAMILY MEDICINE | Facility: CLINIC | Age: 70
End: 2025-04-10

## 2025-04-10 VITALS
SYSTOLIC BLOOD PRESSURE: 160 MMHG | BODY MASS INDEX: 24.71 KG/M2 | DIASTOLIC BLOOD PRESSURE: 80 MMHG | TEMPERATURE: 98 F | WEIGHT: 163 LBS | HEIGHT: 68 IN | HEART RATE: 60 BPM | OXYGEN SATURATION: 95 %

## 2025-04-10 DIAGNOSIS — N18.31 CHRONIC KIDNEY DISEASE, STAGE 3A: ICD-10-CM

## 2025-04-10 DIAGNOSIS — I48.0 PAROXYSMAL ATRIAL FIBRILLATION: ICD-10-CM

## 2025-04-10 DIAGNOSIS — Z74.09 OTHER REDUCED MOBILITY: ICD-10-CM

## 2025-04-10 DIAGNOSIS — I34.0 SEVERE MITRAL REGURGITATION: ICD-10-CM

## 2025-04-10 DIAGNOSIS — Z12.5 SCREENING FOR PROSTATE CANCER: ICD-10-CM

## 2025-04-10 DIAGNOSIS — I63.9 CEREBROVASCULAR ACCIDENT (CVA), UNSPECIFIED MECHANISM: ICD-10-CM

## 2025-04-10 DIAGNOSIS — I10 HYPERTENSION, UNSPECIFIED TYPE: ICD-10-CM

## 2025-04-10 DIAGNOSIS — N52.9 ERECTILE DYSFUNCTION, UNSPECIFIED ERECTILE DYSFUNCTION TYPE: ICD-10-CM

## 2025-04-10 DIAGNOSIS — I25.10 CORONARY ARTERY DISEASE INVOLVING NATIVE CORONARY ARTERY OF NATIVE HEART WITHOUT ANGINA PECTORIS: ICD-10-CM

## 2025-04-10 DIAGNOSIS — Z00.00 ENCOUNTER FOR SUBSEQUENT ANNUAL WELLNESS VISIT (AWV) IN MEDICARE PATIENT: Primary | ICD-10-CM

## 2025-04-10 LAB — PSA SERPL-MCNC: 3.74 NG/ML

## 2025-04-10 PROCEDURE — G0103 PSA SCREENING: HCPCS | Mod: ,,, | Performed by: CLINICAL MEDICAL LABORATORY

## 2025-04-10 NOTE — PATIENT INSTRUCTIONS
Counseling and Referral of Other Preventative  (Italic type indicates deductible and co-insurance are waived)    Patient Name: Mitul Torres  Today's Date: 4/10/2025    Health Maintenance       Date Due Completion Date    TETANUS VACCINE Never done ---    Colorectal Cancer Screening Never done ---    Shingles Vaccine (1 of 2) Never done ---    Pneumococcal Vaccines (Age 50+) (1 of 1 - PCV) Never done ---    RSV Vaccine (Age 60+ and Pregnant patients) (1 - Risk 60-74 years 1-dose series) Never done ---    Influenza Vaccine (1) Never done ---    COVID-19 Vaccine (1 - 2024-25 season) Never done ---    High Dose Statin 01/23/2026 1/23/2025    Hemoglobin A1c (Prediabetes) 03/10/2026 3/10/2025    Lipid Panel 03/10/2030 3/10/2025        No orders of the defined types were placed in this encounter.      The following information is provided to all patients.  This information is to help you find resources for any of the problems found today that may be affecting your health:                  Living healthy guide: ms.gov    Understanding Diabetes: www.diabetes.org      Eating healthy: www.cdc.gov/healthyweight      CDC home safety checklist: www.cdc.gov/steadi/patient.html      Agency on Aging: ms.gov    Alcoholics anonymous (AA): www.aa.org      Physical Activity: www.maite.nih.gov/vq9nprd      Tobacco use: ms.gov

## 2025-04-15 ENCOUNTER — TELEPHONE (OUTPATIENT)
Dept: NEUROLOGY | Facility: CLINIC | Age: 70
End: 2025-04-15
Payer: MEDICARE

## 2025-04-15 ENCOUNTER — HOSPITAL ENCOUNTER (EMERGENCY)
Facility: HOSPITAL | Age: 70
Discharge: HOME | End: 2025-04-15
Attending: FAMILY MEDICINE
Payer: MEDICARE

## 2025-04-15 VITALS
RESPIRATION RATE: 13 BRPM | TEMPERATURE: 99 F | SYSTOLIC BLOOD PRESSURE: 152 MMHG | OXYGEN SATURATION: 97 % | HEIGHT: 67 IN | WEIGHT: 145 LBS | BODY MASS INDEX: 22.76 KG/M2 | HEART RATE: 61 BPM | DIASTOLIC BLOOD PRESSURE: 78 MMHG

## 2025-04-15 DIAGNOSIS — R29.818 ACUTE FOCAL NEUROLOGICAL DEFICIT: ICD-10-CM

## 2025-04-15 DIAGNOSIS — R56.9 SEIZURE: Primary | ICD-10-CM

## 2025-04-15 DIAGNOSIS — R41.82 ALTERED MENTAL STATUS: ICD-10-CM

## 2025-04-15 LAB
ALBUMIN SERPL BCP-MCNC: 4.2 G/DL (ref 3.4–4.8)
ALBUMIN/GLOB SERPL: 1.1 {RATIO}
ALP SERPL-CCNC: 64 U/L (ref 40–150)
ALT SERPL W P-5'-P-CCNC: 10 U/L
AMPHET UR QL SCN: NEGATIVE
ANION GAP SERPL CALCULATED.3IONS-SCNC: 19 MMOL/L (ref 7–16)
AST SERPL W P-5'-P-CCNC: 18 U/L (ref 11–45)
BARBITURATES UR QL SCN: NEGATIVE
BASOPHILS # BLD AUTO: 0.05 K/UL (ref 0–0.2)
BASOPHILS NFR BLD AUTO: 0.6 % (ref 0–1)
BENZODIAZ METAB UR QL SCN: POSITIVE
BILIRUB SERPL-MCNC: 0.6 MG/DL
BILIRUB UR QL STRIP: NEGATIVE
BUN SERPL-MCNC: 19 MG/DL (ref 8–26)
BUN/CREAT SERPL: 14 (ref 6–20)
CALCIUM SERPL-MCNC: 9.9 MG/DL (ref 8.8–10)
CANNABINOIDS UR QL SCN: POSITIVE
CHLORIDE SERPL-SCNC: 109 MMOL/L (ref 98–107)
CHOLEST SERPL-MCNC: 171 MG/DL
CHOLEST/HDLC SERPL: 2.9 {RATIO}
CLARITY UR: CLEAR
CO2 SERPL-SCNC: 25 MMOL/L (ref 23–31)
COCAINE UR QL SCN: NEGATIVE
COLOR UR: COLORLESS
CREAT SERPL-MCNC: 1.35 MG/DL (ref 0.72–1.25)
DIFFERENTIAL METHOD BLD: ABNORMAL
EGFR (NO RACE VARIABLE) (RUSH/TITUS): 57 ML/MIN/1.73M2
EOSINOPHIL # BLD AUTO: 0.17 K/UL (ref 0–0.5)
EOSINOPHIL NFR BLD AUTO: 2.1 % (ref 1–4)
ERYTHROCYTE [DISTWIDTH] IN BLOOD BY AUTOMATED COUNT: 13.7 % (ref 11.5–14.5)
ETHANOL SERPL-MCNC: <10 MG/DL
GLOBULIN SER-MCNC: 3.7 G/DL (ref 2–4)
GLUCOSE SERPL-MCNC: 111 MG/DL (ref 70–105)
GLUCOSE SERPL-MCNC: 119 MG/DL (ref 82–115)
GLUCOSE UR STRIP-MCNC: NORMAL MG/DL
HCO3 UR-SCNC: 25.8 MMOL/L (ref 24–28)
HCT VFR BLD AUTO: 47.1 % (ref 40–54)
HCT VFR BLD CALC: 39 % (ref 35–51)
HDLC SERPL-MCNC: 60 MG/DL (ref 35–60)
HGB BLD-MCNC: 14.6 G/DL (ref 13.5–18)
IMM GRANULOCYTES # BLD AUTO: 0.02 K/UL (ref 0–0.04)
IMM GRANULOCYTES NFR BLD: 0.3 % (ref 0–0.4)
INR BLD: 0.98
KETONES UR STRIP-SCNC: NEGATIVE MG/DL
LACTATE SERPL-SCNC: 0.6 MMOL/L (ref 0.5–2.2)
LACTATE SERPL-SCNC: 4.8 MMOL/L (ref 0.5–2.2)
LDH SERPL L TO P-CCNC: 4.1 MMOL/L (ref 0.3–1.2)
LDLC SERPL CALC-MCNC: 88 MG/DL
LDLC/HDLC SERPL: 1.5 {RATIO}
LEUKOCYTE ESTERASE UR QL STRIP: NEGATIVE
LYMPHOCYTES # BLD AUTO: 2.21 K/UL (ref 1–4.8)
LYMPHOCYTES NFR BLD AUTO: 27.8 % (ref 27–41)
MCH RBC QN AUTO: 29 PG (ref 27–31)
MCHC RBC AUTO-ENTMCNC: 31 G/DL (ref 32–36)
MCV RBC AUTO: 93.5 FL (ref 80–96)
MONOCYTES # BLD AUTO: 0.75 K/UL (ref 0–0.8)
MONOCYTES NFR BLD AUTO: 9.4 % (ref 2–6)
MPC BLD CALC-MCNC: 11.1 FL (ref 9.4–12.4)
NEUTROPHILS # BLD AUTO: 4.75 K/UL (ref 1.8–7.7)
NEUTROPHILS NFR BLD AUTO: 59.8 % (ref 53–65)
NITRITE UR QL STRIP: NEGATIVE
NONHDLC SERPL-MCNC: 111 MG/DL
NRBC # BLD AUTO: 0 X10E3/UL
NRBC, AUTO (.00): 0 %
OPIATES UR QL SCN: NEGATIVE
PCO2 BLDA: 49 MMHG (ref 41–51)
PCP UR QL SCN: NEGATIVE
PH SMN: 7.33 [PH] (ref 7.32–7.42)
PH UR STRIP: 5.5 PH UNITS
PLATELET # BLD AUTO: 226 K/UL (ref 150–400)
PO2 BLDA: 56 MMHG (ref 25–40)
POC BASE EXCESS: -0.6 MMOL/L (ref -2–3)
POC CO2: 27.3 MMOL/L
POC IONIZED CALCIUM: 1.13 MMOL/L (ref 1.15–1.35)
POC SATURATED O2: 86 % (ref 40–70)
POCT GLUCOSE: 147 MG/DL (ref 60–95)
POTASSIUM BLD-SCNC: 4.1 MMOL/L (ref 3.4–4.5)
POTASSIUM SERPL-SCNC: 4.3 MMOL/L (ref 3.5–5.1)
POTASSIUM SERPL-SCNC: 6.6 MMOL/L (ref 3.5–5.1)
PROT SERPL-MCNC: 7.9 G/DL (ref 5.8–7.6)
PROT UR QL STRIP: NEGATIVE
PROTHROMBIN TIME: 12.9 SECONDS (ref 11.7–14.7)
RBC # BLD AUTO: 5.04 M/UL (ref 4.6–6.2)
RBC # UR STRIP: NEGATIVE /UL
SODIUM BLD-SCNC: 140 MMOL/L (ref 136–145)
SODIUM SERPL-SCNC: 146 MMOL/L (ref 136–145)
SP GR UR STRIP: 1.01
TRIGL SERPL-MCNC: 115 MG/DL (ref 34–140)
TSH SERPL DL<=0.005 MIU/L-ACNC: 1.93 UIU/ML (ref 0.35–4.94)
UROBILINOGEN UR STRIP-ACNC: NORMAL MG/DL
VLDLC SERPL-MCNC: 23 MG/DL
WBC # BLD AUTO: 7.95 K/UL (ref 4.5–11)

## 2025-04-15 PROCEDURE — 94799 UNLISTED PULMONARY SVC/PX: CPT

## 2025-04-15 PROCEDURE — 82962 GLUCOSE BLOOD TEST: CPT

## 2025-04-15 PROCEDURE — 80053 COMPREHEN METABOLIC PANEL: CPT | Performed by: FAMILY MEDICINE

## 2025-04-15 PROCEDURE — 84132 ASSAY OF SERUM POTASSIUM: CPT

## 2025-04-15 PROCEDURE — 99900035 HC TECH TIME PER 15 MIN (STAT)

## 2025-04-15 PROCEDURE — 25000003 PHARM REV CODE 250: Performed by: FAMILY MEDICINE

## 2025-04-15 PROCEDURE — 82803 BLOOD GASES ANY COMBINATION: CPT

## 2025-04-15 PROCEDURE — 83605 ASSAY OF LACTIC ACID: CPT | Performed by: FAMILY MEDICINE

## 2025-04-15 PROCEDURE — 81003 URINALYSIS AUTO W/O SCOPE: CPT | Performed by: FAMILY MEDICINE

## 2025-04-15 PROCEDURE — 63600175 PHARM REV CODE 636 W HCPCS

## 2025-04-15 PROCEDURE — 84295 ASSAY OF SERUM SODIUM: CPT | Mod: 91

## 2025-04-15 PROCEDURE — 82330 ASSAY OF CALCIUM: CPT

## 2025-04-15 PROCEDURE — 82077 ASSAY SPEC XCP UR&BREATH IA: CPT | Performed by: FAMILY MEDICINE

## 2025-04-15 PROCEDURE — 83605 ASSAY OF LACTIC ACID: CPT

## 2025-04-15 PROCEDURE — 85014 HEMATOCRIT: CPT

## 2025-04-15 PROCEDURE — 63600175 PHARM REV CODE 636 W HCPCS: Performed by: FAMILY MEDICINE

## 2025-04-15 PROCEDURE — 96375 TX/PRO/DX INJ NEW DRUG ADDON: CPT

## 2025-04-15 PROCEDURE — 93010 ELECTROCARDIOGRAM REPORT: CPT | Mod: ,,, | Performed by: INTERNAL MEDICINE

## 2025-04-15 PROCEDURE — 96376 TX/PRO/DX INJ SAME DRUG ADON: CPT

## 2025-04-15 PROCEDURE — 85610 PROTHROMBIN TIME: CPT | Performed by: FAMILY MEDICINE

## 2025-04-15 PROCEDURE — 80061 LIPID PANEL: CPT | Performed by: FAMILY MEDICINE

## 2025-04-15 PROCEDURE — 84443 ASSAY THYROID STIM HORMONE: CPT | Performed by: FAMILY MEDICINE

## 2025-04-15 PROCEDURE — 93005 ELECTROCARDIOGRAM TRACING: CPT

## 2025-04-15 PROCEDURE — 96374 THER/PROPH/DIAG INJ IV PUSH: CPT

## 2025-04-15 PROCEDURE — 82947 ASSAY GLUCOSE BLOOD QUANT: CPT

## 2025-04-15 PROCEDURE — 80307 DRUG TEST PRSMV CHEM ANLYZR: CPT | Performed by: FAMILY MEDICINE

## 2025-04-15 PROCEDURE — 96361 HYDRATE IV INFUSION ADD-ON: CPT

## 2025-04-15 PROCEDURE — 85025 COMPLETE CBC W/AUTO DIFF WBC: CPT | Performed by: FAMILY MEDICINE

## 2025-04-15 PROCEDURE — 99285 EMERGENCY DEPT VISIT HI MDM: CPT | Mod: 25

## 2025-04-15 PROCEDURE — 84132 ASSAY OF SERUM POTASSIUM: CPT | Performed by: FAMILY MEDICINE

## 2025-04-15 PROCEDURE — 36415 COLL VENOUS BLD VENIPUNCTURE: CPT | Performed by: FAMILY MEDICINE

## 2025-04-15 RX ORDER — MIDAZOLAM HYDROCHLORIDE 2 MG/2ML
INJECTION, SOLUTION INTRAMUSCULAR; INTRAVENOUS
Status: COMPLETED
Start: 2025-04-15 | End: 2025-04-15

## 2025-04-15 RX ORDER — MIDAZOLAM HYDROCHLORIDE 2 MG/2ML
2 INJECTION, SOLUTION INTRAMUSCULAR; INTRAVENOUS
Status: COMPLETED | OUTPATIENT
Start: 2025-04-15 | End: 2025-04-15

## 2025-04-15 RX ORDER — LEVETIRACETAM 500 MG/5ML
1500 INJECTION, SOLUTION, CONCENTRATE INTRAVENOUS
Status: COMPLETED | OUTPATIENT
Start: 2025-04-15 | End: 2025-04-15

## 2025-04-15 RX ORDER — LORAZEPAM 2 MG/ML
2 INJECTION INTRAMUSCULAR
Status: COMPLETED | OUTPATIENT
Start: 2025-04-15 | End: 2025-04-15

## 2025-04-15 RX ORDER — LORAZEPAM 2 MG/ML
INJECTION INTRAMUSCULAR
Status: DISCONTINUED
Start: 2025-04-15 | End: 2025-04-15 | Stop reason: HOSPADM

## 2025-04-15 RX ORDER — MIDAZOLAM HYDROCHLORIDE 2 MG/2ML
4 INJECTION, SOLUTION INTRAMUSCULAR; INTRAVENOUS
Status: COMPLETED | OUTPATIENT
Start: 2025-04-15 | End: 2025-04-15

## 2025-04-15 RX ORDER — LEVETIRACETAM 500 MG/1
500 TABLET ORAL 2 TIMES DAILY
Qty: 60 TABLET | Refills: 1 | Status: SHIPPED | OUTPATIENT
Start: 2025-04-15 | End: 2025-04-21

## 2025-04-15 RX ORDER — LORAZEPAM 2 MG/ML
2 INJECTION INTRAMUSCULAR
Status: DISCONTINUED | OUTPATIENT
Start: 2025-04-15 | End: 2025-04-15

## 2025-04-15 RX ADMIN — MIDAZOLAM HYDROCHLORIDE 2 MG: 2 INJECTION, SOLUTION INTRAMUSCULAR; INTRAVENOUS at 08:04

## 2025-04-15 RX ADMIN — MIDAZOLAM HYDROCHLORIDE 2 MG: 1 INJECTION, SOLUTION INTRAMUSCULAR; INTRAVENOUS at 08:04

## 2025-04-15 RX ADMIN — MIDAZOLAM HYDROCHLORIDE 4 MG: 1 INJECTION, SOLUTION INTRAMUSCULAR; INTRAVENOUS at 09:04

## 2025-04-15 RX ADMIN — LEVETIRACETAM 1500 MG: 100 INJECTION INTRAVENOUS at 03:04

## 2025-04-15 RX ADMIN — LORAZEPAM 2 MG: 2 INJECTION, SOLUTION INTRAMUSCULAR; INTRAVENOUS at 08:04

## 2025-04-15 RX ADMIN — SODIUM CHLORIDE 1000 ML: 9 INJECTION, SOLUTION INTRAVENOUS at 10:04

## 2025-04-15 RX ADMIN — SODIUM CHLORIDE 1000 ML: 9 INJECTION, SOLUTION INTRAVENOUS at 09:04

## 2025-04-15 RX ADMIN — LEVETIRACETAM 1500 MG: 100 INJECTION, SOLUTION INTRAVENOUS at 08:04

## 2025-04-15 RX ADMIN — MIDAZOLAM HYDROCHLORIDE 4 MG: 2 INJECTION, SOLUTION INTRAMUSCULAR; INTRAVENOUS at 09:04

## 2025-04-15 NOTE — ED PROVIDER NOTES
Encounter Date: 4/15/2025       History     Chief Complaint   Patient presents with    Altered Mental Status     Presents to ED with coworker for c/o AMS that was noticed 1 hour PTA. Patient unable to state any information about himself or what is going on. CODE NORRIS initiated.     Patient comes in with co-worker patient has a history of multiple mini strokes as per the co-worker.  And has seizure activity the patient arrived to the ER was having difficulty with his right arm and right leg he was also lateralizing to the right side.  He was tense unable to move the right side.  While in the CT scan he had a sudden onset of grand mal seizure.  Took multiple shots of Versed and Ativan 2 help the patient relax enough to where the seizure was gone Keppra 1500 was started.  IV patient received up to 8 mg of Versed 2 mg of Ativan IV      Last normal was approximately 15-20 minutes prior to arrival at about 10:15 a.m.---        Review of patient's allergies indicates:   Allergen Reactions    Sulfamethoxazole-trimethoprim Rash     Note: Emil Johson Syndrome     Past Medical History:   Diagnosis Date    A-fib     GERD (gastroesophageal reflux disease)     Hyperlipidemia     Hypertension     Seizures     Stroke      Past Surgical History:   Procedure Laterality Date    ANKLE FRACTURE SURGERY Right     CYSTOURETEROSCOPY, WITH HOLMIUM LASER LITHOTRIPSY OF URETERAL CALCULUS AND STENT INSERTION Left 01/13/2025    Procedure: CYSTOURETEROSCOPY, WITH HOLMIUM LASER LITHOTRIPSY OF URETERAL CALCULUS AND STENT INSERTION;  Surgeon: Enrique Mcdaniel MD;  Location: [facility] OR;  Service: Urology;  Laterality: Left;    ECHOCARDIOGRAM,TRANSESOPHAGEAL  12/14/2023    Procedure: Transesophageal echo (GAEL) intra-procedure log documentation;  Surgeon: Jerry Be DO;  Location: [facility] CATH LAB;  Service: Cardiology;;    LEFT HEART CATHETERIZATION Left 12/14/2023    Procedure: Left heart cath;  Surgeon: Jerry Be DO;  Location:  [facility] CATH LAB;  Service: Cardiology;  Laterality: Left;    MITRAL VALVE REPAIR      PINNING-CLOSED-ARM Left     REPAIR OF HEART VALVE      RIGHT HEART CATHETERIZATION Right 12/14/2023    Procedure: INSERTION, CATHETER, RIGHT HEART;  Surgeon: Jerry Be DO;  Location: [facility] CATH LAB;  Service: Cardiology;  Laterality: Right;     Family History   Problem Relation Name Age of Onset    Cataracts Mother      Glaucoma Mother      No Known Problems Father      Heart disease Brother          stent placement    No Known Problems Brother      Diabetes Maternal Grandfather       Social History[1]  Review of Systems   Constitutional:  Positive for fatigue. Negative for fever.   HENT: Negative.  Negative for sore throat.    Eyes: Negative.    Respiratory: Negative.  Negative for shortness of breath.    Cardiovascular: Negative.  Negative for chest pain.   Gastrointestinal: Negative.  Negative for nausea.   Endocrine: Negative.    Genitourinary: Negative.  Negative for dysuria.   Musculoskeletal: Negative.  Negative for back pain.   Skin: Negative.  Negative for rash.   Allergic/Immunologic: Negative.    Neurological: Negative.  Negative for weakness.   Hematological: Negative.  Does not bruise/bleed easily.   Psychiatric/Behavioral: Negative.         Physical Exam     Initial Vitals   BP Pulse Resp Temp SpO2   04/15/25 0841 04/15/25 0839 04/15/25 0839 -- 04/15/25 0826   (!) 212/138 (!) 151 17  96 %      MAP       --                Physical Exam    Constitutional: He appears well-developed and well-nourished.   HENT:   Head: Normocephalic and atraumatic.   Right Ear: External ear normal.   Left Ear: External ear normal.   Nose: Nose normal. Mouth/Throat: Oropharynx is clear and moist.   Eyes: Conjunctivae and EOM are normal. Pupils are equal, round, and reactive to light.   Neck: Neck supple.   Normal range of motion.  Cardiovascular:  Normal rate, regular rhythm, normal heart sounds and intact distal pulses.            Pulmonary/Chest: Breath sounds normal.   Abdominal: Abdomen is soft. Bowel sounds are normal.   Genitourinary:    Prostate and penis normal.     Musculoskeletal:         General: Normal range of motion.      Cervical back: Normal range of motion and neck supple.     Neurological: He is alert and oriented to person, place, and time. He has normal strength and normal reflexes.   THE INITIAL NIH STROKE SCALE WAS AT 19.  LATER AFTER THE SEIZURE THE PATIENT IS MOVING ALL EXTREMITIES WITH NO COMPLICATIONS POSTICTAL.  THE NIH SCORE DROPPED TO 3   Skin: Skin is warm and dry.   Psychiatric: He has a normal mood and affect. His behavior is normal. Judgment and thought content normal.         Medical Screening Exam   See Full Note    ED Course   Procedures  Labs Reviewed   COMPREHENSIVE METABOLIC PANEL - Abnormal       Result Value    Sodium 146 (*)     Potassium 6.6 (*)     Chloride 109 (*)     CO2 25      Anion Gap 19 (*)     Glucose 119 (*)     BUN 19      Creatinine 1.35 (*)     BUN/Creatinine Ratio 14      Calcium 9.9      Total Protein 7.9 (*)     Albumin 4.2      Globulin 3.7      A/G Ratio 1.1      Bilirubin, Total 0.6      Alk Phos 64      ALT 10      AST 18      eGFR 57 (*)    CBC WITH DIFFERENTIAL - Abnormal    WBC 7.95      RBC 5.04      Hemoglobin 14.6      Hematocrit 47.1      MCV 93.5      MCH 29.0      MCHC 31.0 (*)     RDW 13.7      Platelet Count 226      MPV 11.1      Neutrophils % 59.8      Lymphocytes % 27.8      Monocytes % 9.4 (*)     Eosinophils % 2.1      Basophils % 0.6      Immature Granulocytes % 0.3      nRBC, Auto 0.0      Neutrophils, Abs 4.75      Lymphocytes, Absolute 2.21      Monocytes, Absolute 0.75      Eosinophils, Absolute 0.17      Basophils, Absolute 0.05      Immature Granulocytes, Absolute 0.02      nRBC, Absolute 0.00      Diff Type Auto     LACTIC ACID, PLASMA - Abnormal    Lactic Acid 4.8 (*)    DRUG SCREEN, URINE (BEAKER) - Abnormal    Barbiturates, Urine Negative       Benzodiazepine, Urine Positive (*)     Opiates, Urine Negative      Phencyclidine, Urine Negative      Amphetamine, Urine Negative      Cannabinoid, Urine Positive (*)     Cocaine, Urine Negative      Narrative:     This screen includes the following classes of drugs at the listed cut-off:    Benzodiazepines 200 ng/ml  Cocaine metabolite 300 ng/ml  Opiates 2000 ng/ml  Barbiturates 200 ng/ml  Amphetamines 500 ng/ml  Marijuana metabs (THC) 50 ng/ml  Phencyclidine (PCP) 25 ng/ml    This is a screening test. If results do not correlate with clinical presentation, then a confirmatory send out test is advised.   POCT GLUCOSE MONITORING CONTINUOUS - Abnormal    POC Glucose 111 (*)    PROTIME-INR - Normal    PT 12.9      INR 0.98     TSH - Normal    TSH 1.925     POTASSIUM - Normal    Potassium 4.3     LACTIC ACID, PLASMA - Normal    Lactic Acid 0.6     ALCOHOL,MEDICAL (ETHANOL) - Normal    Ethanol <10     CBC W/ AUTO DIFFERENTIAL    Narrative:     The following orders were created for panel order CBC W/ AUTO DIFFERENTIAL.  Procedure                               Abnormality         Status                     ---------                               -----------         ------                     CBC with Differential[[id number]]       Abnormal            Final result                 Please view results for these tests on the individual orders.   LIPID PANEL    Triglycerides 115      Cholesterol 171      HDL Cholesterol 60      Cholesterol/HDL Ratio (Risk Factor) 2.9      Non-      LDL Calculated 88      LDL/HDL 1.5      VLDL 23     URINALYSIS    Color, UA Colorless      Clarity, UA Clear      pH, UA 5.5      Leukocytes, UA Negative      Nitrites, UA Negative      Protein, UA Negative      Glucose, UA Normal      Ketones, UA Negative      Urobilinogen, UA Normal      Bilirubin, UA Negative      Blood, UA Negative      Specific [place], UA 1.013     POCT GLUCOSE MONITORING CONTINUOUS          Imaging Results               CT Head Without Contrast (Final result)  Result time 04/15/25 09:48:54      Final result by Grey Tran Jr., MD (04/15/25 09:48:54)                   Impression:      Motion limited exam.  Old ischemia left occipital lobe similar to previous study.  No obvious hemorrhage or mass effect.    RECOMMENDATIONS: Depending on symptoms, repeat exam in 12-24 hours and or follow-up with MRI would be helpful.      Electronically signed by: Grey Tran  Date:    04/15/2025  Time:    09:48               Narrative:    EXAMINATION:  CT HEAD WITHOUT CONTRAST    CLINICAL HISTORY:  Neuro deficit, acute, stroke suspected;    M 68 y/o    TECHNIQUE:  Axial images were obtained from skull base to vertex without intravenous contrast administration. Coronal and sagittal reconstructions were generated.    COMPARISON:  March 13, 2025    FINDINGS:  Exam is limited due to motion.  Old ischemia left occipital lobe medially similar to previous exam.  No definite acute parenchymal abnormality.  No extra-axial.  No acute intracranial hemorrhage. No mass effect or edema.    Ventricles are normal in size and configuration. No hydrocephalus, midline shift, or extra-axial fluid collection.    Normal appearance of the calvarium and skull base. Visualized paranasal sinuses and mastoid air cells are clear.                                       Medications   levETIRAcetam injection 1,500 mg (1,500 mg Intravenous Given 4/15/25 0840)   midazolam (PF) (VERSED) 1 mg/mL injection 2 mg (2 mg Intravenous Given 4/15/25 0830)   midazolam (PF) (VERSED) 1 mg/mL injection 2 mg (2 mg Intravenous Given 4/15/25 0835)   midazolam (PF) (VERSED) 1 mg/mL injection 2 mg (2 mg Intravenous Given 4/15/25 0844)   LORazepam injection 2 mg (2 mg Intravenous Given 4/15/25 0820)   midazolam (PF) (VERSED) 1 mg/mL injection 4 mg (4 mg Intravenous Given 4/15/25 0906)   sodium chloride 0.9% bolus 1,000 mL 1,000 mL (0 mLs Intravenous Stopped 4/15/25 1140)   sodium chloride  0.9% bolus 1,000 mL 1,000 mL (0 mLs Intravenous Stopped 4/15/25 1140)   sodium chloride 0.9% bolus 1,000 mL 1,000 mL (0 mLs Intravenous Stopped 4/15/25 1027)   levETIRAcetam injection 1,500 mg (1,500 mg Intravenous Given 4/15/25 1530)     Medical Decision Making  Amount and/or Complexity of Data Reviewed  Labs: ordered.  Radiology: ordered.    Risk  Prescription drug management.                          Medical Decision Making:   Initial Assessment:   Patient comes in with co-worker patient has a history of multiple mini strokes as per the co-worker.  And has seizure activity the patient arrived to the ER was having difficulty with his right arm and right leg he was also lateralizing to the right side.  He was tense unable to move the right side.  While in the CT scan he had a sudden onset of grand mal seizure.  Took multiple shots of Versed and Ativan 2 help the patient relax enough to where the seizure was gone Keppra 1500 was started.  IV patient received up to 8 mg of Versed 2 mg of Ativan IV      Last normal was approximately 15-20 minutes prior to arrival at about 10:15 a.m.---        Differential Diagnosis:   RECURRENT AND ONGOING SEIZURE ACTIVITY.  ED Management:  Patient history includes missing multiple appointments with neurologist.  His son had no idea that he was missing appointments.  And he had stopped his medicine.  Call placed to our Neurology appointment team for Yamil Garcia--they will be contacting his son to make the psychiatric appointments in the meantime we will start the patient back on Keppra 500 1 p.o. b.i.d.             Clinical Impression:   Final diagnoses:  [R29.818] Acute focal neurological deficit  [R41.82] Altered mental status  [R56.9] Seizure (Primary)        ED Disposition Condition    Discharge Stable          ED Prescriptions       Medication Sig Dispense Start Date End Date Auth. Provider    levETIRAcetam (KEPPRA) 500 MG Tab Take 1 tablet (500 mg total) by mouth 2 (two) times  daily. 60 tablet 4/15/2025 2025 Beau Corral DO          Follow-up Information    None              [1]   Social History  Tobacco Use    Smoking status: Former     Current packs/day: 0.00     Types: Cigarettes     Quit date:      Years since quittin.3     Passive exposure: Past    Smokeless tobacco: Never   Substance Use Topics    Alcohol use: Not Currently    Drug use: Yes     Types: Marijuana        Beau Corral DO  04/15/25 1547

## 2025-04-15 NOTE — PROGRESS NOTES
PCP: Leesa Martínez NP    Referring Provider:        Subjective:   Mitul Torres is a 69 y.o. male with hx of severe MR s/p complex MV repair and MOY occlusion (1/30/2024, Dr. Daniels), non-obstructive CAD with 30% lesion prox RCA (12/14/2023), A. Fib with RVR in the presence of severe anemia secondary to an actively bleeding duodenal ulcer, HLD, and HTN,  who presents for a follow up visit.    4/7/25: No cardiac complaints today. Unable to afford eliquis. Pt assistance consult was placed. Last documentation from 3/25/25- status is unclear.  Pt notes he has not received any phone calls or letter. Address and contact info verified. 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 Discharged home on eliquis but is not currently taking.     1/6/25:  Patient has stopped taking most of her medications and wants to discuss monitor results since symptoms in person before proceeding.  It was discussed that he had multiple episodes of paroxysmal Afib (with RVR) on the Zio monitor.  Discussed Eliquis for stroke prevention and metoprolol 50 mg daily for rate control.  Patient is agreeable to proceed.  Notes he stopped taking his statin because he felt extremely fatigued while he was taking it.  He also notes muscle cramps usually at night.  He also stopped taking Keppra- advised to talk to Neurology regarding Keppra.    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  No recurrence of GI bleeding.  Endorses NYHA class II dyspnea on exertion.    08/14/24:  Doing well.  No cardiac complaints today.  He had an episode of dizziness/imbalance about 3 weeks ago without any recurrence.  Since about 6 weeks, he is seeing a halo from his right eye.  Eye exam was normal.  He has a MRI brain scheduled tomorrow.    5/14/24:  Doing well.  Occasional episodes of shortness of breath with heavy exertion but otherwise doing good.  He did undergo partial cardiac rehab  but he notes that he had to get back to work and due to which he stopped cardiac rehab.    4/3/24:  Presents for follow-up.  Doing very well from cardiac standpoint.  Undergoing cardiac rehab.  Denies any chest pain/tightness, dyspnea, palpitations, lightheadedness or syncope.  Of incisional pain at sternotomy site.    3/15/24: Seen by ZAIN Pitt NP  for HD follow up from admission to 2/10/2024-2/14/2024 at [facility]. The patient presented to [facility] 2/10/2024 with c/o sudden onset lightheadedness, nausea and palpitations. EKG demonstrated atrial fibrillation with RVR; rate 140 bpm in the presence of GIB. The patient was airlifted to [facility] with an urgent GI consult. The atrial fibrillation was converted chemically.    The patient had a complex mitral valve repair and MOY occlusion by Dr. Daniels on 1/30/2024. He was discharged on 2/8/2024. Returned to [facility] ED on 2/10/2024 in A fib with RVR secondry to severe blood loss anemia from an actively bleeding duodenal ulcer. He was air lifted to [facility] with an emergent EGD done the following day demonstrating a large duodenal ulcer with a large overlying pancake like hematoma. The ulcer was treated with epinephrine injections and Bovie cautery.  He required approx 7 units of PRBCs, platelets, Fp and cryoprecipitate. A repeat EGD 48 hours later found no evidence of ongoing active bleeding . The patient's plavix was stopped but he was discharged home on ASA 81 mg po daily with a PPI BID. He denies any evidence of bleeding since hospital DC.The patient presents today and states that he has some tightness in the mornings at his chest incision site but otherwise is having no issues.     11/20/23 (Dr. Dorsey): Presents for evaluation of palpitations, come and go spontaneously, would not notice unless he put jhis hand on his chest.  He feels some pressure in mid epigastric area, 2/10, checks heart with hand on his chest, feels skip, then symptoms  resolve.  He denies chest pain, pressure or shortness of breath.  He is very active, using chain saw and swing blade without provocation.  He is a  by trade, able to climb stairs, ladders without difficulty.  No previous cardiac history.  He is having more frequent and severe episodes of shortness of breath if tries to pick and carry five gallon pail of pain.  He has to stop activity, wait three to five minutes for symptoms to resolve.  He is recovering from puncture wound on bottom of left foot, notes is healing slowly,  He is smoking one or two joints a day.      Fhx:  Brother (CAD)   SH: Former smoker    EKG 4/7/25: Sinus bradycardia with occasional PVCs  11/14/2024: Sinus bradycardia with frequent PVCs, LVH with repolarization abnormality  3/14/2024 NSR with 1st degree AVB; HR 72 bpm; LVH with repolarization abnormality   2/10/2024 A fib with RVR;  bpm; ant/septal and lateral NS-TWA  11/202023 RSR with HR 65 bpm; normal EKG    Results for orders placed during the hospital encounter of 12/13/24    Echo    Interpretation Summary    Left Ventricle: The left ventricle is normal in size. Mildly increased wall thickness. There is concentric remodeling. There is normal systolic function. Quantitated ejection fraction is 55%.    Right Ventricle: Normal right ventricular cavity size. Systolic function could not be assesed.    Left Atrium: Left atrium is mildly dilated.    Tricuspid Valve: There is mild regurgitation.    Pulmonary Artery: The estimated pulmonary artery systolic pressure is 26 mmHg.    IVC/SVC: Normal venous pressure at 3 mmHg.    TTE at [facility] 2/10/2024     Conclusion: When compared to prior study, 2/6/2024. Patient in atrial   fibrillation during study. Cardiac evaluation made difficult by abnormal   rhythm.    Left Ventricle: Moderately to severely increased wall thickness. Normal   (55 - 60%) ejection fraction. Diastolic function could not be graded due   to A-Fib.     Left Atrium:  Left atrium volume index is severely increased.     Right Atrium: Right atrium is mildly dilated.     Mitral Valve: Normal structure status post mitral valve ring repair. No   mitral regurgitation.     Tricuspid Valve: Trace tricuspid regurgitation. RVSP is 34.00.     Pericardium: No pericardial effusion. No cardiac tamponade present.       GAEL 12/14/2023    Summary      Left Ventricle: The left ventricle is normal in size. Increased wall thickness. Normal wall motion. There is normal systolic function.    Right Ventricle: Right ventricular enlargement. Wall thickness is normal. Right ventricle wall motion  is normal. Systolic function is normal.    Left Atrium: Left atrium is severely dilated. The pulmonary veins have systolic flow reversal. There is no thrombus in the left atrial cavity.    Right Atrium: Right atrium is dilated.    Mitral Valve: Findings are consistent with myxomatous changes. There is severe prolapse of the posterior mitral leaflet. Flail posterior leaflet (P2 segment). There is no mass/vegetation present. There is no stenosis. There is severe regurgitation with an anteromedial eccentrically directed wall-impinging jet.    Tricuspid Valve: There is mild regurgitation.    Pulmonary Artery: Pulmonary artery pressure could not be accurately determined.    Conclusion: Severe mitral regurgitation secondary to severe prolpase of the posterior leaflet of the mitral valve with flail P2 segment.HO         Results for orders placed during the hospital encounter of 12/12/23    Echo    Interpretation Summary    Left Ventricle: The left ventricle is normal in size. Increased ventricular mass. Increased wall thickness. There is mild concentric hypertrophy. Normal wall motion. There is normal systolic function. Biplane (2D) method of discs ejection fraction is 60%. Global longitudinal strain is normal. There is normal diastolic function.    Right Ventricle: Mild right ventricular enlargement. Systolic function  is normal.    Left Atrium: Left atrium is severely dilated. LA volume index 69.8  ml/m2    Right Atrium: Right atrium is mildly dilated.    Aortic Valve: The aortic valve is a trileaflet valve.    Mitral Valve: There is prolapse of the posterior mitral leaflet. Flail posterior leaflet. There is severe regurgitation.    Tricuspid Valve: There is mild regurgitation.    Pulmonary Artery: The estimated pulmonary artery systolic pressure is 30 mmHg.    IVC/SVC: Normal venous pressure at 3 mmHg.    There is severe mitral regurgitation secondary to severe mitral valve prolpase with a flail posterior leaflet. Refer to CV surgery for mitral valve repair.    [facility] Results for orders placed during the hospital encounter of 12/14/23    Cardiac catheterization    Conclusion    The ejection fraction was calculated to be 55%.    The left ventricular systolic function was normal.    The pre-procedure left ventricular end diastolic pressure was 6.    The estimated blood loss was none.    There was non-obstructive coronary artery disease..    There was no mitral valve stenosis and severe (4+) mitral regurgitation.    The annulus was calcified.    The procedure log was documented by Documenter: Shilpi Basilio RN and verified by Jerry Be DO.    Date: 12/14/2023  Time: 11:21 AM    Trivial CAD  Normal LV systolic function, mild dilatation, 4+ MR  Await GAEL results to follow    1/30/2024- Dr. Daniels  Complex mitral valve repair and MOY closure       Day ambulatory cardiac monitor 11/2024    Predominant normal sinus rhyth. Paroxysmal atrial fibrillation (w/ RVR) noted. Episodes marked as VT were reviewed: they are Atrial fibrillation/SVT with aberrancy as opposed to true VT.      Lab Results   Component Value Date     (H) 04/15/2025    K 4.3 04/15/2025     (H) 04/15/2025    CO2 25 04/15/2025    BUN 19 04/15/2025    CREATININE 1.35 (H) 04/15/2025    CALCIUM 9.9 04/15/2025    ANIONGAP 19 (H) 04/15/2025    ESTGFRAFRICA  "81 02/14/2024    EGFRNONAA 76 05/26/2022       Lab Results   Component Value Date    CHOL 171 04/15/2025    CHOL 175 03/10/2025    CHOL 131 01/09/2025     Lab Results   Component Value Date    HDL 60 04/15/2025    HDL 68 (H) 03/10/2025    HDL 52 01/09/2025     Lab Results   Component Value Date    LDLCALC 88 04/15/2025    LDLCALC 91 03/10/2025    LDLCALC 67 01/09/2025     Lab Results   Component Value Date    TRIG 115 04/15/2025    TRIG 79 03/10/2025    TRIG 61 01/09/2025     Lab Results   Component Value Date    CHOLHDL 2.9 04/15/2025    CHOLHDL 2.6 03/10/2025    CHOLHDL 2.5 01/09/2025       Lab Results   Component Value Date    WBC 7.95 04/15/2025    HGB 14.6 04/15/2025    HCT 39 04/15/2025    MCV 93.5 04/15/2025     04/15/2025         No current facility-administered medications for this visit.    Current Outpatient Medications:     aspirin (ECOTRIN) 81 MG EC tablet, Take 1 tablet by mouth every morning., Disp: , Rfl:     apixaban (ELIQUIS) 5 mg Tab, Take 1 tablet (5 mg total) by mouth 2 (two) times daily., Disp: 180 tablet, Rfl: 3    metoprolol succinate (TOPROL-XL) 25 MG 24 hr tablet, Take 1 tablet (25 mg total) by mouth once daily., Disp: 90 tablet, Rfl: 3    Facility-Administered Medications Ordered in Other Visits:     levETIRAcetam injection 1,500 mg, 1,500 mg, Intravenous, ED 1 Time, [place],   Meds reviewed and reconciled using the list provided by the patient.    Review of Systems   Constitutional:  Negative for chills, diaphoresis, fever and malaise/fatigue.   Respiratory:  Negative for cough and shortness of breath.    Cardiovascular:  Negative for chest pain, palpitations, orthopnea, claudication, leg swelling and PND.   Gastrointestinal:  Negative for abdominal pain, heartburn, nausea and vomiting.   Neurological:  Negative for dizziness.           Objective:   BP (!) 140/80 (BP Location: Left arm, Patient Position: Sitting)   Pulse (!) 50   Ht 5' 10" (1.778 m)   Wt 73.8 kg " (162 lb 12.8 oz)   SpO2 97%   BMI 23.36 kg/m²     Physical Exam  Constitutional:       General: He is not in acute distress.     Appearance: Normal appearance.   Cardiovascular:      Rate and Rhythm: Normal rate and regular rhythm.      Pulses: Normal pulses.      Heart sounds: Normal heart sounds. No murmur heard.     No friction rub. No gallop.   Pulmonary:      Effort: Pulmonary effort is normal.      Breath sounds: Normal breath sounds. No wheezing or rales.   Musculoskeletal:      Right lower leg: No edema.      Left lower leg: No edema.   Skin:     General: Skin is warm and dry.   Neurological:      Mental Status: He is alert.           Assessment:     1. Paroxysmal atrial fibrillation  EKG 12-lead    EKG 12-lead            Plan:   No problem-specific Assessment & Plan notes found for this encounter.    Severe mitral regurgitation  Secondary to severe prolapse/flail P2 segment  S/p complex mitral valve repair and MOY closure (Dr. Daniels 1/30/24:)  Doing well from cardiac standpoint  Completed [facility] rehab  Repeat echo with normal LVEF and mitral valve status post repair    Paroxysmal atrial fibrillation  Patient presented to [facility] 2/10/2024 with c/o sudden onset lightheadedness, nausea and palpitations. EKG demonstrated atrial fibrillation with RVR; rate 140 bpm in the presence of GIB. The patient was airlifted to [facility] with an urgent GI consult. The atrial fibrillation was converted chemically.  - Currently NSR  - status post 3 months of amiodarone.  - Was not anticoagulation given brief episode of postop AFib in the setting of major GI bleeding.  Of note, he has undergone left atrial appendage closure along with a mitral valve repair surgery.  11/14/24:- MRI in the interim has shown a chronic occipital infarct.  Consider resuming anticoagulation.  Concerns due to history of major GI bleeding-> place Zio to assess for Afib burden (sinus swapna on EKG today), repeat CBC  01/06/25:- Jeanie  monitor in the interim showed paroxysmal Afib, CBC showed stable H&H and no anemia.  Patient has a was advised to start Eliquis 5 mg b.i.d. but had not started due to some concerns.  After discussion today, he is agreeable to start Eliquis 5 mg b.i.d. and metoprolol 50 mg daily  4/7/25: off eliquis again due to cost, f/u pharmacy assistance recommendations. Declines going on coumadin. Eliquis samples provided today.    Gastrointestinal hemorrhage associated with duodenal ulcer  The patient was airlifted to [facility] with an urgent GI consult. He was admitted to [facility] and continued to have evidence of GIB throughout the night. ON 12/11/2024 an emergent EGD demonstrated a large duodenal ulcer with a large overlying pancake like hematoma. This was treated epinephrine injections and Bovie cautery. The patient required a total of 7 units of PRCs, platelets, FFP and cryoprecipitate. EGD was repeat 48 hours later with no evidence of ongoing active bleeding found.     Contine PPI    Given no recurrence of GI bleeding, stable H&H, paroxysmal AFib on Zio monitor and history of CVA, patient was started on Eliquis 5 mg b.i.d. Cost issues detailed above    Essential hypertension  Blood pressure  126/70  Continue metoprolol 25 mg daily     Coronary artery disease involving native coronary artery of native heart without angina pectoris  [facility] 12/14/2023- Dr. Be  Non-obstructive CAD  Single vessel CAD with a 30% prox RCA lesion  - Patient has stopped taking atorvastatin on his own due to muscle cramping and fatigue.       Follow-up in 6 months

## 2025-04-15 NOTE — DISCHARGE INSTRUCTIONS
Follow-up with Yamil Kebede --neurology--make sure you keep all appointments and be compliant with care --take  medication as directed

## 2025-04-15 NOTE — PHARMACY MED REC
"Admission Medication History     The home medication history was taken by Pippa Wallis.    You may go to "Admission" then "Reconcile Home Medications" tabs to review and/or act upon these items.     The home medication list has been updated by the Pharmacy department.   Please read ALL comments highlighted in yellow.   Please address this information as you see fit.    Feel free to contact us if you have any questions or require assistance.    The patient has been receiving monthly samples of Eliquis 5 mg and is still waiting to hear from patient assistance for this medication.  The patient's girlfriend stated that his BP has been running very low and that his metoprolol 50 mg was reduced to 25 mg PRN      The medications listed below were removed from the home medication list. Please reorder if appropriate:  Atorvastin 20 mg  Lisinopril 10 mg      Medications listed below were obtained from: Patient/family, Analytic software- [employer], and Medical records  (Not in a hospital admission)        Current Outpatient Medications on File Prior to Encounter   Medication Sig Dispense Refill Last Dose/Taking    apixaban (ELIQUIS) 5 mg Tab Take 1 tablet (5 mg total) by mouth 2 (two) times daily. 180 tablet 3 4/14/2025    aspirin (ECOTRIN) 81 MG EC tablet Take 1 tablet by mouth every morning.   4/14/2025    metoprolol succinate (TOPROL-XL) 25 MG 24 hr tablet Take 1 tablet (25 mg total) by mouth once daily. 90 tablet 3 Past Week    [DISCONTINUED] atorvastatin (LIPITOR) 20 MG tablet Take 1 tablet (20 mg total) by mouth once daily. (Patient not taking: Reported on 4/10/2025) 90 tablet 3     [DISCONTINUED] lisinopriL 10 MG tablet Take 1 tablet (10 mg total) by mouth once daily. (Patient not taking: Reported on 1/27/2025) 90 tablet 3        Potential issues to be addressed PRIOR TO DISCHARGE  Drug cost interfering with therapy    Pippa Wallis  [id number]                 .          "

## 2025-04-15 NOTE — TELEPHONE ENCOUNTER
Spoke with pt's son gave him an appointment for 4-21-25 at 2:30. He v/u.        ----- Message from ELA Hathaway sent at 4/15/2025  3:24 PM CDT -----  Need to setup in home VEEG, needs sooner appt even though he has already missed 2 I believe.  If someone cancels tomorrow call him, otherwise keep him on cancellation list

## 2025-04-17 LAB
OHS QRS DURATION: 106 MS
OHS QRS DURATION: 92 MS
OHS QTC CALCULATION: 434 MS
OHS QTC CALCULATION: 456 MS

## 2025-04-21 ENCOUNTER — OFFICE VISIT (OUTPATIENT)
Dept: NEUROLOGY | Facility: CLINIC | Age: 70
End: 2025-04-21
Payer: MEDICARE

## 2025-04-21 VITALS
HEART RATE: 88 BPM | DIASTOLIC BLOOD PRESSURE: 104 MMHG | RESPIRATION RATE: 18 BRPM | WEIGHT: 156.81 LBS | OXYGEN SATURATION: 99 % | HEIGHT: 67 IN | BODY MASS INDEX: 24.61 KG/M2 | SYSTOLIC BLOOD PRESSURE: 174 MMHG

## 2025-04-21 DIAGNOSIS — I10 HYPERTENSION, UNSPECIFIED TYPE: ICD-10-CM

## 2025-04-21 DIAGNOSIS — M48.02 SPINAL STENOSIS, CERVICAL REGION: ICD-10-CM

## 2025-04-21 DIAGNOSIS — I48.91 ATRIAL FIBRILLATION, UNSPECIFIED TYPE: ICD-10-CM

## 2025-04-21 DIAGNOSIS — I63.89 OTHER CEREBRAL INFARCTION: ICD-10-CM

## 2025-04-21 DIAGNOSIS — R56.9 SEIZURE: Primary | ICD-10-CM

## 2025-04-21 PROCEDURE — 99214 OFFICE O/P EST MOD 30 MIN: CPT | Mod: S$PBB,,, | Performed by: NURSE PRACTITIONER

## 2025-04-21 PROCEDURE — 99215 OFFICE O/P EST HI 40 MIN: CPT | Mod: PBBFAC | Performed by: NURSE PRACTITIONER

## 2025-04-21 PROCEDURE — 99999 PR PBB SHADOW E&M-EST. PATIENT-LVL V: CPT | Mod: PBBFAC,,, | Performed by: NURSE PRACTITIONER

## 2025-04-21 RX ORDER — ZONISAMIDE 100 MG/1
100 CAPSULE ORAL 2 TIMES DAILY
Qty: 60 CAPSULE | Refills: 11 | Status: SHIPPED | OUTPATIENT
Start: 2025-04-21 | End: 2026-04-21

## 2025-04-21 NOTE — PATIENT INSTRUCTIONS
MRI brain and cervical spine for right sided weakness  Zonegran 100mg twice daily  Not able to tolerate keppra  EEG if recurrent seizure

## 2025-04-21 NOTE — PROGRESS NOTES
Subjective:       Patient ID: Mitul Torres is a 69 y.o. male     Chief Complaint:  No chief complaint on file.       Allergies:  Sulfamethoxazole-trimethoprim    Current Medications:    Outpatient Encounter Medications as of 4/21/2025   Medication Sig Dispense Refill    apixaban (ELIQUIS) 5 mg Tab Take 1 tablet (5 mg total) by mouth 2 (two) times daily. 180 tablet 3    aspirin (ECOTRIN) 81 MG EC tablet Take 1 tablet by mouth every morning.      metoprolol succinate (TOPROL-XL) 25 MG 24 hr tablet Take 1 tablet (25 mg total) by mouth once daily. 90 tablet 3    zonisamide (ZONEGRAN) 100 MG Cap Take 1 capsule (100 mg total) by mouth 2 (two) times daily. 60 capsule 11    [DISCONTINUED] levETIRAcetam (KEPPRA) 500 MG Tab Take 1 tablet (500 mg total) by mouth 2 (two) times daily. (Patient not taking: Reported on 4/21/2025) 60 tablet 1     No facility-administered encounter medications on file as of 4/21/2025.       History of Present Illness  68 y/o male following in neurology for prior CVA, and since last visit clear findings of epilepsy.    He was initially seen in September of 2024 for prior CVA.  Prior MRI brain in August of 2024 showed chronic left occipitla lobe infarct, and also an area of microhemorrhage in right frontal.  Was prescribed ASA, statin, and HTN treatment, but at that time prior records indicated not taking statin as directed.  I obtained MRA brain 10/14/24, negative.  I a was called by emergency department 11/22/24.  Family had brought to the emergency department for confusion and when patient got out of truck he had seizure in parking lot and another witnessed seizure in the emergency department.  Started on keppra 500mg bid and I setup EEG.  Done 12/19/24 and negative, though showed slowing in same region and prior CVA.  He had follow-up with Dr. Levine in cardiology 1/6/25 findings of A-fib and started on eliquis 5mg bid.  Then apparently had admit for concern about CVA 1/9/25, though imaging  did not show any new area of CVA.  He did not keep his follow-up with me after the emergency department called me in November, he cancelled it.  Then did not show for appt 2/19/25.  I was again called by emergency department on 4/15/25, seen for another event of right sided weakness.  During the emergency department evaluation he had grand mal seizure in the CT scanner.  His UDS showed THC as well.  Per the emergency department record patient was not taking his prescribed keppra.  He reports he felt it caused him significant mood disorder and his son with him agrees.  So today, he is not taking any medication.  He reports his current insurance does not cover meds.  We need to find something he can take.  I am going to start with zonegran, though vimpat likely better option but cost has to be considered.    He still has RUE weakness since his emergency department visit, need to get MRI brain to ensure no new CVA at that time as he had right hemiparesis.  Also having right sided neck pain as well, so possibly cervical cause so will try to get MRI cervical as well.           Review of Systems  Review of Systems   Constitutional:  Negative for diaphoresis and fever.   HENT:  Negative for congestion, hearing loss and tinnitus.    Eyes:  Negative for blurred vision, double vision, photophobia, discharge and redness.   Respiratory:  Negative for cough and shortness of breath.    Cardiovascular:  Negative for chest pain.   Gastrointestinal:  Negative for abdominal pain, nausea and vomiting.   Musculoskeletal:  Negative for back pain, joint pain, myalgias and neck pain.   Skin:  Negative for itching and rash.   Neurological:  Positive for headaches. Negative for dizziness, tremors, sensory change, speech change, focal weakness, seizures, loss of consciousness and weakness.   Psychiatric/Behavioral:  Negative for depression, hallucinations and memory loss. The patient does not have insomnia.    All other systems reviewed and  are negative.     Objective:     NEUROLOGICAL EXAMINATION:     MENTAL STATUS   Oriented to person, place, and time.   Attention: normal. Concentration: normal.   Speech: speech is normal   Level of consciousness: alert  Knowledge: good and consistent with education.   Normal comprehension.     CRANIAL NERVES     CN II   Visual fields full to confrontation.   Visual acuity: normal  Right visual field deficit: none  Left visual field deficit: none     CN III, IV, VI   Pupils are equal, round, and reactive to light.  Extraocular motions are normal.   Right pupil: Size: 3 mm. Shape: regular. Reactivity: brisk. Consensual response: intact. Accommodation: intact.   Left pupil: Size: 3 mm. Shape: regular. Reactivity: brisk. Consensual response: intact. Accommodation: intact.   CN III: no CN III palsy  CN VI: no CN VI palsy  Nystagmus: none   Diplopia: none  Upgaze: normal  Downgaze: normal  Conjugate gaze: present  Vestibulo-ocular reflex: present    CN V   Facial sensation intact.   Right facial sensation deficit: none  Left facial sensation deficit: none  Right corneal reflex: normal  Left corneal reflex: normal    CN VII   Facial expression full, symmetric.   Right facial weakness: none  Left facial weakness: none  Right taste: normal  Left taste: normal    CN VIII   CN VIII normal.   Hearing: intact    CN IX, X   CN IX normal.   CN X normal.   Palate: symmetric    CN XI   CN XI normal.   Right sternocleidomastoid strength: normal  Left sternocleidomastoid strength: normal  Right trapezius strength: normal  Left trapezius strength: normal    CN XII   CN XII normal.   Tongue: not atrophic  Fasciculations: absent  Tongue deviation: none    MOTOR EXAM   Muscle bulk: normal  Overall muscle tone: normal  Right arm tone: normal  Left arm tone: normal  Right arm pronator drift: absent  Left arm pronator drift: absent  Right leg tone: normal  Left leg tone: normal    Strength   Right neck flexion: 5/5  Left neck flexion:  5/  Right neck extension: 5/  Left neck extension:   Right deltoid: 5/  Left deltoid:   Right biceps:   Left biceps:   Right triceps:   Left triceps:   Right wrist flexion:   Left wrist flexion:   Right wrist extension:   Left wrist extension:   Right interossei:   Left interossei:   Right iliopsoas:   Left iliopsoas:   Right quadriceps:   Left quadriceps:   Right hamstrin/5  Left hamstrin/5  Right anterior tibial:   Left anterior tibial:   Right posterior tibial:   Left posterior tibial:   Right gastroc:   Left gastroc:     REFLEXES     Reflexes   Right brachioradialis: 2+  Left brachioradialis: 2+  Right biceps: 2+  Left biceps: 2+  Right triceps: 2+  Left triceps: 2+  Right patellar: 2+  Left patellar: 2+  Right achilles: 2+  Left achilles: 2+  Right plantar: normal  Left plantar: normal  Right Altman: absent  Left Altman: absent  Right ankle clonus: absent  Left ankle clonus: absent  Right pendular knee jerk: absent  Left pendular knee jerk: absent    SENSORY EXAM   Light touch normal.   Right arm light touch: normal  Left arm light touch: normal  Right leg light touch: normal  Left leg light touch: normal  Vibration normal.   Right arm vibration: normal  Left arm vibration: normal  Right leg vibration: normal  Left leg vibration: normal  Proprioception normal.   Right arm proprioception: normal  Left arm proprioception: normal  Right leg proprioception: normal  Left leg proprioception: normal  Pinprick normal.   Right arm pinprick: normal  Left arm pinprick: normal  Right leg pinprick: normal  Left leg pinprick: normal  Graphesthesia: normal  Romberg: negative  Stereognosis: normal    GAIT AND COORDINATION     Gait  Gait: normal     Coordination   Finger to nose coordination: normal  Heel to shin coordination: normal  Tandem walking coordination: normal    Tremor   Resting tremor: absent  Intention tremor: absent  Action tremor:  absent       Physical Exam  Vitals and nursing note reviewed.   Constitutional:       Appearance: Normal appearance.   HENT:      Head: Normocephalic.   Eyes:      Extraocular Movements: EOM normal.      Pupils: Pupils are equal, round, and reactive to light.   Cardiovascular:      Rate and Rhythm: Normal rate and regular rhythm.      Pulses: Normal pulses.      Heart sounds: Normal heart sounds.   Pulmonary:      Effort: Pulmonary effort is normal.      Breath sounds: Normal breath sounds.   Musculoskeletal:         General: Normal range of motion.      Cervical back: Normal range of motion and neck supple.   Skin:     General: Skin is warm and dry.   Neurological:      General: No focal deficit present.      Mental Status: He is alert and oriented to person, place, and time.      Cranial Nerves: No cranial nerve deficit.      Sensory: No sensory deficit.      Motor: No weakness.      Coordination: Coordination normal. Finger-Nose-Finger Test, Heel to Shin Test and Romberg Test normal.      Gait: Gait is intact. Gait and tandem walk normal.      Deep Tendon Reflexes: Reflexes normal.      Reflex Scores:       Tricep reflexes are 2+ on the right side and 2+ on the left side.       Bicep reflexes are 2+ on the right side and 2+ on the left side.       Brachioradialis reflexes are 2+ on the right side and 2+ on the left side.       Patellar reflexes are 2+ on the right side and 2+ on the left side.       Achilles reflexes are 2+ on the right side and 2+ on the left side.  Psychiatric:         Mood and Affect: Mood normal.         Speech: Speech normal.         Behavior: Behavior normal.          Assessment:     Problem List Items Addressed This Visit          Neuro    Seizure - Primary    Relevant Medications    zonisamide (ZONEGRAN) 100 MG Cap    Other cerebral infarction    Relevant Orders    MRI Brain Without Contrast    Spinal stenosis, cervical region    Relevant Orders    MRI Cervical Spine Without Contrast        Cardiac/Vascular    Atrial fibrillation    Overview   Patient presented to [facility] 2/10/2024 with c/o sudden onset lightheadedness, nausea and palpitations. EKG demonstrated atrial fibrillation with RVR; rate 140 bpm in the presence of GIB. The patient was airlifted to [facility] with an urgent GI consult. The atrial fibrillation was converted chemically.         Hypertension          Primary Diagnosis and ICD10  Seizure [R56.9]    Plan:     Patient Instructions   MRI brain and cervical spine for right sided weakness  Zonegran 100mg twice daily  Not able to tolerate keppra  EEG if recurrent seizure    Medications Discontinued During This Encounter   Medication Reason    levETIRAcetam (KEPPRA) 500 MG Tab        Requested Prescriptions     Signed Prescriptions Disp Refills    zonisamide (ZONEGRAN) 100 MG Cap 60 capsule 11     Sig: Take 1 capsule (100 mg total) by mouth 2 (two) times daily.       Orders Placed This Encounter   Procedures    MRI Brain Without Contrast    MRI Cervical Spine Without Contrast

## 2025-04-23 ENCOUNTER — OFFICE VISIT (OUTPATIENT)
Dept: FAMILY MEDICINE | Facility: CLINIC | Age: 70
End: 2025-04-23
Payer: MEDICARE

## 2025-04-23 VITALS
HEART RATE: 82 BPM | RESPIRATION RATE: 18 BRPM | BODY MASS INDEX: 24.33 KG/M2 | SYSTOLIC BLOOD PRESSURE: 147 MMHG | TEMPERATURE: 98 F | HEIGHT: 67 IN | DIASTOLIC BLOOD PRESSURE: 70 MMHG | WEIGHT: 155 LBS | OXYGEN SATURATION: 98 %

## 2025-04-23 DIAGNOSIS — R56.9 SEIZURE: ICD-10-CM

## 2025-04-23 DIAGNOSIS — R01.1 CARDIAC MURMUR: ICD-10-CM

## 2025-04-23 DIAGNOSIS — I10 HYPERTENSION, UNSPECIFIED TYPE: Primary | ICD-10-CM

## 2025-04-23 DIAGNOSIS — I63.412 EMBOLIC STROKE INVOLVING LEFT MIDDLE CEREBRAL ARTERY: ICD-10-CM

## 2025-04-23 DIAGNOSIS — E78.5 HYPERLIPIDEMIA, UNSPECIFIED HYPERLIPIDEMIA TYPE: ICD-10-CM

## 2025-04-23 DIAGNOSIS — I63.89 OTHER CEREBRAL INFARCTION: ICD-10-CM

## 2025-04-23 DIAGNOSIS — I25.10 CORONARY ARTERY DISEASE INVOLVING NATIVE CORONARY ARTERY OF NATIVE HEART WITHOUT ANGINA PECTORIS: ICD-10-CM

## 2025-04-23 DIAGNOSIS — I48.91 ATRIAL FIBRILLATION, UNSPECIFIED TYPE: ICD-10-CM

## 2025-04-23 PROCEDURE — 99214 OFFICE O/P EST MOD 30 MIN: CPT | Mod: ,,,

## 2025-04-23 NOTE — PROGRESS NOTES
Leesa Martínez NP   [address] [place] [address]     PATIENT NAME: Mitul Torres  : 1955  DATE: 25  MRN: [id number]      Billing Provider: Leesa Martínez NP  Level of Service:   Patient PCP Information       Provider PCP Type    Leesa Martínez NP General            Reason for Visit / Chief Complaint: Follow-up (2 Weeks Follow Up )       Update PCP  Update Chief Complaint         History of Present Illness / Problem Focused Workflow     Mitul Torres presents to the clinic with Follow-up (2 Weeks Follow Up )     Patient here today with reports of having a seizure and stroke about a week ago..He did see neurology and was started on Eliquis, and Zonisa. ER records reviewed. Medication reconciliation completed. He does have a history of stroke, afib and CAD. He is followed by cardiology   He refuses to take statin medication. He reports having tingling in his right arm and hand since the incident. No other deficits identified. He is scheduled for MRI. Continue current medications. Keep scheduled follow up appt with neuro. Return to clinic in 1 month and as needed.       Follow-up  Pertinent negatives include no abdominal pain, chest pain, coughing or headaches.       Review of Systems     Review of Systems   Constitutional: Negative.    HENT: Negative.     Eyes: Negative.    Respiratory: Negative.  Negative for cough, shortness of breath and wheezing.    Cardiovascular: Negative.  Negative for chest pain, palpitations and leg swelling.   Gastrointestinal: Negative.  Negative for abdominal pain.   Endocrine: Negative.    Genitourinary: Negative.  Negative for dysuria, flank pain, frequency and hematuria.   Musculoskeletal: Negative.    Integumentary:  Negative.   Allergic/Immunologic: Negative.    Neurological:  Negative for dizziness, headaches and memory loss.   Psychiatric/Behavioral: Negative.        Medical / Social / Family History     Past Medical History:    Diagnosis Date    A-fib     GERD (gastroesophageal reflux disease)     Hyperlipidemia     Hypertension     Seizures     Stroke        Past Surgical History:   Procedure Laterality Date    ANKLE FRACTURE SURGERY Right     CYSTOURETEROSCOPY, WITH HOLMIUM LASER LITHOTRIPSY OF URETERAL CALCULUS AND STENT INSERTION Left 01/13/2025    Procedure: CYSTOURETEROSCOPY, WITH HOLMIUM LASER LITHOTRIPSY OF URETERAL CALCULUS AND STENT INSERTION;  Surgeon: Enrique Mcdaniel MD;  Location: [facility] OR;  Service: Urology;  Laterality: Left;    ECHOCARDIOGRAM,TRANSESOPHAGEAL  12/14/2023    Procedure: Transesophageal echo (GAEL) intra-procedure log documentation;  Surgeon: Jerry Be DO;  Location: [facility] CATH LAB;  Service: Cardiology;;    LEFT HEART CATHETERIZATION Left 12/14/2023    Procedure: Left heart cath;  Surgeon: Jerry Be DO;  Location: [facility] CATH LAB;  Service: Cardiology;  Laterality: Left;    MITRAL VALVE REPAIR      PINNING-CLOSED-ARM Left     REPAIR OF HEART VALVE      RIGHT HEART CATHETERIZATION Right 12/14/2023    Procedure: INSERTION, CATHETER, RIGHT HEART;  Surgeon: Jerry Be DO;  Location: [facility] CATH LAB;  Service: Cardiology;  Laterality: Right;       Social History    reports that he quit smoking about 33 years ago. His smoking use included cigarettes. He has been exposed to tobacco smoke. He has never used smokeless tobacco. He reports that he does not currently use alcohol. He reports current drug use. Drug: Marijuana.    Family History  's family history includes Cataracts in his mother; Diabetes in his maternal grandfather; Glaucoma in his mother; Heart disease in his brother; No Known Problems in his brother and father.    Medications and Allergies     Medications  Outpatient Medications Marked as Taking for the 4/23/25 encounter (Office Visit) with Leesa Martínez NP   Medication Sig Dispense Refill    apixaban (ELIQUIS) 5 mg Tab Take 1 tablet (5 mg  "total) by mouth 2 (two) times daily. 180 tablet 3    aspirin (ECOTRIN) 81 MG EC tablet Take 1 tablet by mouth every morning.      metoprolol succinate (TOPROL-XL) 25 MG 24 hr tablet Take 1 tablet (25 mg total) by mouth once daily. 90 tablet 3    zonisamide (ZONEGRAN) 100 MG Cap Take 1 capsule (100 mg total) by mouth 2 (two) times daily. 60 capsule 11       Allergies  Review of patient's allergies indicates:   Allergen Reactions    Sulfamethoxazole-trimethoprim Rash     Note: Emil Johson Syndrome    Levetiracetam Other (See Comments)       Physical Examination   BP (!) 147/70 (BP Location: Left arm, Patient Position: Sitting)   Pulse 82   Temp 98.4 °F (36.9 °C) (Oral)   Resp 18   Ht 5' 7" (1.702 m)   Wt 70.3 kg (155 lb)   SpO2 98%   BMI 24.28 kg/m²    Physical Exam  Vitals reviewed.   Constitutional:       Appearance: Normal appearance.   HENT:      Right Ear: Tympanic membrane, ear canal and external ear normal.      Left Ear: Tympanic membrane, ear canal and external ear normal.      Nose: Nose normal.      Mouth/Throat:      Mouth: Mucous membranes are moist.      Pharynx: Oropharynx is clear.   Eyes:      Extraocular Movements: Extraocular movements intact.      Conjunctiva/sclera: Conjunctivae normal.      Pupils: Pupils are equal, round, and reactive to light.   Neck:      Vascular: No carotid bruit.   Cardiovascular:      Rate and Rhythm: Normal rate. Rhythm irregular.      Pulses: Normal pulses.      Heart sounds: Murmur heard.   Pulmonary:      Effort: Pulmonary effort is normal. No respiratory distress.      Breath sounds: Normal breath sounds. No wheezing or rales.   Abdominal:      General: Bowel sounds are normal.      Palpations: Abdomen is soft.      Tenderness: There is no abdominal tenderness. There is no right CVA tenderness or guarding.   Musculoskeletal:         General: Normal range of motion.      Cervical back: Normal range of motion and neck supple.   Lymphadenopathy:      " Cervical: No cervical adenopathy.   Skin:     General: Skin is warm and dry.      Capillary Refill: Capillary refill takes less than 2 seconds.   Neurological:      General: No focal deficit present.      Mental Status: He is alert and oriented to person, place, and time.   Psychiatric:         Mood and Affect: Mood normal.         Behavior: Behavior normal.         Thought Content: Thought content normal.         Judgment: Judgment normal.        Assessment and Plan (including Health Maintenance)      Problem List  Smart Sets  Document Outside HM   :    Plan:    Continue current medications  Keep scheduled follow up appt with neuro  Return to clinic in 1 month and as needed.         Health Maintenance Due   Topic Date Due    TETANUS VACCINE  Never done    High Dose Statin  Never done    Colorectal Cancer Screening  Never done    Shingles Vaccine (1 of 2) Never done    Pneumococcal Vaccines (Age 50+) (1 of 1 - PCV) Never done    RSV Vaccine (Age 60+ and Pregnant patients) (1 - Risk 60-74 years 1-dose series) Never done    Influenza Vaccine (1) Never done    COVID-19 Vaccine (1 - 2024-25 season) Never done       Problem List Items Addressed This Visit    None      Health Maintenance Topics with due status: Not Due       Topic Last Completion Date    Hemoglobin A1c (Prediabetes) 03/10/2025    Lipid Panel 04/15/2025       Future Appointments   Date Time Provider Department Center   5[facility]2025 11:30 [facility]   5/6/2025 12:15 PM [facility]   6/12/2025 10:00 AM [facility] GI ROOM [address] [place] [address] ASC   6/23/2025 10:45 AM Enrique Mcdaniel MD [facility] UROL [facility]   7/17/2025 11:00 AM Leesa Martínez, NP [facility]   8/1/2025 10:30 AM Yamil Day FNP [facility] NEURO [facility]   10/27/2025 10:00 AM Trish Levine MD [facility] CARD [facility]   4/16/2026  8:00 AM AWV NURSE, [facility] FAMILY MEDICINE [facility]            Signature:   Leesa Martínez, BETH      [address] [place] [address]    Date of encounter: 4/23/25

## 2025-04-24 NOTE — PATIENT INSTRUCTIONS
Continue current medications  Keep scheduled follow up appt with neuro  Return to clinic in 1 month and as needed.

## 2025-04-25 RX ORDER — PANTOPRAZOLE SODIUM 40 MG/1
40 TABLET, DELAYED RELEASE ORAL DAILY
Qty: 90 TABLET | Refills: 0 | Status: SHIPPED | OUTPATIENT
Start: 2025-04-25 | End: 2025-05-01 | Stop reason: SDUPTHER

## 2025-05-01 ENCOUNTER — PATIENT MESSAGE (OUTPATIENT)
Dept: FAMILY MEDICINE | Facility: CLINIC | Age: 70
End: 2025-05-01
Payer: MEDICARE

## 2025-05-01 ENCOUNTER — OFFICE VISIT (OUTPATIENT)
Dept: FAMILY MEDICINE | Facility: CLINIC | Age: 70
End: 2025-05-01
Payer: MEDICARE

## 2025-05-01 ENCOUNTER — PATIENT MESSAGE (OUTPATIENT)
Dept: CARDIOLOGY | Facility: CLINIC | Age: 70
End: 2025-05-01
Payer: MEDICARE

## 2025-05-01 ENCOUNTER — TELEPHONE (OUTPATIENT)
Dept: FAMILY MEDICINE | Facility: CLINIC | Age: 70
End: 2025-05-01
Payer: MEDICARE

## 2025-05-01 ENCOUNTER — PATIENT MESSAGE (OUTPATIENT)
Dept: NEUROLOGY | Facility: CLINIC | Age: 70
End: 2025-05-01
Payer: MEDICARE

## 2025-05-01 VITALS
DIASTOLIC BLOOD PRESSURE: 82 MMHG | HEART RATE: 45 BPM | OXYGEN SATURATION: 97 % | BODY MASS INDEX: 24.54 KG/M2 | SYSTOLIC BLOOD PRESSURE: 162 MMHG | WEIGHT: 156.38 LBS | HEIGHT: 67 IN | TEMPERATURE: 98 F

## 2025-05-01 DIAGNOSIS — R56.9 SEIZURE: ICD-10-CM

## 2025-05-01 DIAGNOSIS — K21.9 GASTROESOPHAGEAL REFLUX DISEASE, UNSPECIFIED WHETHER ESOPHAGITIS PRESENT: ICD-10-CM

## 2025-05-01 DIAGNOSIS — F41.9 ANXIETY: ICD-10-CM

## 2025-05-01 DIAGNOSIS — Z87.19 H/O: GI BLEED: ICD-10-CM

## 2025-05-01 DIAGNOSIS — I48.0 PAROXYSMAL ATRIAL FIBRILLATION: ICD-10-CM

## 2025-05-01 DIAGNOSIS — R31.9 HEMATURIA, UNSPECIFIED TYPE: Primary | ICD-10-CM

## 2025-05-01 DIAGNOSIS — Z86.73 HISTORY OF CVA (CEREBROVASCULAR ACCIDENT): ICD-10-CM

## 2025-05-01 DIAGNOSIS — Z76.89 ENCOUNTER TO ESTABLISH CARE WITH NEW DOCTOR: Primary | ICD-10-CM

## 2025-05-01 DIAGNOSIS — Z98.890 S/P MITRAL VALVE REPAIR: ICD-10-CM

## 2025-05-01 DIAGNOSIS — K92.1 HEMATOCHEZIA: ICD-10-CM

## 2025-05-01 LAB
BASOPHILS # BLD AUTO: 0.04 K/UL (ref 0–0.2)
BASOPHILS NFR BLD AUTO: 0.6 % (ref 0–1)
DIFFERENTIAL METHOD BLD: ABNORMAL
EOSINOPHIL # BLD AUTO: 0.06 K/UL (ref 0–0.5)
EOSINOPHIL NFR BLD AUTO: 0.9 % (ref 1–4)
ERYTHROCYTE [DISTWIDTH] IN BLOOD BY AUTOMATED COUNT: 13.2 % (ref 11.5–14.5)
HCT VFR BLD AUTO: 41.7 % (ref 40–54)
HGB BLD-MCNC: 13.5 G/DL (ref 13.5–18)
IMM GRANULOCYTES # BLD AUTO: 0.02 K/UL (ref 0–0.04)
IMM GRANULOCYTES NFR BLD: 0.3 % (ref 0–0.4)
LYMPHOCYTES # BLD AUTO: 1.08 K/UL (ref 1–4.8)
LYMPHOCYTES NFR BLD AUTO: 15.4 % (ref 27–41)
MCH RBC QN AUTO: 28.8 PG (ref 27–31)
MCHC RBC AUTO-ENTMCNC: 32.4 G/DL (ref 32–36)
MCV RBC AUTO: 89.1 FL (ref 80–96)
MONOCYTES # BLD AUTO: 0.46 K/UL (ref 0–0.8)
MONOCYTES NFR BLD AUTO: 6.6 % (ref 2–6)
MPC BLD CALC-MCNC: 10.7 FL (ref 9.4–12.4)
NEUTROPHILS # BLD AUTO: 5.35 K/UL (ref 1.8–7.7)
NEUTROPHILS NFR BLD AUTO: 76.2 % (ref 53–65)
NRBC # BLD AUTO: 0 X10E3/UL
NRBC, AUTO (.00): 0 %
PLATELET # BLD AUTO: 252 K/UL (ref 150–400)
RBC # BLD AUTO: 4.68 M/UL (ref 4.6–6.2)
WBC # BLD AUTO: 7.01 K/UL (ref 4.5–11)

## 2025-05-01 PROCEDURE — 85025 COMPLETE CBC W/AUTO DIFF WBC: CPT | Mod: ,,, | Performed by: CLINICAL MEDICAL LABORATORY

## 2025-05-01 RX ORDER — SERTRALINE HYDROCHLORIDE 50 MG/1
50 TABLET, FILM COATED ORAL DAILY
Qty: 90 TABLET | Refills: 1 | Status: SHIPPED | OUTPATIENT
Start: 2025-05-01 | End: 2026-05-01

## 2025-05-01 RX ORDER — PANTOPRAZOLE SODIUM 40 MG/1
40 TABLET, DELAYED RELEASE ORAL DAILY
Qty: 90 TABLET | Refills: 1 | Status: SHIPPED | OUTPATIENT
Start: 2025-05-01

## 2025-05-01 NOTE — TELEPHONE ENCOUNTER
----- Message from Stephanie sent at 5/1/2025  1:47 PM CDT -----  Regarding: CALL BACK  PLEASE CALL BACK HE SAID SOME ONE JUST CALLED [phone]

## 2025-05-01 NOTE — PROGRESS NOTES
Subjective     Patient ID: Mitul Torres is a 69 y.o. male.    Chief Complaint: Establish Care and Health Maintenance (TETANUS VACCINE Never done/High Dose Statin Never done/Colorectal Cancer Screening Never done/Shingles Vaccine(1 of 2) Never done/Pneumococcal Vaccines (Age 50+)(1 of 1 - PCV) Never done/RSV Vaccine (Age 60+ and Pregnant patients)(1 - Risk 60-74 years 1-dose series) Never done/COVID-19 Vaccine(1 - 2024-25 season) Never done/)    Mr. Torres is a 69-year-old male the presents today to establish care.  His past medical history paroxysmal atrial fibrillation, hypertension, severe mitral regurgitation status post mitral valve replacement, duodenal ulcer, questionable seizures, TIA/CVA, and GERD.  He has been seen by Neurology and Cardiology.  He is started on metoprolol but started having issues with severe bradycardia so his dose was changed from 50-25 mg.  Was also placed on Eliquis however he stopped this due to some hematochezia.  Does have a history of a duodenal ulcer in the past.  He did recently start back on the Protonix now he just has some melena.  He was on a statin but stopped this.  He has had several seizure-like episodes.  He could not tolerate Keppra.  He was started on zonisamide but has some concerns about it as well.  He has some imaging coming up.  I do not see any EEG.  Blood pressure is elevated today at 180/90.  States that he has a component of white coat hypertension and that away from doctors his blood pressure jakub in the 130s over 70s.  States that he has had some anxiety recently but otherwise he is doing okay.      Review of Systems   Constitutional:  Positive for fatigue. Negative for appetite change, chills and fever.   HENT:  Negative for ear pain, hearing loss, sinus pressure/congestion and sore throat.    Eyes:  Negative for pain, redness and visual disturbance.   Respiratory:  Negative for apnea, cough, shortness of breath and wheezing.    Cardiovascular:   Negative for chest pain, palpitations and leg swelling.   Gastrointestinal:  Negative for abdominal pain, blood in stool, constipation, diarrhea and nausea.   Endocrine: Negative for cold intolerance, heat intolerance and polyuria.   Genitourinary:  Negative for dysuria and hematuria.   Musculoskeletal:  Negative for arthralgias, back pain, joint swelling, myalgias and neck pain.   Integumentary:  Negative for pallor and rash.   Allergic/Immunologic: Negative for frequent infections.   Neurological:  Negative for tremors, seizures, weakness and headaches.   Hematological:  Negative for adenopathy.   Psychiatric/Behavioral:  Negative for confusion, dysphoric mood and sleep disturbance. The patient is nervous/anxious.           Objective     Physical Exam  Vitals and nursing note reviewed.   Constitutional:       General: He is not in acute distress.     Appearance: Normal appearance. He is not ill-appearing.   HENT:      Head: Normocephalic and atraumatic.      Right Ear: External ear normal.      Left Ear: External ear normal.      Nose: Nose normal.      Mouth/Throat:      Pharynx: Oropharynx is clear.   Eyes:      Extraocular Movements: Extraocular movements intact.      Conjunctiva/sclera: Conjunctivae normal.      Pupils: Pupils are equal, round, and reactive to light.   Neck:      Vascular: No carotid bruit.   Cardiovascular:      Rate and Rhythm: Regular rhythm. Bradycardia present.      Pulses: Normal pulses.      Heart sounds: Normal heart sounds. No murmur heard.  Pulmonary:      Effort: Respiratory distress present.      Breath sounds: Normal breath sounds. No wheezing or rales.   Abdominal:      General: Bowel sounds are normal.      Palpations: Abdomen is soft.   Musculoskeletal:         General: Normal range of motion.      Cervical back: Normal range of motion and neck supple.      Right lower leg: No edema.      Left lower leg: No edema.   Skin:     General: Skin is warm and dry.      Capillary  Refill: Capillary refill takes less than 2 seconds.      Coloration: Skin is not pale.   Neurological:      General: No focal deficit present.      Mental Status: He is alert and oriented to person, place, and time.      Cranial Nerves: No cranial nerve deficit.      Sensory: No sensory deficit.      Motor: No weakness.      Gait: Gait normal.   Psychiatric:         Mood and Affect: Mood normal.         Judgment: Judgment normal.            Assessment and Plan     1. Encounter to establish care with new doctor    2. Paroxysmal atrial fibrillation  Overview:  Patient presented to [facility] 2/10/2024 with c/o sudden onset lightheadedness, nausea and palpitations. EKG demonstrated atrial fibrillation with RVR; rate 140 bpm in the presence of GIB. The patient was airlifted to [facility] with an urgent GI consult. The atrial fibrillation was converted chemically.    Orders:  -     Ambulatory referral/consult to Internal Medicine    3. S/P mitral valve repair  Overview:  1/30/2024- Dr. Daniels- [facility]  Complex mitral valve repair and MOY closure    Orders:  -     Ambulatory referral/consult to Internal Medicine    4. Hematochezia  -     CBC Auto Differential; Future; Expected date: 05/01/2025    5. Seizure    6. Anxiety    7. Gastroesophageal reflux disease, unspecified whether esophagitis present    8. H/O: GI bleed    9. History of CVA (cerebrovascular accident)    Other orders  -     pantoprazole (PROTONIX) 40 MG tablet; Take 1 tablet (40 mg total) by mouth once daily.  Dispense: 90 tablet; Refill: 1        1. Patient presents today to establish care.  Has a complex past medical history and he has not completely sure of all of the details.  We will try to research back into his medical history.  We will also discuss care gaps.    2. Hypertension-with a component of white coat hypertension.  Currently just on metoprolol.  Blood pressure today is 180/90.  However he states away from here blood pressure ones  and 130s over 70s.  He is going to keep a blood pressure log over the next couple weeks and return.  He will bring his blood pressure cuff in so we can check it against ours.  His target is going to be less than 130/80.    3. Paroxysmal atrial fibrillation-he is bradycardic today.  Regular on exam.  With a history of TIA/stroke he really needs anticoagulation.  However he has had issues with GI bleed in the past.  He is currently holding his Eliquis.  We are going to check a CBC today and I have encouraged him to start back on his aspirin.  He is currently taking 25 mg of metoprolol daily    4. GERD with history duodenal ulcer-we are going to try to speed up his appointment with GI.  He needs an upper scope done.  I have encouraged him to take his PPI twice a day.    5. Seizure-like activity-he has been seen by Neurology.  MRIs pending.  Can not tolerate Keppra and he is having some issues with the zonisamide as well.  Told him to reach out to Neurology.  You may need an EEG.    6. History of TIA/CVA-no residual deficits.  Last MRI was January of 2025 and showed some small older strokes and lacunar region.  Nothing acute.  He needs to be on an aspirin a needs to be on a statin.  I have discussed this with the patient.  He was taking 1 at 1 point but took himself off.  We have discussed the risk and benefits.  I have answered all questions to the best of my ability.  He is in contemplation stage.    7. Anxiety-part in part related to all of his health issues.  I have recommended sertraline.  He can take 25 mg nightly for the 1st couple of weeks.  If needed we can increase to 50 mg.    Billing based on high medical complexity         Follow up in about 3 months (around 8/1/2025).

## 2025-05-02 ENCOUNTER — PATIENT MESSAGE (OUTPATIENT)
Dept: CARDIOLOGY | Facility: CLINIC | Age: 70
End: 2025-05-02
Payer: MEDICARE

## 2025-05-02 DIAGNOSIS — R56.9 SEIZURE: Primary | ICD-10-CM

## 2025-05-02 RX ORDER — LAMOTRIGINE 25 MG/1
TABLET ORAL
Qty: 120 TABLET | Refills: 5 | Status: SHIPPED | OUTPATIENT
Start: 2025-05-02

## 2025-05-02 NOTE — TELEPHONE ENCOUNTER
Patient was called, he stated that he had stopped the Eliquis and ASA.  Dr. Curiel had recommended stopping the Eliquis, but wanted patient to contact Dr. Levine about the Aspirin.  Patient is still having rectal bleeding, and today nose bleed.  Dr. Levine recommends hold both meds.  Patient aware and voiced understanding.  He has a GI appointment on 5/6/25.

## 2025-05-05 RX ORDER — ATORVASTATIN CALCIUM 20 MG/1
20 TABLET, FILM COATED ORAL NIGHTLY
Qty: 90 TABLET | Refills: 3 | Status: SHIPPED | OUTPATIENT
Start: 2025-05-05 | End: 2026-05-05

## 2025-05-06 ENCOUNTER — TELEPHONE (OUTPATIENT)
Dept: NEUROLOGY | Facility: CLINIC | Age: 70
End: 2025-05-06
Payer: MEDICARE

## 2025-05-06 ENCOUNTER — OFFICE VISIT (OUTPATIENT)
Dept: GASTROENTEROLOGY | Facility: CLINIC | Age: 70
End: 2025-05-06
Payer: MEDICARE

## 2025-05-06 ENCOUNTER — RESULTS FOLLOW-UP (OUTPATIENT)
Dept: GASTROENTEROLOGY | Facility: CLINIC | Age: 70
End: 2025-05-06

## 2025-05-06 ENCOUNTER — RESULTS FOLLOW-UP (OUTPATIENT)
Dept: NEUROLOGY | Facility: CLINIC | Age: 70
End: 2025-05-06
Payer: MEDICARE

## 2025-05-06 VITALS
OXYGEN SATURATION: 97 % | HEART RATE: 59 BPM | BODY MASS INDEX: 22.42 KG/M2 | SYSTOLIC BLOOD PRESSURE: 192 MMHG | DIASTOLIC BLOOD PRESSURE: 69 MMHG | WEIGHT: 156.63 LBS | HEIGHT: 70 IN

## 2025-05-06 DIAGNOSIS — D64.9 ANEMIA, UNSPECIFIED TYPE: ICD-10-CM

## 2025-05-06 DIAGNOSIS — K92.1 MELENA: ICD-10-CM

## 2025-05-06 DIAGNOSIS — K62.5 RECTAL BLEEDING: Primary | ICD-10-CM

## 2025-05-06 PROCEDURE — 99999 PR PBB SHADOW E&M-EST. PATIENT-LVL III: CPT | Mod: PBBFAC,,,

## 2025-05-06 PROCEDURE — 99215 OFFICE O/P EST HI 40 MIN: CPT | Mod: S$PBB,,,

## 2025-05-06 PROCEDURE — 99213 OFFICE O/P EST LOW 20 MIN: CPT | Mod: PBBFAC

## 2025-05-06 NOTE — TELEPHONE ENCOUNTER
Called pt and gave him the information below from BRADLEY Day NP. He v/u.        ----- Message from ELA Hathaway sent at 5/6/2025  1:29 PM CDT -----  MRI brain unchanged from prior done imaging, no new stroke  ----- Message -----  From: Interface, Rad Results In  Sent: 5/6/2025   1:05 PM CDT  To: ELA Kendrick

## 2025-05-06 NOTE — PROGRESS NOTES
Gastroenterology Clinic Note    Patient ID: [id number]   Referring MD: Sandra Marie FNP   Chief Complaint:   Chief Complaint   Patient presents with    Rectal Bleeding     Belching       History of Present Illness   Mitul Torres is an 69 y.o. WM who is referred for rectal bleeding.  Patient reports episode of bright red rectal bleeding 1 week ago.  The bleeding started after who had frequent belching, upper abdominal pain, and several episodes of diarrhea.  He noticed that his stools improved in the following days but he then began to see what he describes as a melena stool.  He does report a history of bleeding ulcers that have required intervention approximately 2 years ago.  He has been on Protonix ever since.  He was taking Eliquis and a baby aspirin with a reported history of AFib.  Patient reports that his cardiologist has stopped these medications pending GI evaluation after his episode of rectal bleeding.  He has seen no recurrence of the bleeding at this time.  He has also started a new medication for his seizures a few weeks ago.  He states he has not been taking this as prescribed because he was afraid that this was a cause of his problem.  His last seizure was 1 week ago.  He denies use of NSAIDs or alcohol.  He states he does use marijuana on occasion.  He has abdominal pain in his right lower quadrant and left upper quadrant.      Previous workup:  CT abdomen pelvis with and without contrast    He denies prior endoscopy.    Review of Systems   Constitutional:  Negative for weight loss.   Gastrointestinal:  Positive for abdominal pain, blood in stool, diarrhea, heartburn, melena and nausea. Negative for constipation and vomiting.       Past Medical History      Past Medical History:   Diagnosis Date    A-fib     GERD (gastroesophageal reflux disease)     Hyperlipidemia     Hypertension     Seizures     Stroke        Past Surgical History     Past Surgical History:   Procedure Laterality Date     ANKLE FRACTURE SURGERY Right     CYSTOURETEROSCOPY, WITH HOLMIUM LASER LITHOTRIPSY OF URETERAL CALCULUS AND STENT INSERTION Left 01/13/2025    Procedure: CYSTOURETEROSCOPY, WITH HOLMIUM LASER LITHOTRIPSY OF URETERAL CALCULUS AND STENT INSERTION;  Surgeon: Enrique Mcdaniel MD;  Location: [facility] OR;  Service: Urology;  Laterality: Left;    ECHOCARDIOGRAM,TRANSESOPHAGEAL  12/14/2023    Procedure: Transesophageal echo (GAEL) intra-procedure log documentation;  Surgeon: Jerry Be DO;  Location: [facility] CATH LAB;  Service: Cardiology;;    LEFT HEART CATHETERIZATION Left 12/14/2023    Procedure: Left heart cath;  Surgeon: Jerry Be DO;  Location: [facility] CATH LAB;  Service: Cardiology;  Laterality: Left;    MITRAL VALVE REPAIR      PINNING-CLOSED-ARM Left     REPAIR OF HEART VALVE      RIGHT HEART CATHETERIZATION Right 12/14/2023    Procedure: INSERTION, CATHETER, RIGHT HEART;  Surgeon: Jerry Be DO;  Location: [facility] CATH LAB;  Service: Cardiology;  Laterality: Right;       Allergies     Review of patient's allergies indicates:   Allergen Reactions    Sulfamethoxazole-trimethoprim Rash     Note: Emil Johson Syndrome    Levetiracetam Other (See Comments)       Immunization History     There is no immunization history on file for this patient.    Past Family History      Family History   Problem Relation Name Age of Onset    Cataracts Mother      Glaucoma Mother      No Known Problems Father      Heart disease Brother          stent placement    No Known Problems Brother      Diabetes Maternal Grandfather         Past Social History    Social History[1]    Current Medications     Outpatient Medications Marked as Taking for the 5/6/25 encounter (Office Visit) with Sandra Marie FNP   Medication Sig Dispense Refill    atorvastatin (LIPITOR) 20 MG tablet Take 1 tablet (20 mg total) by mouth every evening. 90 tablet 3    pantoprazole (PROTONIX) 40 MG tablet Take 1 tablet (40 mg total) by mouth  "once daily. 90 tablet 1    zonisamide (ZONEGRAN) 100 MG Cap Take 1 capsule (100 mg total) by mouth 2 (two) times daily. 60 capsule 11        I have reviewed the current medications, allergies, vital signs, past medical and surgical history, family medical history, and social history for this encounter and agree with all findings.    OBJECTIVE    Physical Exam    BP (!) 192/69   Pulse (!) 59   Ht 5' 10" (1.778 m)   Wt 71 kg (156 lb 9.6 oz)   SpO2 97%   BMI 22.47 kg/m²   GEN: Well appearing, cooperative, NAD  NECK: Supple, no LAD  CV: Normal rate  RESP: Unlabored  ABD: ND, no guarding  EXT: No clubbing, cyanosis, or edema  SKIN: Warm and dry  NEURO: AAO x4.     LABS    CBC (with or without Differential):   Lab Results   Component Value Date    WBC 5.89 05/06/2025    HGB 14.1 05/06/2025    HGB 8.4 (L) 02/14/2024    HCT 43.8 05/06/2025    HCT 39 04/15/2025    MCV 89.2 05/06/2025    MCH 28.7 05/06/2025    MCHC 32.2 05/06/2025    RDW 13.2 05/06/2025     05/06/2025    MPV 10.6 05/06/2025    NEUTOPHILPCT 67.0 (H) 05/06/2025    DIFFTYPE Auto 05/06/2025     BMP/CMP:   Lab Results   Component Value Date     05/06/2025     02/14/2024    K 5.4 (H) 05/06/2025    K 3.7 02/14/2024     05/06/2025     02/14/2024    CO2 26 05/06/2025    CO2 27 02/14/2024    BUN 22 05/06/2025    BUN 16 02/14/2024    CREATININE 1.51 (H) 05/06/2025    CREATININE 1.02 02/14/2024     05/06/2025    CALCIUM 9.3 05/06/2025    CALCIUM 7.8 (L) 02/14/2024    ALBUMIN 4.2 05/06/2025    ALBUMIN 2.1 (L) 02/12/2024    PHOSPHORUS 3.8 02/12/2024    AST 14 05/06/2025    ALT 10 05/06/2025    ALKPHOS 60 05/06/2025    MG 2.0 01/23/2025        IMAGING  CT abdomen pelvis with and without contrast 01/2025  1. 6 mm obstructing stone at the left UVJ with associated diffuse left hydroureter and moderate left hydronephrosis.  There is associated inflammatory perinephric fat stranding about the left kidney.  2. Bilateral renal cysts  3. " Normal appendix  4. Diverticulosis coli.  5. Minimal bilateral fat containing inguinal hernias.  6. Enlarged prostate gland.  7. Other findings as detailed above    ASSESSMENT  Mitul Torres is a 69 y.o. WM with history of AFib, GERD, hypertension, hyperlipidemia, CVA, and seizures who is referred for rectal bleeding.    1. Rectal bleeding    2. Melena    3. Anemia, unspecified type           PLAN    - schedule EGD/colonoscopy for rectal bleeding, melena, hx of gastric ulcers; discussed procedure with patient, including risks and benefits, patient verbalized understanding  - I discussed with anesthesia and Dr. Maldonado; we will schedule the patient for June 2 2025; I advised the patient that he should be compliant with his seizure medication, taking it exactly as prescribed; also advised patient to notify our office with any change in symptoms or recurrence of seizure; he was instructed to go to the ED if bleeding or abdominal pain becomes severe  - labs today     There are no Patient Instructions on file for this visit.      Orders Placed This Encounter   Procedures    CBC Auto Differential     Standing Status:   Future     Number of Occurrences:   1     Expected Date:   5/6/2025     Expiration Date:   7/5/2026    Comprehensive Metabolic Panel     Standing Status:   Future     Number of Occurrences:   1     Expected Date:   5/6/2025     Expiration Date:   7/5/2026    Ferritin     Standing Status:   Future     Number of Occurrences:   1     Expected Date:   5/6/2025     Expiration Date:   7/6/2026    Iron and TIBC     Standing Status:   Future     Number of Occurrences:   1     Expected Date:   5/6/2025     Expiration Date:   7/6/2026         The risks and benefits of my recommendations, as well as other treatment options were discussed with the patient today. All questions were answered.    45 minutes of total time spent on the encounter, which includes face to face time and non-face to face time preparing to see  the patient (eg, review of tests), obtaining and/or reviewing separately obtained history, documenting clinical information in the electronic or other health record, Independently interpreting results (not separately reported) and communicating results to the patient/family/caregiver, or care coordination (not separately reported).        ELA Trejo/ACNP [facility]         [1]   Social History  Socioeconomic History    Marital status: Single   Tobacco Use    Smoking status: Former     Current packs/day: 0.00     Types: Cigarettes     Quit date:      Years since quittin.3     Passive exposure: Past    Smokeless tobacco: Never   Substance and Sexual Activity    Alcohol use: Not Currently    Drug use: Yes     Types: Marijuana    Sexual activity: Yes     Partners: Female     Social Drivers of Health     Financial Resource Strain: High Risk (4/10/2025)    Overall Financial Resource Strain (CARDIA)     Difficulty of Paying Living Expenses: Very hard   Food Insecurity: No Food Insecurity (4/10/2025)    Hunger Vital Sign     Worried About Running Out of Food in the Last Year: Never true     Ran Out of Food in the Last Year: Never true   Transportation Needs: No Transportation Needs (4/10/2025)    PRAPARE - Transportation     Lack of Transportation (Medical): No     Lack of Transportation (Non-Medical): No   Physical Activity: Inactive (4/10/2025)    Exercise Vital Sign     Days of Exercise per Week: 0 days     Minutes of Exercise per Session: 0 min   Stress: No Stress Concern Present (4/10/2025)    [employer] [place] [employer] - Occupational Stress Questionnaire     Feeling of Stress : Not at all   Housing Stability: Low Risk  (4/10/2025)    Housing Stability Vital Sign     Unable to Pay for Housing in the Last Year: No     Number of Times Moved in the Last Year: 0     Homeless in the Last Year: No

## 2025-05-07 ENCOUNTER — PATIENT MESSAGE (OUTPATIENT)
Dept: FAMILY MEDICINE | Facility: CLINIC | Age: 70
End: 2025-05-07
Payer: MEDICARE

## 2025-05-07 ENCOUNTER — PATIENT MESSAGE (OUTPATIENT)
Dept: CARDIOLOGY | Facility: CLINIC | Age: 70
End: 2025-05-07
Payer: MEDICARE

## 2025-05-07 ENCOUNTER — TELEPHONE (OUTPATIENT)
Dept: GASTROENTEROLOGY | Facility: CLINIC | Age: 70
End: 2025-05-07
Payer: MEDICARE

## 2025-05-07 NOTE — TELEPHONE ENCOUNTER
----- Message from ELA Trejo sent at 5/6/2025 12:47 PM CDT -----  H&H and iron studies are within normal range.  His potassium is mildly elevated, recommend follow-up with PCP.   ----- Message -----  From: Lab, Background User  Sent: 5/6/2025   9:59 AM CDT  To: ELA Trejo

## 2025-05-09 ENCOUNTER — TELEPHONE (OUTPATIENT)
Dept: NEUROLOGY | Facility: CLINIC | Age: 70
End: 2025-05-09
Payer: MEDICARE

## 2025-05-09 NOTE — TELEPHONE ENCOUNTER
Copied from CRM #[id number]. Topic: General Inquiry - Test Results  >> May 9, 2025 11:20 AM Toyin wrote:  Who Called: Mitul Torres    He said he missed a call and he thinks it has something to do with some MRI results but isn't sure    Patient's Preferred Phone Number on File: [phone]

## 2025-05-09 NOTE — TELEPHONE ENCOUNTER
Called pt and gave him the information below from BRADLEY Day NP. He v/u and states to refer him to Dr Mckeon.            ----- Message from ELA Hathaway sent at 5/9/2025  9:25 AM CDT -----  Has multiple levels of moderate stenosis noted on the MRI, worse C5-6 and C6-7.  Several levels of reported severe foraminal stenosis.  This could be contributing to some of his weakness symptoms as   well.  I would recommend evaluation with spinal surgery, if he is in agreement.  Either here or in [place]  ----- Message -----  From: Interface, Rad Results In  Sent: 5/8/2025   1:47 PM CDT  To: ELA Kendrick

## 2025-05-12 DIAGNOSIS — M48.02 SPINAL STENOSIS, CERVICAL REGION: Primary | ICD-10-CM

## 2025-05-18 ENCOUNTER — PATIENT MESSAGE (OUTPATIENT)
Dept: FAMILY MEDICINE | Facility: CLINIC | Age: 70
End: 2025-05-18
Payer: MEDICARE

## 2025-05-19 ENCOUNTER — TELEPHONE (OUTPATIENT)
Dept: FAMILY MEDICINE | Facility: CLINIC | Age: 70
End: 2025-05-19
Payer: MEDICARE

## 2025-05-19 NOTE — TELEPHONE ENCOUNTER
Copied from CRM #[id number]. Topic: Appointments - Appointment Rescheduling  >> May 19, 2025  8:25 AM Connie wrote:  Who Called: Mitul Torres    PT is wanting to reschedule his nurse visit for another day this week. cannot make appt schedule for 5/19.  2 wk BP check.     Preferred Method of Contact: Phone Call  Patient's Preferred Phone Number on File: [phone]   Best Call Back Number, if different:  Additional Information:

## 2025-05-20 ENCOUNTER — CLINICAL SUPPORT (OUTPATIENT)
Dept: FAMILY MEDICINE | Facility: CLINIC | Age: 70
End: 2025-05-20
Payer: MEDICARE

## 2025-05-20 VITALS — HEART RATE: 70 BPM | SYSTOLIC BLOOD PRESSURE: 166 MMHG | DIASTOLIC BLOOD PRESSURE: 93 MMHG

## 2025-05-20 DIAGNOSIS — I10 PRIMARY HYPERTENSION: Primary | ICD-10-CM

## 2025-05-20 RX ORDER — AMLODIPINE BESYLATE 5 MG/1
5 TABLET ORAL DAILY
Qty: 90 TABLET | Refills: 3 | Status: SHIPPED | OUTPATIENT
Start: 2025-05-20 | End: 2026-05-20

## 2025-05-20 NOTE — PROGRESS NOTES
Pt came in for bp check only manual it was 162/78. He also checked it on his machine 166/93. Per dr jordan we are adding 5mg of amlodipine to his meds

## 2025-05-30 ENCOUNTER — PATIENT MESSAGE (OUTPATIENT)
Dept: FAMILY MEDICINE | Facility: CLINIC | Age: 70
End: 2025-05-30
Payer: MEDICARE

## 2025-05-30 ENCOUNTER — PATIENT MESSAGE (OUTPATIENT)
Dept: GASTROENTEROLOGY | Facility: CLINIC | Age: 70
End: 2025-05-30
Payer: MEDICARE

## 2025-06-27 ENCOUNTER — HOSPITAL ENCOUNTER (EMERGENCY)
Facility: HOSPITAL | Age: 70
Discharge: ANOTHER HOSPITAL | End: 2025-06-27
Attending: EMERGENCY MEDICINE
Payer: MEDICARE

## 2025-06-27 VITALS
BODY MASS INDEX: 22.47 KG/M2 | WEIGHT: 156.63 LBS | SYSTOLIC BLOOD PRESSURE: 139 MMHG | TEMPERATURE: 98 F | DIASTOLIC BLOOD PRESSURE: 87 MMHG | OXYGEN SATURATION: 95 % | HEART RATE: 54 BPM | RESPIRATION RATE: 12 BRPM

## 2025-06-27 DIAGNOSIS — R29.818 ACUTE FOCAL NEUROLOGICAL DEFICIT: ICD-10-CM

## 2025-06-27 DIAGNOSIS — G40.909 SEIZURE DISORDER: Primary | ICD-10-CM

## 2025-06-27 PROBLEM — R47.01 APHASIA: Status: ACTIVE | Noted: 2025-06-27

## 2025-06-27 LAB
ALBUMIN SERPL BCP-MCNC: 3.6 G/DL (ref 3.4–4.8)
ALBUMIN/GLOB SERPL: 1.1 {RATIO}
ALP SERPL-CCNC: 48 U/L (ref 40–150)
ALT SERPL W P-5'-P-CCNC: 14 U/L
ANION GAP SERPL CALCULATED.3IONS-SCNC: 11 MMOL/L (ref 7–16)
AST SERPL W P-5'-P-CCNC: 24 U/L (ref 11–45)
BASOPHILS # BLD AUTO: 0.03 K/UL (ref 0–0.2)
BASOPHILS NFR BLD AUTO: 0.6 % (ref 0–1)
BILIRUB SERPL-MCNC: 0.7 MG/DL
BUN SERPL-MCNC: 17 MG/DL (ref 8–26)
BUN/CREAT SERPL: 19 (ref 6–20)
CALCIUM SERPL-MCNC: 8.7 MG/DL (ref 8.8–10)
CHLORIDE SERPL-SCNC: 111 MMOL/L (ref 98–107)
CHOLEST SERPL-MCNC: 149 MG/DL
CHOLEST/HDLC SERPL: 2.8 {RATIO}
CO2 SERPL-SCNC: 24 MMOL/L (ref 23–31)
CREAT SERPL-MCNC: 0.89 MG/DL (ref 0.72–1.25)
DIFFERENTIAL METHOD BLD: ABNORMAL
EGFR (NO RACE VARIABLE) (RUSH/TITUS): 93 ML/MIN/1.73M2
EOSINOPHIL # BLD AUTO: 0.13 K/UL (ref 0–0.5)
EOSINOPHIL NFR BLD AUTO: 2.8 % (ref 1–4)
ERYTHROCYTE [DISTWIDTH] IN BLOOD BY AUTOMATED COUNT: 13.2 % (ref 11.5–14.5)
GLOBULIN SER-MCNC: 3.2 G/DL (ref 2–4)
GLUCOSE SERPL-MCNC: 103 MG/DL (ref 70–105)
GLUCOSE SERPL-MCNC: 104 MG/DL (ref 82–115)
HCT VFR BLD AUTO: 38.4 % (ref 40–54)
HDLC SERPL-MCNC: 54 MG/DL (ref 35–60)
HGB BLD-MCNC: 12.5 G/DL (ref 13.5–18)
IMM GRANULOCYTES # BLD AUTO: 0.01 K/UL (ref 0–0.04)
IMM GRANULOCYTES NFR BLD: 0.2 % (ref 0–0.4)
INR BLD: 1
LDLC SERPL CALC-MCNC: 82 MG/DL
LDLC/HDLC SERPL: 1.5 {RATIO}
LYMPHOCYTES # BLD AUTO: 1.08 K/UL (ref 1–4.8)
LYMPHOCYTES NFR BLD AUTO: 23.1 % (ref 27–41)
MCH RBC QN AUTO: 29.8 PG (ref 27–31)
MCHC RBC AUTO-ENTMCNC: 32.6 G/DL (ref 32–36)
MCV RBC AUTO: 91.4 FL (ref 80–96)
MONOCYTES # BLD AUTO: 0.42 K/UL (ref 0–0.8)
MONOCYTES NFR BLD AUTO: 9 % (ref 2–6)
MPC BLD CALC-MCNC: 10.5 FL (ref 9.4–12.4)
NEUTROPHILS # BLD AUTO: 3.01 K/UL (ref 1.8–7.7)
NEUTROPHILS NFR BLD AUTO: 64.3 % (ref 53–65)
NONHDLC SERPL-MCNC: 95 MG/DL
NRBC # BLD AUTO: 0 X10E3/UL
NRBC, AUTO (.00): 0 %
PLATELET # BLD AUTO: 169 K/UL (ref 150–400)
POTASSIUM SERPL-SCNC: 4.3 MMOL/L (ref 3.5–5.1)
PROT SERPL-MCNC: 6.8 G/DL (ref 5.8–7.6)
PROTHROMBIN TIME: 13.3 SECONDS (ref 11.7–14.7)
RBC # BLD AUTO: 4.2 M/UL (ref 4.6–6.2)
SODIUM SERPL-SCNC: 142 MMOL/L (ref 136–145)
TRIGL SERPL-MCNC: 65 MG/DL (ref 34–140)
TSH SERPL DL<=0.005 MIU/L-ACNC: 2.55 UIU/ML (ref 0.35–4.94)
VLDLC SERPL-MCNC: 13 MG/DL
WBC # BLD AUTO: 4.68 K/UL (ref 4.5–11)

## 2025-06-27 PROCEDURE — 84443 ASSAY THYROID STIM HORMONE: CPT | Performed by: EMERGENCY MEDICINE

## 2025-06-27 PROCEDURE — 85610 PROTHROMBIN TIME: CPT | Performed by: EMERGENCY MEDICINE

## 2025-06-27 PROCEDURE — 93005 ELECTROCARDIOGRAM TRACING: CPT

## 2025-06-27 PROCEDURE — 63600175 PHARM REV CODE 636 W HCPCS: Performed by: EMERGENCY MEDICINE

## 2025-06-27 PROCEDURE — 25500020 PHARM REV CODE 255: Performed by: EMERGENCY MEDICINE

## 2025-06-27 PROCEDURE — 99285 EMERGENCY DEPT VISIT HI MDM: CPT | Mod: 25

## 2025-06-27 PROCEDURE — 96374 THER/PROPH/DIAG INJ IV PUSH: CPT

## 2025-06-27 PROCEDURE — G0425 INPT/ED TELECONSULT30: HCPCS | Mod: 95,,, | Performed by: PSYCHIATRY & NEUROLOGY

## 2025-06-27 PROCEDURE — 36415 COLL VENOUS BLD VENIPUNCTURE: CPT | Performed by: EMERGENCY MEDICINE

## 2025-06-27 PROCEDURE — 85025 COMPLETE CBC W/AUTO DIFF WBC: CPT | Performed by: EMERGENCY MEDICINE

## 2025-06-27 PROCEDURE — 93010 ELECTROCARDIOGRAM REPORT: CPT | Mod: ,,, | Performed by: INTERNAL MEDICINE

## 2025-06-27 PROCEDURE — 80061 LIPID PANEL: CPT | Performed by: EMERGENCY MEDICINE

## 2025-06-27 PROCEDURE — 80053 COMPREHEN METABOLIC PANEL: CPT | Performed by: EMERGENCY MEDICINE

## 2025-06-27 PROCEDURE — 82962 GLUCOSE BLOOD TEST: CPT

## 2025-06-27 RX ORDER — IOPAMIDOL 755 MG/ML
100 INJECTION, SOLUTION INTRAVASCULAR
Status: COMPLETED | OUTPATIENT
Start: 2025-06-27 | End: 2025-06-27

## 2025-06-27 RX ORDER — LEVETIRACETAM 500 MG/5ML
1500 INJECTION, SOLUTION, CONCENTRATE INTRAVENOUS
Status: COMPLETED | OUTPATIENT
Start: 2025-06-27 | End: 2025-06-27

## 2025-06-27 RX ADMIN — LEVETIRACETAM 1500 MG: 100 INJECTION, SOLUTION INTRAVENOUS at 09:06

## 2025-06-27 RX ADMIN — IOPAMIDOL 100 ML: 755 INJECTION, SOLUTION INTRAVENOUS at 08:06

## 2025-06-27 NOTE — SUBJECTIVE & OBJECTIVE
HPI:  70 y.o. male with HTN, A fib no taking AC, s/p mitral valve repair,  GIB, L occipital infarct, post stroke seizures, brought in due to acute onset R arm weakness and difficulty speaking and following commands.  As per family, he had multiple episodes with similar symptoms deemed to be seizure related since his stroke.     Images personally reviewed and interpreted:  Study: Head CT and CTA Head & Neck  Study Interpretation: no acute abnormality. No LVO, high grade stenosis, or significant carotid disease     Assessment and plan:  Seizure vs acute L cortical infarct.  Decided against TNK due similar presentations in the past deemed to be seizure related and history of GIB bleed beginning of May.  Recommended transfer to nearest appropriate facility for EEG monitoring and MRI brain.  Neurology consult.    Lytics recommendation: Thrombolytic therapy not recommended due to Suspected stroke mimic  and recent GIB    Thrombectomy recommendation: No; No large vessel occlusion identified on imaging   Placement recommendation: transfer to nearest appropriate facility

## 2025-06-27 NOTE — TELEMEDICINE CONSULT
[facility]  Vascular Neurology  Comprehensive Stroke Center  TeleVascular Neurology Acute Consultation Note        Consult Information  Consults    Consulting Provider: JIGNA EPPS   Current Providers  No providers found    Patient Location:  [facility] EMERGENCY DEPART* Emergency Department    Spoke hospital nurse at bedside with patient assisting consultant.  Patient information was obtained from relative(s), past medical records, and primary team.       Stroke Documentation  Acute Stroke Times   Last Known Normal Date: 06/27/25  Last Known Normal Time: 0645  Symptom Onset Date: 06/27/25  Symptom Onset Time: 0645  Stroke Team Called Date: 06/27/25  Stroke Team Called Time: 0810  Stroke Team Arrival Date: 06/27/25  Stroke Team Arrival Time: 0815  CT Interpretation Time: 0815  Thrombolytic Therapy Recommended: No  CTA Interpretation Time: 0830  Thrombectomy Recommended: No    NIH Scale:  Interval: baseline  1a. Level of Consciousness: 1-->Not alert, but arousable by minor stimulation to obey, answer, or respond  1b. LOC Questions: 2-->Answers neither question correctly  1c. LOC Commands: 0-->Performs both tasks correctly  2. Best Gaze: 0-->Normal  3. Visual: 0-->No visual loss  4. Facial Palsy: 1-->Minor paralysis (flattened nasolabial fold, asymmetry on smiling)  5a. Motor Arm, Left: 0-->No drift, limb holds 90 (or 45) degrees for full 10 secs  5b. Motor Arm, Right: 1-->Drift, limb holds 90 (or 45) degrees, but drifts down before full 10 secs, does not hit bed or other support  6a. Motor Leg, Left: 0-->No drift, leg holds 30 degree position for full 5 secs  6b. Motor Leg, Right: 0-->No drift, leg holds 30 degree position for full 5 secs  7. Limb Ataxia: 0-->Absent  8. Sensory: 0-->Normal, no sensory loss  9. Best Language: 2-->Severe aphasia, all communication is through fragmentary expression, great need for inference, questioning, and guessing by the listener. Range of information  that can be exchanged is limited, listener carries burden of. . . (see row details)  10. Dysarthria: 0-->Normal  11. Extinction and Inattention (formerly Neglect): 0-->No abnormality  Total (NIH Stroke Scale): 7      Modified Patterson Baseline: Score: 2  Modified Joyce Discharge:    Linette Coma Scale: 11   ABCD2 Score:    GQBQ6CS2-DTK Score: 5  HAS -BLED Score:    ICH Score:    Hunt [employer] Pride Classification:      Blood pressure (!) 176/83, pulse 75, temperature 98.3 °F (36.8 °C), resp. rate 20, weight 71 kg (156 lb 9.6 oz), SpO2 97%.      In my opinion, this was a: Tier 1; VAN Stroke Assessment: Positive     Medical Decision Making  HPI:  70 y.o. male with HTN, A fib no taking AC, s/p mitral valve repair,  GIB, L occipital infarct, post stroke seizures, brought in due to acute onset R arm weakness and difficulty speaking and following commands.  As per family, he had multiple episodes with similar symptoms deemed to be seizure related since his stroke.     Images personally reviewed and interpreted:  Study: Head CT and CTA Head & Neck  Study Interpretation: no acute abnormality. No LVO, high grade stenosis, or significant carotid disease     Assessment and plan:  Seizure vs acute L cortical infarct.  Decided against TNK due similar presentations in the past deemed to be seizure related and history of GIB bleed beginning of May.  Recommended transfer to nearest appropriate facility for EEG monitoring and MRI brain.  Neurology consult.    Lytics recommendation: Thrombolytic therapy not recommended due to Suspected stroke mimic  and recent GIB    Thrombectomy recommendation: No; No large vessel occlusion identified on imaging   Placement recommendation: transfer to nearest appropriate facility                ROS  Physical Exam  Past Medical History:   Diagnosis Date    A-fib     GERD (gastroesophageal reflux disease)     Hyperlipidemia     Hypertension     Seizures     Stroke      Past Surgical History:   Procedure  Laterality Date    ANKLE FRACTURE SURGERY Right     CYSTOURETEROSCOPY, WITH HOLMIUM LASER LITHOTRIPSY OF URETERAL CALCULUS AND STENT INSERTION Left 01/13/2025    Procedure: CYSTOURETEROSCOPY, WITH HOLMIUM LASER LITHOTRIPSY OF URETERAL CALCULUS AND STENT INSERTION;  Surgeon: Enrique Mcdaniel MD;  Location: [facility] OR;  Service: Urology;  Laterality: Left;    ECHOCARDIOGRAM,TRANSESOPHAGEAL  12/14/2023    Procedure: Transesophageal echo (GAEL) intra-procedure log documentation;  Surgeon: Jerry Be DO;  Location: [facility] CATH LAB;  Service: Cardiology;;    LEFT HEART CATHETERIZATION Left 12/14/2023    Procedure: Left heart cath;  Surgeon: Jerry Be DO;  Location: [facility] CATH LAB;  Service: Cardiology;  Laterality: Left;    MITRAL VALVE REPAIR      PINNING-CLOSED-ARM Left     REPAIR OF HEART VALVE      RIGHT HEART CATHETERIZATION Right 12/14/2023    Procedure: INSERTION, CATHETER, RIGHT HEART;  Surgeon: Jerry Be DO;  Location: [facility] CATH LAB;  Service: Cardiology;  Laterality: Right;     Family History   Problem Relation Name Age of Onset    Cataracts Mother      Glaucoma Mother      No Known Problems Father      Heart disease Brother          stent placement    No Known Problems Brother      Diabetes Maternal Grandfather         Diagnoses  Post-ictal state and Confusion/Encephalopathy    Armen Kearns MD      Emergent/Acute neurological consultation requested by spoke provider due to critical concerns for possible cerebrovascular event that could result in permanent loss of neurologic/bodily function, severe disability or death of this patient.  Immediate/timely evaluation by a highly prepared expert is paramount for optimal outcomes  High risk for neurological deterioration if not properly diagnosed  High risk for neurological deterioration if not treated promplty/as soon as possible  Complex diagnostic evaluation may be required (advanced imaging)  High risk treatment options  (thrombolytics and/or thrombectomy)    Patient care was coordinated with spoke provider, including but not limted to    Discussing likely diagnosis/etiology of symptoms  Making recommendations for further diagnostic studies  Making recommendations for intravenous thrombolytics or other advanced therapies  Making recommendations for disposition (admission/transfer for higher level of care)      Neurology consultation requested by spoke provider. Audiovisual encounter with the patient performed using a secure connection.  Results and impressions from the visit are documented on this note and were communicated to the consulting provider/team via direct communication. The note has been shared for addition to the patients electronic medical record.

## 2025-06-27 NOTE — ED PROVIDER NOTES
Encounter Date: 6/27/2025       History     Chief Complaint   Patient presents with    Aphasia     Patient presents to ER pov w coworker. Coworker states that around 0645- 7am that patient began having difficulty speaking and having confusion. Patient has hx of seizure and hx of stroke.      History per significant other and per patient's co-worker.  Last seen normal at 6:45 a.m..  Symptoms started at 7:00 a.m. per co-worker with right arm weakness in difficulty speaking.  Patient can not for normal words.  History give us report that this has happened about 3 times previously has been associated with seizures.  Patient had a documented stroke in September.  MRI showed left occipital stroke.  Patient has started having seizure-like episodes in November.  That has not tolerated Keppra do a suicidal thoughts.  Could not take that has Zonegran patient with history of sulfa allergy Emil Seaman.      Review of patient's allergies indicates:   Allergen Reactions    Sulfamethoxazole-trimethoprim Rash     Note: Emil Ivette Syndrome    Levetiracetam Other (See Comments)     Past Medical History:   Diagnosis Date    A-fib     GERD (gastroesophageal reflux disease)     Hyperlipidemia     Hypertension     Seizures     Stroke      Past Surgical History:   Procedure Laterality Date    ANKLE FRACTURE SURGERY Right     CYSTOURETEROSCOPY, WITH HOLMIUM LASER LITHOTRIPSY OF URETERAL CALCULUS AND STENT INSERTION Left 01/13/2025    Procedure: CYSTOURETEROSCOPY, WITH HOLMIUM LASER LITHOTRIPSY OF URETERAL CALCULUS AND STENT INSERTION;  Surgeon: Enrique Mcdaniel MD;  Location: [facility] OR;  Service: Urology;  Laterality: Left;    ECHOCARDIOGRAM,TRANSESOPHAGEAL  12/14/2023    Procedure: Transesophageal echo (GAEL) intra-procedure log documentation;  Surgeon: Jerry Be DO;  Location: [facility] CATH LAB;  Service: Cardiology;;    LEFT HEART CATHETERIZATION Left 12/14/2023    Procedure: Left heart cath;  Surgeon: Jerry Be  ;  Location: [facility] CATH LAB;  Service: Cardiology;  Laterality: Left;    MITRAL VALVE REPAIR      PINNING-CLOSED-ARM Left     REPAIR OF HEART VALVE      RIGHT HEART CATHETERIZATION Right 12/14/2023    Procedure: INSERTION, CATHETER, RIGHT HEART;  Surgeon: Jerry Be DO;  Location: [facility] CATH LAB;  Service: Cardiology;  Laterality: Right;     Family History   Problem Relation Name Age of Onset    Cataracts Mother      Glaucoma Mother      No Known Problems Father      Heart disease Brother          stent placement    No Known Problems Brother      Diabetes Maternal Grandfather       Social History[1]  Review of Systems   Constitutional:  Negative for fever.   HENT:  Negative for sore throat.    Respiratory:  Negative for shortness of breath.    Cardiovascular:  Negative for chest pain.   Gastrointestinal:  Negative for nausea.   Genitourinary:  Negative for dysuria.   Musculoskeletal:  Negative for back pain.   Skin:  Negative for rash.   Neurological:  Negative for weakness.   Hematological:  Does not bruise/bleed easily.       Physical Exam     Initial Vitals   BP Pulse Resp Temp SpO2   06/27/25 0837 06/27/25 0821 06/27/25 0821 06/27/25 0841 06/27/25 0821   (!) 176/83 75 20 98.3 °F (36.8 °C) 97 %      MAP       --                Physical Exam    Nursing note and vitals reviewed.  Constitutional: He appears well-developed and well-nourished.   HENT:   Head: Normocephalic and atraumatic.   Eyes: EOM are normal. Pupils are equal, round, and reactive to light.   Neck: Neck supple. No thyromegaly present. No JVD present.   Normal range of motion.  Cardiovascular:  Normal rate, regular rhythm, normal heart sounds and intact distal pulses.           No murmur heard.  Pulmonary/Chest: Breath sounds normal. No stridor. No respiratory distress. He has no wheezes.   Abdominal: Abdomen is soft. Bowel sounds are normal. He exhibits no distension. There is no abdominal tenderness.   Musculoskeletal:          General: No tenderness or edema. Normal range of motion.      Cervical back: Normal range of motion and neck supple.     Lymphadenopathy:     He has no cervical adenopathy.   Neurological: He is alert and oriented to person, place, and time. GCS score is 15. GCS eye subscore is 4. GCS verbal subscore is 5. GCS motor subscore is 6.   Aphasia.  Forearm some words.  Right arm weakness   Skin: Skin is warm and dry. Capillary refill takes less than 2 seconds. No rash noted.   Psychiatric: He has a normal mood and affect.         Medical Screening Exam   See Full Note    ED Course   Procedures  Labs Reviewed   COMPREHENSIVE METABOLIC PANEL - Abnormal       Result Value    Sodium 142      Potassium 4.3      Chloride 111 (*)     CO2 24      Anion Gap 11      Glucose 104      BUN 17      Creatinine 0.89      BUN/Creatinine Ratio 19      Calcium 8.7 (*)     Total Protein 6.8      Albumin 3.6      Globulin 3.2      A/G Ratio 1.1      Bilirubin, Total 0.7      Alk Phos 48      ALT 14      AST 24      eGFR 93     CBC WITH DIFFERENTIAL - Abnormal    WBC 4.68      RBC 4.20 (*)     Hemoglobin 12.5 (*)     Hematocrit 38.4 (*)     MCV 91.4      MCH 29.8      MCHC 32.6      RDW 13.2      Platelet Count 169      MPV 10.5      Neutrophils % 64.3      Lymphocytes % 23.1 (*)     Monocytes % 9.0 (*)     Eosinophils % 2.8      Basophils % 0.6      Immature Granulocytes % 0.2      nRBC, Auto 0.0      Neutrophils, Abs 3.01      Lymphocytes, Absolute 1.08      Monocytes, Absolute 0.42      Eosinophils, Absolute 0.13      Basophils, Absolute 0.03      Immature Granulocytes, Absolute 0.01      nRBC, Absolute 0.00      Diff Type Auto     PROTIME-INR - Normal    PT 13.3      INR 1.00     TSH - Normal    TSH 2.549     CBC W/ AUTO DIFFERENTIAL    Narrative:     The following orders were created for panel order CBC W/ AUTO DIFFERENTIAL.  Procedure                               Abnormality         Status                     ---------                                -----------         ------                     CBC with Differential[[id number]]       Abnormal            Final result                 Please view results for these tests on the individual orders.   LIPID PANEL    Triglycerides 65      Cholesterol 149      HDL Cholesterol 54      Cholesterol/HDL Ratio (Risk Factor) 2.8      Non-HDL 95      LDL Calculated 82      LDL/HDL 1.5      VLDL 13     POCT GLUCOSE MONITORING CONTINUOUS    POC Glucose 103     POCT GLUCOSE MONITORING CONTINUOUS          Imaging Results              CTA Brain (Final result)  Result time 06/27/25 08:39:51      Final result by Hank Huggins MD (06/27/25 08:39:51)                   Impression:      No significant stenosis at the carotid bifurcations by NASCET criteria or involving the vertebral arteries.    No major branch advanced stenosis/occlusion intracranially.    Please note the aortic arch is not in the field of view of this study.      Electronically signed by: Hank Huggins  Date:    06/27/2025  Time:    08:39               Narrative:    EXAMINATION:  CTA HEAD; CTA NECK    CLINICAL HISTORY:  Neuro deficit, acute, stroke suspected;    TECHNIQUE:  CTA of the head and CTA of the neck was performed utilizing 100 mL Isovue 370 intravenous contrast.  Sagittal coronal reformats were created.    Postcontrast CT imaging of the head was obtained.    3D reformats were created on an independent workstation to evaluate the [id number].    COMPARISON:  01/09/2025    FINDINGS:  No enhancing intracranial lesion.    The aortic arch is not included in the field of view.    Calcification with mild narrowing at the origin of the right vertebral artery.  The vertebral arteries are codominant without advanced stenosis in the neck.    No significant stenosis at the carotid bifurcations bilaterally by NASCET criteria.    No major branch advanced stenosis/occlusion of the intracranial vessels identified.  Calcifications with mild narrowing of  the cavernous carotids bilaterally.    No evidence of aneurysm.  The venous sinuses are patent.    No soft tissue mass identified in the neck.    These findings were communicated to Dr. Rizo and Dr. Kearns at 08:37 on 06/27/2025.                                       CTA Neck (Final result)  Result time 06/27/25 08:39:51      Final result by Hank Huggins MD (06/27/25 08:39:51)                   Impression:      No significant stenosis at the carotid bifurcations by NASCET criteria or involving the vertebral arteries.    No major branch advanced stenosis/occlusion intracranially.    Please note the aortic arch is not in the field of view of this study.      Electronically signed by: Hank Huggins  Date:    06/27/2025  Time:    08:39               Narrative:    EXAMINATION:  CTA HEAD; CTA NECK    CLINICAL HISTORY:  Neuro deficit, acute, stroke suspected;    TECHNIQUE:  CTA of the head and CTA of the neck was performed utilizing 100 mL Isovue 370 intravenous contrast.  Sagittal coronal reformats were created.    Postcontrast CT imaging of the head was obtained.    3D reformats were created on an independent workstation to evaluate the [id number].    COMPARISON:  01/09/2025    FINDINGS:  No enhancing intracranial lesion.    The aortic arch is not included in the field of view.    Calcification with mild narrowing at the origin of the right vertebral artery.  The vertebral arteries are codominant without advanced stenosis in the neck.    No significant stenosis at the carotid bifurcations bilaterally by NASCET criteria.    No major branch advanced stenosis/occlusion of the intracranial vessels identified.  Calcifications with mild narrowing of the cavernous carotids bilaterally.    No evidence of aneurysm.  The venous sinuses are patent.    No soft tissue mass identified in the neck.    These findings were communicated to Dr. Rizo and Dr. Kearns at 08:37 on 06/27/2025.                                        CT Head Without Contrast (Final result)  Result time 06/27/25 08:10:21      Final result by Hank Huggins MD (06/27/25 08:10:21)                   Impression:      Chronic left parietal infarct.  No acute intracranial process.      Electronically signed by: Hank Huggins  Date:    06/27/2025  Time:    08:10               Narrative:    EXAMINATION:  CT HEAD WITHOUT CONTRAST    CLINICAL HISTORY:  Mental status change, unknown cause;    TECHNIQUE:  Low dose axial images were obtained through the head.  Coronal and sagittal reformations were also performed. Contrast was not administered.    COMPARISON:  MRI 05/06/2020    FINDINGS:  No evidence of hydrocephalus, mass effect, intracranial hemorrhage or acute territorial infarct.    Chronic left parietal infarct again identified.  Small chronic left cerebellar infarct, stable    No acute calvarial fracture. The visualized sinuses and mastoid air cells are clear.                                       Medications   iopamidoL (ISOVUE-370) injection 100 mL (100 mLs Intravenous Given 6/27/25 0817)   levETIRAcetam injection 1,500 mg (1,500 mg Intravenous Given 6/27/25 0903)     Medical Decision Making  Amount and/or Complexity of Data Reviewed  Labs: ordered.  Radiology: ordered.    Risk  Prescription drug management.    Critical Care  Total time providing critical care: 45 minutes      Additional MDM:     NIH Stroke Scale:   Interval = baseline (upon arrival/admit)  Level of consciousness = 0 - alert  LOC questions = 0 - answers both correctly  LOC commands = 0 - performs both correctly  Best gaze = 0 - normal  Visual = 0 - no visual loss  Facial palsy = 0 - normal  Motor left arm =  0 - no drift  Motor right arm =  1 - drift  Motor left leg = 0 - no drift  Motor right leg =  0 - no drift  Limb ataxia = 0 - absent  Sensory = 0 - normal  Best language = 2 - severe aphasia  Dysarthria = 0 - normal articulation  Extinction and inattention = 0 - no neglect  NIH Stroke  Scale Total = 3              ED Course as of 25 0954      0843 Toxic extensively with the patient in his son and with vascular neurologist.  Reviewed external records.  Patient had rectal bleeding end of April 1st of May.  It was bright red blood and then also some melena.  He has had prior stomach ulcers from 2 years ago.  Patient recently taken off of anticoagulants due to GI bleeding.  Also had urology bleeding.  Patient also has presented several times with the same symptoms in terms of deficits according to his son and this has been planned on seizures for which he has been having some difficulty findings seizure medication that works well.  Together we decided the risk exceed the benefits for thrombolysis so will not order TNK [PK]   0844 No large vessel occlusion not a candidate for thrombectomy [PK]   0951 Complicated neurology patient.  Will transfer to [facility]. [PK]      ED Course User Index  [PK] Adelfo Rizo MD                           Clinical Impression:   Final diagnoses:  [R29.818] Acute focal neurological deficit  [G40.909] Seizure disorder (Primary)        ED Disposition Condition    Transfer to Another Facility Stable                  [1]   Social History  Tobacco Use    Smoking status: Former     Current packs/day: 0.00     Types: Cigarettes     Quit date:      Years since quittin.5     Passive exposure: Past    Smokeless tobacco: Never   Substance Use Topics    Alcohol use: Not Currently    Drug use: Yes     Types: Marijuana        Adelfo Rizo MD  25 [phone]

## 2025-06-29 LAB
OHS QRS DURATION: 98 MS
OHS QTC CALCULATION: 481 MS

## 2025-06-30 ENCOUNTER — EXTERNAL HOSPITAL ADMISSION (OUTPATIENT)
Dept: ADMINISTRATIVE | Facility: CLINIC | Age: 70
End: 2025-06-30
Payer: MEDICARE

## 2025-06-30 ENCOUNTER — PATIENT OUTREACH (OUTPATIENT)
Dept: ADMINISTRATIVE | Facility: CLINIC | Age: 70
End: 2025-06-30
Payer: MEDICARE

## 2025-06-30 NOTE — PROGRESS NOTES
C3 nurse spoke with Mitul Torres  for a TCC post hospital discharge follow up call. The patient does not have a scheduled HOSFU appointment with Dr Venancio Curiel within 5-7 days post hospital discharge date 6/29/25. C3 nurse was unable to schedule HOSFU appointment in [facility] d/t no appt available.    Message sent to PCP staff requesting they contact patient and schedule follow up appointment.    Medication changes:  (N) plavix 75mg take 1 tablet by mouth daily  (N) vimpat 100mg take 1 tablet by mouth twice daily (pt states pharmacy had to order medication and plans to  tomorrow)

## 2025-07-02 ENCOUNTER — OFFICE VISIT (OUTPATIENT)
Dept: FAMILY MEDICINE | Facility: CLINIC | Age: 70
End: 2025-07-02
Payer: MEDICARE

## 2025-07-02 DIAGNOSIS — Z86.73 HISTORY OF CVA (CEREBROVASCULAR ACCIDENT): Primary | ICD-10-CM

## 2025-07-02 DIAGNOSIS — R56.9 SEIZURE: ICD-10-CM

## 2025-07-02 RX ORDER — LACOSAMIDE 100 MG/1
100 TABLET ORAL
COMMUNITY
Start: 2025-06-29 | End: 2025-07-31

## 2025-07-02 RX ORDER — CLOPIDOGREL BISULFATE 75 MG/1
75 TABLET ORAL EVERY MORNING
COMMUNITY
Start: 2025-06-30 | End: 2026-06-30

## 2025-07-02 NOTE — PROGRESS NOTES
[facility]  Chief Complaint      Chief Complaint   Patient presents with    Hospital Follow Up     Patient presents to clinic for hospital follow up. He presented to [facility] in [place], [place] with possible CVA with aphasia and right arm weakness. PMHx of Afib, Stroke, Valve Replacement, and seizures. States they dx him with seizures due to hx of CVA.        History of Present Illness      Mitul Torres is a 70 y.o. male with chronic conditions of paroxysmal atrial fibrillation, hypertension, severe mitral regurgitation status post mitral valve replacement, duodenal ulcer, questionable seizures, TIA/CVA, and GERD. He is followed by Dr. Curiel (PCP), BRADLEY MAHMOOD (Neurology) and Dr. Levine (Cardiology). The pt presents to the clinic today for a hospital follow up. He presented to [facility] in [place], [place] 7/27/2025 with symptoms of difficulty using his right arm and difficulty speaking. He was diagnosed with Cerebral Vascular Accident, unspecified mechanism. Patient had a GI bleed 8 weeks ago so TNKASE was not recommended. Today, the pt denies dizziness, headache, fatigue, transient weakness to one side of body, chest pain, sob, or changes in vision. He denies recent seizure activity. The pt is aware of new medication changes plavix and vampat rxs.     Labs and Imaging results reviewed.     HPI     Past Medical History:  Past Medical History:   Diagnosis Date    A-fib     GERD (gastroesophageal reflux disease)     Hyperlipidemia     Hypertension     Seizures     Stroke        Past Surgical History:   has a past surgical history that includes Left heart catheterization (Left, 12/14/2023); Right heart catheterization (Right, 12/14/2023); echocardiogram,transesophageal (12/14/2023); Mitral valve repair; Repair of heart valve; cystoureteroscopy, with holmium laser lithotripsy of ureteral calculus and stent insertion (Left, 01/13/2025); Ankle fracture surgery  (Right); and pinning-closed-arm (Left).    Social History:  Social History[1]    I personally reviewed all past medical, surgical, and social.     Review of Systems   Constitutional:  Negative for fatigue, fever and unexpected weight change.   Respiratory:  Negative for cough, shortness of breath and wheezing.    Cardiovascular:  Negative for chest pain and leg swelling.   Gastrointestinal:  Negative for abdominal pain, constipation, diarrhea, nausea and vomiting.   Genitourinary:  Negative for difficulty urinating, dysuria, flank pain and frequency.   Musculoskeletal:  Negative for gait problem.   Skin:  Negative for color change and rash.   Neurological:  Positive for seizures. Negative for dizziness and weakness.   Psychiatric/Behavioral:  Negative for dysphoric mood. The patient is not nervous/anxious.         Medications:  Encounter Medications[2]    Allergies:  Review of patient's allergies indicates:   Allergen Reactions    Sulfamethoxazole-trimethoprim Rash     Note: Emil Johson Syndrome    Levetiracetam Other (See Comments)       Health Maintenance:    There is no immunization history on file for this patient.     Health Maintenance   Topic Date Due    High Dose Statin  Never done    Colorectal Cancer Screening  Never done    Shingles Vaccine (1 of 2) Never done    Pneumococcal Vaccines (Age 50+) (1 of 1 - PCV) Never done    RSV Vaccine (Age 60+ and Pregnant patients) (1 - Risk 60-74 years 1-dose series) Never done    COVID-19 Vaccine (1 - 2024-25 season) Never done    Influenza Vaccine (1) 09/01/2025    Hemoglobin A1c (Prediabetes)  06/27/2026    Lipid Panel  06/27/2030    TETANUS VACCINE  05/16/2032    Hepatitis C Screening  Completed    Abdominal Aortic Aneurysm Screening  Completed        Physical Exam       ]    Physical Exam  Vitals reviewed.   Constitutional:       General: He is not in acute distress.     Appearance: Normal appearance.   HENT:      Head: Normocephalic.      Mouth/Throat:       Mouth: Mucous membranes are moist.   Cardiovascular:      Rate and Rhythm: Regular rhythm. Bradycardia present.      Pulses: Normal pulses.      Heart sounds: Normal heart sounds. No murmur heard.  Pulmonary:      Effort: Pulmonary effort is normal. No respiratory distress.      Breath sounds: Normal breath sounds. No wheezing, rhonchi or rales.   Abdominal:      General: Bowel sounds are normal. There is no distension.      Palpations: Abdomen is soft.      Tenderness: There is no abdominal tenderness.   Musculoskeletal:         General: Normal range of motion.      Cervical back: Neck supple.   Skin:     General: Skin is warm and dry.   Neurological:      Mental Status: He is alert and oriented to person, place, and time.   Psychiatric:         Mood and Affect: Mood normal.         Behavior: Behavior normal.          Laboratory:  CBC:  Recent Labs   Lab 05/01/25  1109 05/06/25  0929 06/27/25  0803   WBC 7.01 5.89 4.68   RBC 4.68 4.91 4.20 L   Hemoglobin 13.5 14.1 12.5 L   Hematocrit 41.7 43.8 38.4 L   Platelet Count 252 245 169   MCV 89.1 89.2 91.4   MCH 28.8 28.7 29.8   MCHC 32.4 32.2 32.6       LIPIDS:  Recent Labs   Lab 03/10/25  1127 04/15/25  0825 06/27/25  0803 06/27/25  1316 06/28/25  0633   TSH 2.239 1.925 2.549  --  3.154   HDL  --   --   --  50  --    HDL Cholesterol 68 H 60 54  --   --    Cholesterol 175 171 149 137  --    Triglycerides 79 115 65 64  --    LDL Calculated 91 88 82 74  --    Cholesterol/HDL Ratio (Risk Factor) 2.6 2.9 2.8  --   --    Non- 111 95 87  --        TSH:  Recent Labs   Lab 04/15/25  0825 06/27/25  0803 06/28/25  0633   TSH 1.925 2.549 3.154       A1C:  Recent Labs   Lab 10/11/23  1103 01/26/24  1027 03/10/25  1127 06/27/25  1316   Hemoglobin A1C 5.3 5.2 5.4 5.5       Lab Results   Component Value Date     06/27/2025     06/29/2025    K 4.4 06/29/2025     06/29/2025    CO2 29 06/29/2025    BUN 17 06/29/2025    CREATININE 1.13 06/29/2025    CALCIUM 8.8  "06/29/2025    PROT 6.8 06/27/2025    ALBUMIN 3.6 06/27/2025    ALBUMIN 2.1 (L) 02/12/2024    BILITOT 0.7 06/27/2025    ALKPHOS 48 06/27/2025    AST 24 06/27/2025    ALT 14 06/27/2025    ANIONGAP 7 06/29/2025    ESTGFRAFRICA 70 06/29/2025    EGFRNONAA 76 05/26/2022       Lab Results   Component Value Date    WBC 4.68 06/27/2025    RBC 4.20 (L) 06/27/2025    HGB 12.5 (L) 06/27/2025    HGB 8.4 (L) 02/14/2024    HCT 38.4 (L) 06/27/2025    HCT 39 04/15/2025    MCV 91.4 06/27/2025    RDW 13.2 06/27/2025     06/27/2025        Lab Results   Component Value Date    CHOL 137 06/27/2025    TRIG 64 06/27/2025    HDL 50 06/27/2025    LDLCALC 74 06/27/2025       Lab Results   Component Value Date    TSH 3.154 06/28/2025       Lab Results   Component Value Date    HGBA1C 5.5 06/27/2025    ESTIMATEDAVG 108 03/10/2025        No components found for: "VITAMIND"    Lab Results   Component Value Date    PSA 3.744 04/10/2025       Point Of Care Testing:  Nitrites, UA   Date Value Ref Range Status   05/05/2025 Negative Negative Final     Urobilinogen, UA   Date Value Ref Range Status   05/05/2025 Normal 0.2, 1.0, Normal mg/dL Final     pH, UA   Date Value Ref Range Status   05/05/2025 5.5 5.0 to 8.0 pH Units Final     Specific Gravity, UA   Date Value Ref Range Status   05/05/2025 1.009 <=1.030 Final     Ketones, UA   Date Value Ref Range Status   05/05/2025 Negative Negative mg/dL Final       No results found for: "HQGVOBY4UC", "RAPFLUA", "RAPFLUB"      Assessment/Plan       1. History of CVA (cerebrovascular accident)  Stable - Followed by Neurology  Continue aspirin 81 mg po daily  Continue Plavix 75 mg po daily    2. Seizure  Chronic - Stable - followed by Neurology  Continue Vimpat 100 mg po twice daily         Follow up with PCP as scheduled.Keep follow up appts with Specialists.     Questions answered to desired level of satisfaction    Verbalized understanding to all information and instructions provided      Joanne" MIKAL FordeEncompass Health Rehabilitation [facility]                   [1]   Social History  Tobacco Use    Smoking status: Former     Current packs/day: 0.00     Types: Cigarettes     Quit date:      Years since quittin.5     Passive exposure: Past    Smokeless tobacco: Never   Substance Use Topics    Alcohol use: Not Currently    Drug use: Yes     Types: Marijuana   [2]   Outpatient Encounter Medications as of 2025   Medication Sig Note Dispense Refill    aspirin (ECOTRIN) 81 MG EC tablet Take 1 tablet by mouth every morning.       clopidogreL (PLAVIX) 75 mg tablet Take 75 mg by mouth every morning.       lacosamide (VIMPAT) 100 mg Tab Take 100 mg by mouth. Take 1 tablet by mouth in the morning and 1 tablet before bedtime. Do all this for 30 days.       pantoprazole (PROTONIX) 40 MG tablet Take 1 tablet (40 mg total) by mouth once daily.  90 tablet 1    sertraline (ZOLOFT) 50 MG tablet Take 1 tablet (50 mg total) by mouth once daily.  90 tablet 1    [DISCONTINUED] amLODIPine (NORVASC) 5 MG tablet Take 1 tablet (5 mg total) by mouth once daily. (Patient not taking: Reported on 2025)  90 tablet 3    [DISCONTINUED] atorvastatin (LIPITOR) 20 MG tablet Take 1 tablet (20 mg total) by mouth every evening. (Patient not taking: Reported on 2025)  90 tablet 3    [DISCONTINUED] lamoTRIgine (LAMICTAL) 25 MG tablet Take 1 tablet daily for 2 weeks, then take 1 tablet twice daily for 2 weeks then take 2 tablets twice daily (Patient not taking: Reported on 2025)  120 tablet 5    [DISCONTINUED] metoprolol succinate (TOPROL-XL) 25 MG 24 hr tablet Take 1 tablet (25 mg total) by mouth once daily. (Patient not taking: Reported on 2025) 4/15/2025: Patient's BP has been running low, dose reduced from 50 mg to 25 mg PRN   90 tablet 3    [DISCONTINUED] zonisamide (ZONEGRAN) 100 MG Cap Take 1 capsule (100 mg total) by mouth 2 (two) times daily. (Patient not taking: Reported on 2025) 2025: Allergic to  sulfa ingrediant in medication 60 capsule 11     Facility-Administered Encounter Medications as of 7/2/2025   Medication Dose Route Frequency Provider Last Rate Last Admin    [DISCONTINUED] GENERIC EXTERNAL MEDICATION     Provider, Generic External Data        [DISCONTINUED] GENERIC EXTERNAL MEDICATION     Provider, Generic External Data        [DISCONTINUED] GENERIC EXTERNAL MEDICATION     Provider, Generic External Data

## 2025-07-08 ENCOUNTER — APPOINTMENT (OUTPATIENT)
Dept: CT IMAGING | Facility: HOSPITAL | Age: 70
End: 2025-07-08
Payer: MEDICARE

## 2025-07-08 ENCOUNTER — HOSPITAL ENCOUNTER (EMERGENCY)
Facility: HOSPITAL | Age: 70
Discharge: HOME | End: 2025-07-08
Attending: FAMILY MEDICINE
Payer: MEDICARE

## 2025-07-08 VITALS
DIASTOLIC BLOOD PRESSURE: 86 MMHG | WEIGHT: 162.2 LBS | HEIGHT: 70 IN | OXYGEN SATURATION: 99 % | TEMPERATURE: 98.5 F | RESPIRATION RATE: 8 BRPM | SYSTOLIC BLOOD PRESSURE: 139 MMHG | BODY MASS INDEX: 23.22 KG/M2 | HEART RATE: 57 BPM

## 2025-07-08 DIAGNOSIS — G40.909 SEIZURE DISORDER (CMS/HCC): Primary | ICD-10-CM

## 2025-07-08 DIAGNOSIS — E86.1 FLUID VOLUME DEFICIT: ICD-10-CM

## 2025-07-08 LAB
ALBUMIN SERPL-MCNC: 4.7 G/DL (ref 3.5–5.3)
ALP SERPL-CCNC: 52 U/L (ref 45–115)
ALT SERPL-CCNC: 11 U/L (ref 7–52)
ANION GAP SERPL CALC-SCNC: 9 MMOL/L (ref 3–11)
AST SERPL-CCNC: 12 U/L
BASOPHILS # BLD AUTO: 0.04 10*3/UL
BASOPHILS NFR BLD AUTO: 0.5 % (ref 0–2)
BILIRUB SERPL-MCNC: 0.64 MG/DL (ref 0.2–1.4)
BUN SERPL-MCNC: 21 MG/DL (ref 7–25)
CALCIUM ALBUM COR SERPL-MCNC: 8.9 MG/DL (ref 8.6–10.3)
CALCIUM SERPL-MCNC: 9.5 MG/DL (ref 8.6–10.3)
CHLORIDE SERPL-SCNC: 104 MMOL/L (ref 98–107)
CO2 SERPL-SCNC: 27 MMOL/L (ref 21–32)
CREAT SERPL-MCNC: 1.1 MG/DL (ref 0.7–1.3)
EGFRCR SERPLBLD CKD-EPI 2021: 73 ML/MIN/1.73M*2
EOSINOPHIL # BLD AUTO: 0.07 10*3/UL
EOSINOPHIL NFR BLD AUTO: 0.9 % (ref 0–3)
ERYTHROCYTE DISTRIBUTION WIDTH STANDARD DEVIATION: 42.8 FL (ref 35.1–43.9)
ERYTHROCYTE [DISTWIDTH] IN BLOOD BY AUTOMATED COUNT: 13.1 % (ref 11.5–15)
GLUCOSE BLDC GLUCOMTR-MCNC: 105 MG/DL (ref 70–105)
GLUCOSE SERPL-MCNC: 113 MG/DL (ref 70–105)
HCT VFR BLD AUTO: 42.2 % (ref 38–50)
HGB BLD-MCNC: 13.8 G/DL (ref 13.2–17.2)
IMMATURE GRANULOCYTES (10*3/UL) LEUKOCYTES IN BLOOD BY AUTOMATED COUNT: <0.03 10*3/UL
IMMATURE GRANULOCYTES/100 LEUKOCYTES IN BLOOD BY AUTOMATED COUNT: 0.2 %
LYMPHOCYTES # BLD AUTO: 1.17 10*3/UL
LYMPHOCYTES NFR BLD AUTO: 14.5 % (ref 15–47)
MAGNESIUM SERPL-MCNC: 1.9 MG/DL (ref 1.8–2.4)
MCH RBC QN AUTO: 29.4 PG (ref 29–34)
MCHC RBC AUTO-ENTMCNC: 32.7 G/DL (ref 32–36)
MCV RBC AUTO: 89.8 FL (ref 82–97)
MONOCYTES # BLD AUTO: 0.5 10*3/UL
MONOCYTES NFR BLD AUTO: 6.2 % (ref 5–13)
NEUTROPHILS # BLD AUTO: 6.29 10*3/UL
NEUTROPHILS NFR BLD AUTO: 77.7 % (ref 46–70)
NRBC (10*3/UL) BY AUTOMATED COUNT: 0 10*3/UL (ref 0–0.1)
NRBC BLD-RTO: 0 /100 WBCS (ref 0–2)
PLATELET # BLD AUTO: 241 10*3/UL (ref 130–350)
PMV BLD AUTO: 9.8 FL (ref 6.9–10.8)
POTASSIUM SERPL-SCNC: 4 MMOL/L (ref 3.5–5.1)
PROT SERPL-MCNC: 7.1 G/DL (ref 6–8.3)
RBC # BLD AUTO: 4.7 10*6/UL (ref 4.1–5.8)
SODIUM SERPL-SCNC: 140 MMOL/L (ref 135–145)
TROPONIN I SERPL-MCNC: 7.2 PG/ML
WBC # BLD AUTO: 8.1 10*3/UL (ref 3.7–9.6)

## 2025-07-08 PROCEDURE — 84484 ASSAY OF TROPONIN QUANT: CPT | Performed by: FAMILY MEDICINE

## 2025-07-08 PROCEDURE — 83735 ASSAY OF MAGNESIUM: CPT | Performed by: FAMILY MEDICINE

## 2025-07-08 PROCEDURE — 2580000300 HC RX 258: Performed by: FAMILY MEDICINE

## 2025-07-08 PROCEDURE — 70450 CT HEAD/BRAIN W/O DYE: CPT

## 2025-07-08 PROCEDURE — 96374 THER/PROPH/DIAG INJ IV PUSH: CPT

## 2025-07-08 PROCEDURE — 82947 ASSAY GLUCOSE BLOOD QUANT: CPT | Mod: QW,91

## 2025-07-08 PROCEDURE — 99284 EMERGENCY DEPT VISIT MOD MDM: CPT | Mod: 25 | Performed by: FAMILY MEDICINE

## 2025-07-08 PROCEDURE — 80053 COMPREHEN METABOLIC PANEL: CPT | Performed by: FAMILY MEDICINE

## 2025-07-08 PROCEDURE — 99284 EMERGENCY DEPT VISIT MOD MDM: CPT | Mod: GF,25 | Performed by: FAMILY MEDICINE

## 2025-07-08 PROCEDURE — 6360000200 HC RX 636 W HCPCS (ALT 250 FOR IP): Performed by: FAMILY MEDICINE

## 2025-07-08 PROCEDURE — 96375 TX/PRO/DX INJ NEW DRUG ADDON: CPT

## 2025-07-08 PROCEDURE — 93010 ELECTROCARDIOGRAM REPORT: CPT | Performed by: INTERNAL MEDICINE

## 2025-07-08 PROCEDURE — 85025 COMPLETE CBC W/AUTO DIFF WBC: CPT | Performed by: FAMILY MEDICINE

## 2025-07-08 PROCEDURE — 93005 ELECTROCARDIOGRAM TRACING: CPT | Performed by: FAMILY MEDICINE

## 2025-07-08 PROCEDURE — 36415 COLL VENOUS BLD VENIPUNCTURE: CPT | Performed by: FAMILY MEDICINE

## 2025-07-08 RX ORDER — SODIUM CHLORIDE 9 MG/ML
150 INJECTION, SOLUTION INTRAVENOUS CONTINUOUS
Status: DISCONTINUED | OUTPATIENT
Start: 2025-07-08 | End: 2025-07-08 | Stop reason: HOSPADM

## 2025-07-08 RX ORDER — LORAZEPAM 2 MG/ML
1 INJECTION INTRAMUSCULAR ONCE
Status: COMPLETED | OUTPATIENT
Start: 2025-07-08 | End: 2025-07-08

## 2025-07-08 RX ORDER — LEVETIRACETAM 500 MG/5ML
1500 INJECTION, SOLUTION, CONCENTRATE INTRAVENOUS ONCE
Status: COMPLETED | OUTPATIENT
Start: 2025-07-08 | End: 2025-07-08

## 2025-07-08 RX ORDER — LACOSAMIDE 150 MG/1
150 TABLET ORAL 2 TIMES DAILY
Qty: 60 TABLET | Refills: 0 | Status: SHIPPED | OUTPATIENT
Start: 2025-07-08 | End: 2025-08-07

## 2025-07-08 RX ADMIN — SODIUM CHLORIDE 150 ML/HR: 9 INJECTION, SOLUTION INTRAVENOUS at 13:40

## 2025-07-08 RX ADMIN — LEVETIRACETAM 1500 MG: 100 INJECTION, SOLUTION INTRAVENOUS at 13:29

## 2025-07-08 RX ADMIN — LORAZEPAM 1 MG: 2 INJECTION INTRAMUSCULAR; INTRAVENOUS at 13:27

## 2025-07-08 ASSESSMENT — ENCOUNTER SYMPTOMS
DYSURIA: 0
ABDOMINAL PAIN: 0
CHILLS: 0
BRUISES/BLEEDS EASILY: 0
SHORTNESS OF BREATH: 0
DIARRHEA: 1
MYALGIAS: 0
WHEEZING: 0
DIZZINESS: 0
ARTHRALGIAS: 1
BACK PAIN: 0
NECK PAIN: 1
SORE THROAT: 0
SPEECH DIFFICULTY: 1
PALPITATIONS: 0
FEVER: 0
COLOR CHANGE: 0
FATIGUE: 1
NAUSEA: 0
EYE PAIN: 0
SINUS PAIN: 0
RHINORRHEA: 0
SEIZURES: 1
HEMATURIA: 0
HEADACHES: 0
ABDOMINAL DISTENTION: 0
COUGH: 0
WEAKNESS: 1
LIGHT-HEADEDNESS: 0
CONFUSION: 0
NERVOUS/ANXIOUS: 0
VOMITING: 0

## 2025-07-08 NOTE — DISCHARGE INSTRUCTIONS
Medications:    1. Increase Vimpat to 150 mg by mouth: 1 tablet  every AM and every PM. Stop the 100 mg tablets.     2. Continue all home medications.    Non-medications:    Rest    2.   Drink plenty of fluids.    3.  Eat 3 healthy meals per day.    Follow-up:    1. Follow-up with your neurologist as scheduled.    2. Follow-up in the Emergency Department if you develop recurrent seizures, inability to keep down food or fluids, chest pain, or any other concerns.    3.  Please do not hesitate to call an ambulance if seizures return and come to the hospital.

## 2025-07-08 NOTE — ED PROVIDER NOTES
SUBJECTIVE    HPI:  Chief Complaint   Patient presents with    Seizures       70-year-old  male with a PMH significant for seizure disorder, CVA who presents to the emergency department POV with his spouse with reports of a seizure x 1.  The patient is visiting the local area with his spouse from [place].  Reports they were walking down the street today and the patient reported that he did not well slipped and went back on her home and fell.  The spouse reports that he had a dazed appearance and then did have some shaking in his arms and trouble speaking which is exactly how his seizures have been in the past.  Reports that he was on Keppra and then changed to a different medication and now on Vimpat 100 mg twice a day.  Neurology did give approval to travel out of the state.  Has been to the ER requiring medications in the past related to seizures including Keppra.  Was on oral Keppra but was giving him suicidal    The patient denies any complaints including no shortness of breath, chest pain, racing heart rate, or abdominal pain.  Wife does report that he had a looser stool earlier today.    Does report right shoulder discomfort stemming to his neck which is    Denies alcohol use, tobacco use, marijuana use, or any other      History provided by:  Patient, medical records and spouse   used: No        HISTORY:  Past Medical History:   Diagnosis Date    Seizures (CMS/[facility])        History reviewed. No pertinent surgical history.    History reviewed. No pertinent family history.         Current Outpatient Medications:     lacosamide (Vimpat) 150 mg tablet, Take 1 tablet (150 mg total) by mouth 2 times a day Max Daily Amount: 300 mg, Disp: 60 tablet, Rfl: 0      ROS:  Review of Systems   Constitutional:  Positive for fatigue. Negative for chills and fever.   HENT:  Negative for congestion, ear pain, rhinorrhea, sinus pain and sore throat.    Eyes:  Negative for pain and visual  disturbance.   Respiratory:  Negative for cough, shortness of breath and wheezing.    Cardiovascular:  Negative for chest pain, palpitations and leg swelling.   Gastrointestinal:  Positive for diarrhea. Negative for abdominal distention, abdominal pain, nausea and vomiting.   Genitourinary:  Negative for dysuria and hematuria.   Musculoskeletal:  Positive for arthralgias and neck pain. Negative for back pain and myalgias.   Skin:  Negative for color change and rash.   Neurological:  Positive for seizures, speech difficulty and weakness. Negative for dizziness, syncope, light-headedness and headaches.   Hematological:  Does not bruise/bleed easily.   Psychiatric/Behavioral:  Negative for confusion. The patient is not nervous/anxious.    All other systems reviewed and are negative.      OBJECTIVE    Vital Signs  ED Triage Vitals   Temp Heart Rate Resp BP SpO2   07/08/25 1412 07/08/25 1400 07/08/25 1400 07/08/25 1400 07/08/25 1400   36.9 °C (98.5 °F) 58 15 159/92 (!) 86 %      Mean BP (mmHg) Temp Source Heart Rate Source Patient Position BP Location   07/08/25 1400 07/08/25 1412 -- -- --   116 Oral         FiO2 (%)       --                  Physical Exam  Vitals and nursing note reviewed.   Constitutional:       General: He is not in acute distress.     Appearance: He is well-developed and normal weight. He is ill-appearing. He is not toxic-appearing or diaphoretic.   HENT:      Head: Normocephalic and atraumatic.      Right Ear: Tympanic membrane and ear canal normal.      Left Ear: Tympanic membrane and ear canal normal.      Nose: Nose normal. No congestion or rhinorrhea.      Mouth/Throat:      Mouth: Mucous membranes are dry.      Pharynx: No oropharyngeal exudate or posterior oropharyngeal erythema.   Eyes:      General: No visual field deficit or scleral icterus.     Extraocular Movements: Extraocular movements intact.      Conjunctiva/sclera: Conjunctivae normal.      Pupils: Pupils are equal, round, and  reactive to light.   Cardiovascular:      Rate and Rhythm: Normal rate and regular rhythm.      Pulses: Normal pulses.      Heart sounds: Normal heart sounds. No murmur heard.     No gallop.   Pulmonary:      Effort: Pulmonary effort is normal. No respiratory distress.      Breath sounds: Normal breath sounds. No stridor. No wheezing, rhonchi or rales.   Abdominal:      General: Bowel sounds are normal. There is no distension.      Palpations: Abdomen is soft.      Tenderness: There is no abdominal tenderness. There is no guarding.   Musculoskeletal:         General: Tenderness present. No swelling.      Cervical back: Neck supple. Tenderness present.   Skin:     General: Skin is warm and dry.      Capillary Refill: Capillary refill takes less than 2 seconds.      Coloration: Skin is not pale.      Findings: No erythema or rash.   Neurological:      General: No focal deficit present.      Mental Status: He is alert and oriented to person, place, and time.      GCS: GCS eye subscore is 4. GCS verbal subscore is 5. GCS motor subscore is 6.      Cranial Nerves: Dysarthria present. No cranial nerve deficit or facial asymmetry.      Sensory: Sensation is intact. No sensory deficit.      Motor: Weakness and seizure activity present. No tremor, atrophy, abnormal muscle tone or pronator drift.      Coordination: Coordination is intact. Coordination normal.      Gait: Gait abnormal.      Deep Tendon Reflexes: Reflexes normal.   Psychiatric:         Attention and Perception: Attention and perception normal.         Mood and Affect: Mood and affect normal.         Speech: Speech normal.         Behavior: Behavior normal. Behavior is cooperative.         Thought Content: Thought content normal. Thought content does not include homicidal or suicidal ideation.         Cognition and Memory: Cognition and memory normal. Cognition is not impaired. Memory is not impaired.         Judgment: Judgment normal. Judgment is not impulsive  or inappropriate.         ED LABS:  Labs Reviewed   CBC WITH AUTO DIFFERENTIAL - Abnormal       Result Value    WBC 8.1      RBC 4.70      Hemoglobin 13.8      Hematocrit 42.2      MCV 89.8      MCH 29.4      MCHC 32.7      RDW-SD 42.8      RDW 13.1      Platelets 241      MPV 9.8      Neutrophils% 77.7 (*)     Lymphocytes% 14.5 (*)     Monocytes% 6.2      Eosinophils% 0.9      Basophils% 0.5      Immature Granulocyte% 0.2      ANC (auto diff) 6.29      Lymphocytes Absolute 1.17      Monocytes Absolute 0.50      Eosinophils Absolute 0.07      Basophils Absolute 0.04      Immature Granulocytes Absolute <0.03      nRBC 0      nRBC Absolute 0.0     COMPREHENSIVE METABOLIC PANEL - Abnormal    Sodium 140      Potassium 4.0      Chloride 104      CO2 27      Anion Gap 9      BUN 21      Creatinine 1.10      Glucose 113 (*)     Calcium 9.5      AST 12      ALT (SGPT) 11      Alkaline Phosphatase 52      Total Protein 7.1      Albumin 4.7      Total Bilirubin 0.64      Corrected Calcium 8.9      eGFR 73      Narrative:     eGFR calculation based on the 2021 Chronic Kidney Disease Epidemiology Collaboration (CKD-EPI) equation refit without adjustment for race.   MAGNESIUM - Normal    Magnesium 1.9     HIGH SENSITIVE TROPONIN I - Normal    hsTnI 0 Hour 7.2     POCT GLUCOSE RESULTS ONLY - Normal    POC Glucose 105     HIGH SENSITIVE TROPONIN I PANEL (0HR, 1HR, 2HR)    Narrative:     The following orders were created for panel order HS Troponin I Panel (0HR, 1HR, 2HR) Blood, Venous.  Procedure                               Abnormality         Status                     ---------                               -----------         ------                     HS Troponin I[[id number]]                Normal              Final result               1HR High Sensitive Trop I[[id number]]                                                   2HR High Sensitive Tropo...[[id number]]                                                   Please view  results for these tests on the individual orders.         ED IMAGES:  CT head without IV contrast   Final Result   IMPRESSION:      1.  No CT evidence of an acute intracranial process.   2.  Old moderate sized left posterior parietal infarct.             ED COURSE/ASSESSMENT/MDM:  ED Course as of 07/08/25 1458   Tue Jul 08, 2025   1323 Point-of-care glucose on arrival; 105 [CD]   1328 EKG completed and interpreted in the absence of cardiology reveals a sinus rhythm with a rate of 66 with no ST elevation or depression noted; no comparisons available [CD]   1344 CBC reveals no leukocytosis; slight elevation in neutrophil count [CD]   1351 Magnesium within normal limits 1.9    CMP reveals no significant abnormality [CD]   1353 Troponin negative; 7.2 [CD]   1400 CT personally interpreted reveals no  Shift, mass, or acute process; noted previous infarct to the posterior [CD]   1422 CT read by outside provider reveals the following:    IMPRESSION:     1.  No CT evidence of an acute intracranial process.  2.  Old moderate sized left posterior parietal infarct.      [CD]      ED Course User Index  [CD] Sol Duncan, CNP          The patient was promptly roomed in the emergency department by nursing staff.      Vital signs obtained and within normal limits; afebrile.      Bedside glucose within normal limits.     Physical examination and subjective interview completed with difficulty due to garbled speech.  History obtained from spouse and then with patient.    Previous medical and surgical history reviewed verbally with spouse.      No red flag warnings identified but concern for CVA versus seizures.  Spouse reports that activity is consistent with his previous seizures.    EKG complete interpreted the absence of cardiology reveals no acute abnormality.  No comparisons available.     Differential Dx include CVA, electrolyte imbalance, ACS, and seizures.    The patient was provided with lorazepam 1 mg IV x 1 and  Keppra 1500 mg IV x 1.    Laboratory studies ordered, reviewed, and outlined in the ED Course.     Radiological studies ordered, reviewed, read by outside provider, L&D course.    Reassessment of patient completed.  No additional seizure activity noted.  Patient denies pain.    Prescription drug management considerations: Will increase Vimpat to 150 mg 2 times per day and have close follow-up with neurology.    Diagnosis and treatment was not significantly limited by social determinants of health.     Patient stable/improved prior to discharge from the emergency department. Able to transport to vehicle without difficulty.    Shared-Decision Making completed with the patient and wife who agree with the following plan of care including discharge from the ED and recommended follow-up plan.    PLAN/FOLLOW-UP:    Medications:    1. Increase Vimpat to 150 mg by mouth: 1 tablet  every AM and every PM. Stop the 100 mg tablets.     2. Continue all home medications.    Non-medications:    Rest    2.   Drink plenty of fluids.    3.  Eat 3 healthy meals per day.    Follow-up:    1. Follow-up with your neurologist as scheduled.    2. Follow-up in the Emergency Department if you develop recurrent seizures, inability to keep down food or fluids, chest pain, or any other concerns.    3.  Please do not hesitate to call an ambulance if seizures return and come to the hospital.      MDM:  Medical Decision Making  Problems Addressed:  Fluid volume deficit: self-limited or minor problem  Seizure disorder (CMS/HCC): chronic illness or injury with exacerbation, progression, or side effects of treatment    Amount and/or Complexity of Data Reviewed  Labs: ordered. Decision-making details documented in ED Course.  Radiology: ordered and independent interpretation performed. Decision-making details documented in ED Course.  ECG/medicine tests: ordered and independent interpretation performed. Decision-making details documented in ED  Course.    Risk  OTC drugs.  Prescription drug management.        Final diagnoses:   [G40.909] Seizure disorder (CMS/Formerly [facility])   [E86.1] Fluid volume deficit          A voice recognition program was used to aid in documentation of this record. Sometimes words are not printed exactly as they were spoken. While efforts were made to carefully edit and correct any inaccuracies, some errors may be present. Please take these into context.  Please contact the provider if errors are identified.                            Sol Duncan, CNP  07/08/25 [id number]

## 2025-08-01 ENCOUNTER — PATIENT MESSAGE (OUTPATIENT)
Dept: CARDIOLOGY | Facility: CLINIC | Age: 70
End: 2025-08-01
Payer: MEDICARE

## 2025-08-06 ENCOUNTER — PATIENT MESSAGE (OUTPATIENT)
Facility: HOSPITAL | Age: 70
End: 2025-08-06
Payer: MEDICARE

## 2025-08-13 ENCOUNTER — OFFICE VISIT (OUTPATIENT)
Dept: FAMILY MEDICINE | Facility: CLINIC | Age: 70
End: 2025-08-13
Payer: MEDICARE

## 2025-08-13 VITALS
HEART RATE: 65 BPM | WEIGHT: 164 LBS | HEIGHT: 70 IN | SYSTOLIC BLOOD PRESSURE: 170 MMHG | OXYGEN SATURATION: 97 % | BODY MASS INDEX: 23.48 KG/M2 | DIASTOLIC BLOOD PRESSURE: 88 MMHG | TEMPERATURE: 98 F

## 2025-08-13 DIAGNOSIS — Z87.19 H/O: GI BLEED: ICD-10-CM

## 2025-08-13 DIAGNOSIS — F41.9 ANXIETY: ICD-10-CM

## 2025-08-13 DIAGNOSIS — Z98.890 S/P MITRAL VALVE REPAIR: ICD-10-CM

## 2025-08-13 DIAGNOSIS — Z86.73 HISTORY OF CVA (CEREBROVASCULAR ACCIDENT): ICD-10-CM

## 2025-08-13 DIAGNOSIS — R56.9 SEIZURE: Primary | ICD-10-CM

## 2025-08-13 DIAGNOSIS — I10 ESSENTIAL HYPERTENSION: ICD-10-CM

## 2025-08-13 DIAGNOSIS — I48.0 PAROXYSMAL ATRIAL FIBRILLATION: ICD-10-CM

## 2025-08-13 DIAGNOSIS — K21.9 GASTROESOPHAGEAL REFLUX DISEASE, UNSPECIFIED WHETHER ESOPHAGITIS PRESENT: ICD-10-CM

## 2025-08-13 PROCEDURE — 99215 OFFICE O/P EST HI 40 MIN: CPT | Mod: ,,, | Performed by: INTERNAL MEDICINE

## 2025-08-13 RX ORDER — HYDROXYZINE PAMOATE 25 MG/1
25 CAPSULE ORAL EVERY 8 HOURS PRN
Qty: 30 CAPSULE | Refills: 1 | Status: SHIPPED | OUTPATIENT
Start: 2025-08-13 | End: 2025-08-14

## 2025-08-14 ENCOUNTER — OFFICE VISIT (OUTPATIENT)
Dept: BEHAVIORAL HEALTH | Facility: CLINIC | Age: 70
End: 2025-08-14
Payer: MEDICARE

## 2025-08-14 VITALS
TEMPERATURE: 98 F | OXYGEN SATURATION: 98 % | DIASTOLIC BLOOD PRESSURE: 80 MMHG | HEIGHT: 70 IN | BODY MASS INDEX: 23.77 KG/M2 | SYSTOLIC BLOOD PRESSURE: 180 MMHG | WEIGHT: 166 LBS | RESPIRATION RATE: 18 BRPM | HEART RATE: 62 BPM

## 2025-08-14 DIAGNOSIS — F41.9 ANXIETY: ICD-10-CM

## 2025-08-14 DIAGNOSIS — F41.0 PANIC DISORDER: Primary | ICD-10-CM

## 2025-08-14 DIAGNOSIS — I10 ESSENTIAL HYPERTENSION: ICD-10-CM

## 2025-08-14 DIAGNOSIS — Z79.899 LONG TERM USE OF DRUG: ICD-10-CM

## 2025-08-14 LAB

## 2025-08-14 RX ORDER — ALPRAZOLAM 0.25 MG/1
TABLET ORAL
Qty: 40 TABLET | Refills: 0 | Status: SHIPPED | OUTPATIENT
Start: 2025-08-14

## 2025-08-14 RX ORDER — ALPRAZOLAM 0.25 MG/1
0.25 TABLET ORAL 3 TIMES DAILY
Qty: 40 TABLET | Refills: 0 | Status: SHIPPED | OUTPATIENT
Start: 2025-08-14 | End: 2025-08-14

## 2025-08-15 ENCOUNTER — PATIENT OUTREACH (OUTPATIENT)
Facility: HOSPITAL | Age: 70
End: 2025-08-15
Payer: MEDICARE

## 2025-08-18 ENCOUNTER — HOSPITAL ENCOUNTER (OUTPATIENT)
Dept: CARDIOLOGY | Facility: HOSPITAL | Age: 70
Discharge: HOME | End: 2025-08-18
Attending: INTERNAL MEDICINE
Payer: MEDICARE

## 2025-08-18 ENCOUNTER — OFFICE VISIT (OUTPATIENT)
Dept: CARDIOLOGY | Facility: CLINIC | Age: 70
End: 2025-08-18
Payer: MEDICARE

## 2025-08-18 VITALS
WEIGHT: 164 LBS | BODY MASS INDEX: 23.48 KG/M2 | HEIGHT: 70 IN | SYSTOLIC BLOOD PRESSURE: 154 MMHG | HEART RATE: 65 BPM | DIASTOLIC BLOOD PRESSURE: 96 MMHG | OXYGEN SATURATION: 96 %

## 2025-08-18 DIAGNOSIS — Z86.73 HISTORY OF CVA (CEREBROVASCULAR ACCIDENT): ICD-10-CM

## 2025-08-18 DIAGNOSIS — R56.9 SEIZURE: ICD-10-CM

## 2025-08-18 DIAGNOSIS — Z87.19 H/O: GI BLEED: ICD-10-CM

## 2025-08-18 DIAGNOSIS — I48.0 PAROXYSMAL ATRIAL FIBRILLATION: ICD-10-CM

## 2025-08-18 DIAGNOSIS — Z98.890 S/P MITRAL VALVE REPAIR: ICD-10-CM

## 2025-08-18 DIAGNOSIS — I48.0 PAROXYSMAL ATRIAL FIBRILLATION: Primary | ICD-10-CM

## 2025-08-18 DIAGNOSIS — I25.10 CORONARY ARTERY DISEASE INVOLVING NATIVE CORONARY ARTERY OF NATIVE HEART WITHOUT ANGINA PECTORIS: ICD-10-CM

## 2025-08-18 PROBLEM — Z79.899 LONG TERM USE OF DRUG: Status: RESOLVED | Noted: 2025-08-14 | Resolved: 2025-08-18

## 2025-08-18 LAB — OHS CV CPX PATIENT HEIGHT IN: 70

## 2025-08-18 PROCEDURE — 93246 EXT ECG>7D<15D RECORDING: CPT

## 2025-08-18 PROCEDURE — 99999 PR PBB SHADOW E&M-EST. PATIENT-LVL IV: CPT | Mod: PBBFAC,,, | Performed by: INTERNAL MEDICINE

## 2025-08-18 PROCEDURE — 99214 OFFICE O/P EST MOD 30 MIN: CPT | Mod: S$PBB,,, | Performed by: INTERNAL MEDICINE

## 2025-08-18 PROCEDURE — 99214 OFFICE O/P EST MOD 30 MIN: CPT | Mod: PBBFAC,25 | Performed by: INTERNAL MEDICINE

## 2025-08-18 RX ORDER — AMLODIPINE BESYLATE 5 MG/1
5 TABLET ORAL DAILY
COMMUNITY

## 2025-09-04 ENCOUNTER — CLINICAL SUPPORT (OUTPATIENT)
Dept: CARDIOLOGY | Facility: CLINIC | Age: 70
End: 2025-09-04
Payer: MEDICARE

## 2025-09-04 DIAGNOSIS — I10 ESSENTIAL HYPERTENSION: Primary | ICD-10-CM

## 2025-09-04 PROCEDURE — 99499 UNLISTED E&M SERVICE: CPT | Mod: S$PBB,,, | Performed by: INTERNAL MEDICINE

## 2025-09-04 PROCEDURE — 99211 OFF/OP EST MAY X REQ PHY/QHP: CPT | Mod: PBBFAC

## 2025-09-04 PROCEDURE — 99999 PR PBB SHADOW E&M-EST. PATIENT-LVL I: CPT | Mod: PBBFAC,,,

## 2025-09-05 ENCOUNTER — TELEPHONE (OUTPATIENT)
Dept: CARDIOLOGY | Facility: CLINIC | Age: 70
End: 2025-09-05
Payer: MEDICARE

## (undated) DEVICE — GLOVE SENSICARE PI SLT 6.5

## (undated) DEVICE — DRESSING TRANS 4X4 TEGADERM

## (undated) DEVICE — SHEATH INTRODUCER 6FR 11CM

## (undated) DEVICE — PROTECTOR ULNAR NERVE FOAM

## (undated) DEVICE — GUIDEWIRE ZIPWIRE STR .038 150

## (undated) DEVICE — TRAY CATH 1-LYR URIMTR 16FR

## (undated) DEVICE — LEADWIRE DL DC EKG 10 PIN

## (undated) DEVICE — GLOVE SENSICARE PI SURG 7

## (undated) DEVICE — LINER SEMI-RIGID 1500CC

## (undated) DEVICE — SET EXT CATH NONDEHP .8 6.5IN

## (undated) DEVICE — ETCO2 NC MICROSTR FEM ST ADLT

## (undated) DEVICE — CANISTER SUCTION JUMBO 12L

## (undated) DEVICE — CONTRAST ISOVUE 370 100ML

## (undated) DEVICE — SET IV PRIMARY

## (undated) DEVICE — GOWN NONREINF SET-IN SLV 2XL

## (undated) DEVICE — SYR 50ML CATH TIP

## (undated) DEVICE — CLIPPER BLADE MOD 4406 (CAREF)

## (undated) DEVICE — CONTAINER SPECIMEN OR STER 4OZ

## (undated) DEVICE — GLOVE SENSICARE PI SURG 8

## (undated) DEVICE — CHLORAPREP 10.5 ML APPLICATOR

## (undated) DEVICE — TIP YANKAUERS BULB NO VENT

## (undated) DEVICE — CATH DIAG IMPULSE 6FR FR4

## (undated) DEVICE — CATH IMPULSE D6F PIG 5PK

## (undated) DEVICE — CUFF FLEXIPORT BP LONG ADULT

## (undated) DEVICE — SOL NORMAL USPCA 0.9%

## (undated) DEVICE — Device

## (undated) DEVICE — CATH SWAN GANZ STND 7FR

## (undated) DEVICE — KIT IV START WITH PREVANTICS

## (undated) DEVICE — SYR 30CC LUER LOCK

## (undated) DEVICE — PAD RADIOLUCENT STAT ADULT

## (undated) DEVICE — SOL NACL IRR 1000ML BTL

## (undated) DEVICE — SET CYSTO IRR DRP CHMBR 84IN

## (undated) DEVICE — CATH IV 18G 1.16 IN AUTOGARD

## (undated) DEVICE — BOWL STERILE LARGE 32OZ

## (undated) DEVICE — CATH URTRL OPEN END STR TIP 5F

## (undated) DEVICE — KIT LITHOTOMY RUSH

## (undated) DEVICE — DECANTER FLUID TRNSF WHITE 9IN

## (undated) DEVICE — SET EXTENSION CLEARLINK 2INJ

## (undated) DEVICE — SYR MED RAD 150ML

## (undated) DEVICE — CATH DIAG IMPULSE 6FR FL4

## (undated) DEVICE — FIBER LASER PULSE ID 2.6 365

## (undated) DEVICE — OXISENSOR ADULT DIGIT N/S

## (undated) DEVICE — SHEATH INTRODUCER 7FR 11CM

## (undated) DEVICE — DRAPE HALF SURGICAL 40X58IN

## (undated) DEVICE — INTRODUCER KIT MICRO 4FR

## (undated) DEVICE — GLOVE SENSICARE PI SURG 7.5

## (undated) DEVICE — EXTRACTOR TIPLESS 2.4FRX1115CM

## (undated) DEVICE — GUIDE WIRE SENSOR .035 150

## (undated) DEVICE — TUBE SUCTION MEDI-VAC STERILE